# Patient Record
Sex: MALE | Race: WHITE | NOT HISPANIC OR LATINO | Employment: FULL TIME | URBAN - NONMETROPOLITAN AREA
[De-identification: names, ages, dates, MRNs, and addresses within clinical notes are randomized per-mention and may not be internally consistent; named-entity substitution may affect disease eponyms.]

---

## 2017-02-07 ENCOUNTER — OFFICE VISIT (OUTPATIENT)
Dept: RETAIL CLINIC | Facility: CLINIC | Age: 52
End: 2017-02-07

## 2017-02-07 VITALS
HEIGHT: 65 IN | SYSTOLIC BLOOD PRESSURE: 130 MMHG | RESPIRATION RATE: 16 BRPM | WEIGHT: 265.8 LBS | HEART RATE: 78 BPM | DIASTOLIC BLOOD PRESSURE: 82 MMHG | BODY MASS INDEX: 44.28 KG/M2 | TEMPERATURE: 98.4 F | OXYGEN SATURATION: 97 %

## 2017-02-07 DIAGNOSIS — R68.89 FLU-LIKE SYMPTOMS: Primary | ICD-10-CM

## 2017-02-07 LAB
EXPIRATION DATE: NORMAL
FLUAV AG NPH QL: NEGATIVE
FLUBV AG NPH QL: NEGATIVE
INTERNAL CONTROL: NORMAL
Lab: NORMAL

## 2017-02-07 PROCEDURE — 99202 OFFICE O/P NEW SF 15 MIN: CPT | Performed by: NURSE PRACTITIONER

## 2017-02-07 PROCEDURE — 87804 INFLUENZA ASSAY W/OPTIC: CPT | Performed by: NURSE PRACTITIONER

## 2017-02-07 RX ORDER — OMEPRAZOLE 20 MG/1
1 CAPSULE, DELAYED RELEASE ORAL DAILY
COMMUNITY
Start: 2016-02-19

## 2017-02-07 RX ORDER — ROSUVASTATIN CALCIUM 10 MG/1
1 TABLET, COATED ORAL DAILY
COMMUNITY
End: 2018-11-17

## 2017-02-07 RX ORDER — LEVOTHYROXINE SODIUM 0.05 MG/1
1 TABLET ORAL DAILY
COMMUNITY

## 2017-02-07 RX ORDER — GABAPENTIN 100 MG/1
100 CAPSULE ORAL 2 TIMES DAILY
COMMUNITY
End: 2021-03-15

## 2017-02-07 RX ORDER — METOPROLOL SUCCINATE 100 MG/1
1 TABLET, EXTENDED RELEASE ORAL 2 TIMES DAILY
COMMUNITY

## 2017-02-07 RX ORDER — OSELTAMIVIR PHOSPHATE 75 MG/1
75 CAPSULE ORAL 2 TIMES DAILY
Qty: 10 CAPSULE | Refills: 0 | Status: SHIPPED | OUTPATIENT
Start: 2017-02-07 | End: 2017-02-12

## 2017-02-07 NOTE — PROGRESS NOTES
Subjective   Shankar Velasco is a 51 y.o. male.     History of Present Illness   Patient presents today with complaints of fever, chills, and body aches.  Patient states symptoms began yesterday and are still present today.  He has been fatigued.  He also has some nasal congestion.  No cough or SOA.  No sore throat.  He states there have been a lot of people out sick at work, so he is unsure if he has been exposed to the flu.    The following portions of the patient's history were reviewed and updated as appropriate: allergies, current medications, past family history, past medical history, past social history, past surgical history and problem list.    Review of Systems   Constitutional: Positive for chills, fatigue and fever.   HENT: Positive for congestion and postnasal drip. Negative for rhinorrhea, sinus pressure and sore throat.    Eyes: Negative.    Respiratory: Negative.    Musculoskeletal:        Body aches     Neurological: Positive for weakness and headaches.       Objective   Physical Exam   Constitutional: He is oriented to person, place, and time. He appears well-developed and well-nourished. He appears ill.   HENT:   Right Ear: Ear canal normal. Tympanic membrane is bulging.   Left Ear: Ear canal normal. Tympanic membrane is bulging.   Nose: Nose normal.   Mouth/Throat: Uvula is midline, oropharynx is clear and moist and mucous membranes are normal. Tonsils are 0 on the right. Tonsils are 0 on the left.   Cardiovascular: Regular rhythm and normal heart sounds.    Pulmonary/Chest: Effort normal and breath sounds normal.   Lymphadenopathy:     He has no cervical adenopathy.   Neurological: He is alert and oriented to person, place, and time.   Skin: Skin is warm and dry. There is pallor.   Vitals reviewed.      Assessment/Plan   Shankar was seen today for nasal congestion, fever and generalized body aches.    Diagnoses and all orders for this visit:    Flu-like symptoms  -     POC Influenza A / B  -      oseltamivir (TAMIFLU) 75 MG capsule; Take 1 capsule by mouth 2 (Two) Times a Day for 5 days.      Will treat patient based on symptoms.  If symptoms are not improving in next 48-72 hours, follow up with PCP.

## 2017-03-03 RX ORDER — LEVOTHYROXINE SODIUM 0.05 MG/1
50 TABLET ORAL DAILY
Qty: 30 TABLET | Refills: 3 | Status: SHIPPED | OUTPATIENT
Start: 2017-03-03 | End: 2017-04-19 | Stop reason: SDUPTHER

## 2017-03-03 RX ORDER — OMEPRAZOLE 20 MG/1
20 CAPSULE, DELAYED RELEASE ORAL DAILY
Qty: 90 CAPSULE | Refills: 3 | Status: SHIPPED | OUTPATIENT
Start: 2017-03-03 | End: 2017-04-19 | Stop reason: SDUPTHER

## 2017-03-03 RX ORDER — ROSUVASTATIN CALCIUM 40 MG/1
40 TABLET, COATED ORAL DAILY
Qty: 30 TABLET | Refills: 3 | Status: SHIPPED | OUTPATIENT
Start: 2017-03-03 | End: 2017-04-19

## 2017-03-20 RX ORDER — PRAVASTATIN SODIUM 40 MG
TABLET ORAL
Qty: 30 TABLET | Refills: 0 | Status: SHIPPED | OUTPATIENT
Start: 2017-03-20 | End: 2017-04-19

## 2017-03-24 ENCOUNTER — HOSPITAL ENCOUNTER (OUTPATIENT)
Age: 52
Setting detail: OBSERVATION
Discharge: HOME | End: 2017-03-26
Attending: EMERGENCY MEDICINE | Admitting: HOSPITALIST
Payer: COMMERCIAL

## 2017-03-24 ENCOUNTER — APPOINTMENT (OUTPATIENT)
Dept: CT IMAGING | Age: 52
End: 2017-03-24
Payer: COMMERCIAL

## 2017-03-24 ENCOUNTER — APPOINTMENT (OUTPATIENT)
Dept: GENERAL RADIOLOGY | Age: 52
End: 2017-03-24
Payer: COMMERCIAL

## 2017-03-24 DIAGNOSIS — G45.9 TRANSIENT CEREBRAL ISCHEMIA, UNSPECIFIED TYPE: Primary | ICD-10-CM

## 2017-03-24 LAB
ALBUMIN SERPL-MCNC: 4.4 G/DL (ref 3.5–5.2)
ALP BLD-CCNC: 55 U/L (ref 40–130)
ALT SERPL-CCNC: 26 U/L (ref 5–41)
ANION GAP SERPL CALCULATED.3IONS-SCNC: 12 MMOL/L (ref 7–19)
AST SERPL-CCNC: 19 U/L (ref 5–40)
BACTERIA: NEGATIVE /HPF
BASOPHILS ABSOLUTE: 0.1 K/UL (ref 0–0.2)
BASOPHILS RELATIVE PERCENT: 0.6 % (ref 0–1)
BILIRUB SERPL-MCNC: 0.3 MG/DL (ref 0.2–1.2)
BILIRUBIN URINE: NEGATIVE
BLOOD, URINE: ABNORMAL
BUN BLDV-MCNC: 13 MG/DL (ref 6–20)
CALCIUM SERPL-MCNC: 9 MG/DL (ref 8.6–10)
CHLORIDE BLD-SCNC: 104 MMOL/L (ref 98–111)
CLARITY: CLEAR
CO2: 27 MMOL/L (ref 22–29)
COLOR: YELLOW
CREAT SERPL-MCNC: 0.7 MG/DL (ref 0.5–1.2)
EOSINOPHILS ABSOLUTE: 0.1 K/UL (ref 0–0.6)
EOSINOPHILS RELATIVE PERCENT: 0.7 % (ref 0–5)
EPITHELIAL CELLS, UA: 0 /HPF (ref 0–5)
GFR NON-AFRICAN AMERICAN: >60
GLOBULIN: 3 G/DL
GLUCOSE BLD-MCNC: 105 MG/DL (ref 74–109)
GLUCOSE URINE: NEGATIVE MG/DL
HCT VFR BLD CALC: 41.7 % (ref 42–52)
HEMOGLOBIN: 14.3 G/DL (ref 14–18)
HYALINE CASTS: 0 /HPF (ref 0–8)
INR BLD: 1.05 (ref 0.88–1.18)
KETONES, URINE: NEGATIVE MG/DL
LACTIC ACID: 1.1 MG/DL (ref 0.5–1.9)
LEUKOCYTE ESTERASE, URINE: NEGATIVE
LYMPHOCYTES ABSOLUTE: 2.9 K/UL (ref 1.1–4.5)
LYMPHOCYTES RELATIVE PERCENT: 26.3 % (ref 20–40)
MCH RBC QN AUTO: 30.5 PG (ref 27–31)
MCHC RBC AUTO-ENTMCNC: 34.3 G/DL (ref 33–37)
MCV RBC AUTO: 88.9 FL (ref 80–94)
MONOCYTES ABSOLUTE: 0.8 K/UL (ref 0–0.9)
MONOCYTES RELATIVE PERCENT: 7 % (ref 0–10)
NEUTROPHILS ABSOLUTE: 7.1 K/UL (ref 1.5–7.5)
NEUTROPHILS RELATIVE PERCENT: 65.4 % (ref 50–65)
NITRITE, URINE: NEGATIVE
PDW BLD-RTO: 12.9 % (ref 11.5–14.5)
PERFORMED ON: NORMAL
PH UA: 5.5
PLATELET # BLD: 247 K/UL (ref 130–400)
PMV BLD AUTO: 10.9 FL (ref 7.4–10.4)
POC TROPONIN I: 0 NG/ML (ref 0–0.08)
POTASSIUM SERPL-SCNC: 3.7 MMOL/L (ref 3.5–5)
PROTEIN UA: NEGATIVE MG/DL
PROTHROMBIN TIME: 13.7 SEC (ref 12–14.6)
RBC # BLD: 4.69 M/UL (ref 4.7–6.1)
RBC UA: 1 /HPF (ref 0–4)
SODIUM BLD-SCNC: 143 MMOL/L (ref 136–145)
SPECIFIC GRAVITY UA: 1.02
TOTAL PROTEIN: 7.4 G/DL (ref 6.6–8.7)
UROBILINOGEN, URINE: 0.2 E.U./DL
WBC # BLD: 10.8 K/UL (ref 4.8–10.8)
WBC UA: 1 /HPF (ref 0–5)

## 2017-03-24 PROCEDURE — G0378 HOSPITAL OBSERVATION PER HR: HCPCS

## 2017-03-24 PROCEDURE — 6370000000 HC RX 637 (ALT 250 FOR IP): Performed by: HOSPITALIST

## 2017-03-24 PROCEDURE — 85610 PROTHROMBIN TIME: CPT

## 2017-03-24 PROCEDURE — 83605 ASSAY OF LACTIC ACID: CPT

## 2017-03-24 PROCEDURE — 70450 CT HEAD/BRAIN W/O DYE: CPT

## 2017-03-24 PROCEDURE — 99284 EMERGENCY DEPT VISIT MOD MDM: CPT | Performed by: EMERGENCY MEDICINE

## 2017-03-24 PROCEDURE — 36415 COLL VENOUS BLD VENIPUNCTURE: CPT

## 2017-03-24 PROCEDURE — 6370000000 HC RX 637 (ALT 250 FOR IP): Performed by: EMERGENCY MEDICINE

## 2017-03-24 PROCEDURE — 85025 COMPLETE CBC W/AUTO DIFF WBC: CPT

## 2017-03-24 PROCEDURE — 93005 ELECTROCARDIOGRAM TRACING: CPT

## 2017-03-24 PROCEDURE — 84484 ASSAY OF TROPONIN QUANT: CPT

## 2017-03-24 PROCEDURE — 80053 COMPREHEN METABOLIC PANEL: CPT

## 2017-03-24 PROCEDURE — 71010 XR CHEST PORTABLE: CPT

## 2017-03-24 PROCEDURE — 81001 URINALYSIS AUTO W/SCOPE: CPT

## 2017-03-24 PROCEDURE — 99285 EMERGENCY DEPT VISIT HI MDM: CPT

## 2017-03-24 RX ORDER — GABAPENTIN 100 MG/1
100 CAPSULE ORAL 3 TIMES DAILY
Status: DISCONTINUED | OUTPATIENT
Start: 2017-03-24 | End: 2017-03-26 | Stop reason: HOSPADM

## 2017-03-24 RX ORDER — ASPIRIN 81 MG/1
324 TABLET, CHEWABLE ORAL ONCE
Status: COMPLETED | OUTPATIENT
Start: 2017-03-24 | End: 2017-03-24

## 2017-03-24 RX ORDER — LEVOTHYROXINE SODIUM 0.05 MG/1
50 TABLET ORAL DAILY
Status: DISCONTINUED | OUTPATIENT
Start: 2017-03-25 | End: 2017-03-26 | Stop reason: HOSPADM

## 2017-03-24 RX ORDER — PRAVASTATIN SODIUM 20 MG
80 TABLET ORAL NIGHTLY
Status: DISCONTINUED | OUTPATIENT
Start: 2017-03-24 | End: 2017-03-26 | Stop reason: HOSPADM

## 2017-03-24 RX ORDER — GABAPENTIN 100 MG/1
100 CAPSULE ORAL 3 TIMES DAILY
COMMUNITY
End: 2017-04-19 | Stop reason: SDUPTHER

## 2017-03-24 RX ORDER — PANTOPRAZOLE SODIUM 40 MG/1
40 TABLET, DELAYED RELEASE ORAL
Status: DISCONTINUED | OUTPATIENT
Start: 2017-03-25 | End: 2017-03-26 | Stop reason: HOSPADM

## 2017-03-24 RX ADMIN — ASPIRIN 81 MG CHEWABLE TABLET 324 MG: 81 TABLET CHEWABLE at 21:22

## 2017-03-24 RX ADMIN — METOPROLOL TARTRATE 25 MG: 25 TABLET ORAL at 23:54

## 2017-03-24 RX ADMIN — PRAVASTATIN SODIUM 80 MG: 20 TABLET ORAL at 23:54

## 2017-03-24 RX ADMIN — GABAPENTIN 100 MG: 100 CAPSULE ORAL at 23:54

## 2017-03-25 PROBLEM — K21.9 GERD (GASTROESOPHAGEAL REFLUX DISEASE): Status: ACTIVE | Noted: 2017-03-25

## 2017-03-25 PROBLEM — E03.9 HYPOTHYROIDISM: Status: ACTIVE | Noted: 2017-03-25

## 2017-03-25 PROBLEM — G47.30 UNSPECIFIED SLEEP APNEA: Status: ACTIVE | Noted: 2017-03-25

## 2017-03-25 PROBLEM — F31.9 BIPOLAR DISORDER (HCC): Status: ACTIVE | Noted: 2017-03-25

## 2017-03-25 PROBLEM — E78.5 HYPERLIPIDEMIA: Status: ACTIVE | Noted: 2017-03-25

## 2017-03-25 PROBLEM — F45.8 HYPERVENTILATION SYNDROME: Status: ACTIVE | Noted: 2017-03-25

## 2017-03-25 PROBLEM — E66.01 MORBID OBESITY (HCC): Status: ACTIVE | Noted: 2017-03-25

## 2017-03-25 PROBLEM — I10 HYPERTENSION: Status: ACTIVE | Noted: 2017-03-25

## 2017-03-25 PROBLEM — G45.9 TIA (TRANSIENT ISCHEMIC ATTACK): Status: ACTIVE | Noted: 2017-03-25

## 2017-03-25 LAB
ANION GAP SERPL CALCULATED.3IONS-SCNC: 14 MMOL/L (ref 7–19)
BUN BLDV-MCNC: 15 MG/DL (ref 6–20)
CALCIUM SERPL-MCNC: 8.7 MG/DL (ref 8.6–10)
CHLORIDE BLD-SCNC: 106 MMOL/L (ref 98–111)
CO2: 25 MMOL/L (ref 22–29)
CREAT SERPL-MCNC: 0.7 MG/DL (ref 0.5–1.2)
GFR NON-AFRICAN AMERICAN: >60
GLUCOSE BLD-MCNC: 126 MG/DL (ref 74–109)
HCT VFR BLD CALC: 40.2 % (ref 42–52)
HEMOGLOBIN: 13.5 G/DL (ref 14–18)
LV EF: 58 %
LVEF MODALITY: NORMAL
MAGNESIUM: 2.5 MG/DL (ref 1.6–2.6)
MCH RBC QN AUTO: 30.3 PG (ref 27–31)
MCHC RBC AUTO-ENTMCNC: 33.6 G/DL (ref 33–37)
MCV RBC AUTO: 90.3 FL (ref 80–94)
PDW BLD-RTO: 13.1 % (ref 11.5–14.5)
PHOSPHORUS: 4.2 MG/DL (ref 2.5–4.5)
PLATELET # BLD: 230 K/UL (ref 130–400)
PMV BLD AUTO: 11.1 FL (ref 7.4–10.4)
POTASSIUM SERPL-SCNC: 3.4 MMOL/L (ref 3.5–5)
RBC # BLD: 4.45 M/UL (ref 4.7–6.1)
SODIUM BLD-SCNC: 145 MMOL/L (ref 136–145)
TSH SERPL DL<=0.05 MIU/L-ACNC: 2.63 UIU/ML (ref 0.27–4.2)
VITAMIN B-12: 787 PG/ML (ref 211–946)
WBC # BLD: 9.4 K/UL (ref 4.8–10.8)

## 2017-03-25 PROCEDURE — 92610 EVALUATE SWALLOWING FUNCTION: CPT

## 2017-03-25 PROCEDURE — 84443 ASSAY THYROID STIM HORMONE: CPT

## 2017-03-25 PROCEDURE — 95819 EEG AWAKE AND ASLEEP: CPT

## 2017-03-25 PROCEDURE — 2580000003 HC RX 258: Performed by: HOSPITALIST

## 2017-03-25 PROCEDURE — 99220 PR INITIAL OBSERVATION CARE/DAY 70 MINUTES: CPT | Performed by: HOSPITALIST

## 2017-03-25 PROCEDURE — 93306 TTE W/DOPPLER COMPLETE: CPT

## 2017-03-25 PROCEDURE — 82607 VITAMIN B-12: CPT

## 2017-03-25 PROCEDURE — 6370000000 HC RX 637 (ALT 250 FOR IP): Performed by: HOSPITALIST

## 2017-03-25 PROCEDURE — 36415 COLL VENOUS BLD VENIPUNCTURE: CPT

## 2017-03-25 PROCEDURE — 6360000002 HC RX W HCPCS: Performed by: HOSPITALIST

## 2017-03-25 PROCEDURE — 99245 OFF/OP CONSLTJ NEW/EST HI 55: CPT | Performed by: PSYCHIATRY & NEUROLOGY

## 2017-03-25 PROCEDURE — 83735 ASSAY OF MAGNESIUM: CPT

## 2017-03-25 PROCEDURE — 85027 COMPLETE CBC AUTOMATED: CPT

## 2017-03-25 PROCEDURE — 96372 THER/PROPH/DIAG INJ SC/IM: CPT

## 2017-03-25 PROCEDURE — 95816 EEG AWAKE AND DROWSY: CPT | Performed by: PSYCHIATRY & NEUROLOGY

## 2017-03-25 PROCEDURE — 93880 EXTRACRANIAL BILAT STUDY: CPT

## 2017-03-25 PROCEDURE — 80048 BASIC METABOLIC PNL TOTAL CA: CPT

## 2017-03-25 PROCEDURE — 99999 PR OFFICE/OUTPT VISIT,PROCEDURE ONLY: CPT | Performed by: HOSPITALIST

## 2017-03-25 PROCEDURE — G0378 HOSPITAL OBSERVATION PER HR: HCPCS

## 2017-03-25 PROCEDURE — 84100 ASSAY OF PHOSPHORUS: CPT

## 2017-03-25 RX ORDER — POTASSIUM CHLORIDE 20 MEQ/1
40 TABLET, EXTENDED RELEASE ORAL ONCE
Status: COMPLETED | OUTPATIENT
Start: 2017-03-25 | End: 2017-03-25

## 2017-03-25 RX ORDER — ONDANSETRON 2 MG/ML
4 INJECTION INTRAMUSCULAR; INTRAVENOUS EVERY 6 HOURS PRN
Status: DISCONTINUED | OUTPATIENT
Start: 2017-03-25 | End: 2017-03-26 | Stop reason: HOSPADM

## 2017-03-25 RX ORDER — SODIUM CHLORIDE 0.9 % (FLUSH) 0.9 %
10 SYRINGE (ML) INJECTION EVERY 12 HOURS SCHEDULED
Status: DISCONTINUED | OUTPATIENT
Start: 2017-03-25 | End: 2017-03-26 | Stop reason: HOSPADM

## 2017-03-25 RX ORDER — SODIUM CHLORIDE 0.9 % (FLUSH) 0.9 %
10 SYRINGE (ML) INJECTION PRN
Status: DISCONTINUED | OUTPATIENT
Start: 2017-03-25 | End: 2017-03-26 | Stop reason: HOSPADM

## 2017-03-25 RX ORDER — POTASSIUM CHLORIDE 20 MEQ/1
40 TABLET, EXTENDED RELEASE ORAL EVERY 4 HOURS
Status: COMPLETED | OUTPATIENT
Start: 2017-03-25 | End: 2017-03-25

## 2017-03-25 RX ORDER — ACETAMINOPHEN 325 MG/1
650 TABLET ORAL EVERY 4 HOURS PRN
Status: DISCONTINUED | OUTPATIENT
Start: 2017-03-25 | End: 2017-03-26 | Stop reason: HOSPADM

## 2017-03-25 RX ORDER — ASPIRIN 325 MG
325 TABLET ORAL DAILY
Status: DISCONTINUED | OUTPATIENT
Start: 2017-03-25 | End: 2017-03-26 | Stop reason: HOSPADM

## 2017-03-25 RX ADMIN — POTASSIUM CHLORIDE 40 MEQ: 20 TABLET, EXTENDED RELEASE ORAL at 13:45

## 2017-03-25 RX ADMIN — ENOXAPARIN SODIUM 40 MG: 40 INJECTION SUBCUTANEOUS at 07:52

## 2017-03-25 RX ADMIN — Medication 10 ML: at 20:35

## 2017-03-25 RX ADMIN — ASPIRIN 325 MG ORAL TABLET 325 MG: 325 PILL ORAL at 07:51

## 2017-03-25 RX ADMIN — ACETAMINOPHEN 650 MG: 325 TABLET, FILM COATED ORAL at 20:35

## 2017-03-25 RX ADMIN — GABAPENTIN 100 MG: 100 CAPSULE ORAL at 07:49

## 2017-03-25 RX ADMIN — LEVOTHYROXINE SODIUM 50 MCG: 50 TABLET ORAL at 05:43

## 2017-03-25 RX ADMIN — METOPROLOL TARTRATE 25 MG: 25 TABLET ORAL at 07:50

## 2017-03-25 RX ADMIN — ACETAMINOPHEN 650 MG: 325 TABLET, FILM COATED ORAL at 05:42

## 2017-03-25 RX ADMIN — PANTOPRAZOLE SODIUM 40 MG: 40 TABLET, DELAYED RELEASE ORAL at 05:43

## 2017-03-25 RX ADMIN — GABAPENTIN 100 MG: 100 CAPSULE ORAL at 20:35

## 2017-03-25 RX ADMIN — POTASSIUM CHLORIDE 40 MEQ: 20 TABLET, EXTENDED RELEASE ORAL at 17:19

## 2017-03-25 RX ADMIN — METOPROLOL TARTRATE 25 MG: 25 TABLET ORAL at 20:35

## 2017-03-25 RX ADMIN — GABAPENTIN 100 MG: 100 CAPSULE ORAL at 13:45

## 2017-03-25 RX ADMIN — Medication 10 ML: at 07:56

## 2017-03-25 RX ADMIN — PRAVASTATIN SODIUM 80 MG: 20 TABLET ORAL at 20:35

## 2017-03-25 RX ADMIN — POTASSIUM CHLORIDE 40 MEQ: 20 TABLET, EXTENDED RELEASE ORAL at 07:50

## 2017-03-25 ASSESSMENT — ENCOUNTER SYMPTOMS
BLURRED VISION: 0
SHORTNESS OF BREATH: 0
VOMITING: 0
SPUTUM PRODUCTION: 0
ABDOMINAL PAIN: 0
NAUSEA: 0
DOUBLE VISION: 0
WHEEZING: 0
DIARRHEA: 0
SORE THROAT: 0
HEMOPTYSIS: 0
CHEST TIGHTNESS: 0
COUGH: 0
EYES NEGATIVE: 1
BACK PAIN: 1
RHINORRHEA: 0

## 2017-03-25 ASSESSMENT — PAIN SCALES - GENERAL
PAINLEVEL_OUTOF10: 10
PAINLEVEL_OUTOF10: 0
PAINLEVEL_OUTOF10: 0
PAINLEVEL_OUTOF10: 9

## 2017-03-26 VITALS
BODY MASS INDEX: 39.25 KG/M2 | HEART RATE: 63 BPM | RESPIRATION RATE: 17 BRPM | DIASTOLIC BLOOD PRESSURE: 85 MMHG | HEIGHT: 69 IN | OXYGEN SATURATION: 98 % | TEMPERATURE: 97.8 F | SYSTOLIC BLOOD PRESSURE: 143 MMHG | WEIGHT: 265 LBS

## 2017-03-26 LAB
ANION GAP SERPL CALCULATED.3IONS-SCNC: 14 MMOL/L (ref 7–19)
BASOPHILS ABSOLUTE: 0.1 K/UL (ref 0–0.2)
BASOPHILS RELATIVE PERCENT: 0.6 % (ref 0–1)
BUN BLDV-MCNC: 13 MG/DL (ref 6–20)
CALCIUM SERPL-MCNC: 8.7 MG/DL (ref 8.6–10)
CHLORIDE BLD-SCNC: 102 MMOL/L (ref 98–111)
CHOLESTEROL, TOTAL: 113 MG/DL (ref 160–199)
CO2: 24 MMOL/L (ref 22–29)
CREAT SERPL-MCNC: 0.6 MG/DL (ref 0.5–1.2)
EOSINOPHILS ABSOLUTE: 0.2 K/UL (ref 0–0.6)
EOSINOPHILS RELATIVE PERCENT: 1.9 % (ref 0–5)
GFR NON-AFRICAN AMERICAN: >60
GLUCOSE BLD-MCNC: 129 MG/DL (ref 74–109)
HBA1C MFR BLD: 5.3 %
HCT VFR BLD CALC: 42.2 % (ref 42–52)
HDLC SERPL-MCNC: 23 MG/DL (ref 55–121)
HEMOGLOBIN: 13.4 G/DL (ref 14–18)
LDL CHOLESTEROL CALCULATED: 20 MG/DL
LYMPHOCYTES ABSOLUTE: 2.9 K/UL (ref 1.1–4.5)
LYMPHOCYTES RELATIVE PERCENT: 34.7 % (ref 20–40)
MCH RBC QN AUTO: 29.5 PG (ref 27–31)
MCHC RBC AUTO-ENTMCNC: 31.8 G/DL (ref 33–37)
MCV RBC AUTO: 93 FL (ref 80–94)
MONOCYTES ABSOLUTE: 0.5 K/UL (ref 0–0.9)
MONOCYTES RELATIVE PERCENT: 5.7 % (ref 0–10)
NEUTROPHILS ABSOLUTE: 4.7 K/UL (ref 1.5–7.5)
NEUTROPHILS RELATIVE PERCENT: 56.4 % (ref 50–65)
PDW BLD-RTO: 13.4 % (ref 11.5–14.5)
PLATELET # BLD: 225 K/UL (ref 130–400)
PMV BLD AUTO: 11.4 FL (ref 7.4–10.4)
POTASSIUM SERPL-SCNC: 3.8 MMOL/L (ref 3.5–5)
RBC # BLD: 4.54 M/UL (ref 4.7–6.1)
SODIUM BLD-SCNC: 140 MMOL/L (ref 136–145)
TRIGL SERPL-MCNC: 348 MG/DL (ref 150–199)
WBC # BLD: 8.3 K/UL (ref 4.8–10.8)

## 2017-03-26 PROCEDURE — 99217 PR OBSERVATION CARE DISCHARGE MANAGEMENT: CPT | Performed by: HOSPITALIST

## 2017-03-26 PROCEDURE — 94762 N-INVAS EAR/PLS OXIMTRY CONT: CPT

## 2017-03-26 PROCEDURE — G8978 MOBILITY CURRENT STATUS: HCPCS

## 2017-03-26 PROCEDURE — 6370000000 HC RX 637 (ALT 250 FOR IP): Performed by: HOSPITALIST

## 2017-03-26 PROCEDURE — G8979 MOBILITY GOAL STATUS: HCPCS

## 2017-03-26 PROCEDURE — G0378 HOSPITAL OBSERVATION PER HR: HCPCS

## 2017-03-26 PROCEDURE — 6360000002 HC RX W HCPCS: Performed by: HOSPITALIST

## 2017-03-26 PROCEDURE — 2580000003 HC RX 258: Performed by: HOSPITALIST

## 2017-03-26 PROCEDURE — 97161 PT EVAL LOW COMPLEX 20 MIN: CPT

## 2017-03-26 PROCEDURE — G8980 MOBILITY D/C STATUS: HCPCS

## 2017-03-26 PROCEDURE — 99213 OFFICE O/P EST LOW 20 MIN: CPT | Performed by: PSYCHIATRY & NEUROLOGY

## 2017-03-26 PROCEDURE — 96372 THER/PROPH/DIAG INJ SC/IM: CPT

## 2017-03-26 PROCEDURE — 36415 COLL VENOUS BLD VENIPUNCTURE: CPT

## 2017-03-26 PROCEDURE — 80061 LIPID PANEL: CPT

## 2017-03-26 PROCEDURE — 80048 BASIC METABOLIC PNL TOTAL CA: CPT

## 2017-03-26 PROCEDURE — 83036 HEMOGLOBIN GLYCOSYLATED A1C: CPT

## 2017-03-26 PROCEDURE — 85025 COMPLETE CBC W/AUTO DIFF WBC: CPT

## 2017-03-26 RX ORDER — ASPIRIN 325 MG
325 TABLET ORAL DAILY
Qty: 30 TABLET | Refills: 3 | COMMUNITY
Start: 2017-03-26 | End: 2017-04-19 | Stop reason: SDUPTHER

## 2017-03-26 RX ADMIN — ENOXAPARIN SODIUM 40 MG: 40 INJECTION SUBCUTANEOUS at 09:05

## 2017-03-26 RX ADMIN — GABAPENTIN 100 MG: 100 CAPSULE ORAL at 09:05

## 2017-03-26 RX ADMIN — GABAPENTIN 100 MG: 100 CAPSULE ORAL at 13:47

## 2017-03-26 RX ADMIN — LEVOTHYROXINE SODIUM 50 MCG: 50 TABLET ORAL at 05:48

## 2017-03-26 RX ADMIN — PANTOPRAZOLE SODIUM 40 MG: 40 TABLET, DELAYED RELEASE ORAL at 05:48

## 2017-03-26 RX ADMIN — ASPIRIN 325 MG ORAL TABLET 325 MG: 325 PILL ORAL at 09:05

## 2017-03-26 RX ADMIN — METOPROLOL TARTRATE 25 MG: 25 TABLET ORAL at 09:05

## 2017-03-26 RX ADMIN — Medication 10 ML: at 09:05

## 2017-03-28 LAB
EKG P AXIS: 44 DEGREES
EKG P-R INTERVAL: 150 MS
EKG Q-T INTERVAL: 398 MS
EKG QRS DURATION: 90 MS
EKG QTC CALCULATION (BAZETT): 422 MS
EKG T AXIS: 15 DEGREES

## 2017-03-31 ENCOUNTER — APPOINTMENT (OUTPATIENT)
Dept: GENERAL RADIOLOGY | Age: 52
End: 2017-03-31
Payer: COMMERCIAL

## 2017-03-31 ENCOUNTER — HOSPITAL ENCOUNTER (EMERGENCY)
Age: 52
Discharge: HOME | End: 2017-03-31
Attending: EMERGENCY MEDICINE
Payer: COMMERCIAL

## 2017-03-31 ENCOUNTER — TELEPHONE (OUTPATIENT)
Dept: URGENT CARE | Age: 52
End: 2017-03-31

## 2017-03-31 VITALS
HEART RATE: 74 BPM | BODY MASS INDEX: 39.25 KG/M2 | WEIGHT: 265 LBS | DIASTOLIC BLOOD PRESSURE: 109 MMHG | RESPIRATION RATE: 20 BRPM | HEIGHT: 69 IN | OXYGEN SATURATION: 96 % | TEMPERATURE: 97.9 F | SYSTOLIC BLOOD PRESSURE: 184 MMHG

## 2017-03-31 DIAGNOSIS — I10 ESSENTIAL HYPERTENSION: Primary | ICD-10-CM

## 2017-03-31 LAB
ALBUMIN SERPL-MCNC: 4.5 G/DL (ref 3.5–5.2)
ALP BLD-CCNC: 55 U/L (ref 40–130)
ALT SERPL-CCNC: 23 U/L (ref 5–41)
ANION GAP SERPL CALCULATED.3IONS-SCNC: 12 MMOL/L (ref 7–19)
AST SERPL-CCNC: 17 U/L (ref 5–40)
BASOPHILS ABSOLUTE: 0 K/UL (ref 0–0.2)
BASOPHILS RELATIVE PERCENT: 0.4 % (ref 0–1)
BILIRUB SERPL-MCNC: 0.4 MG/DL (ref 0.2–1.2)
BUN BLDV-MCNC: 16 MG/DL (ref 6–20)
CALCIUM SERPL-MCNC: 8.9 MG/DL (ref 8.6–10)
CHLORIDE BLD-SCNC: 102 MMOL/L (ref 98–111)
CO2: 26 MMOL/L (ref 22–29)
CREAT SERPL-MCNC: 0.7 MG/DL (ref 0.5–1.2)
EOSINOPHILS ABSOLUTE: 0.1 K/UL (ref 0–0.6)
EOSINOPHILS RELATIVE PERCENT: 1 % (ref 0–5)
GFR NON-AFRICAN AMERICAN: >60
GLOBULIN: 2.7 G/DL
GLUCOSE BLD-MCNC: 118 MG/DL (ref 74–109)
HCT VFR BLD CALC: 41.4 % (ref 42–52)
HEMOGLOBIN: 14.3 G/DL (ref 14–18)
LYMPHOCYTES ABSOLUTE: 2.4 K/UL (ref 1.1–4.5)
LYMPHOCYTES RELATIVE PERCENT: 25.2 % (ref 20–40)
MCH RBC QN AUTO: 31 PG (ref 27–31)
MCHC RBC AUTO-ENTMCNC: 34.5 G/DL (ref 33–37)
MCV RBC AUTO: 89.6 FL (ref 80–94)
MONOCYTES ABSOLUTE: 0.6 K/UL (ref 0–0.9)
MONOCYTES RELATIVE PERCENT: 6 % (ref 0–10)
NEUTROPHILS ABSOLUTE: 6.4 K/UL (ref 1.5–7.5)
NEUTROPHILS RELATIVE PERCENT: 67.4 % (ref 50–65)
PDW BLD-RTO: 12.8 % (ref 11.5–14.5)
PLATELET # BLD: 226 K/UL (ref 130–400)
PMV BLD AUTO: 11.7 FL (ref 7.4–10.4)
POTASSIUM SERPL-SCNC: 4.2 MMOL/L (ref 3.5–5)
RBC # BLD: 4.62 M/UL (ref 4.7–6.1)
SODIUM BLD-SCNC: 140 MMOL/L (ref 136–145)
TOTAL PROTEIN: 7.2 G/DL (ref 6.6–8.7)
WBC # BLD: 9.6 K/UL (ref 4.8–10.8)

## 2017-03-31 PROCEDURE — 99283 EMERGENCY DEPT VISIT LOW MDM: CPT

## 2017-03-31 PROCEDURE — 80053 COMPREHEN METABOLIC PANEL: CPT

## 2017-03-31 PROCEDURE — 36415 COLL VENOUS BLD VENIPUNCTURE: CPT

## 2017-03-31 PROCEDURE — 85025 COMPLETE CBC W/AUTO DIFF WBC: CPT

## 2017-03-31 PROCEDURE — 71010 XR CHEST PORTABLE: CPT

## 2017-03-31 PROCEDURE — 99283 EMERGENCY DEPT VISIT LOW MDM: CPT | Performed by: EMERGENCY MEDICINE

## 2017-03-31 RX ORDER — CLONIDINE HYDROCHLORIDE 0.1 MG/1
0.1 TABLET ORAL ONCE
Status: DISCONTINUED | OUTPATIENT
Start: 2017-03-31 | End: 2017-03-31 | Stop reason: HOSPADM

## 2017-03-31 RX ORDER — CLONIDINE HYDROCHLORIDE 0.1 MG/1
0.1 TABLET ORAL 3 TIMES DAILY
Qty: 20 TABLET | Refills: 0 | Status: SHIPPED | OUTPATIENT
Start: 2017-03-31 | End: 2017-04-19

## 2017-03-31 ASSESSMENT — ENCOUNTER SYMPTOMS
EYE REDNESS: 0
EYE ITCHING: 0
EYES NEGATIVE: 1
COUGH: 0
RESPIRATORY NEGATIVE: 1
FACIAL SWELLING: 0
ANAL BLEEDING: 0
ALLERGIC/IMMUNOLOGIC NEGATIVE: 1
APNEA: 0
CHOKING: 0

## 2017-04-19 ENCOUNTER — OFFICE VISIT (OUTPATIENT)
Dept: PRIMARY CARE CLINIC | Age: 52
End: 2017-04-19
Payer: COMMERCIAL

## 2017-04-19 VITALS
TEMPERATURE: 98 F | SYSTOLIC BLOOD PRESSURE: 164 MMHG | WEIGHT: 264 LBS | DIASTOLIC BLOOD PRESSURE: 90 MMHG | HEART RATE: 88 BPM | BODY MASS INDEX: 39.1 KG/M2 | HEIGHT: 69 IN | RESPIRATION RATE: 20 BRPM | OXYGEN SATURATION: 98 %

## 2017-04-19 DIAGNOSIS — I10 ESSENTIAL HYPERTENSION: ICD-10-CM

## 2017-04-19 DIAGNOSIS — G47.30 UNSPECIFIED SLEEP APNEA: ICD-10-CM

## 2017-04-19 DIAGNOSIS — E66.01 MORBID OBESITY DUE TO EXCESS CALORIES (HCC): ICD-10-CM

## 2017-04-19 DIAGNOSIS — N52.01 ERECTILE DYSFUNCTION DUE TO ARTERIAL INSUFFICIENCY: ICD-10-CM

## 2017-04-19 DIAGNOSIS — E34.9 TESTOSTERONE DEFICIENCY: ICD-10-CM

## 2017-04-19 DIAGNOSIS — Z00.00 ROUTINE GENERAL MEDICAL EXAMINATION AT A HEALTH CARE FACILITY: Primary | ICD-10-CM

## 2017-04-19 DIAGNOSIS — R68.82 DECREASED LIBIDO: ICD-10-CM

## 2017-04-19 DIAGNOSIS — E03.1 CONGENITAL HYPOTHYROIDISM WITHOUT GOITER: ICD-10-CM

## 2017-04-19 DIAGNOSIS — G45.8 OTHER SPECIFIED TRANSIENT CEREBRAL ISCHEMIAS: ICD-10-CM

## 2017-04-19 PROBLEM — E78.5 HYPERLIPIDEMIA: Status: RESOLVED | Noted: 2017-03-25 | Resolved: 2017-04-19

## 2017-04-19 PROBLEM — F45.8 HYPERVENTILATION SYNDROME: Status: RESOLVED | Noted: 2017-03-25 | Resolved: 2017-04-19

## 2017-04-19 PROBLEM — E03.9 HYPOTHYROIDISM: Status: RESOLVED | Noted: 2017-03-25 | Resolved: 2017-04-19

## 2017-04-19 PROCEDURE — 99396 PREV VISIT EST AGE 40-64: CPT | Performed by: FAMILY MEDICINE

## 2017-04-19 PROCEDURE — 99214 OFFICE O/P EST MOD 30 MIN: CPT | Performed by: FAMILY MEDICINE

## 2017-04-19 RX ORDER — GABAPENTIN 100 MG/1
100 CAPSULE ORAL 3 TIMES DAILY
Qty: 270 CAPSULE | Refills: 1 | Status: SHIPPED | OUTPATIENT
Start: 2017-04-19 | End: 2018-06-01 | Stop reason: SDUPTHER

## 2017-04-19 RX ORDER — LISINOPRIL AND HYDROCHLOROTHIAZIDE 20; 12.5 MG/1; MG/1
1 TABLET ORAL DAILY
Qty: 30 TABLET | Refills: 3 | Status: SHIPPED | OUTPATIENT
Start: 2017-04-19 | End: 2017-05-17

## 2017-04-19 RX ORDER — OMEPRAZOLE 20 MG/1
20 CAPSULE, DELAYED RELEASE ORAL DAILY
Qty: 90 CAPSULE | Refills: 3 | Status: SHIPPED | OUTPATIENT
Start: 2017-04-19 | End: 2018-06-01 | Stop reason: SDUPTHER

## 2017-04-19 RX ORDER — LEVOTHYROXINE SODIUM 0.05 MG/1
50 TABLET ORAL DAILY
Qty: 30 TABLET | Refills: 3 | Status: SHIPPED | OUTPATIENT
Start: 2017-04-19 | End: 2017-11-06 | Stop reason: SDUPTHER

## 2017-04-19 RX ORDER — ASPIRIN 325 MG
325 TABLET ORAL DAILY
Qty: 30 TABLET | Refills: 3 | Status: SHIPPED | OUTPATIENT
Start: 2017-04-19 | End: 2018-06-01 | Stop reason: SDUPTHER

## 2017-04-19 RX ORDER — PRAVASTATIN SODIUM 40 MG
TABLET ORAL
Qty: 30 TABLET | Refills: 3 | Status: CANCELLED | OUTPATIENT
Start: 2017-04-19

## 2017-04-19 RX ORDER — ATORVASTATIN CALCIUM 40 MG/1
40 TABLET, FILM COATED ORAL DAILY
Qty: 30 TABLET | Refills: 3 | Status: SHIPPED | OUTPATIENT
Start: 2017-04-19 | End: 2017-09-07 | Stop reason: SDUPTHER

## 2017-04-19 ASSESSMENT — ENCOUNTER SYMPTOMS
CHEST TIGHTNESS: 0
EYE PAIN: 0
RHINORRHEA: 0
VOMITING: 0
SORE THROAT: 0
ABDOMINAL PAIN: 0
TROUBLE SWALLOWING: 0
PHOTOPHOBIA: 0
CONSTIPATION: 0
DIARRHEA: 0
NAUSEA: 0
COUGH: 0
COLOR CHANGE: 0
SHORTNESS OF BREATH: 0
WHEEZING: 0

## 2017-04-27 ENCOUNTER — TELEPHONE (OUTPATIENT)
Dept: PRIMARY CARE CLINIC | Age: 52
End: 2017-04-27

## 2017-04-28 ENCOUNTER — OFFICE VISIT (OUTPATIENT)
Dept: PRIMARY CARE CLINIC | Age: 52
End: 2017-04-28
Payer: COMMERCIAL

## 2017-04-28 VITALS
WEIGHT: 272 LBS | TEMPERATURE: 98.1 F | OXYGEN SATURATION: 95 % | RESPIRATION RATE: 18 BRPM | SYSTOLIC BLOOD PRESSURE: 152 MMHG | DIASTOLIC BLOOD PRESSURE: 96 MMHG | BODY MASS INDEX: 40.29 KG/M2 | HEART RATE: 70 BPM | HEIGHT: 69 IN

## 2017-04-28 DIAGNOSIS — E66.01 MORBID OBESITY DUE TO EXCESS CALORIES (HCC): ICD-10-CM

## 2017-04-28 DIAGNOSIS — G45.8 OTHER SPECIFIED TRANSIENT CEREBRAL ISCHEMIAS: ICD-10-CM

## 2017-04-28 DIAGNOSIS — I10 ESSENTIAL HYPERTENSION: Primary | ICD-10-CM

## 2017-04-28 PROCEDURE — 99213 OFFICE O/P EST LOW 20 MIN: CPT | Performed by: NURSE PRACTITIONER

## 2017-04-28 RX ORDER — AMLODIPINE BESYLATE 2.5 MG/1
2.5 TABLET ORAL DAILY
Qty: 30 TABLET | Refills: 3 | Status: SHIPPED | OUTPATIENT
Start: 2017-04-28 | End: 2017-05-17

## 2017-04-28 ASSESSMENT — ENCOUNTER SYMPTOMS
GASTROINTESTINAL NEGATIVE: 1
RESPIRATORY NEGATIVE: 1
EYES NEGATIVE: 1
ALLERGIC/IMMUNOLOGIC NEGATIVE: 1

## 2017-05-17 ENCOUNTER — OFFICE VISIT (OUTPATIENT)
Dept: PRIMARY CARE CLINIC | Age: 52
End: 2017-05-17
Payer: COMMERCIAL

## 2017-05-17 VITALS
SYSTOLIC BLOOD PRESSURE: 128 MMHG | TEMPERATURE: 98 F | RESPIRATION RATE: 18 BRPM | DIASTOLIC BLOOD PRESSURE: 66 MMHG | HEART RATE: 93 BPM | WEIGHT: 272.6 LBS | OXYGEN SATURATION: 96 % | BODY MASS INDEX: 40.38 KG/M2 | HEIGHT: 69 IN

## 2017-05-17 DIAGNOSIS — E66.01 MORBID OBESITY DUE TO EXCESS CALORIES (HCC): ICD-10-CM

## 2017-05-17 DIAGNOSIS — I10 ESSENTIAL HYPERTENSION: Primary | ICD-10-CM

## 2017-05-17 DIAGNOSIS — Z91.89 SEDENTARY LIFESTYLE: ICD-10-CM

## 2017-05-17 DIAGNOSIS — K40.90 INGUINAL HERNIA OF LEFT SIDE WITHOUT OBSTRUCTION OR GANGRENE: ICD-10-CM

## 2017-05-17 PROCEDURE — 99214 OFFICE O/P EST MOD 30 MIN: CPT | Performed by: FAMILY MEDICINE

## 2017-05-17 RX ORDER — AMLODIPINE BESYLATE 2.5 MG/1
2.5 TABLET ORAL DAILY
Qty: 90 TABLET | Refills: 0 | Status: SHIPPED | OUTPATIENT
Start: 2017-05-17 | End: 2017-09-11 | Stop reason: DRUGHIGH

## 2017-05-17 RX ORDER — LISINOPRIL AND HYDROCHLOROTHIAZIDE 25; 20 MG/1; MG/1
1 TABLET ORAL DAILY
Qty: 90 TABLET | Refills: 1 | Status: SHIPPED | OUTPATIENT
Start: 2017-05-17 | End: 2017-11-06 | Stop reason: SDUPTHER

## 2017-05-17 ASSESSMENT — ENCOUNTER SYMPTOMS
PHOTOPHOBIA: 0
COLOR CHANGE: 0
VOMITING: 0
CONSTIPATION: 0
RHINORRHEA: 0
EYE PAIN: 0
SHORTNESS OF BREATH: 0
CHEST TIGHTNESS: 0
NAUSEA: 0
ABDOMINAL PAIN: 0
WHEEZING: 0
TROUBLE SWALLOWING: 0
DIARRHEA: 0
SORE THROAT: 0
COUGH: 0

## 2017-09-07 RX ORDER — ATORVASTATIN CALCIUM 40 MG/1
TABLET, FILM COATED ORAL
Qty: 30 TABLET | Refills: 0 | Status: SHIPPED | OUTPATIENT
Start: 2017-09-07 | End: 2018-06-01 | Stop reason: SDUPTHER

## 2017-09-11 DIAGNOSIS — I10 ESSENTIAL HYPERTENSION: ICD-10-CM

## 2017-09-11 RX ORDER — AMLODIPINE BESYLATE 5 MG/1
5 TABLET ORAL DAILY
Qty: 30 TABLET | Refills: 3 | Status: SHIPPED | OUTPATIENT
Start: 2017-09-11 | End: 2017-11-06 | Stop reason: SDUPTHER

## 2017-09-11 RX ORDER — AMLODIPINE BESYLATE 2.5 MG/1
2.5 TABLET ORAL DAILY
Qty: 90 TABLET | Refills: 1 | Status: CANCELLED | OUTPATIENT
Start: 2017-09-11

## 2017-11-06 ENCOUNTER — OFFICE VISIT (OUTPATIENT)
Dept: PRIMARY CARE CLINIC | Age: 52
End: 2017-11-06
Payer: COMMERCIAL

## 2017-11-06 VITALS
SYSTOLIC BLOOD PRESSURE: 140 MMHG | DIASTOLIC BLOOD PRESSURE: 80 MMHG | TEMPERATURE: 98 F | RESPIRATION RATE: 20 BRPM | BODY MASS INDEX: 40.17 KG/M2 | WEIGHT: 272 LBS

## 2017-11-06 DIAGNOSIS — G89.29 CHRONIC PAIN OF LEFT ANKLE: ICD-10-CM

## 2017-11-06 DIAGNOSIS — R20.8 BURNING SENSATION OF MOUTH: Primary | ICD-10-CM

## 2017-11-06 DIAGNOSIS — L73.9 FOLLICULITIS: ICD-10-CM

## 2017-11-06 DIAGNOSIS — B07.9 VIRAL WARTS, UNSPECIFIED TYPE: ICD-10-CM

## 2017-11-06 DIAGNOSIS — B37.0 THRUSH: ICD-10-CM

## 2017-11-06 DIAGNOSIS — M25.572 CHRONIC PAIN OF LEFT ANKLE: ICD-10-CM

## 2017-11-06 PROCEDURE — G8417 CALC BMI ABV UP PARAM F/U: HCPCS | Performed by: FAMILY MEDICINE

## 2017-11-06 PROCEDURE — 99214 OFFICE O/P EST MOD 30 MIN: CPT | Performed by: FAMILY MEDICINE

## 2017-11-06 PROCEDURE — G8484 FLU IMMUNIZE NO ADMIN: HCPCS | Performed by: FAMILY MEDICINE

## 2017-11-06 PROCEDURE — G8427 DOCREV CUR MEDS BY ELIG CLIN: HCPCS | Performed by: FAMILY MEDICINE

## 2017-11-06 PROCEDURE — 1036F TOBACCO NON-USER: CPT | Performed by: FAMILY MEDICINE

## 2017-11-06 PROCEDURE — 3017F COLORECTAL CA SCREEN DOC REV: CPT | Performed by: FAMILY MEDICINE

## 2017-11-06 RX ORDER — LEVOTHYROXINE SODIUM 0.05 MG/1
50 TABLET ORAL DAILY
Qty: 90 TABLET | Refills: 3 | Status: SHIPPED | OUTPATIENT
Start: 2017-11-06 | End: 2018-06-01 | Stop reason: SDUPTHER

## 2017-11-06 RX ORDER — AMLODIPINE BESYLATE 10 MG/1
10 TABLET ORAL DAILY
Qty: 90 TABLET | Refills: 3 | Status: SHIPPED | OUTPATIENT
Start: 2017-11-06 | End: 2018-06-01 | Stop reason: SDUPTHER

## 2017-11-06 RX ORDER — LISINOPRIL AND HYDROCHLOROTHIAZIDE 25; 20 MG/1; MG/1
1 TABLET ORAL DAILY
Qty: 90 TABLET | Refills: 1 | Status: SHIPPED | OUTPATIENT
Start: 2017-11-06 | End: 2018-06-01 | Stop reason: SDUPTHER

## 2017-11-06 ASSESSMENT — ENCOUNTER SYMPTOMS
SHORTNESS OF BREATH: 0
NAUSEA: 0
DIARRHEA: 0
COUGH: 0
CONSTIPATION: 0
ABDOMINAL PAIN: 0
WHEEZING: 0
CHEST TIGHTNESS: 0
VOMITING: 0

## 2017-11-06 NOTE — PATIENT INSTRUCTIONS
Please call if you do not hear about your referal to orthopedic surgery for second opinion within 10 days. Use thrush for 1 week. Follow up as needed or in 7 days if not improving.

## 2017-11-06 NOTE — PROGRESS NOTES
Jesu Cerda is a 47 y.o. male who presents today for   Chief Complaint   Patient presents with    Gastroesophageal Reflux     taking medication but it isnt working well       HPI  Patient is here for Burning sensation in throat mouth lips. Denies any swelling. Has increase his lisinopril hydrochlorothiazide after having high blood pressure at home. Denies any dysphagia. This is a constant pain that is not related to meals or bedtime he states. Does have history of uvulectomy. No paresthesias to the skin. Complaining of rashes well on back as well as multiple warts on the hands. Has seen Dr. Reynaldo Ricci of podiatry. He would like to have a second opinion as he still having some ankle pain. He wanted to do conservative therapy at that time. Has history of subchondral cyst and possibly an osteochondroma noted on MRI    No change in PMH, family, social, or surgical history unless mentioned above. Review of Systems   Constitutional: Negative for chills and fever. Respiratory: Negative for cough, chest tightness, shortness of breath and wheezing. Cardiovascular: Negative for chest pain, palpitations and leg swelling. Gastrointestinal: Negative for abdominal pain, constipation, diarrhea, nausea and vomiting. Genitourinary: Negative for difficulty urinating, dysuria and frequency. Musculoskeletal: Positive for arthralgias. Skin: Positive for rash.        Past Medical History:   Diagnosis Date    Bipolar disorder ([facility] [address]     Colon polyps     Diverticular disease     GERD (gastroesophageal reflux disease)     Hiatal hernia     Hyperlipidemia     Hypertension     Hypothyroidism     Neuropathy (HCC)     Unspecified sleep apnea        Current Outpatient Prescriptions   Medication Sig Dispense Refill    levothyroxine (LEVOTHROID) 50 MCG tablet Take 1 tablet by mouth daily 90 tablet 3    lisinopril-hydrochlorothiazide (PRINZIDE;ZESTORETIC) 20-25 MG per tablet Take 1 tablet by mouth daily 90 tablet 1    nystatin (MYCOSTATIN) 873807 UNIT/ML suspension Take 5 mLs by mouth 4 times daily 473 mL 0    amLODIPine (NORVASC) 10 MG tablet Take 1 tablet by mouth daily 90 tablet 3    metoprolol tartrate (LOPRESSOR) 25 MG tablet Take 1 tablet by mouth 2 times daily 60 tablet 3    omeprazole (PRILOSEC) 20 MG delayed release capsule Take 1 capsule by mouth Daily 90 capsule 3    gabapentin (NEURONTIN) 100 MG capsule Take 1 capsule by mouth 3 times daily 270 capsule 1    aspirin 325 MG tablet Take 1 tablet by mouth daily 30 tablet 3    atorvastatin (LIPITOR) 40 MG tablet TAKE 1 TABLET BY MOUTH ONCE DAILY 30 tablet 0     No current facility-administered medications for this visit.         No Known Allergies    Past Surgical History:   Procedure Laterality Date    COLONOSCOPY  2009    Dr Savanna Cao: hyperplastic polyp, resolving diverticulitis    COLONOSCOPY  2012    minimal diverticulosis left side o/w normal postsurgical colon    COLONOSCOPY  2005    diverticulitis    COLONOSCOPY N/A 2016    Dr Hallman Cos bleeding internal hemorrhoids, diverticular disease-5 yr recall    SUBTOTAL COLECTOMY      recurrant diverticulitis    UPPER GASTROINTESTINAL ENDOSCOPY  2005    Dr Savanna Cao: duodenal ulcer, gastritis    UPPP UVULOPALATOPHARYGOPLASTY         Social History   Substance Use Topics    Smoking status: Never Smoker    Smokeless tobacco: Never Used    Alcohol use No       Family History   Problem Relation Age of Onset    Diabetes Mother     Kidney Disease Mother     Dementia Mother       at 68 - Lewy Body Dementia    Coronary Art Dis Father       at [de-identified]   [employer] Colon Cancer Neg Hx     Colon Polyps Neg Hx     Esophageal Cancer Neg Hx     Liver Cancer Neg Hx     Liver Disease Neg Hx     Rectal Cancer Neg Hx     Stomach Cancer Neg Hx        BP (!) 140/80   Temp 98 °F (36.7 °C) (Temporal)   Resp 20   Wt 272 lb (123.4 kg)   BMI 40.17 kg/m²     Physical Exam Constitutional: He is oriented to person, place, and time. He appears well-developed and well-nourished. Non-toxic appearance. No distress. HENT:   Head: Normocephalic and atraumatic. Mouth/Throat: Oral lesions (small opacities noted on Mucomyst mucosa bilaterally that do have white tinge) present. No oropharyngeal exudate, posterior oropharyngeal edema, posterior oropharyngeal erythema or tonsillar abscesses. Cardiovascular: Normal rate, regular rhythm, normal heart sounds and intact distal pulses. Exam reveals no gallop and no friction rub. No murmur heard. Pulmonary/Chest: Effort normal and breath sounds normal. No respiratory distress. He has no wheezes. He has no rales. He exhibits no tenderness. Abdominal: Soft. Bowel sounds are normal. He exhibits no distension and no mass. There is no tenderness. There is no rebound and no guarding. Musculoskeletal:        Right lower leg: He exhibits no edema. Left lower leg: He exhibits no edema. Neurological: He is alert and oriented to person, place, and time. Coordination and gait normal.   Skin: Skin is warm and dry. Lesion and rash noted. Rash is macular and nodular (Multiple warts on left and right hand). He is not diaphoretic. No cyanosis. Nails show no clubbing. Nursing note and vitals reviewed. Assessment:    ICD-10-CM ICD-9-CM    1. Burning sensation of mouth R20.8 528.9    2. Thrush B37.0 112.0    3. Viral warts, unspecified type B07.9 078.10    4. Folliculitis W43.7 138.9    5. Chronic pain of left ankle M25.572 719.47 External Referral To Orthopedic Surgery    G89.29 338.29        Plan:   It appears that he may have thrush on close examination and I will treat with nystatin at this time. If not improving, we will increase his coverage for reflux as he is high risk. This does not help, I will send him to ear nose and throat. It does not appear to be any nerve impingement.   It is possible it could be secondary from his uvulectomy but I would imagine would only cover the birth of the mouth versus the mucous membranes and tongue. I'll refer him to dermatology for his multiple skin issues. We will also increase his amlodipine to 10 mg daily. Refer to orthopedic surgery for ongoing ankle pain and abnormal MRI of ankle previously. Orders Placed This Encounter   Procedures    External Referral To Orthopedic Surgery     Referral Priority:   Routine     Referral Type:   Consult for Advice and Opinion     Referral Reason:   Specialty Services Required     Requested Specialty:   Orthopedic Surgery     Number of Visits Requested:   1     Orders Placed This Encounter   Medications    levothyroxine (LEVOTHROID) 50 MCG tablet     Sig: Take 1 tablet by mouth daily     Dispense:  90 tablet     Refill:  3    lisinopril-hydrochlorothiazide (PRINZIDE;ZESTORETIC) 20-25 MG per tablet     Sig: Take 1 tablet by mouth daily     Dispense:  90 tablet     Refill:  1    nystatin (MYCOSTATIN) 965604 UNIT/ML suspension     Sig: Take 5 mLs by mouth 4 times daily     Dispense:  473 mL     Refill:  0    amLODIPine (NORVASC) 10 MG tablet     Sig: Take 1 tablet by mouth daily     Dispense:  90 tablet     Refill:  3     Medications Discontinued During This Encounter   Medication Reason    levothyroxine (LEVOTHROID) 50 MCG tablet Reorder    lisinopril-hydrochlorothiazide (PRINZIDE;ZESTORETIC) 20-25 MG per tablet Reorder    amLODIPine (NORVASC) 5 MG tablet Reorder     Patient Instructions   Please call if you do not hear about your referal to orthopedic surgery for second opinion within 10 days. Use thrush for 1 week. Follow up as needed or in 7 days if not improving. Patient given educational handouts and has had all questions answered. Patient voices understanding and agrees to plans along with risks and benefits of plan. Patient is instructed to continue prior meds, diet, and exercise plans unless instructed otherwise.  Patient agrees

## 2018-06-01 ENCOUNTER — OFFICE VISIT (OUTPATIENT)
Dept: PRIMARY CARE CLINIC | Age: 53
End: 2018-06-01
Payer: COMMERCIAL

## 2018-06-01 VITALS
WEIGHT: 271 LBS | RESPIRATION RATE: 20 BRPM | BODY MASS INDEX: 40.14 KG/M2 | HEART RATE: 88 BPM | SYSTOLIC BLOOD PRESSURE: 132 MMHG | TEMPERATURE: 98 F | OXYGEN SATURATION: 93 % | DIASTOLIC BLOOD PRESSURE: 82 MMHG | HEIGHT: 69 IN

## 2018-06-01 DIAGNOSIS — G62.89 OTHER POLYNEUROPATHY: ICD-10-CM

## 2018-06-01 DIAGNOSIS — I10 ESSENTIAL HYPERTENSION: Primary | ICD-10-CM

## 2018-06-01 DIAGNOSIS — K21.9 GASTROESOPHAGEAL REFLUX DISEASE, ESOPHAGITIS PRESENCE NOT SPECIFIED: ICD-10-CM

## 2018-06-01 DIAGNOSIS — I87.2 VENOUS INSUFFICIENCY: ICD-10-CM

## 2018-06-01 DIAGNOSIS — B07.8 OTHER VIRAL WARTS: ICD-10-CM

## 2018-06-01 PROCEDURE — G8417 CALC BMI ABV UP PARAM F/U: HCPCS | Performed by: FAMILY MEDICINE

## 2018-06-01 PROCEDURE — 99214 OFFICE O/P EST MOD 30 MIN: CPT | Performed by: FAMILY MEDICINE

## 2018-06-01 PROCEDURE — 1036F TOBACCO NON-USER: CPT | Performed by: FAMILY MEDICINE

## 2018-06-01 PROCEDURE — G8427 DOCREV CUR MEDS BY ELIG CLIN: HCPCS | Performed by: FAMILY MEDICINE

## 2018-06-01 PROCEDURE — 3017F COLORECTAL CA SCREEN DOC REV: CPT | Performed by: FAMILY MEDICINE

## 2018-06-01 RX ORDER — LEVOTHYROXINE SODIUM 0.05 MG/1
50 TABLET ORAL DAILY
Qty: 90 TABLET | Refills: 3 | Status: SHIPPED | OUTPATIENT
Start: 2018-06-01 | End: 2019-06-14 | Stop reason: SDUPTHER

## 2018-06-01 RX ORDER — LISINOPRIL AND HYDROCHLOROTHIAZIDE 25; 20 MG/1; MG/1
1 TABLET ORAL DAILY
Qty: 90 TABLET | Refills: 1 | Status: SHIPPED | OUTPATIENT
Start: 2018-06-01 | End: 2018-12-22 | Stop reason: SDUPTHER

## 2018-06-01 RX ORDER — FUROSEMIDE 20 MG/1
20 TABLET ORAL DAILY
Qty: 30 TABLET | Refills: 3 | Status: SHIPPED | OUTPATIENT
Start: 2018-06-01 | End: 2018-10-08 | Stop reason: SDUPTHER

## 2018-06-01 RX ORDER — OMEPRAZOLE 20 MG/1
20 CAPSULE, DELAYED RELEASE ORAL DAILY
Qty: 90 CAPSULE | Refills: 3 | Status: SHIPPED | OUTPATIENT
Start: 2018-06-01 | End: 2019-06-14 | Stop reason: SDUPTHER

## 2018-06-01 RX ORDER — ASPIRIN 325 MG
325 TABLET ORAL DAILY
Qty: 30 TABLET | Refills: 3 | Status: SHIPPED | OUTPATIENT
Start: 2018-06-01 | End: 2018-10-08 | Stop reason: SDUPTHER

## 2018-06-01 RX ORDER — ATORVASTATIN CALCIUM 40 MG/1
40 TABLET, FILM COATED ORAL DAILY
Qty: 90 TABLET | Refills: 3 | Status: SHIPPED | OUTPATIENT
Start: 2018-06-01 | End: 2019-06-14 | Stop reason: SDUPTHER

## 2018-06-01 RX ORDER — GABAPENTIN 100 MG/1
200 CAPSULE ORAL 3 TIMES DAILY
Qty: 540 CAPSULE | Refills: 1 | Status: SHIPPED | OUTPATIENT
Start: 2018-06-01 | End: 2019-06-14 | Stop reason: SDUPTHER

## 2018-06-01 RX ORDER — AMLODIPINE BESYLATE 10 MG/1
10 TABLET ORAL DAILY
Qty: 90 TABLET | Refills: 3 | Status: SHIPPED | OUTPATIENT
Start: 2018-06-01 | End: 2019-06-14 | Stop reason: SDUPTHER

## 2018-06-01 ASSESSMENT — ENCOUNTER SYMPTOMS
DIARRHEA: 0
VOMITING: 0
ABDOMINAL PAIN: 0
NAUSEA: 0
SHORTNESS OF BREATH: 0
WHEEZING: 0
COUGH: 0
CHEST TIGHTNESS: 0
CONSTIPATION: 0

## 2018-06-01 ASSESSMENT — PATIENT HEALTH QUESTIONNAIRE - PHQ9
SUM OF ALL RESPONSES TO PHQ9 QUESTIONS 1 & 2: 0
2. FEELING DOWN, DEPRESSED OR HOPELESS: 0
1. LITTLE INTEREST OR PLEASURE IN DOING THINGS: 0
SUM OF ALL RESPONSES TO PHQ QUESTIONS 1-9: 0

## 2018-06-27 ENCOUNTER — OFFICE VISIT (OUTPATIENT)
Dept: GASTROENTEROLOGY | Age: 53
End: 2018-06-27
Payer: COMMERCIAL

## 2018-06-27 VITALS
OXYGEN SATURATION: 97 % | HEIGHT: 69 IN | HEART RATE: 82 BPM | WEIGHT: 280 LBS | DIASTOLIC BLOOD PRESSURE: 78 MMHG | SYSTOLIC BLOOD PRESSURE: 130 MMHG | BODY MASS INDEX: 41.47 KG/M2

## 2018-06-27 DIAGNOSIS — Z87.19 HISTORY OF DUODENAL ULCER: ICD-10-CM

## 2018-06-27 DIAGNOSIS — R11.10 DRY HEAVES: Primary | ICD-10-CM

## 2018-06-27 DIAGNOSIS — K22.89 ESOPHAGEAL PAIN: ICD-10-CM

## 2018-06-27 DIAGNOSIS — R13.19 ESOPHAGEAL DYSPHAGIA: ICD-10-CM

## 2018-06-27 DIAGNOSIS — K21.9 CHRONIC GERD: ICD-10-CM

## 2018-06-27 PROCEDURE — G8427 DOCREV CUR MEDS BY ELIG CLIN: HCPCS | Performed by: NURSE PRACTITIONER

## 2018-06-27 PROCEDURE — G8417 CALC BMI ABV UP PARAM F/U: HCPCS | Performed by: NURSE PRACTITIONER

## 2018-06-27 PROCEDURE — 3017F COLORECTAL CA SCREEN DOC REV: CPT | Performed by: NURSE PRACTITIONER

## 2018-06-27 PROCEDURE — 99214 OFFICE O/P EST MOD 30 MIN: CPT | Performed by: NURSE PRACTITIONER

## 2018-06-27 PROCEDURE — 1036F TOBACCO NON-USER: CPT | Performed by: NURSE PRACTITIONER

## 2018-06-27 ASSESSMENT — ENCOUNTER SYMPTOMS
CHEST TIGHTNESS: 0
BLOOD IN STOOL: 0
DIARRHEA: 0
VOMITING: 0
NAUSEA: 0
ABDOMINAL DISTENTION: 0
ABDOMINAL PAIN: 0
RECTAL PAIN: 0
SORE THROAT: 0
COUGH: 0
CONSTIPATION: 0
SHORTNESS OF BREATH: 0
VOICE CHANGE: 0
BACK PAIN: 0

## 2018-08-20 ENCOUNTER — ANESTHESIA EVENT (OUTPATIENT)
Dept: OPERATING ROOM | Age: 53
End: 2018-08-20

## 2018-08-24 ENCOUNTER — ANESTHESIA (OUTPATIENT)
Dept: OPERATING ROOM | Age: 53
End: 2018-08-24

## 2018-08-24 ENCOUNTER — HOSPITAL ENCOUNTER (OUTPATIENT)
Age: 53
Setting detail: SPECIMEN
Discharge: HOME | End: 2018-08-24
Payer: COMMERCIAL

## 2018-08-24 ENCOUNTER — HOSPITAL ENCOUNTER (OUTPATIENT)
Age: 53
Setting detail: OUTPATIENT SURGERY
Discharge: HOME | End: 2018-08-24
Attending: INTERNAL MEDICINE | Admitting: INTERNAL MEDICINE
Payer: COMMERCIAL

## 2018-08-24 VITALS — OXYGEN SATURATION: 93 % | SYSTOLIC BLOOD PRESSURE: 84 MMHG | DIASTOLIC BLOOD PRESSURE: 52 MMHG

## 2018-08-24 VITALS
OXYGEN SATURATION: 96 % | BODY MASS INDEX: 39.99 KG/M2 | HEART RATE: 77 BPM | SYSTOLIC BLOOD PRESSURE: 107 MMHG | HEIGHT: 69 IN | WEIGHT: 270 LBS | DIASTOLIC BLOOD PRESSURE: 59 MMHG | RESPIRATION RATE: 18 BRPM | TEMPERATURE: 97.7 F

## 2018-08-24 PROCEDURE — 88312 SPECIAL STAINS GROUP 1: CPT

## 2018-08-24 PROCEDURE — 43239 EGD BIOPSY SINGLE/MULTIPLE: CPT | Performed by: INTERNAL MEDICINE

## 2018-08-24 PROCEDURE — 88305 TISSUE EXAM BY PATHOLOGIST: CPT

## 2018-08-24 PROCEDURE — 43239 EGD BIOPSY SINGLE/MULTIPLE: CPT

## 2018-08-24 PROCEDURE — G8918 PT W/O PREOP ORDER IV AB PRO: HCPCS

## 2018-08-24 PROCEDURE — G8907 PT DOC NO EVENTS ON DISCHARG: HCPCS

## 2018-08-24 RX ORDER — PROPOFOL 10 MG/ML
INJECTION, EMULSION INTRAVENOUS PRN
Status: DISCONTINUED | OUTPATIENT
Start: 2018-08-24 | End: 2018-08-24 | Stop reason: SDUPTHER

## 2018-08-24 RX ORDER — LIDOCAINE HYDROCHLORIDE 10 MG/ML
1 INJECTION, SOLUTION EPIDURAL; INFILTRATION; INTRACAUDAL; PERINEURAL
Status: DISCONTINUED | OUTPATIENT
Start: 2018-08-24 | End: 2018-08-24 | Stop reason: HOSPADM

## 2018-08-24 RX ORDER — SODIUM CHLORIDE, SODIUM LACTATE, POTASSIUM CHLORIDE, CALCIUM CHLORIDE 600; 310; 30; 20 MG/100ML; MG/100ML; MG/100ML; MG/100ML
INJECTION, SOLUTION INTRAVENOUS CONTINUOUS
Status: DISCONTINUED | OUTPATIENT
Start: 2018-08-24 | End: 2018-08-24 | Stop reason: HOSPADM

## 2018-08-24 RX ADMIN — SODIUM CHLORIDE, SODIUM LACTATE, POTASSIUM CHLORIDE, CALCIUM CHLORIDE: 600; 310; 30; 20 INJECTION, SOLUTION INTRAVENOUS at 13:02

## 2018-08-24 RX ADMIN — PROPOFOL 200 MG: 10 INJECTION, EMULSION INTRAVENOUS at 13:20

## 2018-08-24 NOTE — ANESTHESIA PRE PROCEDURE
(122.9 kg)   11/06/17 272 lb (123.4 kg)     There is no height or weight on file to calculate BMI.    CBC:   Lab Results   Component Value Date    WBC 9.6 03/31/2017    RBC 4.62 03/31/2017    HGB 14.3 03/31/2017    HCT 41.4 03/31/2017    MCV 89.6 03/31/2017    RDW 12.8 03/31/2017     03/31/2017       CMP:   Lab Results   Component Value Date     03/31/2017    K 4.2 03/31/2017     03/31/2017    CO2 26 03/31/2017    BUN 16 03/31/2017    CREATININE 0.7 03/31/2017    LABGLOM >60 03/31/2017    GLUCOSE 118 03/31/2017    PROT 7.2 03/31/2017    CALCIUM 8.9 03/31/2017    BILITOT 0.4 03/31/2017    ALKPHOS 55 03/31/2017    AST 17 03/31/2017    ALT 23 03/31/2017       POC Tests: No results for input(s): POCGLU, POCNA, POCK, POCCL, POCBUN, POCHEMO, POCHCT in the last 72 hours. Coags:   Lab Results   Component Value Date    PROTIME 13.7 03/24/2017    INR 1.05 03/24/2017       HCG (If Applicable): No results found for: PREGTESTUR, PREGSERUM, HCG, HCGQUANT     ABGs: No results found for: PHART, PO2ART, YKF2NHJ, BWQ7MAC, BEART, B3MTLRTJ     Type & Screen (If Applicable):  No results found for: LABABO, LABRH    Anesthesia Evaluation    Airway: Mallampati: I  TM distance: >3 FB   Neck ROM: full  Mouth opening: > = 3 FB Dental:          Pulmonary:normal exam    (+) sleep apnea: on CPAP,                             Cardiovascular:    (+) hypertension: no interval change,                   Neuro/Psych:   (+) TIA, psychiatric history: stable with treatment            GI/Hepatic/Renal:   (+) hiatal hernia, GERD: poorly controlled, morbid obesity          Endo/Other:    (+) hypothyroidism::., .                 Abdominal:           Vascular: negative vascular ROS. Anesthesia Plan      general     ASA 3       Induction: intravenous. Anesthetic plan and risks discussed with patient and spouse.                       TRIP Fleming - CRNA   8/24/2018

## 2018-08-24 NOTE — H&P
Patient Name: Vicky Wang  : 1965  MRN: [id number]  DATE: 18    Allergies: No Known Allergies     ENDOSCOPY  History and Physical    Procedure:    [] Diagnostic Colonoscopy       [] Screening Colonoscopy  [x] EGD      [] ERCP      [] EUS       [] Other    [x] Previous office notes/History and Physical reviewed from the patients chart. Please see EMR for further details of HPI. I have examined the patient's status immediately prior to the procedure and:      Indications/HPI:    []Abdominal Pain   []Cancer- GI/Lung     []Fhx of colon CA/polyps  []History of Polyps  []Barretts            []Melena  []Abnormal Imaging              []Dysphagia              []Persistent Pneumonia   []Anemia                            []Food Impaction        []History of Polyps  [] GI Bleed             []Pulmonary nodule/Mass   []Change in bowel habits [x]Heartburn/Reflux  []Rectal Bleed (BRBPR)  []Chest Pain - Non Cardiac []Heme (+) Stool []Ulcers  []Constipation  []Hemoptysis  []Varices  []Diarrhea  []Hypoxemia    []Nausea/Vomiting   []Screening   []Crohns/Colitis  [x]Other: history of duodenal/peptic ulcers      Anesthesia:   [x] MAC [] Moderate Sedation   [] General   [] None     ROS: 12 pt Review of Symptoms was negative unless mentioned above    Medications:   Prior to Admission medications    Medication Sig Start Date End Date Taking? Authorizing Provider   gabapentin (NEURONTIN) 100 MG capsule Take 2 capsules by mouth 3 times daily for 90 days. . 18  Arsalan Rodríguez MD   furosemide (LASIX) 20 MG tablet Take 1 tablet by mouth daily 18   Arsalan Rodríguez MD   metoprolol tartrate (LOPRESSOR) 25 MG tablet Take 1 tablet by mouth 2 times daily 18   Arsalan Rodríguez MD   levothyroxine (LEVOTHROID) 50 MCG tablet Take 1 tablet by mouth daily 18   Arsalan Rodríguez MD   lisinopril-hydrochlorothiazide [facility]) 20-25 MG per tablet Take 1 tablet by mouth daily 18   Arsalan Rodríguez MD   amLODIPine appropriate for planned sedation and procedure. Plan:  Proceed with planned sedation and procedure as above.     Kevin Aguila MD

## 2018-09-12 ENCOUNTER — OFFICE VISIT (OUTPATIENT)
Dept: PRIMARY CARE CLINIC | Age: 53
End: 2018-09-12
Payer: COMMERCIAL

## 2018-09-12 VITALS
RESPIRATION RATE: 18 BRPM | HEART RATE: 88 BPM | DIASTOLIC BLOOD PRESSURE: 68 MMHG | OXYGEN SATURATION: 98 % | BODY MASS INDEX: 39.99 KG/M2 | HEIGHT: 69 IN | TEMPERATURE: 98 F | WEIGHT: 270 LBS | SYSTOLIC BLOOD PRESSURE: 110 MMHG

## 2018-09-12 DIAGNOSIS — Z23 NEED FOR INFLUENZA VACCINATION: ICD-10-CM

## 2018-09-12 DIAGNOSIS — K14.6 TONGUE BURNING SENSATION: ICD-10-CM

## 2018-09-12 DIAGNOSIS — R05.3 CHRONIC COUGH: ICD-10-CM

## 2018-09-12 DIAGNOSIS — Z00.00 ROUTINE GENERAL MEDICAL EXAMINATION AT A HEALTH CARE FACILITY: Primary | ICD-10-CM

## 2018-09-12 DIAGNOSIS — E66.01 MORBID OBESITY (HCC): ICD-10-CM

## 2018-09-12 PROCEDURE — G8427 DOCREV CUR MEDS BY ELIG CLIN: HCPCS | Performed by: FAMILY MEDICINE

## 2018-09-12 PROCEDURE — 90686 IIV4 VACC NO PRSV 0.5 ML IM: CPT | Performed by: FAMILY MEDICINE

## 2018-09-12 PROCEDURE — 1036F TOBACCO NON-USER: CPT | Performed by: FAMILY MEDICINE

## 2018-09-12 PROCEDURE — 3017F COLORECTAL CA SCREEN DOC REV: CPT | Performed by: FAMILY MEDICINE

## 2018-09-12 PROCEDURE — 99213 OFFICE O/P EST LOW 20 MIN: CPT | Performed by: FAMILY MEDICINE

## 2018-09-12 PROCEDURE — G8417 CALC BMI ABV UP PARAM F/U: HCPCS | Performed by: FAMILY MEDICINE

## 2018-09-12 PROCEDURE — 99396 PREV VISIT EST AGE 40-64: CPT | Performed by: FAMILY MEDICINE

## 2018-09-12 PROCEDURE — 90471 IMMUNIZATION ADMIN: CPT | Performed by: FAMILY MEDICINE

## 2018-09-12 RX ORDER — RANITIDINE 150 MG/1
150 TABLET ORAL 2 TIMES DAILY
Qty: 60 TABLET | Refills: 3 | Status: SHIPPED | OUTPATIENT
Start: 2018-09-12 | End: 2019-02-26 | Stop reason: SDUPTHER

## 2018-09-12 NOTE — PROGRESS NOTES
Comprehensive Metabolic Panel      CBC      Lipid Panel      TSH 3RD GENERATION       Plan:   Plan #1: Annual exam  I have reviewed and assessed the patient's current health status, risk factors, and progress for available preventative care. Patient received influenza. Suggested to patient that he needs to increase his exercise as well to help with central obesity. Plan #2: Acute and chronic conditions    Chronic cough is likely secondary to combination of postnasal drip as well as reflux disease. We will start ranitidine for this visit is uncontrolled. He is also started vitamin B supplement for burning sensation at the tongue but it may be secondary to GERD. Orders Placed This Encounter   Procedures    INFLUENZA, QUADV, 3 YRS AND OLDER, IM, PF, PREFILL SYR OR SDV, 0.5ML (FLUZONE QUADV, PF)    Comprehensive Metabolic Panel     Every 11 months     Standing Status:   Standing     Number of Occurrences:   15     Standing Expiration Date:   8/25/2029    CBC     Standing Status:   Standing     Number of Occurrences:   15     Standing Expiration Date:   8/25/2029    Lipid Panel     Standing Status:   Standing     Number of Occurrences:   15     Standing Expiration Date:   8/25/2029     Order Specific Question:   Is Patient Fasting?/# of Hours     Answer:   yes/8 hrs    TSH 3RD GENERATION     Standing Status:   Standing     Number of Occurrences:   15     Standing Expiration Date:   8/25/2029     Orders Placed This Encounter   Medications    ranitidine (ZANTAC) 150 MG tablet     Sig: Take 1 tablet by mouth 2 times daily     Dispense:  60 tablet     Refill:  3     There are no discontinued medications. There are no Patient Instructions on file for this visit. Patient given educational handouts and has had all questions answered. Patient voices understanding and agrees to plans along with risks and benefits of plan.  Patient is instructed to continue prior meds, diet, and exercise plans unless instructed otherwise. Patient agrees to follow up as instructed and sooner if needed. Patient agrees to go to ER if condition becomes emergent. Notes may be completed with dictation device and spelling errors may occur. No Follow-up on file.

## 2018-09-21 ASSESSMENT — ENCOUNTER SYMPTOMS
COUGH: 0
SORE THROAT: 0
NAUSEA: 0
EYE ITCHING: 0
SHORTNESS OF BREATH: 0
RHINORRHEA: 0
COLOR CHANGE: 0
ABDOMINAL PAIN: 0

## 2018-10-08 RX ORDER — ASPIRIN 325 MG
TABLET, DELAYED RELEASE (ENTERIC COATED) ORAL
Qty: 30 TABLET | Refills: 0 | Status: SHIPPED | OUTPATIENT
Start: 2018-10-08 | End: 2018-11-17 | Stop reason: SDUPTHER

## 2018-10-08 RX ORDER — FUROSEMIDE 20 MG/1
TABLET ORAL
Qty: 30 TABLET | Refills: 0 | Status: SHIPPED | OUTPATIENT
Start: 2018-10-08 | End: 2018-11-17 | Stop reason: SDUPTHER

## 2019-01-29 ENCOUNTER — OFFICE VISIT (OUTPATIENT)
Dept: PRIMARY CARE CLINIC | Age: 54
End: 2019-01-29
Payer: COMMERCIAL

## 2019-01-29 VITALS
RESPIRATION RATE: 18 BRPM | BODY MASS INDEX: 40.14 KG/M2 | WEIGHT: 271 LBS | HEIGHT: 69 IN | TEMPERATURE: 98 F | HEART RATE: 102 BPM | OXYGEN SATURATION: 96 % | SYSTOLIC BLOOD PRESSURE: 110 MMHG | DIASTOLIC BLOOD PRESSURE: 80 MMHG

## 2019-01-29 DIAGNOSIS — B07.9 VIRAL WARTS DUE TO HPV: ICD-10-CM

## 2019-01-29 DIAGNOSIS — F41.9 ANXIETY: ICD-10-CM

## 2019-01-29 DIAGNOSIS — R25.2 MUSCLE CRAMP: Primary | ICD-10-CM

## 2019-01-29 DIAGNOSIS — M79.89 LEG SWELLING: ICD-10-CM

## 2019-01-29 PROCEDURE — 1036F TOBACCO NON-USER: CPT | Performed by: FAMILY MEDICINE

## 2019-01-29 PROCEDURE — 99214 OFFICE O/P EST MOD 30 MIN: CPT | Performed by: FAMILY MEDICINE

## 2019-01-29 PROCEDURE — G8482 FLU IMMUNIZE ORDER/ADMIN: HCPCS | Performed by: FAMILY MEDICINE

## 2019-01-29 PROCEDURE — G8417 CALC BMI ABV UP PARAM F/U: HCPCS | Performed by: FAMILY MEDICINE

## 2019-01-29 PROCEDURE — G8427 DOCREV CUR MEDS BY ELIG CLIN: HCPCS | Performed by: FAMILY MEDICINE

## 2019-01-29 PROCEDURE — 3017F COLORECTAL CA SCREEN DOC REV: CPT | Performed by: FAMILY MEDICINE

## 2019-01-29 RX ORDER — BUSPIRONE HYDROCHLORIDE 7.5 MG/1
TABLET ORAL
Qty: 180 TABLET | Refills: 1 | Status: SHIPPED | OUTPATIENT
Start: 2019-01-29 | End: 2020-02-19

## 2019-01-29 RX ORDER — IMIQUIMOD 12.5 MG/.25G
CREAM TOPICAL
Qty: 24 EACH | Refills: 1 | Status: SHIPPED | OUTPATIENT
Start: 2019-01-29 | End: 2019-02-05

## 2019-01-29 RX ORDER — POTASSIUM CHLORIDE 750 MG/1
10 TABLET, EXTENDED RELEASE ORAL DAILY
Qty: 30 TABLET | Refills: 0 | Status: SHIPPED | OUTPATIENT
Start: 2019-01-29 | End: 2019-03-05 | Stop reason: SDUPTHER

## 2019-05-07 RX ORDER — POTASSIUM CHLORIDE 750 MG/1
TABLET, EXTENDED RELEASE ORAL
Qty: 30 TABLET | Refills: 0 | Status: SHIPPED | OUTPATIENT
Start: 2019-05-07 | End: 2020-08-24

## 2019-06-14 DIAGNOSIS — G62.89 OTHER POLYNEUROPATHY: ICD-10-CM

## 2019-06-14 RX ORDER — OMEPRAZOLE 20 MG/1
20 CAPSULE, DELAYED RELEASE ORAL DAILY
Qty: 30 CAPSULE | Refills: 0 | Status: SHIPPED | OUTPATIENT
Start: 2019-06-14 | End: 2019-07-15 | Stop reason: SDUPTHER

## 2019-06-14 RX ORDER — POTASSIUM CHLORIDE 750 MG/1
TABLET, EXTENDED RELEASE ORAL
Qty: 30 TABLET | Refills: 0 | Status: SHIPPED | OUTPATIENT
Start: 2019-06-14 | End: 2020-08-24

## 2019-06-14 RX ORDER — FUROSEMIDE 20 MG/1
TABLET ORAL
Qty: 30 TABLET | Refills: 0 | Status: SHIPPED | OUTPATIENT
Start: 2019-06-14 | End: 2019-07-15 | Stop reason: SDUPTHER

## 2019-06-14 RX ORDER — RANITIDINE 300 MG/1
TABLET ORAL
Qty: 60 TABLET | Refills: 0 | Status: SHIPPED | OUTPATIENT
Start: 2019-06-14 | End: 2019-07-15 | Stop reason: SDUPTHER

## 2019-06-14 RX ORDER — ATORVASTATIN CALCIUM 40 MG/1
TABLET, FILM COATED ORAL
Qty: 30 TABLET | Refills: 0 | Status: SHIPPED | OUTPATIENT
Start: 2019-06-14 | End: 2019-07-15 | Stop reason: SDUPTHER

## 2019-06-14 RX ORDER — AMLODIPINE BESYLATE 10 MG/1
TABLET ORAL
Qty: 30 TABLET | Refills: 0 | Status: SHIPPED | OUTPATIENT
Start: 2019-06-14 | End: 2019-07-15 | Stop reason: SDUPTHER

## 2019-06-14 RX ORDER — LEVOTHYROXINE SODIUM 0.05 MG/1
TABLET ORAL
Qty: 30 TABLET | Refills: 0 | Status: SHIPPED | OUTPATIENT
Start: 2019-06-14 | End: 2019-09-16 | Stop reason: SDUPTHER

## 2019-06-14 RX ORDER — GABAPENTIN 100 MG/1
CAPSULE ORAL
Qty: 180 CAPSULE | Refills: 0 | OUTPATIENT
Start: 2019-06-14 | End: 2019-08-16 | Stop reason: SDUPTHER

## 2019-06-14 NOTE — TELEPHONE ENCOUNTER
Requested Prescriptions     Signed Prescriptions Disp Refills    furosemide (LASIX) 20 MG tablet 30 tablet 0     Sig: TAKE 1 TABLET BY MOUTH ONCE DAILY. Authorizing Provider: Josefa Lemon     Ordering User: Marcus Rojas atorvastatin (LIPITOR) 40 MG tablet 30 tablet 0     Sig: TAKE 1 TABLET BY MOUTH ONCE DAILY. Authorizing Provider: Josefa Lemon     Ordering User: Marcus Rojas levothyroxine (SYNTHROID) 50 MCG tablet 30 tablet 0     Sig: TAKE 1 TABLET BY MOUTH ONCE DAILY. Authorizing Provider: Josefa Lemon     Ordering User: Marcus Rojas omeprazole (PRILOSEC) 20 MG delayed release capsule 30 capsule 0     Sig: TAKE 1 CAPSULE BY MOUTH DAILY     Authorizing Provider: Josefa Lemon     Ordering User: Dariela DICK    amLODIPine (NORVASC) 10 MG tablet 30 tablet 0     Sig: TAKE 1 TABLET BY MOUTH ONCE DAILY. Authorizing Provider: Josefa Ellison User: Marcus Rojas gabapentin (NEURONTIN) 100 MG capsule 180 capsule 0     Sig: TAKE (2) CAPSULES THREE TIMES DAILY. Authorizing Provider: Josefa Lemon     Ordering User: Marcus Bob potassium chloride (KLOR-CON M) 10 MEQ extended release tablet 30 tablet 0     Sig: TAKE 1 TABLET BY MOUTH ONCE DAILY. Authorizing Provider: Josefa Lemon     Ordering User: Wilkes Bob metoprolol tartrate (LOPRESSOR) 25 MG tablet 60 tablet 0     Sig: TAKE 1 TABLET BY MOUTH TWICE DAILY.      Authorizing Provider: Josefa Lemon     Ordering User: Wilkes Bob ranitidine (ZANTAC) 300 MG tablet 60 tablet 0     Sig: TAKE 1 TABLET BY MOUTH TWICE DAILY     Authorizing Provider: Josefa Lemon     Ordering User: Eleanor Don

## 2019-07-01 ENCOUNTER — OFFICE VISIT (OUTPATIENT)
Dept: PRIMARY CARE CLINIC | Age: 54
End: 2019-07-01
Payer: COMMERCIAL

## 2019-07-01 VITALS
RESPIRATION RATE: 20 BRPM | DIASTOLIC BLOOD PRESSURE: 78 MMHG | BODY MASS INDEX: 42.65 KG/M2 | OXYGEN SATURATION: 98 % | WEIGHT: 288 LBS | SYSTOLIC BLOOD PRESSURE: 150 MMHG | HEIGHT: 69 IN | TEMPERATURE: 97.5 F | HEART RATE: 85 BPM

## 2019-07-01 DIAGNOSIS — E66.01 MORBID OBESITY (HCC): ICD-10-CM

## 2019-07-01 DIAGNOSIS — Z11.59 ENCOUNTER FOR HEPATITIS C SCREENING TEST FOR LOW RISK PATIENT: ICD-10-CM

## 2019-07-01 DIAGNOSIS — I10 ESSENTIAL HYPERTENSION: Primary | ICD-10-CM

## 2019-07-01 DIAGNOSIS — E29.1 HYPOGONADISM IN MALE: ICD-10-CM

## 2019-07-01 DIAGNOSIS — N52.1 ERECTILE DYSFUNCTION DUE TO DISEASES CLASSIFIED ELSEWHERE: ICD-10-CM

## 2019-07-01 DIAGNOSIS — K14.6 TONGUE BURNING SENSATION: ICD-10-CM

## 2019-07-01 DIAGNOSIS — F41.9 ANXIETY: ICD-10-CM

## 2019-07-01 DIAGNOSIS — E03.9 ACQUIRED HYPOTHYROIDISM: ICD-10-CM

## 2019-07-01 DIAGNOSIS — B07.9 VIRAL WARTS DUE TO HPV: ICD-10-CM

## 2019-07-01 DIAGNOSIS — I10 ESSENTIAL HYPERTENSION: ICD-10-CM

## 2019-07-01 LAB
ALBUMIN SERPL-MCNC: 4.1 G/DL (ref 3.5–5.2)
ALP BLD-CCNC: 56 U/L (ref 40–130)
ALT SERPL-CCNC: 17 U/L (ref 5–41)
ANION GAP SERPL CALCULATED.3IONS-SCNC: 16 MMOL/L (ref 7–19)
AST SERPL-CCNC: 13 U/L (ref 5–40)
BILIRUB SERPL-MCNC: 0.3 MG/DL (ref 0.2–1.2)
BILIRUBIN URINE: NEGATIVE
BLOOD, URINE: NEGATIVE
BUN BLDV-MCNC: 14 MG/DL (ref 6–20)
CALCIUM SERPL-MCNC: 8.8 MG/DL (ref 8.6–10)
CHLORIDE BLD-SCNC: 104 MMOL/L (ref 98–111)
CHOLESTEROL, TOTAL: 129 MG/DL (ref 160–199)
CLARITY: CLEAR
CO2: 22 MMOL/L (ref 22–29)
COLOR: YELLOW
CREAT SERPL-MCNC: 0.7 MG/DL (ref 0.5–1.2)
GFR NON-AFRICAN AMERICAN: >60
GLUCOSE BLD-MCNC: 116 MG/DL (ref 74–109)
GLUCOSE URINE: NEGATIVE MG/DL
HCT VFR BLD CALC: 41.9 % (ref 42–52)
HDLC SERPL-MCNC: 27 MG/DL (ref 55–121)
HEMOGLOBIN: 13.7 G/DL (ref 14–18)
HEPATITIS C ANTIBODY INTERPRETATION: NORMAL
KETONES, URINE: NEGATIVE MG/DL
LDL CHOLESTEROL CALCULATED: 58 MG/DL
LEUKOCYTE ESTERASE, URINE: NEGATIVE
MCH RBC QN AUTO: 30 PG (ref 27–31)
MCHC RBC AUTO-ENTMCNC: 32.7 G/DL (ref 33–37)
MCV RBC AUTO: 91.7 FL (ref 80–94)
NITRITE, URINE: NEGATIVE
PDW BLD-RTO: 12.8 % (ref 11.5–14.5)
PH UA: 6 (ref 5–8)
PLATELET # BLD: 238 K/UL (ref 130–400)
PMV BLD AUTO: 11.5 FL (ref 9.4–12.4)
POTASSIUM SERPL-SCNC: 3.6 MMOL/L (ref 3.5–5)
PROTEIN UA: NEGATIVE MG/DL
RBC # BLD: 4.57 M/UL (ref 4.7–6.1)
SODIUM BLD-SCNC: 142 MMOL/L (ref 136–145)
SPECIFIC GRAVITY UA: 1.01 (ref 1–1.03)
TESTOSTERONE TOTAL: 155.2 NG/DL (ref 193–740)
TOTAL PROTEIN: 7.1 G/DL (ref 6.6–8.7)
TRIGL SERPL-MCNC: 221 MG/DL (ref 0–149)
UROBILINOGEN, URINE: 0.2 E.U./DL
WBC # BLD: 8.7 K/UL (ref 4.8–10.8)

## 2019-07-01 PROCEDURE — 1036F TOBACCO NON-USER: CPT | Performed by: FAMILY MEDICINE

## 2019-07-01 PROCEDURE — 3017F COLORECTAL CA SCREEN DOC REV: CPT | Performed by: FAMILY MEDICINE

## 2019-07-01 PROCEDURE — G8417 CALC BMI ABV UP PARAM F/U: HCPCS | Performed by: FAMILY MEDICINE

## 2019-07-01 PROCEDURE — G8427 DOCREV CUR MEDS BY ELIG CLIN: HCPCS | Performed by: FAMILY MEDICINE

## 2019-07-01 PROCEDURE — 99214 OFFICE O/P EST MOD 30 MIN: CPT | Performed by: FAMILY MEDICINE

## 2019-07-01 RX ORDER — SILDENAFIL 100 MG/1
100 TABLET, FILM COATED ORAL PRN
Qty: 8 TABLET | Refills: 5 | Status: SHIPPED | OUTPATIENT
Start: 2019-07-01 | End: 2021-06-14

## 2019-07-01 ASSESSMENT — ENCOUNTER SYMPTOMS
CHEST TIGHTNESS: 0
ABDOMINAL PAIN: 0
SHORTNESS OF BREATH: 0
VOMITING: 0
NAUSEA: 0
WHEEZING: 0
DIARRHEA: 0
CONSTIPATION: 0
COUGH: 0

## 2019-07-01 NOTE — PROGRESS NOTES
Marga Rutledge is a 53 y.o. male who presents today for   Chief Complaint   Patient presents with    Anxiety       HPI  Patient is here for f/u anxiety. Doing well overall. 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 He is working from home due to work having concern of contamination. Concerned about ED as well. He is frustrated, wife 10 yrs younger. Pt notes he had low T in the past.  Anxiety controlled today w/ buspar    No change in PMH, family, social, or surgical history unless mentioned above. Review of Systems   Constitutional: Negative for chills and fever. Respiratory: Negative for cough, chest tightness, shortness of breath and wheezing. Cardiovascular: Negative for chest pain, palpitations and leg swelling. Gastrointestinal: Negative for abdominal pain, constipation, diarrhea, nausea and vomiting. Genitourinary: Negative for difficulty urinating, dysuria and frequency. Past Medical History:   Diagnosis Date    [facility] disorder ([facility] [address]     Colon polyps     Diverticular disease     GERD (gastroesophageal reflux disease)     Hiatal hernia     Hyperlipidemia     Hypertension     Hypothyroidism     Neuropathy     Unspecified sleep apnea        Current Outpatient Medications   Medication Sig Dispense Refill    metoprolol tartrate (LOPRESSOR) 25 MG tablet Take 2 tablets by mouth 2 times daily 180 tablet 3    sildenafil (VIAGRA) 100 MG tablet Take 1 tablet by mouth as needed for Erectile Dysfunction Generic sildenafil 100 mg or strongest dose 8 tablet 5    furosemide (LASIX) 20 MG tablet TAKE 1 TABLET BY MOUTH ONCE DAILY. 30 tablet 0    atorvastatin (LIPITOR) 40 MG tablet TAKE 1 TABLET BY MOUTH ONCE DAILY. 30 tablet 0    levothyroxine (SYNTHROID) 50 MCG tablet TAKE 1 TABLET BY MOUTH ONCE DAILY.  30 tablet 0    omeprazole (PRILOSEC) 20 MG delayed release capsule TAKE 1 CAPSULE BY MOUTH DAILY 30 capsule 0    amLODIPine (NORVASC) 10 MG tablet TAKE 1 TABLET BY MOUTH ONCE DAILY. 30 tablet 0    gabapentin (NEURONTIN) 100 MG capsule TAKE (2) CAPSULES THREE TIMES DAILY. 180 capsule 0    potassium chloride (KLOR-CON M) 10 MEQ extended release tablet TAKE 1 TABLET BY MOUTH ONCE DAILY. 30 tablet 0    ranitidine (ZANTAC) 300 MG tablet TAKE 1 TABLET BY MOUTH TWICE DAILY 60 tablet 0    potassium chloride (KLOR-CON M) 10 MEQ extended release tablet TAKE 1 TABLET BY MOUTH ONCE DAILY. 30 tablet 0    potassium chloride (KLOR-CON M) 10 MEQ extended release tablet TAKE 1 TABLET BY MOUTH ONCE DAILY. 30 tablet 1    ranitidine (ZANTAC) 150 MG tablet TAKE 1 TABLET BY MOUTH TWICE DAILY 60 tablet 2    magnesium oxide (MAG-OX) 140 MG CAPS Take 140 mg by mouth daily 90 capsule 1    busPIRone (BUSPAR) 7.5 MG tablet Take 3 times per day as necessary. After 7 days if needed may take 2 tablets 3 times per day as necessary 180 tablet 1    lisinopril-hydrochlorothiazide (PRINZIDE;ZESTORETIC) 20-25 MG per tablet TAKE 1 TABLET BY MOUTH ONCE DAILY. 30 tablet 5    aspirin 325 MG EC tablet TAKE 1 TABLET BY MOUTH ONCE DAILY. 30 tablet 5     No current facility-administered medications for this visit.         No Known Allergies    Past Surgical History:   Procedure Laterality Date    COLONOSCOPY  01/06/2009    Dr Anne Garcias: hyperplastic polyp, resolving diverticulitis    COLONOSCOPY  07/2012    minimal diverticulosis left side o/w normal postsurgical colon    COLONOSCOPY  12/2005    diverticulitis    COLONOSCOPY N/A 11/8/2016    Dr Shannan Selby bleeding internal hemorrhoids, diverticular disease-5 yr recall    EGD      TX EGD TRANSORAL BIOPSY SINGLE/MULTIPLE N/A 8/24/2018    Dr JONATHAN Bentley-Gastritis/gastropathy, esophagitis    SUBTOTAL COLECTOMY  2010    recurrant diverticulitis    UPPER GASTROINTESTINAL ENDOSCOPY  12/22/2005    Dr Anne Garcias: duodenal ulcer, gastritis    UPPP UVULOPALATOPHARYGOPLASTY         Social tartrate (LOPRESSOR) 25 MG tablet     metoprolol tartrate (LOPRESSOR) 25 MG tablet     metoprolol tartrate (LOPRESSOR) 25 MG tablet     metoprolol tartrate (LOPRESSOR) 25 MG tablet REORDER     Patient Instructions   Get your labs checked in [address]. Please arrive 15 minutes early to next follow up appointment in 6 months or schedule an appointment sooner if needed. Patient given educational handouts and has had all questions answered. Patient voices understanding and agrees to plans along with risks and benefits of plan. Patient isinstructed to continue prior meds, diet, and exercise plans unless instructed otherwise. Patient agrees to follow up as instructed and sooner if needed. Patient agrees to go to ER if condition becomes emergent. Notesmay be completed with dictation device and spelling errors may occur.       Return in about 6 months (around 1/1/2020) for annual.

## 2019-07-02 LAB — TSH, 3RD GENERATION: 1.69 MU/L (ref 0.3–4)

## 2019-07-08 ENCOUNTER — TELEPHONE (OUTPATIENT)
Dept: PRIMARY CARE CLINIC | Age: 54
End: 2019-07-08

## 2019-07-08 NOTE — TELEPHONE ENCOUNTER
----- Message from Alessandra Pineda MD sent at 7/5/2019  1:20 PM CDT -----  Testosterone levels are slightly low for your age. The lab compares it to a normal 80-year-old male testosterone and therefore it is always low compared to a younger male. You may feel some benefit if we start testosterone based on history of a mild mini stroke, there is some increased risk of stroke potentially. If we do treatment, we do not want the levels to get very high. If you do not feel better on treatment; and we should stop the treatment because it would not be providing any benefit but only some risk.     Please let us know if you are interested in starting treatment for which we would do a once monthly injection to see if this helps    Eventually her stated let me know, I'll look into it whenever I get back

## 2019-07-16 RX ORDER — FUROSEMIDE 20 MG/1
TABLET ORAL
Qty: 30 TABLET | Refills: 0 | Status: SHIPPED | OUTPATIENT
Start: 2019-07-16 | End: 2019-08-16 | Stop reason: SDUPTHER

## 2019-07-16 RX ORDER — ASPIRIN 325 MG
TABLET, DELAYED RELEASE (ENTERIC COATED) ORAL
Qty: 30 TABLET | Refills: 0 | Status: SHIPPED | OUTPATIENT
Start: 2019-07-16 | End: 2019-08-16 | Stop reason: SDUPTHER

## 2019-07-16 RX ORDER — RANITIDINE 300 MG/1
TABLET ORAL
Qty: 60 TABLET | Refills: 0 | Status: SHIPPED | OUTPATIENT
Start: 2019-07-16 | End: 2019-10-19 | Stop reason: SDUPTHER

## 2019-07-16 RX ORDER — OMEPRAZOLE 20 MG/1
20 CAPSULE, DELAYED RELEASE ORAL DAILY
Qty: 30 CAPSULE | Refills: 0 | Status: SHIPPED | OUTPATIENT
Start: 2019-07-16 | End: 2019-08-16 | Stop reason: SDUPTHER

## 2019-07-16 RX ORDER — AMLODIPINE BESYLATE 10 MG/1
TABLET ORAL
Qty: 30 TABLET | Refills: 0 | Status: SHIPPED | OUTPATIENT
Start: 2019-07-16 | End: 2019-08-16 | Stop reason: SDUPTHER

## 2019-07-16 RX ORDER — ATORVASTATIN CALCIUM 40 MG/1
TABLET, FILM COATED ORAL
Qty: 30 TABLET | Refills: 0 | Status: SHIPPED | OUTPATIENT
Start: 2019-07-16 | End: 2019-08-16 | Stop reason: SDUPTHER

## 2019-08-16 DIAGNOSIS — G62.89 OTHER POLYNEUROPATHY: ICD-10-CM

## 2019-08-19 RX ORDER — GABAPENTIN 100 MG/1
CAPSULE ORAL
Qty: 180 CAPSULE | Refills: 0 | OUTPATIENT
Start: 2019-08-19 | End: 2019-11-15 | Stop reason: SDUPTHER

## 2019-08-19 RX ORDER — AMLODIPINE BESYLATE 10 MG/1
TABLET ORAL
Qty: 30 TABLET | Refills: 0 | Status: SHIPPED | OUTPATIENT
Start: 2019-08-19 | End: 2019-09-23 | Stop reason: SDUPTHER

## 2019-08-19 RX ORDER — OMEPRAZOLE 20 MG/1
20 CAPSULE, DELAYED RELEASE ORAL DAILY
Qty: 30 CAPSULE | Refills: 0 | Status: SHIPPED | OUTPATIENT
Start: 2019-08-19 | End: 2021-06-03

## 2019-08-19 RX ORDER — ASPIRIN 325 MG
TABLET, DELAYED RELEASE (ENTERIC COATED) ORAL
Qty: 30 TABLET | Refills: 0 | Status: SHIPPED | OUTPATIENT
Start: 2019-08-19 | End: 2019-09-23 | Stop reason: SDUPTHER

## 2019-08-19 RX ORDER — ATORVASTATIN CALCIUM 40 MG/1
TABLET, FILM COATED ORAL
Qty: 30 TABLET | Refills: 0 | Status: SHIPPED | OUTPATIENT
Start: 2019-08-19 | End: 2019-09-23 | Stop reason: SDUPTHER

## 2019-08-19 RX ORDER — FUROSEMIDE 20 MG/1
TABLET ORAL
Qty: 30 TABLET | Refills: 0 | Status: SHIPPED | OUTPATIENT
Start: 2019-08-19 | End: 2019-09-23 | Stop reason: SDUPTHER

## 2019-11-15 DIAGNOSIS — G62.89 OTHER POLYNEUROPATHY: ICD-10-CM

## 2019-11-19 RX ORDER — GABAPENTIN 100 MG/1
CAPSULE ORAL
Qty: 180 CAPSULE | Refills: 0 | Status: SHIPPED | OUTPATIENT
Start: 2019-11-19 | End: 2020-01-27 | Stop reason: SDUPTHER

## 2019-11-24 ENCOUNTER — APPOINTMENT (OUTPATIENT)
Dept: GENERAL RADIOLOGY | Age: 54
End: 2019-11-24
Payer: COMMERCIAL

## 2019-11-24 ENCOUNTER — HOSPITAL ENCOUNTER (EMERGENCY)
Age: 54
Discharge: HOME | End: 2019-11-24
Attending: EMERGENCY MEDICINE
Payer: COMMERCIAL

## 2019-11-24 VITALS
HEIGHT: 68 IN | TEMPERATURE: 98.3 F | HEART RATE: 90 BPM | RESPIRATION RATE: 18 BRPM | BODY MASS INDEX: 41.68 KG/M2 | WEIGHT: 275 LBS | SYSTOLIC BLOOD PRESSURE: 135 MMHG | OXYGEN SATURATION: 93 % | DIASTOLIC BLOOD PRESSURE: 73 MMHG

## 2019-11-24 DIAGNOSIS — J18.9 PNEUMONIA OF RIGHT MIDDLE LOBE DUE TO INFECTIOUS ORGANISM: Primary | ICD-10-CM

## 2019-11-24 LAB
ANION GAP SERPL CALCULATED.3IONS-SCNC: 12 MMOL/L (ref 7–19)
APTT: 30.9 SEC (ref 26–36.2)
BASE EXCESS ARTERIAL: 1.9 MMOL/L (ref -2–2)
BASOPHILS ABSOLUTE: 0.1 K/UL (ref 0–0.2)
BASOPHILS RELATIVE PERCENT: 0.7 % (ref 0–1)
BUN BLDV-MCNC: 18 MG/DL (ref 6–20)
CALCIUM SERPL-MCNC: 9.1 MG/DL (ref 8.6–10)
CARBOXYHEMOGLOBIN ARTERIAL: 2.1 % (ref 0–5)
CHLORIDE BLD-SCNC: 105 MMOL/L (ref 98–111)
CO2: 24 MMOL/L (ref 22–29)
CREAT SERPL-MCNC: 0.8 MG/DL (ref 0.5–1.2)
EOSINOPHILS ABSOLUTE: 0.2 K/UL (ref 0–0.6)
EOSINOPHILS RELATIVE PERCENT: 1.5 % (ref 0–5)
GFR NON-AFRICAN AMERICAN: >60
GLUCOSE BLD-MCNC: 157 MG/DL (ref 74–109)
HCO3 ARTERIAL: 26.6 MMOL/L (ref 22–26)
HCT VFR BLD CALC: 42.6 % (ref 42–52)
HEMOGLOBIN, ART, EXTENDED: 14.1 G/DL (ref 14–18)
HEMOGLOBIN: 14.1 G/DL (ref 14–18)
IMMATURE GRANULOCYTES #: 0.1 K/UL
INR BLD: 1.08 (ref 0.88–1.18)
LYMPHOCYTES ABSOLUTE: 2.6 K/UL (ref 1.1–4.5)
LYMPHOCYTES RELATIVE PERCENT: 22.8 % (ref 20–40)
MCH RBC QN AUTO: 30.7 PG (ref 27–31)
MCHC RBC AUTO-ENTMCNC: 33.1 G/DL (ref 33–37)
MCV RBC AUTO: 92.6 FL (ref 80–94)
METHEMOGLOBIN ARTERIAL: 1.2 %
MONOCYTES ABSOLUTE: 0.9 K/UL (ref 0–0.9)
MONOCYTES RELATIVE PERCENT: 7.6 % (ref 0–10)
NEUTROPHILS ABSOLUTE: 7.6 K/UL (ref 1.5–7.5)
NEUTROPHILS RELATIVE PERCENT: 67 % (ref 50–65)
O2 CONTENT ARTERIAL: 18.5 ML/DL
O2 SAT, ARTERIAL: 93.1 %
O2 THERAPY: ABNORMAL
PCO2 ARTERIAL: 41 MMHG (ref 35–45)
PDW BLD-RTO: 13.1 % (ref 11.5–14.5)
PH ARTERIAL: 7.42 (ref 7.35–7.45)
PLATELET # BLD: 261 K/UL (ref 130–400)
PMV BLD AUTO: 11.4 FL (ref 9.4–12.4)
PO2 ARTERIAL: 67 MMHG (ref 80–100)
POTASSIUM REFLEX MAGNESIUM: 4.1 MMOL/L (ref 3.5–5)
POTASSIUM, WHOLE BLOOD: 3.7
PRO-BNP: 8 PG/ML (ref 0–900)
PROTHROMBIN TIME: 13.4 SEC (ref 12–14.6)
RAPID INFLUENZA  B AGN: NEGATIVE
RAPID INFLUENZA A AGN: NEGATIVE
RBC # BLD: 4.6 M/UL (ref 4.7–6.1)
SODIUM BLD-SCNC: 141 MMOL/L (ref 136–145)
T4 TOTAL: 6.6 UG/DL (ref 4.5–11.7)
TROPONIN: <0.01 NG/ML (ref 0–0.03)
TSH SERPL DL<=0.05 MIU/L-ACNC: 1.85 UIU/ML (ref 0.27–4.2)
WBC # BLD: 11.3 K/UL (ref 4.8–10.8)

## 2019-11-24 PROCEDURE — 71046 X-RAY EXAM CHEST 2 VIEWS: CPT

## 2019-11-24 PROCEDURE — 84443 ASSAY THYROID STIM HORMONE: CPT

## 2019-11-24 PROCEDURE — 36415 COLL VENOUS BLD VENIPUNCTURE: CPT

## 2019-11-24 PROCEDURE — 83880 ASSAY OF NATRIURETIC PEPTIDE: CPT

## 2019-11-24 PROCEDURE — 96374 THER/PROPH/DIAG INJ IV PUSH: CPT

## 2019-11-24 PROCEDURE — 36600 WITHDRAWAL OF ARTERIAL BLOOD: CPT

## 2019-11-24 PROCEDURE — 99285 EMERGENCY DEPT VISIT HI MDM: CPT

## 2019-11-24 PROCEDURE — 80048 BASIC METABOLIC PNL TOTAL CA: CPT

## 2019-11-24 PROCEDURE — 6370000000 HC RX 637 (ALT 250 FOR IP): Performed by: EMERGENCY MEDICINE

## 2019-11-24 PROCEDURE — 94640 AIRWAY INHALATION TREATMENT: CPT

## 2019-11-24 PROCEDURE — 85730 THROMBOPLASTIN TIME PARTIAL: CPT

## 2019-11-24 PROCEDURE — 84436 ASSAY OF TOTAL THYROXINE: CPT

## 2019-11-24 PROCEDURE — 84484 ASSAY OF TROPONIN QUANT: CPT

## 2019-11-24 PROCEDURE — 6360000002 HC RX W HCPCS: Performed by: EMERGENCY MEDICINE

## 2019-11-24 PROCEDURE — 82803 BLOOD GASES ANY COMBINATION: CPT

## 2019-11-24 PROCEDURE — 85610 PROTHROMBIN TIME: CPT

## 2019-11-24 PROCEDURE — 84132 ASSAY OF SERUM POTASSIUM: CPT

## 2019-11-24 PROCEDURE — 87804 INFLUENZA ASSAY W/OPTIC: CPT

## 2019-11-24 PROCEDURE — 93005 ELECTROCARDIOGRAM TRACING: CPT | Performed by: EMERGENCY MEDICINE

## 2019-11-24 PROCEDURE — 85025 COMPLETE CBC W/AUTO DIFF WBC: CPT

## 2019-11-24 RX ORDER — CEFDINIR 300 MG/1
300 CAPSULE ORAL ONCE
Status: COMPLETED | OUTPATIENT
Start: 2019-11-24 | End: 2019-11-24

## 2019-11-24 RX ORDER — ASPIRIN 81 MG/1
162 TABLET, CHEWABLE ORAL ONCE
Status: COMPLETED | OUTPATIENT
Start: 2019-11-24 | End: 2019-11-24

## 2019-11-24 RX ORDER — METHYLPREDNISOLONE SODIUM SUCCINATE 125 MG/2ML
60 INJECTION, POWDER, LYOPHILIZED, FOR SOLUTION INTRAMUSCULAR; INTRAVENOUS ONCE
Status: COMPLETED | OUTPATIENT
Start: 2019-11-24 | End: 2019-11-24

## 2019-11-24 RX ORDER — ASPIRIN 81 MG/1
81 TABLET, CHEWABLE ORAL DAILY
COMMUNITY
End: 2021-06-14

## 2019-11-24 RX ORDER — AZITHROMYCIN 250 MG/1
TABLET, FILM COATED ORAL
Qty: 1 PACKET | Refills: 0 | Status: SHIPPED | OUTPATIENT
Start: 2019-11-24 | End: 2019-11-28

## 2019-11-24 RX ORDER — CEFDINIR 300 MG/1
300 CAPSULE ORAL 2 TIMES DAILY
Qty: 14 CAPSULE | Refills: 0 | Status: SHIPPED | OUTPATIENT
Start: 2019-11-24 | End: 2019-12-01

## 2019-11-24 RX ADMIN — IPRATROPIUM BROMIDE 0.5 MG: 0.5 SOLUTION RESPIRATORY (INHALATION) at 16:05

## 2019-11-24 RX ADMIN — ALBUTEROL SULFATE 7.5 MG: 2.5 SOLUTION RESPIRATORY (INHALATION) at 16:05

## 2019-11-24 RX ADMIN — ASPIRIN 81 MG 162 MG: 81 TABLET ORAL at 15:56

## 2019-11-24 RX ADMIN — CEFDINIR 300 MG: 300 CAPSULE ORAL at 17:11

## 2019-11-24 RX ADMIN — METHYLPREDNISOLONE SODIUM SUCCINATE 60 MG: 125 INJECTION, POWDER, FOR SOLUTION INTRAMUSCULAR; INTRAVENOUS at 15:57

## 2019-11-24 ASSESSMENT — ENCOUNTER SYMPTOMS
DIARRHEA: 0
CONSTIPATION: 0
COUGH: 1
SORE THROAT: 0
WHEEZING: 1
BLOOD IN STOOL: 0
SHORTNESS OF BREATH: 1
CHEST TIGHTNESS: 1
ABDOMINAL PAIN: 0
TROUBLE SWALLOWING: 0
BACK PAIN: 0
VOMITING: 0
ABDOMINAL DISTENTION: 0
RHINORRHEA: 0
NAUSEA: 0

## 2019-11-26 LAB
EKG P AXIS: 38 DEGREES
EKG P-R INTERVAL: 154 MS
EKG Q-T INTERVAL: 370 MS
EKG QRS DURATION: 94 MS
EKG QTC CALCULATION (BAZETT): 413 MS
EKG T AXIS: 40 DEGREES

## 2019-11-26 PROCEDURE — 93010 ELECTROCARDIOGRAM REPORT: CPT | Performed by: INTERNAL MEDICINE

## 2019-12-02 ENCOUNTER — HOSPITAL ENCOUNTER (OUTPATIENT)
Dept: GENERAL RADIOLOGY | Age: 54
Discharge: HOME | End: 2019-12-02
Payer: COMMERCIAL

## 2019-12-02 ENCOUNTER — TELEPHONE (OUTPATIENT)
Dept: PRIMARY CARE CLINIC | Age: 54
End: 2019-12-02

## 2019-12-02 ENCOUNTER — OFFICE VISIT (OUTPATIENT)
Dept: PRIMARY CARE CLINIC | Age: 54
End: 2019-12-02
Payer: COMMERCIAL

## 2019-12-02 VITALS
OXYGEN SATURATION: 98 % | HEART RATE: 78 BPM | BODY MASS INDEX: 43.83 KG/M2 | DIASTOLIC BLOOD PRESSURE: 72 MMHG | WEIGHT: 288.25 LBS | SYSTOLIC BLOOD PRESSURE: 120 MMHG | RESPIRATION RATE: 20 BRPM

## 2019-12-02 DIAGNOSIS — R06.2 WHEEZING: ICD-10-CM

## 2019-12-02 DIAGNOSIS — Z09 HOSPITAL DISCHARGE FOLLOW-UP: ICD-10-CM

## 2019-12-02 DIAGNOSIS — R06.02 SHORTNESS OF BREATH: ICD-10-CM

## 2019-12-02 DIAGNOSIS — Z09 HOSPITAL DISCHARGE FOLLOW-UP: Primary | ICD-10-CM

## 2019-12-02 DIAGNOSIS — Z87.01 HISTORY OF PNEUMONIA: ICD-10-CM

## 2019-12-02 LAB
ALBUMIN SERPL-MCNC: 4.2 G/DL (ref 3.5–5.2)
ALP BLD-CCNC: 61 U/L (ref 40–130)
ALT SERPL-CCNC: 19 U/L (ref 5–41)
ANION GAP SERPL CALCULATED.3IONS-SCNC: 17 MMOL/L (ref 7–19)
AST SERPL-CCNC: 15 U/L (ref 5–40)
BASOPHILS ABSOLUTE: 0.1 K/UL (ref 0–0.2)
BASOPHILS RELATIVE PERCENT: 1.2 % (ref 0–1)
BILIRUB SERPL-MCNC: 0.6 MG/DL (ref 0.2–1.2)
BUN BLDV-MCNC: 16 MG/DL (ref 6–20)
CALCIUM SERPL-MCNC: 9.1 MG/DL (ref 8.6–10)
CHLORIDE BLD-SCNC: 103 MMOL/L (ref 98–111)
CO2: 22 MMOL/L (ref 22–29)
CREAT SERPL-MCNC: 0.8 MG/DL (ref 0.5–1.2)
EOSINOPHILS ABSOLUTE: 0.3 K/UL (ref 0–0.6)
EOSINOPHILS RELATIVE PERCENT: 2.9 % (ref 0–5)
GFR NON-AFRICAN AMERICAN: >60
GLUCOSE BLD-MCNC: 129 MG/DL (ref 74–109)
HCT VFR BLD CALC: 41.9 % (ref 42–52)
HEMOGLOBIN: 13.7 G/DL (ref 14–18)
IMMATURE GRANULOCYTES #: 0.1 K/UL
LYMPHOCYTES ABSOLUTE: 2.3 K/UL (ref 1.1–4.5)
LYMPHOCYTES RELATIVE PERCENT: 22.3 % (ref 20–40)
MCH RBC QN AUTO: 30.3 PG (ref 27–31)
MCHC RBC AUTO-ENTMCNC: 32.7 G/DL (ref 33–37)
MCV RBC AUTO: 92.7 FL (ref 80–94)
MONOCYTES ABSOLUTE: 0.8 K/UL (ref 0–0.9)
MONOCYTES RELATIVE PERCENT: 7.7 % (ref 0–10)
NEUTROPHILS ABSOLUTE: 6.8 K/UL (ref 1.5–7.5)
NEUTROPHILS RELATIVE PERCENT: 65.3 % (ref 50–65)
PDW BLD-RTO: 13 % (ref 11.5–14.5)
PLATELET # BLD: 267 K/UL (ref 130–400)
PMV BLD AUTO: 11.1 FL (ref 9.4–12.4)
POTASSIUM SERPL-SCNC: 3.7 MMOL/L (ref 3.5–5)
PRO-BNP: 28 PG/ML (ref 0–900)
RBC # BLD: 4.52 M/UL (ref 4.7–6.1)
SODIUM BLD-SCNC: 142 MMOL/L (ref 136–145)
TOTAL PROTEIN: 7.5 G/DL (ref 6.6–8.7)
WBC # BLD: 10.4 K/UL (ref 4.8–10.8)

## 2019-12-02 PROCEDURE — 96372 THER/PROPH/DIAG INJ SC/IM: CPT | Performed by: NURSE PRACTITIONER

## 2019-12-02 PROCEDURE — 1111F DSCHRG MED/CURRENT MED MERGE: CPT | Performed by: NURSE PRACTITIONER

## 2019-12-02 PROCEDURE — 99214 OFFICE O/P EST MOD 30 MIN: CPT | Performed by: NURSE PRACTITIONER

## 2019-12-02 PROCEDURE — 71046 X-RAY EXAM CHEST 2 VIEWS: CPT

## 2019-12-02 RX ORDER — ALBUTEROL SULFATE 90 UG/1
2 AEROSOL, METERED RESPIRATORY (INHALATION) EVERY 6 HOURS PRN
Qty: 1 INHALER | Refills: 3 | Status: SHIPPED | OUTPATIENT
Start: 2019-12-02 | End: 2021-04-05 | Stop reason: ALTCHOICE

## 2019-12-02 RX ORDER — METHYLPREDNISOLONE ACETATE 40 MG/ML
40 INJECTION, SUSPENSION INTRA-ARTICULAR; INTRALESIONAL; INTRAMUSCULAR; SOFT TISSUE ONCE
Status: COMPLETED | OUTPATIENT
Start: 2019-12-02 | End: 2019-12-02

## 2019-12-02 RX ORDER — METHYLPREDNISOLONE 4 MG/1
TABLET ORAL
Qty: 1 KIT | Refills: 0 | Status: SHIPPED | OUTPATIENT
Start: 2019-12-02 | End: 2019-12-08

## 2019-12-02 RX ADMIN — METHYLPREDNISOLONE ACETATE 40 MG: 40 INJECTION, SUSPENSION INTRA-ARTICULAR; INTRALESIONAL; INTRAMUSCULAR; SOFT TISSUE at 10:12

## 2019-12-02 ASSESSMENT — ENCOUNTER SYMPTOMS
NAUSEA: 0
COUGH: 1
WHEEZING: 1
BLOOD IN STOOL: 0
ALLERGIC/IMMUNOLOGIC NEGATIVE: 1
DIARRHEA: 0
VOMITING: 0
SHORTNESS OF BREATH: 1
SINUS PRESSURE: 0
GASTROINTESTINAL NEGATIVE: 1
EYE DISCHARGE: 0
TROUBLE SWALLOWING: 0
EYES NEGATIVE: 1
SORE THROAT: 0

## 2019-12-03 ENCOUNTER — TELEPHONE (OUTPATIENT)
Dept: PRIMARY CARE CLINIC | Age: 54
End: 2019-12-03

## 2019-12-30 RX ORDER — LISINOPRIL AND HYDROCHLOROTHIAZIDE 25; 20 MG/1; MG/1
TABLET ORAL
Qty: 30 TABLET | Refills: 0 | Status: SHIPPED | OUTPATIENT
Start: 2019-12-30 | End: 2020-02-19

## 2019-12-30 RX ORDER — MULTIVITAMIN/IRON/FOLIC ACID 18MG-0.4MG
TABLET ORAL
Qty: 30 TABLET | Refills: 0 | Status: SHIPPED | OUTPATIENT
Start: 2019-12-30 | End: 2020-02-19

## 2020-01-27 ENCOUNTER — OFFICE VISIT (OUTPATIENT)
Dept: PRIMARY CARE CLINIC | Age: 55
End: 2020-01-27
Payer: COMMERCIAL

## 2020-01-27 VITALS
SYSTOLIC BLOOD PRESSURE: 120 MMHG | OXYGEN SATURATION: 97 % | HEART RATE: 85 BPM | BODY MASS INDEX: 43.69 KG/M2 | DIASTOLIC BLOOD PRESSURE: 82 MMHG | TEMPERATURE: 95.3 F | WEIGHT: 295 LBS | HEIGHT: 69 IN

## 2020-01-27 PROCEDURE — 99214 OFFICE O/P EST MOD 30 MIN: CPT | Performed by: FAMILY MEDICINE

## 2020-01-27 RX ORDER — GABAPENTIN 100 MG/1
CAPSULE ORAL
Qty: 180 CAPSULE | Refills: 3 | Status: SHIPPED | OUTPATIENT
Start: 2020-01-27 | End: 2020-09-14

## 2020-01-27 ASSESSMENT — ENCOUNTER SYMPTOMS
DIARRHEA: 0
CONSTIPATION: 0
SHORTNESS OF BREATH: 0
VOMITING: 0
COUGH: 0
ABDOMINAL PAIN: 0
NAUSEA: 0
CHEST TIGHTNESS: 0
WHEEZING: 0

## 2020-01-27 NOTE — PROGRESS NOTES
Steven Mederos is a 53 y.o. male who presents today for   Chief Complaint   Patient presents with    Sleep Apnea       HPI  Patient is here for f/u sleep apnea. Pt reports problems with BiPAP and inquires about another sleep study or better-fitting mask. He notes Hx of PNA ~ 1.5 months ago that has resolved. He reports continuous weight gain but notes he sits frequently at his job. He inquires about weigh gain as medication side effect or other ways to help with weight loss. He denies chronic depression but notes his weight gain is discouraging. No change in PMH, family, social, or surgical history unless mentioned above. Review of Systems   Constitutional: Negative for chills and fever. Respiratory: Negative for cough, chest tightness, shortness of breath and wheezing. Cardiovascular: Negative for chest pain, palpitations and leg swelling. Gastrointestinal: Negative for abdominal pain, constipation, diarrhea, nausea and vomiting. Genitourinary: Negative for difficulty urinating, dysuria and frequency. Psychiatric/Behavioral: Positive for dysphoric mood. Past Medical History:   Diagnosis Date    Bipolar disorder ([facility] [address]     Colon polyps     Diverticular disease     GERD (gastroesophageal reflux disease)     Hiatal hernia     Hyperlipidemia     Hypertension     Hypothyroidism     Neuropathy     Unspecified sleep apnea        Current Outpatient Medications   Medication Sig Dispense Refill    gabapentin (NEURONTIN) 100 MG capsule TAKE (2) CAPSULES THREE TIMES DAILY. 180 capsule 3    Magnesium Oxide 250 MG TABS TAKE 1 TABLET BY MOUTH ONCE DAILY. 30 tablet 0    lisinopril-hydrochlorothiazide (PRINZIDE;ZESTORETIC) 20-25 MG per tablet TAKE 1 TABLET BY MOUTH ONCE DAILY.  30 tablet 0    albuterol sulfate HFA (VENTOLIN HFA) 108 (90 Base) MCG/ACT inhaler Inhale 2 puffs into the lungs every 6 hours as needed for Wheezing 1 Inhaler 3    aspirin 81 MG chewable tablet Take 81 mg by mouth daily  albuterol (PROVENTIL) (5 MG/ML) 0.5% nebulizer solution Take 0.5 mLs by nebulization every 6 hours as needed for Wheezing 120 each 0    ranitidine (ZANTAC) 300 MG tablet TAKE 1 TABLET BY MOUTH TWICE DAILY 60 tablet 2    levothyroxine (SYNTHROID) 50 MCG tablet TAKE 1 TABLET BY MOUTH ONCE DAILY. 30 tablet 2    amLODIPine (NORVASC) 10 MG tablet TAKE 1 TABLET BY MOUTH ONCE DAILY. 30 tablet 3    aspirin 325 MG EC tablet TAKE 1 TABLET BY MOUTH ONCE DAILY. 30 tablet 3    atorvastatin (LIPITOR) 40 MG tablet TAKE 1 TABLET BY MOUTH ONCE DAILY. 30 tablet 3    furosemide (LASIX) 20 MG tablet TAKE 1 TABLET BY MOUTH ONCE DAILY. 30 tablet 3    omeprazole (PRILOSEC) 20 MG delayed release capsule TAKE 1 CAPSULE BY MOUTH DAILY 30 capsule 0    metoprolol tartrate (LOPRESSOR) 25 MG tablet Take 2 tablets by mouth 2 times daily 180 tablet 3    sildenafil (VIAGRA) 100 MG tablet Take 1 tablet by mouth as needed for Erectile Dysfunction Generic sildenafil 100 mg or strongest dose 8 tablet 5    potassium chloride (KLOR-CON M) 10 MEQ extended release tablet TAKE 1 TABLET BY MOUTH ONCE DAILY. 30 tablet 0    potassium chloride (KLOR-CON M) 10 MEQ extended release tablet TAKE 1 TABLET BY MOUTH ONCE DAILY. 30 tablet 0    potassium chloride (KLOR-CON M) 10 MEQ extended release tablet TAKE 1 TABLET BY MOUTH ONCE DAILY. 30 tablet 1    magnesium oxide (MAG-OX) 140 MG CAPS Take 140 mg by mouth daily 90 capsule 1    busPIRone (BUSPAR) 7.5 MG tablet Take 3 times per day as necessary. After 7 days if needed may take 2 tablets 3 times per day as necessary 180 tablet 1     No current facility-administered medications for this visit.         No Known Allergies    Past Surgical History:   Procedure Laterality Date    COLONOSCOPY  01/06/2009    Dr Wick Face: hyperplastic polyp, resolving diverticulitis    COLONOSCOPY  07/2012    minimal diverticulosis left side o/w normal postsurgical colon    COLONOSCOPY  12/2005    diverticulitis    COLONOSCOPY N/A 2016    Dr Ruffin Read bleeding internal hemorrhoids, diverticular disease-5 yr recall    EGD      PA EGD TRANSORAL BIOPSY SINGLE/MULTIPLE N/A 2018    Dr Deidra Bentley-Gastritis/gastropathy, esophagitis    SUBTOTAL COLECTOMY      recurrant diverticulitis    UPPER GASTROINTESTINAL ENDOSCOPY  2005    Dr Paz Reading: duodenal ulcer, gastritis    UPPP UVULOPALATOPHARYGOPLASTY         Social History     Tobacco Use    Smoking status: Never Smoker    Smokeless tobacco: Never Used   Substance Use Topics    Alcohol use: No    Drug use: No       Family History   Problem Relation Age of Onset    Diabetes Mother     Kidney Disease Mother     Dementia Mother          at 68 - Lewy Body Dementia    Coronary Art Dis Father          at [de-identified]   Otelia Cristian Colon Cancer Neg Hx     Colon Polyps Neg Hx     Esophageal Cancer Neg Hx     Liver Cancer Neg Hx     Liver Disease Neg Hx     Rectal Cancer Neg Hx     Stomach Cancer Neg Hx        /82   Pulse 85   Temp 95.3 °F (35.2 °C)   Ht 5' 9\" (1.753 m)   Wt 295 lb (133.8 kg)   SpO2 97%   BMI 43.56 kg/m²     Physical Exam  Vitals signs and nursing note reviewed. Constitutional:       General: He is not in acute distress. Appearance: He is well-developed. He is not toxic-appearing or diaphoretic. HENT:      Head: Normocephalic and atraumatic. Cardiovascular:      Rate and Rhythm: Normal rate and regular rhythm. Heart sounds: Normal heart sounds. No murmur. No friction rub. No gallop. Pulmonary:      Effort: Pulmonary effort is normal. No respiratory distress. Breath sounds: Normal breath sounds. No wheezing or rales. Chest:      Chest wall: No tenderness. Abdominal:      General: Bowel sounds are normal. There is no distension. Palpations: Abdomen is soft. There is no mass. Tenderness: There is no abdominal tenderness. There is no guarding or rebound.       Comments: Central obesity   Musculoskeletal: Right lower leg: No edema. Left lower leg: No edema. Skin:     General: Skin is warm and dry. Nails: There is no clubbing. Neurological:      Mental Status: He is alert and oriented to person, place, and time. Coordination: Coordination normal.      Gait: Gait normal.         Assessment:    ICD-10-CM    1. Bipolar disorder, in full remission, most recent episode mixed ([facility] [address] F31.78    2. HAWK (obstructive sleep apnea) G47.33 Home Sleep Study   3. Essential hypertension I10    4. Class 3 severe obesity due to excess calories with serious comorbidity and body mass index (BMI) of 40.0 to 44.9 in adult ([facility]) E66.01     Z68.41    5. Other polyneuropathy G62.89 gabapentin (NEURONTIN) 100 MG capsule       Plan:   Suggested sleep study for uncontrolled sleep apnea, as BiPAP giving him issues. Has had weight gain since Dx of sleep apnea. Suspect he continues to have sleep apnea. Other conditions controlled. Dietary and exercise changes suggested today for weight gain. Continue gabapentin for neuropathy. Orders Placed This Encounter   Procedures    Home Sleep Study     Pt prefers not the service in [facility] Status:   Future     Standing Expiration Date:   2/27/2020     Order Specific Question:   Location For Sleep Study     Answer:   [place]     Order Specific Question:   Select Sleep Lab Location     Answer:   Non-[facility]     Comments:   h/o HAWK w/ bipap but haiving issues w/ it     Orders Placed This Encounter   Medications    gabapentin (NEURONTIN) 100 MG capsule     Sig: TAKE (2) CAPSULES THREE TIMES DAILY. Dispense:  180 capsule     Refill:  3     Medications Discontinued During This Encounter   Medication Reason    gabapentin (NEURONTIN) 100 MG capsule REORDER     Patient Instructions   Begin a walking program consisting of 20 minutes of brisk walking 3 days a week for 2 weeks. Then increase by 1 day a week every 2 weeks until 5 days a week is reached.  Then increase to 30 minutes, 5 days a week. This will help reduce your risk of heart attack and stroke. This can also help control your blood pressure, blood sugar, chronic arthritis and pain, and cholesterol. Please arrive 15 minutes early to next follow up appointment in 4 months or schedule an appointment sooner if needed. Get your labs checked in [address] of [address] building. Get fasting lab work done (no food or drink besides water after midnight and medicines as prescribed) before the next follow up please. Patient given educational handouts and has had all questions answered. Patient voices understanding and agrees to plans along with risks and benefits of plan. Patient isinstructed to continue prior meds, diet, and exercise plans unless instructed otherwise. Patient agrees to follow up as instructed and sooner if needed. Patient agrees to go to ER if condition becomes emergent. Notesmay be completed with dictation device and spelling errors may occur. Jaycob Foster am scribing for and in the presence of Dr. Ivette Rolon. 1/27/2020   I, Dr. Ritchie Gallo, the medical provider for the encounter with patient on 1/27/2020 at 1:56 PM have reviewed my scribe's documentation in earnest and take sole ownership of the intellectual property represented in documentation by my medical scribe and myself within this document. Some errors may occur in proofreading.       Return in about 4 months (around 5/27/2020) for annual.

## 2020-01-27 NOTE — PATIENT INSTRUCTIONS
Begin a walking program consisting of 20 minutes of brisk walking 3 days a week for 2 weeks. Then increase by 1 day a week every 2 weeks until 5 days a week is reached. Then increase to 30 minutes, 5 days a week. This will help reduce your risk of heart attack and stroke. This can also help control your blood pressure, blood sugar, chronic arthritis and pain, and cholesterol. Please arrive 15 minutes early to next follow up appointment in 4 months or schedule an appointment sooner if needed. Get your labs checked in [address] of [address] building. Get fasting lab work done (no food or drink besides water after midnight and medicines as prescribed) before the next follow up please.

## 2020-02-19 RX ORDER — LISINOPRIL AND HYDROCHLOROTHIAZIDE 25; 20 MG/1; MG/1
TABLET ORAL
Qty: 30 TABLET | Refills: 4 | Status: SHIPPED | OUTPATIENT
Start: 2020-02-19 | End: 2020-08-07

## 2020-02-19 RX ORDER — LEVOTHYROXINE SODIUM 0.05 MG/1
TABLET ORAL
Qty: 30 TABLET | Refills: 4 | Status: SHIPPED | OUTPATIENT
Start: 2020-02-19 | End: 2020-08-07

## 2020-02-19 RX ORDER — FUROSEMIDE 20 MG/1
TABLET ORAL
Qty: 30 TABLET | Refills: 4 | Status: SHIPPED | OUTPATIENT
Start: 2020-02-19 | End: 2020-08-07

## 2020-02-19 RX ORDER — MULTIVITAMIN/IRON/FOLIC ACID 18MG-0.4MG
TABLET ORAL
Qty: 30 TABLET | Refills: 4 | Status: SHIPPED | OUTPATIENT
Start: 2020-02-19 | End: 2020-08-24

## 2020-02-19 NOTE — TELEPHONE ENCOUNTER
Azar Anant called to request a refill on his medication. Last office visit : 1/27/2020   Next office visit : 6/1/2020     Requested Prescriptions     Pending Prescriptions Disp Refills    furosemide (LASIX) 20 MG tablet [Pharmacy Med Name: FUROSEMIDE 20MG TABLET] 30 tablet 0     Sig: TAKE 1 TABLET BY MOUTH ONCE DAILY.  lisinopril-hydroCHLOROthiazide (PRINZIDE;ZESTORETIC) 20-25 MG per tablet [Pharmacy Med Name: LISINOP/HCTZ 20/25TAB SD] 30 tablet 0     Sig: TAKE 1 TABLET BY MOUTH ONCE DAILY.  levothyroxine (SYNTHROID) 50 MCG tablet [Pharmacy Med Name: LEVOTHYROXINE 50 MCG TABLET] 30 tablet 0     Sig: TAKE 1 TABLET BY MOUTH ONCE DAILY.  Magnesium Oxide 250 MG TABS [Pharmacy Med Name: MAGNESIUM OXIDE 250 MG TABLET] 30 tablet 0     Sig: TAKE 1 TABLET BY MOUTH ONCE DAILY.             Cm Aver, [place]

## 2020-03-09 ENCOUNTER — TELEPHONE (OUTPATIENT)
Dept: PRIMARY CARE CLINIC | Age: 55
End: 2020-03-09

## 2020-05-27 ENCOUNTER — HOSPITAL ENCOUNTER (OUTPATIENT)
Dept: SLEEP CENTER | Age: 55
Discharge: HOME | End: 2020-05-29
Payer: MEDICAID

## 2020-05-27 PROCEDURE — 95810 POLYSOM 6/> YRS 4/> PARAM: CPT | Performed by: PSYCHIATRY & NEUROLOGY

## 2020-05-27 PROCEDURE — 95810 POLYSOM 6/> YRS 4/> PARAM: CPT

## 2020-05-28 NOTE — PROGRESS NOTES
[place] [address]  [place], [address]  Phone [phone] Fax [phone]     Sleep Study Technician Review    Patient Name:  Marita Olsen  :   1965  Referring Provider: Mary Oakley MD    Brief History:  Marita Olsen is a 63 y.o. Patient is here for f/u sleep apnea. Pt reports problems with BiPAP and inquires about another sleep study or better-fitting mask. He notes Hx of PNA ~ 1.5 months ago that has resolved. He reports continuous weight gain but notes he sits frequently at his job. He inquires about weigh gain as medication side effect or other ways to help with weight loss. He denies chronic depression but notes his weight gain is discouraging. Height:69 inches  Weight: 295 lbs   BMI: 43.56   Neck Circ: 18  MALLAMPATI:4  ESS: 14     Type of Study: PSG  Time Stage Position Snore Hypopnea Obs Apnea Elmer Apnea PAP O2   2200 awake supine no no no no  RA   2300 2 supine yes yes yes no  RA   2400 2 supine yes yes yes no  RA   0100 2 supine yes yes yes no  RA   0200 1 supine yes yes no no  RA   0241 Pt wanted to leave                                   Summary:  Gilmer had multiple events, he was claustrophobic. He was able to keep wires on up until early a.m. He said that he does not think he can wear cpap. DME: [facility]     The study was reviewed briefly with Marita Olsen. He will be notified of the formal results and recommendations after the study is scored and interpreted. The report will be sent to his referring provider.     Technician: Tameka Barragan RRT, RPSGT

## 2020-06-01 ENCOUNTER — VIRTUAL VISIT (OUTPATIENT)
Dept: PRIMARY CARE CLINIC | Age: 55
End: 2020-06-01
Payer: MEDICAID

## 2020-06-01 PROBLEM — R11.10 DRY HEAVES: Status: RESOLVED | Noted: 2018-06-27 | Resolved: 2020-06-01

## 2020-06-01 PROCEDURE — 99396 PREV VISIT EST AGE 40-64: CPT | Performed by: FAMILY MEDICINE

## 2020-06-01 ASSESSMENT — ENCOUNTER SYMPTOMS
CHEST TIGHTNESS: 0
VOMITING: 0
WHEEZING: 0
SHORTNESS OF BREATH: 0
CONSTIPATION: 0
ABDOMINAL PAIN: 0
COUGH: 1
DIARRHEA: 0
NAUSEA: 0

## 2020-06-01 NOTE — PROGRESS NOTES
Hepatitis A vaccine  Aged Out    Hepatitis B vaccine  Aged Out    Hib vaccine  Aged Out    Meningococcal (ACWY) vaccine  Aged Out    Pneumococcal 0-64 years Vaccine  Aged Out       PHYSICAL EXAMINATION:  [ INSTRUCTIONS:  \"[x]\" Indicates a positive item  \"[]\" Indicates a negative item  -- DELETE ALL ITEMS NOT EXAMINED]  Vital Signs: (As obtained by patient/caregiver or practitioner observation)    Blood pressure-  Heart rate-    Respiratory rate-    Temperature-  Pulse oximetry-     Constitutional: [x] Appears well-developed and well-nourished [] No apparent distress      [] Abnormal-   Mental status  [x] Alert and awake  [x] Oriented to person/place/time [x]Able to follow commands      Eyes:  EOM    [x]  Normal  [] Abnormal-  Sclera  [x]  Normal  [] Abnormal -         Discharge []  None visible  [] Abnormal -    HENT:   [x] Normocephalic, atraumatic. [] Abnormal   [x] Mouth/Throat: Mucous membranes are moist.     External Ears [x] Normal  [] Abnormal-     Neck: [x] No visualized mass     Pulmonary/Chest: [x] Respiratory effort normal.  [x] No visualized signs of difficulty breathing or respiratory distress        [] Abnormal-      Musculoskeletal:   [x] Normal gait with no signs of ataxia         [x] Normal range of motion of neck        [] Abnormal-       Neurological:        [x] No Facial Asymmetry (Cranial nerve 7 motor function) (limited exam to video visit)          [x] No gaze palsy        [] Abnormal-         Skin:        [x] No significant exanthematous lesions or discoloration noted on facial skin         [] Abnormal-            Psychiatric:       [x] Normal Affect [x] No Hallucinations        [] Abnormal-     Other pertinent observable physical exam findings-     ASSESSMENT/PLAN:    ICD-10-CM    1. Routine general medical examination at a health care facility Z00.00    2. Panic attack as reaction to stress F41.0     F43.0    3. Obstructive sleep apnea G47.33    4.  Bipolar disorder, in full remission,

## 2020-06-01 NOTE — PATIENT INSTRUCTIONS
Get your labs checked in [address]. Get fasting lab work done (no food or drink besides water after midnight and medicines as prescribed) before the next follow up please.

## 2020-06-17 ENCOUNTER — HOSPITAL ENCOUNTER (OUTPATIENT)
Dept: SLEEP CENTER | Age: 55
Discharge: HOME | End: 2020-06-19
Payer: MEDICAID

## 2020-06-18 ENCOUNTER — TELEPHONE (OUTPATIENT)
Dept: PRIMARY CARE CLINIC | Age: 55
End: 2020-06-18

## 2020-06-18 NOTE — TELEPHONE ENCOUNTER
Patient wife called stating patient is unable to complete sleep study due to getting very anxious when they hooking him up, this is the second attempt. She wanted to know if you could give him something to calm him so he can complete the study?

## 2020-06-19 RX ORDER — LORAZEPAM 0.5 MG/1
TABLET ORAL
Qty: 1 TABLET | Refills: 0 | OUTPATIENT
Start: 2020-06-19 | End: 2020-08-19

## 2020-06-19 NOTE — TELEPHONE ENCOUNTER
Patient is aware and medication has been sent into pharmacy.  Spoke with Allan Miner the pharmacist.     Requested Prescriptions     Signed Prescriptions Disp Refills    LORazepam (ATIVAN) 0.5 MG tablet 1 tablet 0     Sig: Take tablet 15-30 minutes prior to sleep study     Authorizing Provider: Ricardo Youssef     Ordering User: Silvia Brown MA

## 2020-08-07 RX ORDER — LEVOTHYROXINE SODIUM 0.05 MG/1
TABLET ORAL
Qty: 30 TABLET | Refills: 0 | Status: SHIPPED | OUTPATIENT
Start: 2020-08-07 | End: 2020-09-14

## 2020-08-07 RX ORDER — LISINOPRIL AND HYDROCHLOROTHIAZIDE 25; 20 MG/1; MG/1
TABLET ORAL
Qty: 30 TABLET | Refills: 0 | Status: SHIPPED | OUTPATIENT
Start: 2020-08-07 | End: 2020-09-14

## 2020-08-07 RX ORDER — FUROSEMIDE 20 MG/1
TABLET ORAL
Qty: 30 TABLET | Refills: 0 | Status: SHIPPED | OUTPATIENT
Start: 2020-08-07 | End: 2020-09-15

## 2020-09-15 RX ORDER — AMLODIPINE BESYLATE 10 MG/1
TABLET ORAL
Qty: 90 TABLET | Refills: 1 | Status: SHIPPED | OUTPATIENT
Start: 2020-09-15 | End: 2021-03-21

## 2020-09-15 RX ORDER — LEVOTHYROXINE SODIUM 0.05 MG/1
TABLET ORAL
Qty: 90 TABLET | Refills: 1 | Status: SHIPPED | OUTPATIENT
Start: 2020-09-15 | End: 2021-03-21

## 2020-09-15 RX ORDER — ASPIRIN 325 MG
TABLET, DELAYED RELEASE (ENTERIC COATED) ORAL
Qty: 30 TABLET | Refills: 0 | OUTPATIENT
Start: 2020-09-15

## 2020-09-15 RX ORDER — FUROSEMIDE 20 MG/1
TABLET ORAL
Qty: 30 TABLET | Refills: 1 | Status: SHIPPED | OUTPATIENT
Start: 2020-09-15 | End: 2020-11-02

## 2020-09-15 RX ORDER — ATORVASTATIN CALCIUM 40 MG/1
TABLET, FILM COATED ORAL
Qty: 90 TABLET | Refills: 1 | Status: SHIPPED | OUTPATIENT
Start: 2020-09-15 | End: 2021-03-21

## 2020-09-15 RX ORDER — GABAPENTIN 100 MG/1
CAPSULE ORAL
Qty: 180 CAPSULE | Refills: 2 | OUTPATIENT
Start: 2020-09-15 | End: 2020-12-01

## 2020-09-15 RX ORDER — LISINOPRIL AND HYDROCHLOROTHIAZIDE 25; 20 MG/1; MG/1
TABLET ORAL
Qty: 90 TABLET | Refills: 1 | Status: SHIPPED | OUTPATIENT
Start: 2020-09-15 | End: 2021-03-21

## 2020-09-15 NOTE — TELEPHONE ENCOUNTER
Darylene Cooks called to request a refill on his medication. Last office visit : 6/1/2020   Next office visit : 12/1/2020     Last Farheen Bryan: 09/14/2020  Medication Contract: [id number]  Last Fill: 07/07/2020    Last UDS: No results found for: LABAMPH, LABBARB, LABBENZ, BUPRENUR, COCAIMETSCRU, GABAPENTIN, MDMA, METAMPU, OPIATESCREENURINE, OXTCOSU, PHENCYCLIDINESCREENURINE, PROPOXYPHENE, THCSCREENUR, TRICYUR                Requested Prescriptions     Signed Prescriptions Disp Refills    gabapentin (NEURONTIN) 100 MG capsule 180 capsule 2     Sig: TAKE (2) CAPSULES THREE TIMES DAILY. Authorizing Provider: Meaghan Merchant     Ordering User: Ann Marie Dock furosemide (LASIX) 20 MG tablet 30 tablet 1     Sig: TAKE 1 TABLET BY MOUTH ONCE DAILY. Authorizing Provider: Meaghan Merchant     Ordering User: Sofi Arguelles    atorvastatin (LIPITOR) 40 MG tablet 90 tablet 1     Sig: TAKE 1 TABLET BY MOUTH ONCE DAILY. Authorizing Provider: Meaghan Merchant     Ordering User: Ann Marie Dock lisinopril-hydroCHLOROthiazide (PRINZIDE;ZESTORETIC) 20-25 MG per tablet 90 tablet 1     Sig: TAKE 1 TABLET BY MOUTH ONCE DAILY. Authorizing Provider: Meaghan Merchant     Ordering User: Ann Marie Dock levothyroxine (SYNTHROID) 50 MCG tablet 90 tablet 1     Sig: TAKE 1 TABLET BY MOUTH ONCE DAILY. Authorizing Provider: Meaghan Merchant     Ordering User: Sofi Arguelles    amLODIPine (NORVASC) 10 MG tablet 90 tablet 1     Sig: TAKE 1 TABLET BY MOUTH ONCE DAILY. Authorizing Provider: Meaghan Merchant     Ordering User: Sofi Arguelles     Refused Prescriptions Disp Refills    aspirin 325 MG EC tablet [Pharmacy Med Name: ASPIRIN 325MG EC**TAB SD] 30 tablet 0     Sig: TAKE 1 TABLET BY MOUTH ONCE DAILY. Refused By: Sofi Arguelles     Reason for Refusal: Refill not appropriate         Please approve or refuse this medication.    Mia Johnston MA

## 2020-10-29 ENCOUNTER — OFFICE VISIT (OUTPATIENT)
Dept: NEUROLOGY | Age: 55
End: 2020-10-29
Payer: MEDICAID

## 2020-10-29 VITALS
DIASTOLIC BLOOD PRESSURE: 89 MMHG | SYSTOLIC BLOOD PRESSURE: 145 MMHG | HEIGHT: 68 IN | BODY MASS INDEX: 45.47 KG/M2 | WEIGHT: 300 LBS | OXYGEN SATURATION: 97 % | HEART RATE: 87 BPM

## 2020-10-29 PROCEDURE — 99203 OFFICE O/P NEW LOW 30 MIN: CPT | Performed by: PSYCHIATRY & NEUROLOGY

## 2020-10-29 NOTE — PROGRESS NOTES
[id number] [facility]  [address] [place] [address] Drive, [address] [place] [address],[address], [place] [address]  Phone [phone]  [phone]     Joe Ellison PATIENT VISIT        Patient: Kei Nazario  :  1965  Age:  63 y.o. MRN:  [id number]  Account #:  [de-identified]  Today:  10/29/20    Referring Provider:  Susanna Hamm M.D. Consulting Provider: Wicho John M.D.    [address] [place] [address] Avenue:  Chief Complaint   Patient presents with    Follow-up    Sleep Apnea       History Source: History obtained from the patient and chart. PCP: Deniz Ruiz MD    HISTORY OF PRESENT ILLNESS:  Kei Nazario is a 63y.o. year old man with a history of severe obstructive sleep apnea and treatment noncompliance who was referred for opinion. He was diagnosed with HAWK years ago and prescribed BiPAP. He did not tolerate that and did not use it. He had UPPP. He has had worsening symptoms of snoring, poor sleep, and daytime drowsiness. The PSG performed 2020 showed severe HAWK with an AHI of 129 and low saturation of 79%. He did not tolerate the titration portion of the study and left after only 20 minutes of trying. He returned for a titration polysomnogram appt but again left after just a short period. He was referred to the clinic for opinion. He has no problems initiating sleep. He wakens frequently with nocturia. He has no sweating at night or morning headaches. He never feels rested and is very drowsy throughout the day. He naps and falls asleep unintentionally.       PAST MEDICAL HITORY:    Past Medical History:   Diagnosis Date    Bipolar disorder ([facility] [address]     Colon polyps     Diverticular disease     GERD (gastroesophageal reflux disease)     Hiatal hernia     Hyperlipidemia     Hypertension     Hypothyroidism     Neuropathy     Unspecified sleep apnea        Past Surgical History:   Procedure Laterality Date    COLONOSCOPY  2009    Dr [place]: hyperplastic polyp, resolving diverticulitis    COLONOSCOPY  2012 minimal diverticulosis left side o/w normal postsurgical colon    COLONOSCOPY  12/2005    diverticulitis    COLONOSCOPY N/A 11/8/2016    Dr Mia Sanchez bleeding internal hemorrhoids, diverticular disease-5 yr recall    EGD      [place] EGD TRANSORAL BIOPSY SINGLE/MULTIPLE N/A 8/24/2018    Dr JONATHAN Bentley-Gastritis/gastropathy, esophagitis    SUBTOTAL COLECTOMY  2010    recurrant diverticulitis    UPPER GASTROINTESTINAL ENDOSCOPY  12/22/2005    Dr Willard Dub: duodenal ulcer, gastritis    UPPP UVULOPALATOPHARYGOPLASTY         Recent Hospitalizations  · None    Significant Injuries  · None    Habits  Morales Swanson reports that he has never smoked. He has never used smokeless tobacco. He reports that he does not drink alcohol or use drugs. Current Outpatient Medications   Medication Sig Dispense Refill    gabapentin (NEURONTIN) 100 MG capsule TAKE (2) CAPSULES THREE TIMES DAILY. 180 capsule 2    furosemide (LASIX) 20 MG tablet TAKE 1 TABLET BY MOUTH ONCE DAILY. 30 tablet 1    atorvastatin (LIPITOR) 40 MG tablet TAKE 1 TABLET BY MOUTH ONCE DAILY. 90 tablet 1    lisinopril-hydroCHLOROthiazide (PRINZIDE;ZESTORETIC) 20-25 MG per tablet TAKE 1 TABLET BY MOUTH ONCE DAILY. 90 tablet 1    levothyroxine (SYNTHROID) 50 MCG tablet TAKE 1 TABLET BY MOUTH ONCE DAILY. 90 tablet 1    amLODIPine (NORVASC) 10 MG tablet TAKE 1 TABLET BY MOUTH ONCE DAILY.  90 tablet 1    metoprolol tartrate (LOPRESSOR) 25 MG tablet TAKE 2 TABLETS BY MOUTH TWICE DAILY 120 tablet 5    busPIRone (BUSPAR) 7.5 MG tablet TAKE 1 TABLET BY MOUTH UP TO 3 TIMES DAILY AS NEEDED - AFTER 1 WEEK, MAY INCREASE TO 2 TABLETS 3 TIMES DAILY IF NEEDED 180 tablet 3    albuterol sulfate HFA (VENTOLIN HFA) 108 (90 Base) MCG/ACT inhaler Inhale 2 puffs into the lungs every 6 hours as needed for Wheezing 1 Inhaler 3    aspirin 81 MG chewable tablet Take 81 mg by mouth daily      albuterol (PROVENTIL) (5 MG/ML) 0.5% nebulizer solution Take 0.5 mLs by nebulization every 6 hours as needed for Wheezing 120 each 0    omeprazole (PRILOSEC) 20 MG delayed release capsule TAKE 1 CAPSULE BY MOUTH DAILY 30 capsule 0    sildenafil (VIAGRA) 100 MG tablet Take 1 tablet by mouth as needed for Erectile Dysfunction Generic sildenafil 100 mg or strongest dose 8 tablet 5     No current facility-administered medications for this visit. Allergies as of 10/29/2020    (No Known Allergies)       FAMILY HISTORY:   Family History   Problem Relation Age of Onset    Diabetes Mother     Kidney Disease Mother     Dementia Mother          at 68 - Lewy Body Dementia    Coronary Art Dis Father          at [de-identified]    Colon Cancer Neg Hx     Colon Polyps Neg Hx     Esophageal Cancer Neg Hx     Liver Cancer Neg Hx     Liver Disease Neg Hx     Rectal Cancer Neg Hx     Stomach Cancer Neg Hx        SOCIAL HISTORY:   Anish Winslow is , lives in [place], [place], and works at [email or web address]. REVIEW OF SYSTEMS:  Review of Systems   Constitutional: Positive for fatigue. Negative for chills and fever. HENT: Negative for hearing loss and tinnitus. Eyes: Negative for photophobia and visual disturbance. Respiratory: Negative for cough and shortness of breath. Cardiovascular: Negative for chest pain and palpitations. Gastrointestinal: Negative for nausea and vomiting. Endocrine: Negative for polydipsia and polyuria. Genitourinary: Negative for frequency and urgency. Musculoskeletal: Positive for arthralgias. Negative for back pain. Skin: Negative for rash and wound. Allergic/Immunologic: Negative for environmental allergies and food allergies. Neurological: Negative for dizziness, tremors, seizures, syncope, facial asymmetry, speech difficulty, weakness, light-headedness, numbness and headaches. Hematological: Negative for adenopathy. Does not bruise/bleed easily. Psychiatric/Behavioral: Negative for dysphoric mood. The patient is not nervous/anxious.     Sleep: Positive vibration in all extremities. Coordination:  Rapid alternating movements are normal in both upper and lower extremities. Finger to nose testing is unimpaired bilaterally. Gait:  Normal station and gait. ADDITIONAL REVIEW:      IMPRESSION:    ICD-10-CM    1. Obstructive sleep apnea  G47.33    2. Nocturnal hypoxemia  G47.34       I discussed the diagnosis of obstructive sleep apnea with Boris Orona including thepathophysiology (namely the mechanism of breathing and obstruction of upper airway, interruptions of sleep, hypoxemia, hypercapnia, and results of repetitive sympathetic activation), risks, evaluation, and treatment options. I discussed the risks of driving when drowsy and advised that Boris Roch not drive when drowsy and avoid sedatingmedications and respiratory suppressants. Although there are other treatment options - surgery, oral devices, Inspire device,   His apnea is so severe and his size makes those options inappropriate and unavailable. The only option for treatment available to him is PAP use. He used BiPAP previously. I am unsure his insurance with cover that without documented failure of CPAP which is why the titration polysomnogram was scheduled. He believes that he would be able to use BiPAP but gets very claustrophobic with the polysomnogram.    PLAN:  1. Refer for auto BiPAP set up. Will see if insurance will cover  2.  FU per protocol    Mariah Washington M.D.

## 2020-10-29 NOTE — LETTER
[place] [address]  [address]  Phone [phone] Fax [phone]     Patient Name: Jacinda Lyles 10/29/2020  : 1965  Age: 63 y.o.   Patient Address: [address] [place] [address]       Patient Phone: [phone] (home)     REFERRAL  Referred to: DME provider of patient's choice  Jacinda Lyles is referred for the following:    DME Equipment HPCPS Code Setting   Auto Adjusting BiPAP device with flex or comparable pressure relief per comfort  Min EPAP: 8cm to   Max IPAP: 20cm,   Pressure Support Range:  4cm to 6cm   Heated Humidifier  Patient Choice       Replinishible PAP Supplies, 1 year supply  Item HPCPS Code Frequency   Mask of choice  or  1 per 3 months   Nasal Mask cushion/pillows  or  2 per 30 days   Full Face Mask Interface  1 per 30 days   Headgear  1 per 6 months   Tubing, length of choice  or  1 per 3 months   Water Chamber  1 per 6 months   Chinstrap  1 per 6 months   Disposable Filters  2 per 30 days   Reusable Filters  1 per 6 months     Diagnoses:  Obstructive sleep apnea (G47.33)  Length of Need: Lifetime, 99    Ordering Provider: Rosanna Almazan M.D.  Emaline Beverage: [id number]         Signature:       Date: 10/29/2020      Electronically Signed by Rosanna Almazan M.D.

## 2020-11-02 RX ORDER — FUROSEMIDE 20 MG/1
TABLET ORAL
Qty: 30 TABLET | Refills: 5 | Status: SHIPPED | OUTPATIENT
Start: 2020-11-02 | End: 2021-08-27

## 2020-11-03 ASSESSMENT — ENCOUNTER SYMPTOMS
VOMITING: 0
SHORTNESS OF BREATH: 0
NAUSEA: 0
PHOTOPHOBIA: 0
BACK PAIN: 0
COUGH: 0

## 2020-11-12 ENCOUNTER — TELEPHONE (OUTPATIENT)
Dept: NEUROLOGY | Age: 55
End: 2020-11-12

## 2020-11-12 NOTE — TELEPHONE ENCOUNTER
Tabby Duran requests that someone return their call. The best time to reach him is Anytime. The pt is calling back about the cpap machine. Thank you.

## 2020-11-13 NOTE — TELEPHONE ENCOUNTER
Pt states he has yet to have a call from [facility] regarding his CPAP order.

## 2020-11-13 NOTE — TELEPHONE ENCOUNTER
Contacted Caitlin with [address] regarding this patient. Shereen Greenfield will be contacting patient regarding his DME.

## 2020-12-01 ENCOUNTER — VIRTUAL VISIT (OUTPATIENT)
Dept: PRIMARY CARE CLINIC | Age: 55
End: 2020-12-01
Payer: MEDICAID

## 2020-12-01 PROBLEM — J45.40 MODERATE PERSISTENT ASTHMA WITHOUT COMPLICATION: Status: ACTIVE | Noted: 2020-12-01

## 2020-12-01 PROCEDURE — 99214 OFFICE O/P EST MOD 30 MIN: CPT | Performed by: FAMILY MEDICINE

## 2020-12-01 RX ORDER — FLUTICASONE PROPIONATE 44 UG/1
2 AEROSOL, METERED RESPIRATORY (INHALATION) 2 TIMES DAILY
Qty: 1 INHALER | Refills: 3 | Status: SHIPPED | OUTPATIENT
Start: 2020-12-01 | End: 2021-04-05 | Stop reason: ALTCHOICE

## 2020-12-01 RX ORDER — PHENTERMINE HYDROCHLORIDE 15 MG/1
15 CAPSULE ORAL EVERY MORNING
Qty: 30 CAPSULE | Refills: 0 | Status: SHIPPED | OUTPATIENT
Start: 2020-12-01 | End: 2021-01-30

## 2020-12-01 RX ORDER — GABAPENTIN 400 MG/1
400 CAPSULE ORAL 3 TIMES DAILY
Qty: 90 CAPSULE | Refills: 3 | Status: SHIPPED | OUTPATIENT
Start: 2020-12-01 | End: 2021-07-12 | Stop reason: ALTCHOICE

## 2020-12-01 ASSESSMENT — ENCOUNTER SYMPTOMS
ABDOMINAL PAIN: 0
VOMITING: 0
SHORTNESS OF BREATH: 0
CONSTIPATION: 0
DIARRHEA: 0
CHEST TIGHTNESS: 0
COUGH: 0
NAUSEA: 0
WHEEZING: 0

## 2020-12-01 NOTE — PROGRESS NOTES
2020    TELEHEALTH EVALUATION -- Audio/Visual (During FOAQB-99 public health emergency)    HPI:    Cary Encarnacion (:  1965) has requested an audio/video evaluation for the following concern(s):    Patient presents today via video visit for f/u neuropathy. He is on gabapentin but low dose. Notes he is having issues w/ burning. Notes his dad is on namenda for neuropathy. Have some pain in the foot that is ongoing. He was referred to podiatrist.  Describes pain in feet separate from burning as bony instability. He notes he is having more pain on his L knee and L hip pain. Notes that the ankle and knee on L side. Notes he feels jittery after using inhaler, it helps. Wondering if his belly is coming into interference as well. Review of Systems   Constitutional: Negative for chills and fever. Respiratory: Negative for cough, chest tightness, shortness of breath and wheezing. Cardiovascular: Negative for chest pain, palpitations and leg swelling. Gastrointestinal: Negative for abdominal pain, constipation, diarrhea, nausea and vomiting. Genitourinary: Negative for difficulty urinating, dysuria and frequency. Neurological: Positive for numbness (with burning). Negative for weakness. Prior to Visit Medications    Medication Sig Taking? Authorizing Provider   fluticasone (FLOVENT HFA) 44 MCG/ACT inhaler Inhale 2 puffs into the lungs 2 times daily Yes Ruma Shay MD   gabapentin (NEURONTIN) 400 MG capsule Take 1 capsule by mouth 3 times daily for 30 days. Yes Ruma Shay MD   phentermine 15 MG capsule Take 1 capsule by mouth every morning for 30 days. Yes Ruma Shay MD   furosemide (LASIX) 20 MG tablet TAKE 1 TABLET BY MOUTH ONCE DAILY. Ruma Shay MD   atorvastatin (LIPITOR) 40 MG tablet TAKE 1 TABLET BY MOUTH ONCE DAILY. Ruma Shay MD   lisinopril-hydroCHLOROthiazide (PRINZIDE;ZESTORETIC) 20-25 MG per tablet TAKE 1 TABLET BY MOUTH ONCE DAILY.   Ruma Shay MD levothyroxine (SYNTHROID) 50 MCG tablet TAKE 1 TABLET BY MOUTH ONCE DAILY. Edwin Sims MD   amLODIPine (NORVASC) 10 MG tablet TAKE 1 TABLET BY MOUTH ONCE DAILY.   Edwin Sims MD   metoprolol tartrate (LOPRESSOR) 25 MG tablet TAKE 2 TABLETS BY MOUTH TWICE DAILY  Edwin Sims MD   busPIRone (BUSPAR) 7.5 MG tablet TAKE 1 TABLET BY MOUTH UP TO 3 TIMES DAILY AS NEEDED - AFTER 1 WEEK, MAY INCREASE TO 2 TABLETS 3 TIMES DAILY IF NEEDED  Edwin Sims MD   albuterol sulfate HFA (VENTOLIN HFA) 108 (90 Base) MCG/ACT inhaler Inhale 2 puffs into the lungs every 6 hours as needed for Wheezing  TRIP Carlos - NP   aspirin 81 MG chewable tablet Take 81 mg by mouth daily  Historical Provider, MD   albuterol (PROVENTIL) (5 MG/ML) 0.5% nebulizer solution Take 0.5 mLs by nebulization every 6 hours as needed for Wheezing  Thania Saldaña MD   omeprazole (PRILOSEC) 20 MG delayed release capsule TAKE 1 CAPSULE BY MOUTH DAILY  Edwin Sims MD   sildenafil (VIAGRA) 100 MG tablet Take 1 tablet by mouth as needed for Erectile Dysfunction Generic sildenafil 100 mg or strongest dose  Edwin Sims MD       Social History     Tobacco Use    Smoking status: Never Smoker    Smokeless tobacco: Never Used   Substance Use Topics    Alcohol use: No    Drug use: No        No Known Allergies,   Past Medical History:   Diagnosis Date    B[facility] ([facility] [address]     Colon polyps     Diverticular disease     GERD (gastroesophageal reflux disease)     Hiatal hernia     Hyperlipidemia     Hypertension     Hypothyroidism     Neuropathy     Unspecified sleep apnea    ,   Past Surgical History:   Procedure Laterality Date    COLONOSCOPY  01/06/2009    Dr Good Staff: hyperplastic polyp, resolving diverticulitis    COLONOSCOPY  07/2012    minimal diverticulosis left side o/w normal postsurgical colon    COLONOSCOPY  12/2005    diverticulitis    COLONOSCOPY N/A 11/8/2016 Dr Lowell Redmond bleeding internal hemorrhoids, diverticular disease-5 yr recall    EGD      [place] EGD TRANSORAL BIOPSY SINGLE/MULTIPLE N/A 2018    Dr Antonio Bentley-Gastritis/gastropathy, esophagitis    SUBTOTAL COLECTOMY  2010    recurrant diverticulitis    UPPER GASTROINTESTINAL ENDOSCOPY  2005    Dr Kinjal Corbett: duodenal ulcer, gastritis    UPPP UVULOPALATOPHARYGOPLASTY     ,   Social History     Tobacco Use    Smoking status: Never Smoker    Smokeless tobacco: Never Used   Substance Use Topics    Alcohol use: No    Drug use: No   ,   Family History   Problem Relation Age of Onset    Diabetes Mother     Kidney Disease Mother     Dementia Mother          at 68 - Lewy Body Dementia    Coronary Art Dis Father          at [de-identified]   [facility] Colon Cancer Neg Hx     Colon Polyps Neg Hx     Esophageal Cancer Neg Hx     Liver Cancer Neg Hx     Liver Disease Neg Hx     Rectal Cancer Neg Hx     Stomach Cancer Neg Hx    ,   Immunization History   Administered Date(s) Administered    Influenza Virus Vaccine 2017    Influenza, Maria T Mills, IM, (6 mo and older Fluzone, Flulaval, Fluarix and 3 yrs and older Afluria) 2016    Influenza, Quadv, IM, PF (6 mo and older Fluzone, Flulaval, Fluarix, and 3 yrs and older Afluria) 2018    Tdap (Boostrix, Adacel) 2013   ,   Health Maintenance   Topic Date Due    Pneumococcal 0-64 years Vaccine (1 of 1 - PPSV23) 1970    HIV screen  1979    Shingles Vaccine (1 of 2) 2014    Diabetes screen  2020    Lipid screen  2020    Flu vaccine (1) 2020    TSH testing  2020    Potassium monitoring  2020    Creatinine monitoring  2020    Colon cancer screen colonoscopy  2021    DTaP/Tdap/Td vaccine (2 - Td) 2023    Hepatitis C screen  Completed    Hepatitis A vaccine  Aged Out    Hepatitis B vaccine  Aged Out    Hib vaccine  Aged Out    Meningococcal (ACWY) vaccine  Aged Out 7. Chronic pain in left foot  M79.672 External Referral To Orthopedic Surgery    G89.29    8. Acquired hypothyroidism  E03.9 TSH without Reflex   9. Class 3 severe obesity due to excess calories with serious comorbidity and body mass index (BMI) of 45.0 to 49.9 in adult ([facility])  E66.01 phentermine 15 MG capsule    Z68.42        Recommend increase gabapentin. Suggest combined B2/ICS inhaler w/ low dose ICS in it for SE. patient to be referred to orthopedic surgeon for ongoing foot pain. Orders Placed This Encounter   Medications    fluticasone (FLOVENT HFA) 44 MCG/ACT inhaler     Sig: Inhale 2 puffs into the lungs 2 times daily     Dispense:  1 Inhaler     Refill:  3    gabapentin (NEURONTIN) 400 MG capsule     Sig: Take 1 capsule by mouth 3 times daily for 30 days. Dispense:  90 capsule     Refill:  3    phentermine 15 MG capsule     Sig: Take 1 capsule by mouth every morning for 30 days. Dispense:  30 capsule     Refill:  0       Orders Placed This Encounter   Procedures    Hemoglobin A1C     Standing Status:   Future     Standing Expiration Date:   12/1/2021    TSH without Reflex     Standing Status:   Standing     Number of Occurrences:   10     Standing Expiration Date:   11/29/2030    External Referral To Orthopedic Surgery     Referral Priority:   Routine     Referral Type:   Eval and Treat     Referral Reason:   Specialty Services Required     Requested Specialty:   Orthopedic Surgery     Number of Visits Requested:   1       Return in about 1 month (around 1/1/2021) for VV - f/u labs, phentermine, flovent. Caroline Liao is a 63 y.o. male being evaluated by a Virtual Visit (video visit) encounter to address concerns as mentioned above. A caregiver was present when appropriate. Due to this being a TeleHealth encounter (During RKVBR-31 public health emergency), evaluation of the following organ systems was limited: Vitals/Constitutional/EENT/Resp/CV/GI//MS/Neuro/Skin/Heme-Lymph-Imm. Pursuant to the emergency declaration under the [address] [place] [address] and the [employer] Act, this Virtual Visit was conducted with patient's (and/or legal guardian's) consent, to reduce the patient's risk of exposure to COVID-19 and provide necessary medical care. The patient (and/or legal guardian) has also been advised to contact this office for worsening conditions or problems, and seek emergency medical treatment and/or call 911 if deemed necessary. Services were provided through a video synchronous discussion virtually to substitute for in-person clinic visit. Patient and provider were located at their individual homes or provider at secure site for business. It is possible that material may be posted from a notepad that is used for a Dragon dictation device for dictating notes outside the EMR and I am the original author of all of this content. --Horacio Chery MD on 12/13/2020 at 12:22 PM    An electronic signature was used to authenticate this note.

## 2021-01-05 ENCOUNTER — VIRTUAL VISIT (OUTPATIENT)
Dept: PRIMARY CARE CLINIC | Age: 56
End: 2021-01-05
Payer: MEDICAID

## 2021-01-05 DIAGNOSIS — G62.89 OTHER POLYNEUROPATHY: ICD-10-CM

## 2021-01-05 DIAGNOSIS — H81.13 BENIGN PAROXYSMAL POSITIONAL VERTIGO DUE TO BILATERAL VESTIBULAR DISORDER: Primary | ICD-10-CM

## 2021-01-05 DIAGNOSIS — J45.40 MODERATE PERSISTENT ASTHMA WITHOUT COMPLICATION: ICD-10-CM

## 2021-01-05 PROCEDURE — 99214 OFFICE O/P EST MOD 30 MIN: CPT | Performed by: FAMILY MEDICINE

## 2021-01-05 RX ORDER — MECLIZINE HCL 12.5 MG/1
12.5 TABLET ORAL 3 TIMES DAILY PRN
Qty: 30 TABLET | Refills: 0 | Status: SHIPPED | OUTPATIENT
Start: 2021-01-05 | End: 2021-01-15

## 2021-01-05 RX ORDER — MEMANTINE HYDROCHLORIDE 10 MG/1
10 TABLET ORAL 2 TIMES DAILY
Qty: 60 TABLET | Refills: 5 | Status: ON HOLD
Start: 2021-01-05 | End: 2021-05-01 | Stop reason: HOSPADM

## 2021-01-05 NOTE — PATIENT INSTRUCTIONS
Look up \"cantalith repositioning maneuver\" and \"Epley\" maneuver on youtube and do those exercises twice a day. They will make you fell dizzy at first possibly. Make sure you are in a safe environment or on your bed to prevent a fall. Use meclizine as needed for dizziness. If it helps, you can take it up to 3 times a day as needed. If it helps, continue to use it. It can make you drowsy so be careful. Cut down gabapentin to twice daily for 4 days and 1 cap nightly for 4 nights and then stop. Start namenda twice daily     Start inhaler twice daily, call if it is not covered by insurance. Patient Education        Benign Paroxysmal Positional Vertigo (BPPV): Care Instructions  Your Care Instructions     Benign paroxysmal positional vertigo, also called BPPV, is an inner ear problem. It causes a spinning or whirling sensation when you move your head. This sensation is called vertigo. The vertigo usually lasts for less than a minute. People often have vertigo spells for a few days or weeks. Then the vertigo goes away. But it may come back again. The vertigo may be mild, or it may be bad enough to cause unsteadiness, nausea, and vomiting. When you move, your inner ear sends messages to the brain. This helps you keep your balance. Vertigo can happen when debris builds up in the inner ear. The buildup can cause the inner ear to send the wrong message to the brain. Your doctor may move you in different positions to help your vertigo get better faster. This is called the Epley maneuver. Your doctor may also prescribe medicines or exercises to help with your symptoms. Follow-up care is a key part of your treatment and safety. Be sure to make and go to all appointments, and call your doctor if you are having problems. It's also a good idea to know your test results and keep a list of the medicines you take. How can you care for yourself at home? · If your doctor suggests that you do Lawson-Daroff exercises:  ? Sit on the edge of a bed or sofa. Quickly lie down on the side that causes the worst vertigo. Lie on your side with your ear down. ? Stay in this position for at least 30 seconds or until the vertigo goes away. ? Sit up. If this causes vertigo, wait for it to stop. ? Repeat the procedure on the other side. ? Repeat this 10 times. Do these exercises 2 times a day until the vertigo is gone. When should you call for help? Call 911 anytime you think you may need emergency care. For example, call if:    · You have symptoms of a stroke. These may include:  ? Sudden numbness, tingling, weakness, or loss of movement in your face, arm, or leg, especially on only one side of your body. ? Sudden vision changes. ? Sudden trouble speaking. ? Sudden confusion or trouble understanding simple statements. ? Sudden problems with walking or balance. ? A sudden, severe headache that is different from past headaches. Call your doctor now or seek immediate medical care if:    · You have new or worse nausea and vomiting.     · You have new symptoms such as hearing loss or roaring in your ears. Watch closely for changes in your health, and be sure to contact your doctor if:    · You are not getting better as expected.     · Your vertigo gets worse. Where can you learn more? Go to [email or web address]://[email or web address].[employer]. org and sign in to your [employer] account. Enter  in the [facility] box to learn more about \"Benign Paroxysmal Positional Vertigo (BPPV): Care Instructions. \"     If you do not have an account, please click on the \"Sign Up Now\" link. Current as of: April 15, 2020               Content Version: 12.6  © 3550-3463 [employer].

## 2021-01-05 NOTE — PROGRESS NOTES
2021    TELEHEALTH EVALUATION -- Audio/Visual (During RODNP-81 public health emergency)    HPI:    Michele Cevallos (:  1965) has requested an audio/video evaluation for the following concern(s):    Patient presents today via video visit for concern of ongoing issues with balance. Notes that he has difficulty sometimes when he returns it quickly. He denies any loss of consciousness with this. Patient otherwise has been doing well in regards to neuropathy. Dealing with some ongoing issues though as far as breathing and being able to get inhaler covered. Flovent was not covered previously for his asthma. Notes otherwise that he would like to possibly try Namenda for neuropathy if  It is covered as he has a fam member on it. Review of Systems   Constitutional: Negative for chills and fever. Respiratory: Positive for shortness of breath. Negative for cough, chest tightness and wheezing. Cardiovascular: Negative for chest pain, palpitations and leg swelling. Gastrointestinal: Negative for abdominal pain, constipation, diarrhea, nausea and vomiting. Genitourinary: Negative for difficulty urinating, dysuria and frequency. Neurological: Positive for dizziness and numbness (with burning). Negative for syncope, weakness and light-headedness. Prior to Visit Medications    Medication Sig Taking? Authorizing Provider   memantine (NAMENDA) 10 MG tablet Take 1 tablet by mouth 2 times daily Yes Jada Lizarraga MD   meclizine (ANTIVERT) 12.5 MG tablet Take 1 tablet by mouth 3 times daily as needed for Dizziness Yes Jada Lizarraga MD   beclomethasone (QVAR) 80 MCG/ACT inhaler Inhale 1 puff into the lungs 2 times daily Yes Jada Lizarraga MD   fluticasone (FLOVENT HFA) 44 MCG/ACT inhaler Inhale 2 puffs into the lungs 2 times daily  Jada Lizarraga MD   gabapentin (NEURONTIN) 400 MG capsule Take 1 capsule by mouth 3 times daily for 30 days.   Jada Lizarraga MD furosemide (LASIX) 20 MG tablet TAKE 1 TABLET BY MOUTH ONCE DAILY. Pam Dandy, MD   atorvastatin (LIPITOR) 40 MG tablet TAKE 1 TABLET BY MOUTH ONCE DAILY. Pam Dandy, MD   lisinopril-hydroCHLOROthiazide (PRINZIDE;ZESTORETIC) 20-25 MG per tablet TAKE 1 TABLET BY MOUTH ONCE DAILY. Pam Dandy, MD   levothyroxine (SYNTHROID) 50 MCG tablet TAKE 1 TABLET BY MOUTH ONCE DAILY. Pam Dandy, MD   amLODIPine (NORVASC) 10 MG tablet TAKE 1 TABLET BY MOUTH ONCE DAILY.   Pam Dandy, MD   metoprolol tartrate (LOPRESSOR) 25 MG tablet TAKE 2 TABLETS BY MOUTH TWICE DAILY  Pam Dandy, MD   busPIRone (BUSPAR) 7.5 MG tablet TAKE 1 TABLET BY MOUTH UP TO 3 TIMES DAILY AS NEEDED - AFTER 1 WEEK, MAY INCREASE TO 2 TABLETS 3 TIMES DAILY IF NEEDED  Pam Dandy, MD   albuterol sulfate HFA (VENTOLIN HFA) 108 (90 Base) MCG/ACT inhaler Inhale 2 puffs into the lungs every 6 hours as needed for Wheezing  TRIP Frazier NP   aspirin 81 MG chewable tablet Take 81 mg by mouth daily  Historical Provider, MD   albuterol (PROVENTIL) (5 MG/ML) 0.5% nebulizer solution Take 0.5 mLs by nebulization every 6 hours as needed for Wheezing  Sarah Estrada MD   omeprazole (PRILOSEC) 20 MG delayed release capsule TAKE 1 CAPSULE BY MOUTH DAILY  Pam Dandy, MD   sildenafil (VIAGRA) 100 MG tablet Take 1 tablet by mouth as needed for Erectile Dysfunction Generic sildenafil 100 mg or strongest dose  Pam Dandy, MD       Social History     Tobacco Use    Smoking status: Never Smoker    Smokeless tobacco: Never Used   Substance Use Topics    Alcohol use: No    Drug use: No        No Known Allergies,   Past Medical History:   Diagnosis Date    Bipolar disorder ([facility] [address]     Colon polyps     Diverticular disease     GERD (gastroesophageal reflux disease)     Hiatal hernia     Hyperlipidemia     Hypertension     Hypothyroidism     Neuropathy     Unspecified sleep apnea    ,   Past Surgical History: Procedure Laterality Date    COLONOSCOPY  2009    Dr Gisela Raymond: hyperplastic polyp, resolving diverticulitis    COLONOSCOPY  2012    minimal diverticulosis left side o/w normal postsurgical colon    COLONOSCOPY  2005    diverticulitis    COLONOSCOPY N/A 2016    Dr Antoni Ma bleeding internal hemorrhoids, diverticular disease-5 yr recall    EGD      DE EGD TRANSORAL BIOPSY SINGLE/MULTIPLE N/A 2018    Dr JONATHAN Bentley-Gastritis/gastropathy, esophagitis    SUBTOTAL COLECTOMY      recurrant diverticulitis    UPPER GASTROINTESTINAL ENDOSCOPY  2005    Dr Gisela Raymond: duodenal ulcer, gastritis    UPPP UVULOPALATOPHARYGOPLASTY     ,   Social History     Tobacco Use    Smoking status: Never Smoker    Smokeless tobacco: Never Used   Substance Use Topics    Alcohol use: No    Drug use: No   ,   Family History   Problem Relation Age of Onset    Diabetes Mother     Kidney Disease Mother     Dementia Mother          at 68 - Lewy Body Dementia    Coronary Art Dis Father          at [de-identified]   Anny Culp Colon Cancer Neg Hx     Colon Polyps Neg Hx     Esophageal Cancer Neg Hx     Liver Cancer Neg Hx     Liver Disease Neg Hx     Rectal Cancer Neg Hx     Stomach Cancer Neg Hx    ,   Immunization History   Administered Date(s) Administered    Influenza Virus Vaccine 2017    Influenza, [place]cl[place] Fleeting, IM, (6 mo and older Fluzone, Flulaval, Fluarix and 3 yrs and older Afluria) 2016    Influenza, Quadv, IM, PF (6 mo and older Fluzone, Flulaval, Fluarix, and 3 yrs and older Afluria) 2018    Tdap (Boostrix, Adacel) 2013   ,   Health Maintenance   Topic Date Due    Pneumococcal 0-64 years Vaccine (1 of 1 - PPSV23) 1970    HIV screen  1979    Shingles Vaccine (1 of 2) 2014    Diabetes screen  2020    Lipid screen  2020    Flu vaccine (1) 2020    TSH testing  2020    Potassium monitoring  2020  Creatinine monitoring  12/02/2020    Colon cancer screen colonoscopy  11/08/2021    DTaP/Tdap/Td vaccine (2 - Td) 04/19/2023    Hepatitis C screen  Completed    Hepatitis A vaccine  Aged Out    Hepatitis B vaccine  Aged Out    Hib vaccine  Aged Out    Meningococcal (ACWY) vaccine  Aged Out       PHYSICAL EXAMINATION:  [ INSTRUCTIONS:  \"[x]\" Indicates a positive item  \"[]\" Indicates a negative item  -- DELETE ALL ITEMS NOT EXAMINED]  Vital Signs: (As obtained by patient/caregiver or practitioner observation)    Blood pressure-  Heart rate-    Respiratory rate-    Temperature-  Pulse oximetry-     Constitutional: [x] Appears well-developed and well-nourished [] No apparent distress      [] Abnormal-   Mental status  [x] Alert and awake  [x] Oriented to person/place/time [x]Able to follow commands      Eyes:  EOM    [x]  Normal  [] Abnormal-  Sclera  [x]  Normal  [] Abnormal -         Discharge []  None visible  [] Abnormal -    HENT:   [x] Normocephalic, atraumatic. [] Abnormal   [x] Mouth/Throat: Mucous membranes are moist.     External Ears [x] Normal  [] Abnormal-     Neck: [x] No visualized mass     Pulmonary/Chest: [x] Respiratory effort normal.  [x] No visualized signs of difficulty breathing or respiratory distress        [] Abnormal-      Musculoskeletal:   [x] Normal gait with no signs of ataxia         [x] Normal range of motion of neck        [] Abnormal-       Neurological:        [x] No Facial Asymmetry (Cranial nerve 7 motor function) (limited exam to video visit)          [x] No gaze palsy        [] Abnormal-         Skin:        [x] No significant exanthematous lesions or discoloration noted on facial skin         [] Abnormal-            Psychiatric:       [x] Normal Affect [x] No Hallucinations        [] Abnormal-     Other pertinent observable physical exam findings-     ASSESSMENT/PLAN:    ICD-10-CM    1.  Benign paroxysmal positional vertigo due to bilateral vestibular disorder  H81.13 Services were provided through a video synchronous discussion virtually to substitute for in-person clinic visit. Patient and provider were located at their individual homes or provider at secure site for business. It is possible that material may be posted from a notepad that is used for a Dragon dictation device for dictating notes outside the EMR and I am the original author of all of this content. --Baron Myah MD on 1/15/2021 at 7:44 AM    An electronic signature was used to authenticate this note.

## 2021-01-07 ENCOUNTER — TELEPHONE (OUTPATIENT)
Dept: PRIMARY CARE CLINIC | Age: 56
End: 2021-01-07

## 2021-01-07 NOTE — TELEPHONE ENCOUNTER
Called patient as requested per [facility] regarding after visit haja instructions.  Patient informed,\"I have to get off of here I have a call on home but so you know my inhaler isn't covered by insurance  Information sent to patients home address  PA initiated for inhaler

## 2021-01-08 ENCOUNTER — TELEPHONE (OUTPATIENT)
Dept: PRIMARY CARE CLINIC | Age: 56
End: 2021-01-08

## 2021-01-12 ENCOUNTER — TELEPHONE (OUTPATIENT)
Dept: PRIMARY CARE CLINIC | Age: 56
End: 2021-01-12

## 2021-01-12 NOTE — TELEPHONE ENCOUNTER
Pt called office back after speaking with wife. Needs to make an appt due to urinary/prostate concerns. Scheduled pt with TRIP Dave for 1/21. Pt VU.

## 2021-01-12 NOTE — TELEPHONE ENCOUNTER
Called pt's wife in regards to concerns she had about pt. Received a message back from the provider and just needed to relay the message. No answer. Left voicemail to call office back.
15:00

## 2021-01-12 NOTE — TELEPHONE ENCOUNTER
Called patients wife,she had talked to Kirstie and told her that she would have Jordan Ruiz call back at [facility]

## 2021-01-15 ASSESSMENT — ENCOUNTER SYMPTOMS
ABDOMINAL PAIN: 0
COUGH: 0
VOMITING: 0
DIARRHEA: 0
WHEEZING: 0
CHEST TIGHTNESS: 0
CONSTIPATION: 0
NAUSEA: 0
SHORTNESS OF BREATH: 1

## 2021-01-27 ENCOUNTER — OFFICE VISIT (OUTPATIENT)
Dept: PRIMARY CARE CLINIC | Age: 56
End: 2021-01-27
Payer: MEDICAID

## 2021-01-27 VITALS
TEMPERATURE: 97.1 F | HEART RATE: 84 BPM | DIASTOLIC BLOOD PRESSURE: 78 MMHG | BODY MASS INDEX: 44.43 KG/M2 | WEIGHT: 300 LBS | HEIGHT: 69 IN | SYSTOLIC BLOOD PRESSURE: 126 MMHG | RESPIRATION RATE: 18 BRPM | OXYGEN SATURATION: 98 %

## 2021-01-27 DIAGNOSIS — R33.9 URINARY RETENTION: ICD-10-CM

## 2021-01-27 DIAGNOSIS — E03.9 ACQUIRED HYPOTHYROIDISM: ICD-10-CM

## 2021-01-27 DIAGNOSIS — R42 DIZZINESS: ICD-10-CM

## 2021-01-27 DIAGNOSIS — R33.9 URINARY RETENTION: Primary | ICD-10-CM

## 2021-01-27 DIAGNOSIS — R73.9 HYPERGLYCEMIA: ICD-10-CM

## 2021-01-27 LAB
ALBUMIN SERPL-MCNC: 4.3 G/DL (ref 3.5–5.2)
ALP BLD-CCNC: 57 U/L (ref 40–130)
ALT SERPL-CCNC: 22 U/L (ref 5–41)
ANION GAP SERPL CALCULATED.3IONS-SCNC: 12 MMOL/L (ref 7–19)
AST SERPL-CCNC: 15 U/L (ref 5–40)
BASOPHILS ABSOLUTE: 0.1 K/UL (ref 0–0.2)
BASOPHILS RELATIVE PERCENT: 0.9 % (ref 0–1)
BILIRUB SERPL-MCNC: 0.4 MG/DL (ref 0.2–1.2)
BUN BLDV-MCNC: 14 MG/DL (ref 6–20)
CALCIUM SERPL-MCNC: 8.8 MG/DL (ref 8.6–10)
CHLORIDE BLD-SCNC: 104 MMOL/L (ref 98–111)
CO2: 25 MMOL/L (ref 22–29)
CREAT SERPL-MCNC: 0.6 MG/DL (ref 0.5–1.2)
EOSINOPHILS ABSOLUTE: 0.1 K/UL (ref 0–0.6)
EOSINOPHILS RELATIVE PERCENT: 0.9 % (ref 0–5)
GFR AFRICAN AMERICAN: >59
GFR NON-AFRICAN AMERICAN: >60
GLUCOSE BLD-MCNC: 83 MG/DL (ref 74–109)
HBA1C MFR BLD: 5.9 % (ref 4–6)
HCT VFR BLD CALC: 43.1 % (ref 42–52)
HEMOGLOBIN: 14.3 G/DL (ref 14–18)
IMMATURE GRANULOCYTES #: 0.1 K/UL
LYMPHOCYTES ABSOLUTE: 3 K/UL (ref 1.1–4.5)
LYMPHOCYTES RELATIVE PERCENT: 28.8 % (ref 20–40)
MCH RBC QN AUTO: 30.4 PG (ref 27–31)
MCHC RBC AUTO-ENTMCNC: 33.2 G/DL (ref 33–37)
MCV RBC AUTO: 91.7 FL (ref 80–94)
MONOCYTES ABSOLUTE: 0.8 K/UL (ref 0–0.9)
MONOCYTES RELATIVE PERCENT: 7.2 % (ref 0–10)
NEUTROPHILS ABSOLUTE: 6.5 K/UL (ref 1.5–7.5)
NEUTROPHILS RELATIVE PERCENT: 61.7 % (ref 50–65)
PDW BLD-RTO: 12.8 % (ref 11.5–14.5)
PLATELET # BLD: 241 K/UL (ref 130–400)
PMV BLD AUTO: 11.5 FL (ref 9.4–12.4)
POTASSIUM SERPL-SCNC: 3.8 MMOL/L (ref 3.5–5)
PROSTATE SPECIFIC ANTIGEN: 0.64 NG/ML (ref 0–4)
RBC # BLD: 4.7 M/UL (ref 4.7–6.1)
SODIUM BLD-SCNC: 141 MMOL/L (ref 136–145)
TOTAL PROTEIN: 7.4 G/DL (ref 6.6–8.7)
TSH REFLEX FT4: 2.28 UIU/ML (ref 0.35–5.5)
WBC # BLD: 10.5 K/UL (ref 4.8–10.8)

## 2021-01-27 PROCEDURE — 99214 OFFICE O/P EST MOD 30 MIN: CPT | Performed by: NURSE PRACTITIONER

## 2021-01-27 RX ORDER — MECLIZINE HYDROCHLORIDE 25 MG/1
TABLET ORAL
COMMUNITY
Start: 2021-01-05 | End: 2021-01-30

## 2021-01-27 SDOH — ECONOMIC STABILITY: TRANSPORTATION INSECURITY
IN THE PAST 12 MONTHS, HAS THE LACK OF TRANSPORTATION KEPT YOU FROM MEDICAL APPOINTMENTS OR FROM GETTING MEDICATIONS?: NO

## 2021-01-27 SDOH — ECONOMIC STABILITY: INCOME INSECURITY: HOW HARD IS IT FOR YOU TO PAY FOR THE VERY BASICS LIKE FOOD, HOUSING, MEDICAL CARE, AND HEATING?: NOT HARD AT ALL

## 2021-01-27 ASSESSMENT — ENCOUNTER SYMPTOMS
SHORTNESS OF BREATH: 0
BACK PAIN: 0
PHOTOPHOBIA: 0
RHINORRHEA: 0
VOICE CHANGE: 0
COLOR CHANGE: 0
VOMITING: 0
COUGH: 0
NAUSEA: 0

## 2021-01-27 NOTE — PROGRESS NOTES
[address] [facility]  [address] [address] [place] [id number] [address] [address]  Dept: [phone]  Dept Fax: [phone]  Loc: [phone]    Davied Barthel is a 63 y.o. male who presents today for his medical conditions/complaints as noted below. Davied Barthel is c/o of Urinary Retention (feels like he has difficulty urinating; he has difficulty starting to urinate), Thyroid Problem (would like to see about getting his thyroid levels checked also), and Dizziness (has noticed more difficulty walking a straight line sometimes; he will sometimes have to grab the wall to get his balance)        HPI:     HPI   Chief Complaint   Patient presents with    Urinary Retention     feels like he has difficulty urinating; he has difficulty starting to urinate    Thyroid Problem     would like to see about getting his thyroid levels checked also    Dizziness     has noticed more difficulty walking a straight line sometimes; he will sometimes have to grab the wall to get his balance     Patient presents today for evaluation of urinary retention. He reports he is having difficulty starting to void and keeping a stream. He denies burning or irritation. He states his symptoms have been present for 1 month; he feels like it maybe has improved some. There was small amount of blood in urine today. Patient complains of wanting his thyroid checked. He is taking synthroid 50 mcg. Reports he has had normal thyroid ultrasound but feels like it is off. He complains of dizziness. He was seen on 1/5/21 by Dr Jose Cordova and this was addressed. He was given meclizine. He states that this has not helped his dizziness. He denies aural pressure. He notices with head movement his dizziness increases. Patient was scheduled on 1/21 to discuss these problems with TRIP Cook and no showed his appointment.     Past Medical History:   Diagnosis Date    Bipolar disorder ([facility] [address]     Colon polyps  Diverticular disease     GERD (gastroesophageal reflux disease)     Hiatal hernia     Hyperlipidemia     Hypertension     Hypothyroidism     Neuropathy     Unspecified sleep apnea       Past Surgical History:   Procedure Laterality Date    COLONOSCOPY  2009    Dr Jenna Jordan: hyperplastic polyp, resolving diverticulitis    COLONOSCOPY  2012    minimal diverticulosis left side o/w normal postsurgical colon    COLONOSCOPY  2005    diverticulitis    COLONOSCOPY N/A 2016    Dr Lin Rendon bleeding internal hemorrhoids, diverticular disease-5 yr recall    EGD      AL EGD TRANSORAL BIOPSY SINGLE/MULTIPLE N/A 2018    Dr JONATHAN Bentley-Gastritis/gastropathy, esophagitis    SUBTOTAL COLECTOMY  2010    recurrant diverticulitis    UPPER GASTROINTESTINAL ENDOSCOPY  2005    Dr Jenna Jordan: duodenal ulcer, gastritis    UPPP UVULOPALATOPHARYGOPLASTY         Vitals 2021 10/29/2020 2020 2019 2019    SYSTOLIC 161 895 316 - 633 233   DIASTOLIC 78 89 82 - 72 73   Site - - - - Right Upper Arm -   Position - - - - Sitting -   Cuff Size - - - - Large Adult -   Pulse 84 87 85 - 78 90   Temp 97.1 - 95.3 - - 98.3   Resp 18 - - - 20 18   SpO2 98 97 97 - 98 93   Weight 300 lb 300 lb 295 lb 288 lb 4 oz 299 lb -   Height 5' 9\" 5' 8\" 5' 9\" - - -   Body mass index 44.3 kg/m2 45.61 kg/m2 43.56 kg/m2 - - -   Some recent data might be hidden       Family History   Problem Relation Age of Onset    Diabetes Mother     Kidney Disease Mother     Dementia Mother          at 68 - Lewy Body Dementia    Coronary Art Dis Father          at [de-identified]   Georgianna Olszewski Colon Cancer Neg Hx     Colon Polyps Neg Hx     Esophageal Cancer Neg Hx     Liver Cancer Neg Hx     Liver Disease Neg Hx     Rectal Cancer Neg Hx     Stomach Cancer Neg Hx        Social History     Tobacco Use    Smoking status: Never Smoker    Smokeless tobacco: Never Used   Substance Use Topics    Alcohol use:  No Current Outpatient Medications   Medication Sig Dispense Refill    memantine (NAMENDA) 10 MG tablet Take 1 tablet by mouth 2 times daily 60 tablet 5    furosemide (LASIX) 20 MG tablet TAKE 1 TABLET BY MOUTH ONCE DAILY. 30 tablet 5    atorvastatin (LIPITOR) 40 MG tablet TAKE 1 TABLET BY MOUTH ONCE DAILY. 90 tablet 1    lisinopril-hydroCHLOROthiazide (PRINZIDE;ZESTORETIC) 20-25 MG per tablet TAKE 1 TABLET BY MOUTH ONCE DAILY. 90 tablet 1    levothyroxine (SYNTHROID) 50 MCG tablet TAKE 1 TABLET BY MOUTH ONCE DAILY. 90 tablet 1    amLODIPine (NORVASC) 10 MG tablet TAKE 1 TABLET BY MOUTH ONCE DAILY. 90 tablet 1    metoprolol tartrate (LOPRESSOR) 25 MG tablet TAKE 2 TABLETS BY MOUTH TWICE DAILY 120 tablet 5    busPIRone (BUSPAR) 7.5 MG tablet TAKE 1 TABLET BY MOUTH UP TO 3 TIMES DAILY AS NEEDED - AFTER 1 WEEK, MAY INCREASE TO 2 TABLETS 3 TIMES DAILY IF NEEDED (Patient taking differently: TAKE 1 TABLET BY MOUTH 3 TIMES DAILY AS NEEDED) 180 tablet 3    albuterol sulfate HFA (VENTOLIN HFA) 108 (90 Base) MCG/ACT inhaler Inhale 2 puffs into the lungs every 6 hours as needed for Wheezing 1 Inhaler 3    albuterol (PROVENTIL) (5 MG/ML) 0.5% nebulizer solution Take 0.5 mLs by nebulization every 6 hours as needed for Wheezing 120 each 0    omeprazole (PRILOSEC) 20 MG delayed release capsule TAKE 1 CAPSULE BY MOUTH DAILY 30 capsule 0    sildenafil (VIAGRA) 100 MG tablet Take 1 tablet by mouth as needed for Erectile Dysfunction Generic sildenafil 100 mg or strongest dose 8 tablet 5    meclizine (ANTIVERT) 25 MG tablet       beclomethasone (QVAR) 80 MCG/ACT inhaler Inhale 1 puff into the lungs 2 times daily (Patient not taking: Reported on 1/27/2021) 1 Inhaler 3    fluticasone (FLOVENT HFA) 44 MCG/ACT inhaler Inhale 2 puffs into the lungs 2 times daily (Patient not taking: Reported on 1/27/2021) 1 Inhaler 3    gabapentin (NEURONTIN) 400 MG capsule Take 1 capsule by mouth 3 times daily for 30 days.  90 capsule 3  aspirin 81 MG chewable tablet Take 81 mg by mouth daily       No current facility-administered medications for this visit. No Known Allergies    Health Maintenance   Topic Date Due    Pneumococcal 0-64 years Vaccine (1 of 1 - PPSV23) 02/18/1970    HIV screen  02/18/1979    Shingles Vaccine (1 of 2) 02/18/2014    Diabetes screen  03/26/2020    Lipid screen  07/01/2020    Flu vaccine (1) 09/01/2020    TSH testing  11/24/2020    Potassium monitoring  12/02/2020    Creatinine monitoring  12/02/2020    Colon cancer screen colonoscopy  11/08/2021    DTaP/Tdap/Td vaccine (2 - Td) 04/19/2023    Hepatitis C screen  Completed    Hepatitis A vaccine  Aged Out    Hepatitis B vaccine  Aged Out    Hib vaccine  Aged Out    Meningococcal (ACWY) vaccine  Aged Out       Subjective:      Review of Systems   Constitutional: Negative for chills and fever. HENT: Negative for ear pain, hearing loss, rhinorrhea and voice change. Eyes: Negative for photophobia and visual disturbance. Respiratory: Negative for cough and shortness of breath. Cardiovascular: Negative for chest pain and palpitations. Gastrointestinal: Negative for nausea and vomiting. Endocrine: Negative. Negative for cold intolerance and heat intolerance. Genitourinary: Positive for difficulty urinating and frequency. Negative for flank pain. Musculoskeletal: Negative for back pain and neck pain. Skin: Negative for color change and rash. Allergic/Immunologic: Negative for environmental allergies and food allergies. Neurological: Positive for dizziness. Negative for speech difficulty and headaches. Hematological: Does not bruise/bleed easily. Psychiatric/Behavioral: Negative for sleep disturbance and suicidal ideas. Objective:      Physical Exam  Vitals signs and nursing note reviewed. Constitutional:       Appearance: He is well-developed. HENT:      Head: Atraumatic.       Right Ear: External ear normal. Left Ear: External ear normal.      Nose: Nose normal.   Eyes:      Conjunctiva/sclera: Conjunctivae normal.      Pupils: Pupils are equal, round, and reactive to light. Neck:      Musculoskeletal: Normal range of motion and neck supple. Cardiovascular:      Rate and Rhythm: Normal rate and regular rhythm. Heart sounds: Normal heart sounds, S1 normal and S2 normal.   Pulmonary:      Effort: Pulmonary effort is normal.      Breath sounds: Normal breath sounds. Abdominal:      General: Bowel sounds are normal.      Palpations: Abdomen is soft. Musculoskeletal: Normal range of motion. Skin:     General: Skin is warm and dry. Neurological:      Mental Status: He is alert and oriented to person, place, and time. Psychiatric:         Behavior: Behavior normal.       /78   Pulse 84   Temp 97.1 °F (36.2 °C) (Temporal)   Resp 18   Ht 5' 9\" (1.753 m)   Wt 300 lb (136.1 kg)   SpO2 98%   BMI 44.30 kg/m²     Assessment:       Diagnosis Orders   1. Urinary retention  CBC Auto Differential    Comprehensive Metabolic Panel    PSA, Diagnostic    TSH WITH REFLEX TO FT4   2. Acquired hypothyroidism  CBC Auto Differential    Comprehensive Metabolic Panel    PSA, Diagnostic    TSH WITH REFLEX TO FT4   3. Dizziness  CBC Auto Differential    Comprehensive Metabolic Panel    PSA, Diagnostic    TSH WITH REFLEX TO FT4       Plan:     More than 50% of the time was spent counseling and coordinating care for a total time of 30 min face to face. If labs are negative would suggest referral to ENT/audiology for vertigo. Physical exam was normal.   Urinary symptoms are improving and no UTI but will check PSA today. If it worsens again would consider treating with medication.    Will recheck TSH per his request. Patient given educational materials -see patient instructions. Discussed use, benefit, and side effects of prescribed medications. All patient questions answered. Pt voiced understanding. Reviewed health maintenance. Instructed to continue currentmedications, diet and exercise. Patient agreed with treatment plan. Follow up as directed. MEDICATIONS:  No orders of the defined types were placed in this encounter. ORDERS:  Orders Placed This Encounter   Procedures    CBC Auto Differential    Comprehensive Metabolic Panel    PSA, Diagnostic    TSH WITH REFLEX TO FT4       Follow-up:  No follow-ups on file. PATIENT INSTRUCTIONS:  Patient Instructions   We are committed to providing you with the best care possible. In order to help us achieve these goals please remember to bring all medications, herbal products, and over the counter supplements with you to each visit. If your provider has ordered testing for you, please be sure to follow up with our office if you have not received results within 7 days after the testing took place. *If you receive a survey after visiting one of our offices, please take time to share your experience concerning your physician office visit. These surveys are confidential and no health information about you is shared. We are eager to improve for you and we are counting on your feedback to help make that happen. Electronically signed by TRIP Irene on 1/27/2021 at 4:25 PM    EMR Dragon/transcription disclaimer:  Much of thisencounter note is electronic transcription/translation of spoken language to printed texts. The electronic translation of spoken language may be erroneous, or at times, nonsensical words or phrases may be inadvertentlytranscribed.   Although I have reviewed the note for such errors, some may still exist.

## 2021-02-01 ENCOUNTER — TELEPHONE (OUTPATIENT)
Dept: NEUROLOGY | Age: 56
End: 2021-02-01

## 2021-02-01 NOTE — TELEPHONE ENCOUNTER
Called pt twice, pt no answer and no voicemail. to let them know we had to reschedule appt that was on 2/4/21 with Dr. Allan Coulter, he has had a death in the family.

## 2021-03-03 ENCOUNTER — HOSPITAL ENCOUNTER (INPATIENT)
Age: 56
LOS: 2 days | Discharge: HOME | DRG: 948 | End: 2021-03-05
Attending: PEDIATRICS | Admitting: FAMILY MEDICINE
Payer: MEDICAID

## 2021-03-03 ENCOUNTER — APPOINTMENT (OUTPATIENT)
Dept: CT IMAGING | Age: 56
DRG: 948 | End: 2021-03-03
Payer: MEDICAID

## 2021-03-03 ENCOUNTER — APPOINTMENT (OUTPATIENT)
Dept: GENERAL RADIOLOGY | Age: 56
DRG: 948 | End: 2021-03-03
Payer: MEDICAID

## 2021-03-03 ENCOUNTER — TELEPHONE (OUTPATIENT)
Dept: PRIMARY CARE CLINIC | Age: 56
End: 2021-03-03

## 2021-03-03 DIAGNOSIS — R00.2 PALPITATIONS: ICD-10-CM

## 2021-03-03 DIAGNOSIS — R42 DIZZINESS: ICD-10-CM

## 2021-03-03 DIAGNOSIS — R53.1 GENERALIZED WEAKNESS: Primary | ICD-10-CM

## 2021-03-03 DIAGNOSIS — R47.01 EXPRESSIVE APHASIA: ICD-10-CM

## 2021-03-03 PROBLEM — R29.90 STROKE-LIKE SYMPTOMS: Status: ACTIVE | Noted: 2021-03-03

## 2021-03-03 LAB
ALBUMIN SERPL-MCNC: 4.3 G/DL (ref 3.5–5.2)
ALP BLD-CCNC: 59 U/L (ref 40–130)
ALT SERPL-CCNC: 22 U/L (ref 5–41)
ANION GAP SERPL CALCULATED.3IONS-SCNC: 11 MMOL/L (ref 7–19)
AST SERPL-CCNC: 20 U/L (ref 5–40)
BASOPHILS ABSOLUTE: 0.1 K/UL (ref 0–0.2)
BASOPHILS RELATIVE PERCENT: 0.7 % (ref 0–1)
BILIRUB SERPL-MCNC: 0.9 MG/DL (ref 0.2–1.2)
BILIRUBIN URINE: NEGATIVE
BLOOD, URINE: NEGATIVE
BUN BLDV-MCNC: 21 MG/DL (ref 6–20)
CALCIUM SERPL-MCNC: 9.3 MG/DL (ref 8.6–10)
CHLORIDE BLD-SCNC: 103 MMOL/L (ref 98–111)
CHP ED QC CHECK: YES
CLARITY: CLEAR
CO2: 27 MMOL/L (ref 22–29)
COLOR: YELLOW
CREAT SERPL-MCNC: 0.7 MG/DL (ref 0.5–1.2)
EKG P AXIS: 52 DEGREES
EKG P-R INTERVAL: 150 MS
EKG Q-T INTERVAL: 400 MS
EKG QRS DURATION: 100 MS
EKG QTC CALCULATION (BAZETT): 415 MS
EKG T AXIS: 65 DEGREES
EOSINOPHILS ABSOLUTE: 0.2 K/UL (ref 0–0.6)
EOSINOPHILS RELATIVE PERCENT: 1.9 % (ref 0–5)
GFR AFRICAN AMERICAN: >59
GFR NON-AFRICAN AMERICAN: >60
GLUCOSE BLD-MCNC: 106 MG/DL
GLUCOSE BLD-MCNC: 106 MG/DL (ref 70–99)
GLUCOSE BLD-MCNC: 97 MG/DL (ref 74–109)
GLUCOSE URINE: NEGATIVE MG/DL
HCT VFR BLD CALC: 42.4 % (ref 42–52)
HEMOGLOBIN: 14.3 G/DL (ref 14–18)
IMMATURE GRANULOCYTES #: 0 K/UL
KETONES, URINE: NEGATIVE MG/DL
LEUKOCYTE ESTERASE, URINE: NEGATIVE
LYMPHOCYTES ABSOLUTE: 2.6 K/UL (ref 1.1–4.5)
LYMPHOCYTES RELATIVE PERCENT: 27.2 % (ref 20–40)
MAGNESIUM: 2.3 MG/DL (ref 1.6–2.6)
MCH RBC QN AUTO: 30.8 PG (ref 27–31)
MCHC RBC AUTO-ENTMCNC: 33.7 G/DL (ref 33–37)
MCV RBC AUTO: 91.4 FL (ref 80–94)
MONOCYTES ABSOLUTE: 0.6 K/UL (ref 0–0.9)
MONOCYTES RELATIVE PERCENT: 6.4 % (ref 0–10)
NEUTROPHILS ABSOLUTE: 6.1 K/UL (ref 1.5–7.5)
NEUTROPHILS RELATIVE PERCENT: 63.4 % (ref 50–65)
NITRITE, URINE: NEGATIVE
PDW BLD-RTO: 13 % (ref 11.5–14.5)
PERFORMED ON: ABNORMAL
PH UA: 5.5 (ref 5–8)
PLATELET # BLD: 274 K/UL (ref 130–400)
PMV BLD AUTO: 11.3 FL (ref 9.4–12.4)
POTASSIUM REFLEX MAGNESIUM: 3.7 MMOL/L (ref 3.5–5)
PRO-BNP: 35 PG/ML (ref 0–900)
PROTEIN UA: NEGATIVE MG/DL
RBC # BLD: 4.64 M/UL (ref 4.7–6.1)
SARS-COV-2, NAAT: NOT DETECTED
SODIUM BLD-SCNC: 141 MMOL/L (ref 136–145)
SPECIFIC GRAVITY UA: 1.02 (ref 1–1.03)
TOTAL PROTEIN: 7.6 G/DL (ref 6.6–8.7)
TROPONIN: <0.01 NG/ML (ref 0–0.03)
TROPONIN: <0.01 NG/ML (ref 0–0.03)
TSH SERPL DL<=0.05 MIU/L-ACNC: 1.69 UIU/ML (ref 0.27–4.2)
UROBILINOGEN, URINE: 1 E.U./DL
WBC # BLD: 9.6 K/UL (ref 4.8–10.8)

## 2021-03-03 PROCEDURE — 81003 URINALYSIS AUTO W/O SCOPE: CPT

## 2021-03-03 PROCEDURE — 85025 COMPLETE CBC W/AUTO DIFF WBC: CPT

## 2021-03-03 PROCEDURE — 83880 ASSAY OF NATRIURETIC PEPTIDE: CPT

## 2021-03-03 PROCEDURE — 83735 ASSAY OF MAGNESIUM: CPT

## 2021-03-03 PROCEDURE — 80053 COMPREHEN METABOLIC PANEL: CPT

## 2021-03-03 PROCEDURE — 93005 ELECTROCARDIOGRAM TRACING: CPT | Performed by: PEDIATRICS

## 2021-03-03 PROCEDURE — 1210000000 HC MED SURG R&B

## 2021-03-03 PROCEDURE — 99285 EMERGENCY DEPT VISIT HI MDM: CPT

## 2021-03-03 PROCEDURE — 84443 ASSAY THYROID STIM HORMONE: CPT

## 2021-03-03 PROCEDURE — 82947 ASSAY GLUCOSE BLOOD QUANT: CPT

## 2021-03-03 PROCEDURE — 84484 ASSAY OF TROPONIN QUANT: CPT

## 2021-03-03 PROCEDURE — 70450 CT HEAD/BRAIN W/O DYE: CPT

## 2021-03-03 PROCEDURE — 87635 SARS-COV-2 COVID-19 AMP PRB: CPT

## 2021-03-03 PROCEDURE — 36415 COLL VENOUS BLD VENIPUNCTURE: CPT

## 2021-03-03 PROCEDURE — 71045 X-RAY EXAM CHEST 1 VIEW: CPT

## 2021-03-03 RX ORDER — ACETAMINOPHEN 325 MG/1
650 TABLET ORAL EVERY 4 HOURS PRN
Status: DISCONTINUED | OUTPATIENT
Start: 2021-03-03 | End: 2021-03-05 | Stop reason: HOSPADM

## 2021-03-03 RX ORDER — PROMETHAZINE HYDROCHLORIDE 25 MG/1
12.5 TABLET ORAL EVERY 6 HOURS PRN
Status: DISCONTINUED | OUTPATIENT
Start: 2021-03-03 | End: 2021-03-05 | Stop reason: HOSPADM

## 2021-03-03 RX ORDER — PANTOPRAZOLE SODIUM 40 MG/1
40 TABLET, DELAYED RELEASE ORAL
Status: DISCONTINUED | OUTPATIENT
Start: 2021-03-04 | End: 2021-03-05 | Stop reason: HOSPADM

## 2021-03-03 RX ORDER — BUDESONIDE 0.25 MG/2ML
0.25 INHALANT ORAL 2 TIMES DAILY
Refills: 3 | Status: DISCONTINUED | OUTPATIENT
Start: 2021-03-03 | End: 2021-03-04

## 2021-03-03 RX ORDER — LEVOTHYROXINE SODIUM 0.05 MG/1
50 TABLET ORAL DAILY
Status: DISCONTINUED | OUTPATIENT
Start: 2021-03-04 | End: 2021-03-05 | Stop reason: HOSPADM

## 2021-03-03 RX ORDER — ASPIRIN 300 MG/1
300 SUPPOSITORY RECTAL DAILY
Status: DISCONTINUED | OUTPATIENT
Start: 2021-03-03 | End: 2021-03-05 | Stop reason: HOSPADM

## 2021-03-03 RX ORDER — FLUTICASONE PROPIONATE 44 UG/1
2 AEROSOL, METERED RESPIRATORY (INHALATION) 2 TIMES DAILY
Status: DISCONTINUED | OUTPATIENT
Start: 2021-03-03 | End: 2021-03-04

## 2021-03-03 RX ORDER — ONDANSETRON 2 MG/ML
4 INJECTION INTRAMUSCULAR; INTRAVENOUS EVERY 6 HOURS PRN
Status: DISCONTINUED | OUTPATIENT
Start: 2021-03-03 | End: 2021-03-05 | Stop reason: HOSPADM

## 2021-03-03 RX ORDER — ACETAMINOPHEN 650 MG/1
650 SUPPOSITORY RECTAL EVERY 4 HOURS PRN
Status: DISCONTINUED | OUTPATIENT
Start: 2021-03-03 | End: 2021-03-05 | Stop reason: HOSPADM

## 2021-03-03 RX ORDER — SODIUM CHLORIDE 0.9 % (FLUSH) 0.9 %
10 SYRINGE (ML) INJECTION PRN
Status: DISCONTINUED | OUTPATIENT
Start: 2021-03-03 | End: 2021-03-05 | Stop reason: HOSPADM

## 2021-03-03 RX ORDER — LABETALOL HYDROCHLORIDE 5 MG/ML
10 INJECTION, SOLUTION INTRAVENOUS EVERY 10 MIN PRN
Status: DISCONTINUED | OUTPATIENT
Start: 2021-03-03 | End: 2021-03-05 | Stop reason: HOSPADM

## 2021-03-03 RX ORDER — SODIUM CHLORIDE 0.9 % (FLUSH) 0.9 %
10 SYRINGE (ML) INJECTION EVERY 12 HOURS SCHEDULED
Status: DISCONTINUED | OUTPATIENT
Start: 2021-03-03 | End: 2021-03-05 | Stop reason: HOSPADM

## 2021-03-03 RX ORDER — ATORVASTATIN CALCIUM 40 MG/1
40 TABLET, FILM COATED ORAL NIGHTLY
Status: DISCONTINUED | OUTPATIENT
Start: 2021-03-03 | End: 2021-03-05 | Stop reason: HOSPADM

## 2021-03-03 RX ORDER — ASPIRIN 81 MG/1
81 TABLET ORAL DAILY
Status: DISCONTINUED | OUTPATIENT
Start: 2021-03-03 | End: 2021-03-05 | Stop reason: HOSPADM

## 2021-03-03 RX ORDER — BUSPIRONE HYDROCHLORIDE 5 MG/1
7.5 TABLET ORAL 3 TIMES DAILY PRN
Status: DISCONTINUED | OUTPATIENT
Start: 2021-03-03 | End: 2021-03-05 | Stop reason: HOSPADM

## 2021-03-03 ASSESSMENT — ENCOUNTER SYMPTOMS
SORE THROAT: 0
NAUSEA: 0
EYE REDNESS: 0
RHINORRHEA: 0
WHEEZING: 0
SHORTNESS OF BREATH: 0
ABDOMINAL PAIN: 0
COUGH: 0
EYE DISCHARGE: 0
VOMITING: 0
BACK PAIN: 0
COLOR CHANGE: 0
TROUBLE SWALLOWING: 0

## 2021-03-03 NOTE — ED NOTES
Bed: 11  Expected date:   Expected time:   Means of arrival:   Comments:  Open at [address], RN  03/03/21 [id number]

## 2021-03-03 NOTE — H&P
HISTORY AND PHYSICAL             Date: 3/3/2021        Patient Name: Maksim Moreira     YOB: 1965      Age:  61 y.o.     Chief Complaint     Chief Complaint   Patient presents with    Palpitations        History Obtained From   patient, electronic medical record, emergency room provider    History of Present Illness Patient is a 58-year-old obese  male with a known past medical history of hypertension, hyperlipidemia, hypothyroidism, obstructive sleep apnea, bipolar disorder presenting to the emergency room with concern due to feelings of numbness presenting in the oral region, bilateral elbow, bilateral knees as well as lower extremities. Patient states the symptoms have been present for approximately 3 to 4 months time, has progressively gotten worse appreciate also some weakness feelings as though his feet are light when standing. Does have a history of Bell's palsy left-sided, most concerned presented as patient's nurse was an RN last night appreciated some patient with broken speech, still patient appreciate some slurred words evidenced by ER encounter. Patient also states when he walks noticed some drifting to the left or the right. Patient denies any recent trauma to the head, no recent car accidents. Patient has been diagnosed with benign positional vertigo by primary care provider and dosed with meclizine which patient states has not been working. Work-up in the emergency thus far reveals electrolytes to be within normal limits, unremarkable EKG. CT head was obtained which revealed no acute intracranial findings, x-ray within normal limits. Case has been discussed with neurological attending plans for admission for strokelike symptoms, anticipate MRI. Patient at current time anxious, would like answers as to that he feels as though the numbness has been affecting lifestyle. At current time denies any headache, changes in vision, chest pain, abdominal pain, nausea vomiting, fevers or chills.       Patient to be admitted to medical floor, COVID-19 screen pending    Past Medical History     Past Medical History:   Diagnosis Date    Bipolar disorder ([facility] [address]     Colon polyps     Diverticular disease     GERD (gastroesophageal reflux disease)     Hiatal hernia     Hyperlipidemia     Hypertension  Hypothyroidism     Neuropathy     Unspecified sleep apnea         Past Surgical History     Past Surgical History:   Procedure Laterality Date    COLONOSCOPY  01/06/2009    Dr Cano Carry: hyperplastic polyp, resolving diverticulitis    COLONOSCOPY  07/2012    minimal diverticulosis left side o/w normal postsurgical colon    COLONOSCOPY  12/2005    diverticulitis    COLONOSCOPY N/A 11/8/2016    Dr Timoteo Barksdale bleeding internal hemorrhoids, diverticular disease-5 yr recall    EGD      [place] EGD TRANSORAL BIOPSY SINGLE/MULTIPLE N/A 8/24/2018    Dr JONATHAN Bentley-Gastritis/gastropathy, esophagitis    SUBTOTAL COLECTOMY  2010    recurrant diverticulitis    UPPER GASTROINTESTINAL ENDOSCOPY  12/22/2005    Dr Cano Carry: duodenal ulcer, gastritis    UPPP UVULOPALATOPHARYGOPLASTY          Medications Prior to Admission     Prior to Admission medications    Medication Sig Start Date End Date Taking? Authorizing Provider   phentermine 15 MG capsule TAKE 1 CAPSULE BY MOUTH ONCE DAILY IN THE MORNING. 2/1/21 3/3/21 Yes Jeffy Ford MD   meclizine (ANTIVERT) 25 MG tablet TAKE 1/2 TABLET BY MOUTH THREE TIMES DAILY AS NEEDED FOR DIZZINESS. 1/30/21  Yes Jeffy Ford MD   memantine [facility]) 10 MG tablet Take 1 tablet by mouth 2 times daily 1/5/21  Yes Jeffy Ford MD   beclomethasone (QVAR) 80 MCG/ACT inhaler Inhale 1 puff into the lungs 2 times daily 1/5/21  Yes Jeffy Ford MD   fluticasone [facility]) 44 MCG/ACT inhaler Inhale 2 puffs into the lungs 2 times daily 12/1/20  Yes Jeffy Ford MD   furosemide (LASIX) 20 MG tablet TAKE 1 TABLET BY MOUTH ONCE DAILY. 11/2/20  Yes Jeffy Ford MD   atorvastatin (LIPITOR) 40 MG tablet TAKE 1 TABLET BY MOUTH ONCE DAILY. 9/15/20  Yes Jeffy Ford MD   lisinopril-hydroCHLOROthiazide (PRINZIDE;ZESTORETIC) 20-25 MG per tablet TAKE 1 TABLET BY MOUTH ONCE DAILY.  9/15/20  Yes Jeffy Ford MD Mental Status: He is alert and oriented to person, place, and time. Mental status is at baseline.       Comments: Sensation intact bilaterally   Psychiatric:         Mood and Affect: Mood normal.         Labs      Recent Results (from the past 24 hour(s))   EKG 12 Lead    Collection Time: 03/03/21 11:57 AM   Result Value Ref Range    P-R Interval 150 ms    QRS Duration 100 ms    Q-T Interval 400 ms    QTc Calculation (Bazett) 415 ms    P Axis 52 degrees    T Axis 65 degrees   Urinalysis Reflex to Culture    Collection Time: 03/03/21  1:20 PM    Specimen: Urine, clean catch   Result Value Ref Range    Color, UA YELLOW Straw/Yellow    Clarity, UA Clear Clear    Glucose, Ur Negative Negative mg/dL    Bilirubin Urine Negative Negative    Ketones, Urine Negative Negative mg/dL    Specific Gravity, UA 1.024 1.005 - 1.030    Blood, Urine Negative Negative    pH, UA 5.5 5.0 - 8.0    Protein, UA Negative Negative mg/dL    Urobilinogen, Urine 1.0 <2.0 E.U./dL    Nitrite, Urine Negative Negative    Leukocyte Esterase, Urine Negative Negative   CBC Auto Differential    Collection Time: 03/03/21  1:22 PM   Result Value Ref Range    WBC 9.6 4.8 - 10.8 K/uL    RBC 4.64 (L) 4.70 - 6.10 M/uL    Hemoglobin 14.3 14.0 - 18.0 g/dL    Hematocrit 42.4 42.0 - 52.0 %    MCV 91.4 80.0 - 94.0 fL    MCH 30.8 27.0 - 31.0 pg    MCHC 33.7 33.0 - 37.0 g/dL    RDW 13.0 11.5 - 14.5 %    Platelets 304 424 - 927 K/uL    MPV 11.3 9.4 - 12.4 fL    Neutrophils % 63.4 50.0 - 65.0 %    Lymphocytes % 27.2 20.0 - 40.0 %    Monocytes % 6.4 0.0 - 10.0 %    Eosinophils % 1.9 0.0 - 5.0 %    Basophils % 0.7 0.0 - 1.0 %    Neutrophils Absolute 6.1 1.5 - 7.5 K/uL    Immature Granulocytes # 0.0 K/uL    Lymphocytes Absolute 2.6 1.1 - 4.5 K/uL    Monocytes Absolute 0.60 0.00 - 0.90 K/uL    Eosinophils Absolute 0.20 0.00 - 0.60 K/uL    Basophils Absolute 0.10 0.00 - 0.20 K/uL   Comprehensive Metabolic Panel w/ Reflex to MG    Collection Time: 03/03/21  1:22 PM Result Value Ref Range    Sodium 141 136 - 145 mmol/L    Potassium reflex Magnesium 3.7 3.5 - 5.0 mmol/L    Chloride 103 98 - 111 mmol/L    CO2 27 22 - 29 mmol/L    Anion Gap 11 7 - 19 mmol/L    Glucose 97 74 - 109 mg/dL    BUN 21 (H) 6 - 20 mg/dL    CREATININE 0.7 0.5 - 1.2 mg/dL    GFR Non-African American >60 >60    GFR African American >59 >59    Calcium 9.3 8.6 - 10.0 mg/dL    Total Protein 7.6 6.6 - 8.7 g/dL    Albumin 4.3 3.5 - 5.2 g/dL    Total Bilirubin 0.9 0.2 - 1.2 mg/dL    Alkaline Phosphatase 59 40 - 130 U/L    ALT 22 5 - 41 U/L    AST 20 5 - 40 U/L   Troponin    Collection Time: 03/03/21  1:22 PM   Result Value Ref Range    Troponin <0.01 0.00 - 0.03 ng/mL   Brain Natriuretic Peptide    Collection Time: 03/03/21  1:22 PM   Result Value Ref Range    Pro-BNP 35 0 - 900 pg/mL   Magnesium    Collection Time: 03/03/21  1:22 PM   Result Value Ref Range    Magnesium 2.3 1.6 - 2.6 mg/dL   POCT Glucose    Collection Time: 03/03/21  2:10 PM   Result Value Ref Range    POC Glucose 106 (H) 70 - 99 mg/dl    Performed on [employer]    POCT Glucose    Collection Time: 03/03/21  2:12 PM   Result Value Ref Range    Glucose 106 mg/dL    QC OK?  yes    Troponin    Collection Time: 03/03/21  5:42 PM   Result Value Ref Range    Troponin <0.01 0.00 - 0.03 ng/mL        Imaging/Diagnostics Last 24 Hours   Ct Head Wo Contrast    Result Date: 3/3/2021 Exam: CT HEAD WO CONTRAST - 3/3/2021 12:36 PM Indication: Expressive aphasia Comparison: 3/24/2017 DLP: 675 mGy cm. In order to have a CT radiation dose as low as reasonably achievable, Automated Exposure Control was utilized for adjustment of the mA and/or KV according to patient size. Findings: No evidence of intracranial hemorrhage. No loss of gray-white differentiation to suggest acute infarct. No midline shift or mass effect. Lateral ventricles are nondilated. Basilar cisterns are patent. No acute orbital finding. Mastoid air cells are clear. Trace mucosal thickening in the maxillary sinuses. Paranasal sinuses are otherwise clear. No acute osseous finding. Impression: No acute intracranial findings. Signed by Dr Radhika Newsome on 3/3/2021 1:52 PM    Xr Chest Portable    Result Date: 3/3/2021  Exam: XR CHEST PORTABLE - 3/3/2021 12:45 PM Indication: Cough Comparison: 12/2/2019 Findings: Cardiac silhouette is normal size. No pleural effusion, pneumothorax, or consolidation. No acute osseous finding. Impression: No acute findings.  Signed by Dr Radhika Newsome on 3/3/2021 1:57 PM      Assessment      Hospital Problems           Last Modified POA    * (Principal) Stroke-like symptoms 3/3/2021 Yes    Hypothyroidism 3/3/2021 Yes    Chronic GERD 3/3/2021 Yes    Morbid obesity ([address] 3/3/2021 Yes    Obstructive sleep apnea 3/3/2021 Yes    Mixed hyperlipidemia 3/3/2021 Yes    Essential hypertension 3/3/2021 Yes          Plan     Stroke Like Symptoms   -Head CT unremarkable  -Urinalysis negative for nitrite/leukocyte esterase  -Neurology has been consulted appreciate further recommendations  -Plans for bilateral carotid ultrasound  -Plans for transthoracic echocardiogram  -Lovenox anticoagulation, statin therapy  -PT/OT/SLP appreciate recommendation  -Hold meclizine/Namenda/gabapentin  -Allow for permissive hypertension, labetalol as needed    Hx of [place] Palsy left sided -noted Hypertension -condition, will hold home antihypertensive regimen currently allow for permissive hypertension, as needed modalities have been placed, routine vitals      Hyperlipidemiachronic condition, will obtain lipid panel in a.m., continue with  statin therapy    Hypothyroidism -chronic condition, obtain updated thyroid studies, continue with Synthroid dosing      Anxiety -chronic condition continue with home BuSpar      GERD -chronic condition, continue with PPI    History of obstructive sleep apneachronic condition, continue with albuterol as needed, CPAP nightly      Consultations Ordered:  IP CONSULT TO NEUROLOGY      EMR Dragon/Transcription disclaimer:   Much of this encounter note is an electronic transcription/translation of spoken language to printed text.  The electronic translation of spoken language may permit erroneous, or at times, nonsensical words or phrases to be inadvertently transcribed; although attempts have made to review the note for such errors, some may still exist.      Electronically signed by   Lupe Sunshine   Internal Medicine Hospitalist  On 3/3/2021  At 6:21 PM

## 2021-03-03 NOTE — ED PROVIDER NOTES
[facility]  eMERGENCY dEPARTMENT eNCOUnter      Pt Name: Terence Knox  MRN: [id number]  [place] 1965  Date of evaluation: 3/3/2021  Provider: Shira Kingston MD    [address] [place] [address]       Chief Complaint   Patient presents with    Palpitations         HISTORY OF PRESENT ILLNESS   (Location/Symptom, Timing/Onset,Context/Setting, Quality, Duration, Modifying Factors, Severity)  Note limiting factors. HENT: Negative for congestion and rhinorrhea. Eyes: Negative for discharge. Respiratory: Negative for cough and shortness of breath. Cardiovascular: Positive for palpitations and leg swelling (A long time). Negative for chest pain. Gastrointestinal: Negative for abdominal pain, nausea and vomiting. Genitourinary: Negative for difficulty urinating and dysuria. Musculoskeletal: Negative for back pain and neck pain. Skin: Negative for color change and pallor. Neurological: Positive for dizziness, facial asymmetry (Chronic left-sided Bell's palsy), speech difficulty, weakness, light-headedness and numbness. Negative for syncope. Psychiatric/Behavioral: Negative for agitation and confusion. All other systems reviewed and are negative.            PAST MEDICALHISTORY     Past Medical History:   Diagnosis Date    B[facility] disorder ([facility] [address]     Colon polyps     Diverticular disease     GERD (gastroesophageal reflux disease)     Hiatal hernia     Hyperlipidemia     Hypertension     Hypothyroidism     Neuropathy     Unspecified sleep apnea          SURGICAL HISTORY       Past Surgical History:   Procedure Laterality Date    COLONOSCOPY  01/06/2009    Dr Karlos Acosta: hyperplastic polyp, resolving diverticulitis    COLONOSCOPY  07/2012    minimal diverticulosis left side o/w normal postsurgical colon    COLONOSCOPY  12/2005    diverticulitis    COLONOSCOPY N/A 11/8/2016    Dr Cindy Fernandez bleeding internal hemorrhoids, diverticular disease-5 yr recall    EGD      [place] EGD TRANSORAL BIOPSY SINGLE/MULTIPLE N/A 8/24/2018    Dr JONATHAN Bentley-Gastritis/gastropathy, esophagitis    SUBTOTAL COLECTOMY  2010    recurrant diverticulitis    UPPER GASTROINTESTINAL ENDOSCOPY  12/22/2005    Dr Karlos Acosta: duodenal ulcer, gastritis    UPPP UVULOPALATOPHARYGOPLASTY           CURRENT MEDICATIONS     Current Discharge Medication List      CONTINUE these medications which have NOT CHANGED    Details omeprazole (PRILOSEC) 20 MG delayed release capsule TAKE 1 CAPSULE BY MOUTH DAILY  Qty: 30 capsule, Refills: 0    Comments: $      sildenafil (VIAGRA) 100 MG tablet Take 1 tablet by mouth as needed for Erectile Dysfunction Generic sildenafil 100 mg or strongest dose  Qty: 8 tablet, Refills: 5             ALLERGIES     Patient has no known allergies.     FAMILY HISTORY       Family History   Problem Relation Age of Onset    Diabetes Mother     Kidney Disease Mother     Dementia Mother          at 68 - Lewy Body Dementia    Coronary Art Dis Father          at [de-identified]    Colon Cancer Neg Hx     Colon Polyps Neg Hx     Esophageal Cancer Neg Hx     Liver Cancer Neg Hx     Liver Disease Neg Hx     Rectal Cancer Neg Hx     Stomach Cancer Neg Hx           SOCIAL HISTORY       Social History     Socioeconomic History    Marital status:      Spouse name: None    Number of children: None    Years of education: None    Highest education level: None   Occupational History    None   Social Needs    Financial resource strain: Not hard at all   [place] insecurity     Worry: Never true     Inability: Never true    Transportation needs     Medical: No     Non-medical: No   Tobacco Use    Smoking status: Never Smoker    Smokeless tobacco: Never Used   Substance and Sexual Activity    Alcohol use: No    Drug use: No    Sexual activity: Yes   Lifestyle    Physical activity     Days per week: None     Minutes per session: None    Stress: None   Relationships    Social connections     Talks on phone: None     Gets together: None     Attends [employer] service: None     Active member of club or organization: None     Attends meetings of clubs or organizations: None     Relationship status: None    Intimate partner violence     Fear of current or ex partner: None     Emotionally abused: None     Physically abused: None     Forced sexual activity: None   Other Topics Concern    None Social History Narrative    None       SCREENINGS    [place] Coma Scale  Eye Opening: Spontaneous  Best Verbal Response: Oriented  Best Motor Response: Obeys commands  [place] Coma Scale Score: 15        PHYSICAL EXAM    (up to 7 for level 4, 8 or more for level 5)     ED Triage Vitals [03/03/21 1155]   BP Temp Temp src Pulse Resp SpO2 Height Weight   122/70 98 °F (36.7 °C) -- 76 19 94 % 5' 9\" (1.753 m) 300 lb (136.1 kg)       Physical Exam  Vitals signs and nursing note reviewed. Constitutional:       General: He is not in acute distress. Appearance: He is obese. HENT:      Head: Normocephalic and atraumatic. Right Ear: External ear normal.      Left Ear: External ear normal.      Nose: Nose normal.      Mouth/Throat:      Mouth: Mucous membranes are moist.      Pharynx: Oropharynx is clear. No oropharyngeal exudate. Eyes:      General: No scleral icterus. Conjunctiva/sclera: Conjunctivae normal.      Pupils: Pupils are equal, round, and reactive to light. Neck:      Musculoskeletal: Neck supple. No neck rigidity. Cardiovascular:      Rate and Rhythm: Normal rate and regular rhythm. Pulses: Normal pulses. Heart sounds: Murmur (2/6  murmur left axilla) present. Comments: Multiple extrasystoles seen on monitor while in the room. Pulmonary:      Effort: Pulmonary effort is normal.      Breath sounds: Normal breath sounds. Abdominal:      General: Bowel sounds are normal.      Palpations: Abdomen is soft. Tenderness: There is no abdominal tenderness. There is no guarding. Musculoskeletal:         General: No tenderness or deformity. Right lower leg: Edema present. Left lower leg: Edema present. Skin:     General: Skin is warm and dry. Capillary Refill: Capillary refill takes less than 2 seconds. Coloration: Skin is not jaundiced. Neurological:      General: No focal deficit present. All other components within normal limits   POCT GLUCOSE - Abnormal; Notable for the following components:    POC Glucose 106 (*)     All other components within normal limits   POCT GLUCOSE - Normal   COVID-19, RAPID    Narrative:     ORDER WAS CANCELLED 03/03/2021 18:36, Rapid swab sent down. TROPONIN   BRAIN NATRIURETIC PEPTIDE   URINE RT REFLEX TO CULTURE   TROPONIN   MAGNESIUM    Narrative:     . TSH WITHOUT REFLEX    Narrative:     . CBC   COMPREHENSIVE METABOLIC PANEL   LIPID PANEL       All other labs were within normal range or not returned as of this dictation. EMERGENCY DEPARTMENT COURSE and DIFFERENTIAL DIAGNOSIS/MDM:   Vitals:    Vitals:    03/03/21 1403 03/03/21 2054 03/03/21 2249 03/04/21 0157   BP: 139/76 (!) 153/88 (!) 151/90 118/75   Pulse:  88 71 80   Resp:  24 19 19   Temp:  98.5 °F (36.9 °C) 98.3 °F (36.8 °C) 97.2 °F (36.2 °C)   TempSrc:  Temporal Temporal Temporal   SpO2: 92% 95% 95% 95%   Weight:  290 lb 8 oz (131.8 kg)     Height:           MDM  58-year-old male presents with recent history of palpitations and expressive aphasia/slurring of speech. Patient has multiple other symptoms that have been ongoing for the last few months. Patient has been evaluated by neurology and diagnosed with benign positional vertigo. Lab, EKG, and radiology results reviewed. Discussed with Dr. Jada Aragon, neurologist, who recommends further evaluation. Discussed with Dr. Hailey Sevilla, hospitalist, who will admit patient for further evaluation and treatment. Patient and wife, Natalia Landaverde, verbalize understanding and agreement with plan of care. CONSULTS:  IP CONSULT TO NEUROLOGY    PROCEDURES:  Unless otherwise noted below, none     Procedures    FINAL IMPRESSION      1. Generalized weakness    2. Palpitations    3. Expressive aphasia    4. Dizziness          DISPOSITION/PLAN   DISPOSITION Admitted      PATIENT REFERRED TO:  No follow-up provider specified.     DISCHARGE MEDICATIONS: Current Discharge Medication List             (Please note that portions of this note were completed with a voice recognition program.  Efforts were made to edit thedictations but occasionally words are mis-transcribed.)    Britt Smith MD (electronically signed)  Attending Emergency Physician          Britt Smith MD  03/04/21 [id number]

## 2021-03-03 NOTE — ED TRIAGE NOTES
Pt presents with list from spouse of s/s  \"ongoing dizziness, numbness around mouth for one month plus, arrhythmia/irregular beart per spouse, ongoing swelling of feet and ankles, intermittent sob\"  Pt denies any new concerns or problems at this time.

## 2021-03-04 ENCOUNTER — APPOINTMENT (OUTPATIENT)
Dept: MRI IMAGING | Age: 56
DRG: 948 | End: 2021-03-04
Payer: MEDICAID

## 2021-03-04 LAB
ALBUMIN SERPL-MCNC: 4.1 G/DL (ref 3.5–5.2)
ALP BLD-CCNC: 56 U/L (ref 40–130)
ALT SERPL-CCNC: 23 U/L (ref 5–41)
ANION GAP SERPL CALCULATED.3IONS-SCNC: 10 MMOL/L (ref 7–19)
AST SERPL-CCNC: 17 U/L (ref 5–40)
BILIRUB SERPL-MCNC: 0.6 MG/DL (ref 0.2–1.2)
BUN BLDV-MCNC: 21 MG/DL (ref 6–20)
C-REACTIVE PROTEIN: 0.46 MG/DL (ref 0–0.5)
CALCIUM SERPL-MCNC: 8.8 MG/DL (ref 8.6–10)
CHLORIDE BLD-SCNC: 104 MMOL/L (ref 98–111)
CHOLESTEROL, TOTAL: 106 MG/DL (ref 160–199)
CO2: 25 MMOL/L (ref 22–29)
CREAT SERPL-MCNC: 0.7 MG/DL (ref 0.5–1.2)
GFR AFRICAN AMERICAN: >59
GFR NON-AFRICAN AMERICAN: >60
GLUCOSE BLD-MCNC: 96 MG/DL (ref 74–109)
HCT VFR BLD CALC: 40.3 % (ref 42–52)
HDLC SERPL-MCNC: 27 MG/DL (ref 55–121)
HEMOGLOBIN: 13.4 G/DL (ref 14–18)
LDL CHOLESTEROL CALCULATED: 39 MG/DL
LV EF: 58 %
LVEF MODALITY: NORMAL
MAGNESIUM: 2.4 MG/DL (ref 1.6–2.6)
MCH RBC QN AUTO: 30.7 PG (ref 27–31)
MCHC RBC AUTO-ENTMCNC: 33.3 G/DL (ref 33–37)
MCV RBC AUTO: 92.4 FL (ref 80–94)
PDW BLD-RTO: 13 % (ref 11.5–14.5)
PLATELET # BLD: 252 K/UL (ref 130–400)
PMV BLD AUTO: 11.5 FL (ref 9.4–12.4)
POTASSIUM SERPL-SCNC: 3.3 MMOL/L (ref 3.5–5)
RBC # BLD: 4.36 M/UL (ref 4.7–6.1)
RHEUMATOID FACTOR: <10 IU/ML
SEDIMENTATION RATE, ERYTHROCYTE: 14 MM/HR (ref 0–15)
SODIUM BLD-SCNC: 139 MMOL/L (ref 136–145)
TOTAL CK: 104 U/L (ref 39–308)
TOTAL PROTEIN: 7.1 G/DL (ref 6.6–8.7)
TRIGL SERPL-MCNC: 199 MG/DL (ref 0–149)
TSH REFLEX FT4: 1.37 UIU/ML (ref 0.35–5.5)
VITAMIN B-12: 868 PG/ML (ref 211–946)
WBC # BLD: 10.3 K/UL (ref 4.8–10.8)

## 2021-03-04 PROCEDURE — A9577 INJ MULTIHANCE: HCPCS | Performed by: FAMILY MEDICINE

## 2021-03-04 PROCEDURE — 86617 LYME DISEASE ANTIBODY: CPT

## 2021-03-04 PROCEDURE — 83735 ASSAY OF MAGNESIUM: CPT

## 2021-03-04 PROCEDURE — 70553 MRI BRAIN STEM W/O & W/DYE: CPT

## 2021-03-04 PROCEDURE — 2580000003 HC RX 258: Performed by: FAMILY MEDICINE

## 2021-03-04 PROCEDURE — 85652 RBC SED RATE AUTOMATED: CPT

## 2021-03-04 PROCEDURE — 86431 RHEUMATOID FACTOR QUANT: CPT

## 2021-03-04 PROCEDURE — 80061 LIPID PANEL: CPT

## 2021-03-04 PROCEDURE — 97530 THERAPEUTIC ACTIVITIES: CPT

## 2021-03-04 PROCEDURE — 95816 EEG AWAKE AND DROWSY: CPT

## 2021-03-04 PROCEDURE — 70544 MR ANGIOGRAPHY HEAD W/O DYE: CPT

## 2021-03-04 PROCEDURE — 86038 ANTINUCLEAR ANTIBODIES: CPT

## 2021-03-04 PROCEDURE — 86140 C-REACTIVE PROTEIN: CPT

## 2021-03-04 PROCEDURE — 82607 VITAMIN B-12: CPT

## 2021-03-04 PROCEDURE — 6360000004 HC RX CONTRAST MEDICATION: Performed by: FAMILY MEDICINE

## 2021-03-04 PROCEDURE — 99223 1ST HOSP IP/OBS HIGH 75: CPT | Performed by: PSYCHIATRY & NEUROLOGY

## 2021-03-04 PROCEDURE — 82550 ASSAY OF CK (CPK): CPT

## 2021-03-04 PROCEDURE — 84443 ASSAY THYROID STIM HORMONE: CPT

## 2021-03-04 PROCEDURE — 95816 EEG AWAKE AND DROWSY: CPT | Performed by: PSYCHIATRY & NEUROLOGY

## 2021-03-04 PROCEDURE — 80053 COMPREHEN METABOLIC PANEL: CPT

## 2021-03-04 PROCEDURE — 6370000000 HC RX 637 (ALT 250 FOR IP): Performed by: FAMILY MEDICINE

## 2021-03-04 PROCEDURE — 97165 OT EVAL LOW COMPLEX 30 MIN: CPT

## 2021-03-04 PROCEDURE — 36415 COLL VENOUS BLD VENIPUNCTURE: CPT

## 2021-03-04 PROCEDURE — 6360000002 HC RX W HCPCS: Performed by: FAMILY MEDICINE

## 2021-03-04 PROCEDURE — 85027 COMPLETE CBC AUTOMATED: CPT

## 2021-03-04 PROCEDURE — 93880 EXTRACRANIAL BILAT STUDY: CPT

## 2021-03-04 PROCEDURE — 1210000000 HC MED SURG R&B

## 2021-03-04 PROCEDURE — 92610 EVALUATE SWALLOWING FUNCTION: CPT

## 2021-03-04 PROCEDURE — C8929 TTE W OR WO FOL WCON,DOPPLER: HCPCS

## 2021-03-04 PROCEDURE — 92523 SPEECH SOUND LANG COMPREHEN: CPT

## 2021-03-04 RX ORDER — DIAZEPAM 5 MG/1
5 TABLET ORAL
Status: COMPLETED | OUTPATIENT
Start: 2021-03-04 | End: 2021-03-04

## 2021-03-04 RX ORDER — POTASSIUM CHLORIDE 20 MEQ/1
40 TABLET, EXTENDED RELEASE ORAL PRN
Status: DISCONTINUED | OUTPATIENT
Start: 2021-03-04 | End: 2021-03-05 | Stop reason: HOSPADM

## 2021-03-04 RX ORDER — POTASSIUM CHLORIDE 7.45 MG/ML
10 INJECTION INTRAVENOUS PRN
Status: DISCONTINUED | OUTPATIENT
Start: 2021-03-04 | End: 2021-03-05 | Stop reason: HOSPADM

## 2021-03-04 RX ADMIN — LEVOTHYROXINE SODIUM 50 MCG: 0.05 TABLET ORAL at 09:50

## 2021-03-04 RX ADMIN — PANTOPRAZOLE SODIUM 40 MG: 40 TABLET, DELAYED RELEASE ORAL at 09:51

## 2021-03-04 RX ADMIN — GADOBENATE DIMEGLUMINE 20 ML: 529 INJECTION, SOLUTION INTRAVENOUS at 13:20

## 2021-03-04 RX ADMIN — SODIUM CHLORIDE, PRESERVATIVE FREE 10 ML: 5 INJECTION INTRAVENOUS at 09:51

## 2021-03-04 RX ADMIN — ATORVASTATIN CALCIUM 40 MG: 40 TABLET, FILM COATED ORAL at 21:25

## 2021-03-04 RX ADMIN — PERFLUTREN 2.2 MG: 6.52 INJECTION, SUSPENSION INTRAVENOUS at 08:45

## 2021-03-04 RX ADMIN — ASPIRIN 81 MG: 81 TABLET, COATED ORAL at 09:50

## 2021-03-04 RX ADMIN — ENOXAPARIN SODIUM 40 MG: 40 INJECTION SUBCUTANEOUS at 21:25

## 2021-03-04 RX ADMIN — SODIUM CHLORIDE, PRESERVATIVE FREE 10 ML: 5 INJECTION INTRAVENOUS at 21:25

## 2021-03-04 RX ADMIN — DIAZEPAM 5 MG: 5 TABLET ORAL at 11:57

## 2021-03-04 NOTE — PROGRESS NOTES
Speech Language Pathology  Facility/Department: [facility] SURG SERVICES  Initial Speech/Language/Cognitive/Swallow Assessment    NAME: Leisa Alex  : 1965   MRN: [id number]  ADMISSION DATE: 3/3/2021  ADMITTING DIAGNOSIS: has S/P partial resection of colon; Hiatal hernia; Hypothyroidism; TIA (transient ischemic attack); Chronic GERD; Bipolar disorder ([facility] [address] Morbid obesity ([facility] [address] Obstructive sleep apnea; Mixed hyperlipidemia; Essential hypertension; Testosterone deficiency; Decreased libido; Erectile dysfunction due to arterial insufficiency; Sedentary lifestyle; Esophageal pain; Esophageal dysphagia; History of duodenal ulcer; Dyspepsia; 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    Date of Eval: 3/4/2021   Evaluating Therapist: BRIE Solomon    Assessment:  Completed assessment. SLP ranked functional intelligibility of speech for unfamiliar listeners at 100% in conversation. Patient did exhibit decreased sustained attention with frequently fast, tangential speech noted. Also evaluated patient's swallowing function. Patient exhibits mildly decreased and inconsistently sluggish laryngeal elevation for swallow airway protection. Even so, no outward S/S penetration/aspiration was observed with any ice chip trial, puree consistency trial, regular solid consistency trial, or thin H2O trial presented during assessment this date. At this time, recommend continuation regular solid consistency and thin liquids. Self-feed. Will continue to follow. Thank you for this consult. Subjective:  General  Chart Reviewed: Yes  Patient assessed for rehabilitation services?: Yes  Family / Caregiver Present: No     Objective:  Oral Motor:   Labial ROM: Adequate during labial retraction trials and labial protrusion trials. Labial Strength: Adequate during labial compression trials. Labial Coordination: Adequate movements were noted. Lingual ROM: Adequate during lingual extension trials with full point achieved; decreased during lingual elevation trials without use of accessory jaw movement; adequate movements noted bilaterally. Lingual Coordination: Mildly slowed movements were noted. Auditory Comprehension  Comprehension:  (Patient demonstrated ability to answer yes/no questions of increased complexity and to follow complex 1 and simple 2 step commands at independent level and with adequate processing speed.)    Expression  Primary Mode of Expression:  (Confrontation naming of items in room was considered to be [facility]. Patient did exhibit decreased sustained attention with frequently fast, tangential speech noted in structured responsive speech and during natural conversation.)    Motor Speech:  (Patient exhibited very mildly slowed, decreased lingual movements during verbalizations. SLP still ranked functional intelligibility of speech for unfamiliar listeners at 100% in verbalizations with background noise present.)    Overall Orientation Status:  (Patient demonstrated ability to verbalize name, birthday, age, address, phone number, city, state, hospital, month, [place]AN[place], date, year, and situation at independent level.)    Attention:  (Patient demonstrated decreased sustained attention during assessment. No adverse behaviors were noted with provision of redirections.)    Memory:  (Patient demonstrated appropriate immediate memory with sequences of unrelated numbers/words set up to 5 items without repetitions provided. Patient demonstrated appropriate short-term/working memory with 10 minute delay+distractions present during assessment.)    Problem Solving:  (Patient verbalized appropriate simple solutions to situations that could occur during activities of daily living at independent level.  Patient verbalized PCP and pharmacy independently.)    Additional Assessments: Assessed patient's swallowing function. Patient exhibited adequate oral prep and transit of ice chip trials, presented by SLP, with timing of oral transit primarily measuring 1-2 seconds in length. Patient exhibited adequate oral prep and transit of puree consistency trials and regular solid consistency trials, presented independently, with timing of oral transit primarily measuring 1-2 seconds in length. No puree consistency residue was noted post swallows. Min regular solid consistency residue was observed post swallows; residue cleared from the mouth with additional dry swallows. Oral transit of thin H2O trials, presented independently via cup, primarily measured 1-2 seconds in length. Laryngeal elevation during swallow initiation was considered to be mildly decreased and inconsistently sluggish for swallow airway protection. Even so, no outward S/S penetration/aspiration was observed with any ice chip trial, puree consistency trial, regular solid consistency trial, or thin H2O trial presented during evaluation this date. At this time, recommend continuation regular solid consistency and thin liquids. Self-feed. Will continue to follow to monitor swallow and [id number] functioning.     Electronically signed by BRIE Lozano on 3/4/2021 at 1:14 PM

## 2021-03-04 NOTE — CONSULTS
[facility] Neurology Consult      Patient:   Anders Jaimes  MR#:    [id number]  Account Number:                   [id number]      Room:    [facility]-   YOB: 1965  Date of Progress Note: 3/4/2021  Time of Note                           11:25 AM  Attending Physician:  Elsa Pacheco MD  Consulting Physician:  Sean Haywood DO       CHIEF COMPLAINT:  Speech difficulty    HISTORY OF PRESENT ILLNESS:   This is a 61 y.o. male who was admitted with speech difficulty and numbness. Symptoms have been present for the last few months but seemed to worsen from a speech standpoint yesterday which prompted him to go to the ED. Numbness is facial, but also noted some numbness in the arms and legs as well. Denies worsening back or neck pain. Also noted some gait difficulty and balance difficulty. But denies focal weakness. Does have a prior history of [place] palsy as well, but denies new facial drooping. No clear history of stroke is noted. Underlying bipolar history noted as well. Some possible vertigo noted as well, and recently diagnosed with BPPV. REVIEW OF SYSTEMS:  Constitutional  No fever or chills. HENT   No Scalp tenderness. No tinnitus or significant hearing loss. No nose bleeding, no sore throat. Eyes - No sudden vision change or eye pain  Respiratory  No significant shortness of breath or cough  Cardiovascular  No chest pain. No palpitations or significant leg swelling  Gastrointestinal  No abdominal swelling or pain. Genitourinary  No difficulty urinating, dysuria  Musculoskeletal  No back pain or myalgia. Skin  No color change or rash  Neurologic  No seizures. Hematologic  No easy bruising or spontaneous bleeding. Psychiatric  Anxiety.      PAST MEDICAL HISTORY:      Diagnosis Date    Bipolar disorder ([facility] [address]     Colon polyps     Diverticular disease     GERD (gastroesophageal reflux disease)     Hiatal hernia     Hyperlipidemia     Hypertension Pupillary exam as below, see CN exam in the neurologic exam  ENT-    No scars, masses, or lesions over external nose or ears  Oropharynx without erythema, palate midline  Cardiovascular -   No clubbing, cyanosis, or edema   Pulmonary-   Good expansion, normal effort without use of accessory muscles  Musculoskeletal    No significant wasting of muscles noted  Gait as below, see gait exam in the neurologic exam  Muscle strength, tone, stability as below see the motor exam in the neurologic exam.   No bony deformities  Skin    Warm, dry, and intact to inspection and palpation. No rash, erythema, or pallor  Psychiatric    Mood, affect, and behavior appear normal    Memory as below see mental status examination in the neurologic exam      NEUROLOGICAL EXAM    Mental status   [x] Awake, alert, oriented   [x] Affect attention and concentration appear appropriate  [x] Recent and remote memory appears unremarkable  [] Speech normal without dysarthria or aphasia, comprehension and repetition intact.    COMMENTS: mild speech difficulty   Cranial Nerves [x] No VF deficit to confrontation  [x] PERRLA, EOMI, no nystagmus, conjugate eye movements, no ptosis  [] Face symmetric  [x] Facial sensation intact  [x] Tongue midline no atrophy or fasciculations present  [x] Palate midline, hearing to finger rub normal  [x] Shoulder shrug and SCM testing normal  COMMENTS: Mild left facial symmetry, chronic   Motor   [] 5/5 strength x 4 extremities  [x] Normal bulk and tone  [x] No tremor present  [x] No rigidity or bradykinesia noted  COMMENTS: 4+/5 globally   Sensory  [] Sensation intact to light touch, pin prick, vibration, and proprioception BLE  [] Sensation intact to light touch, pin prick, vibration, and proprioception BUE  COMMENTS: Decreased LT, PP, Vib BLE    Coordination [x] FTN normal bilaterally   [] HTS normal bilaterally  [] CORY normal.   COMMENTS:   Reflexes  [x] Symmetric and non-pathological [x] Toes downgoing bilaterally  [x] No clonus present  COMMENTS:   Gait                  [] Normal steady gait    [] Ataxic    [] Spastic     [] Magnetic     [] Shuffling  [x] Not assessed  COMMENTS:       LABS/IMAGING:    As below and per HPI    Ct Head Wo Contrast    Result Date: 3/3/2021  Exam: CT HEAD WO CONTRAST - 3/3/2021 12:36 PM Indication: Expressive aphasia Comparison: 3/24/2017 DLP: 675 mGy cm. In order to have a CT radiation dose as low as reasonably achievable, Automated Exposure Control was utilized for adjustment of the mA and/or KV according to patient size. Findings: No evidence of intracranial hemorrhage. No loss of gray-white differentiation to suggest acute infarct. No midline shift or mass effect. Lateral ventricles are nondilated. Basilar cisterns are patent. No acute orbital finding. Mastoid air cells are clear. Trace mucosal thickening in the maxillary sinuses. Paranasal sinuses are otherwise clear. No acute osseous finding. Impression: No acute intracranial findings. Signed by Dr Nela Mcintyre on 3/3/2021 1:52 PM    Xr Chest Portable    Result Date: 3/3/2021  Exam: XR CHEST PORTABLE - 3/3/2021 12:45 PM Indication: Cough Comparison: 12/2/2019 Findings: Cardiac silhouette is normal size. No pleural effusion, pneumothorax, or consolidation. No acute osseous finding. Impression: No acute findings.  Signed by Dr Nela Mcintyre on 3/3/2021 1:57 PM    Vascular Carotid Duplex Bilateral    Result Date: 3/4/2021 ! +------------+-------+-------+--------+-------+------------+---------------+ ! Mid ICA     !68     !20.8   !0       !0.69   !            !               ! +------------+-------+-------+--------+-------+------------+---------------+ ! Dist ICA    !126    !39.3   !60      !0.69   !            !               ! +------------+-------+-------+--------+-------+------------+---------------+ ! Prox ECA    !141    !4.71   ! 60      !0.97   !            !               ! +------------+-------+-------+--------+-------+------------+---------------+ ! Vertebral   !73.9   !14.7   !60      !0.8    ! !               ! +------------+-------+-------+--------+-------+------------+---------------+   - There is antegrade vertebral flow noted on the right side. - Additional Measurements:ICAPSV/CCAPSV 0.74. ICAEDV/CCAEDV 2.35. Carotid Left Measurements +------------+-------+-------+--------+-------+------------+---------------+ ! Location    ! PSV    ! EDV    ! Angle   ! RI     !%Stenosis   ! Tortuosity     ! +------------+-------+-------+--------+-------+------------+---------------+ ! Prox CCA    ! 157    !21.6   !60      !0.86   !            !               ! +------------+-------+-------+--------+-------+------------+---------------+ ! Mid CCA     !163    !19.6   !60      !0.88   !            !               ! +------------+-------+-------+--------+-------+------------+---------------+ ! Prox ICA    ! 144    ! 25.5   !60      !0.82   !            !               ! +------------+-------+-------+--------+-------+------------+---------------+ ! Mid ICA     ! 122    !26.5   !60      !0.78   !            !               ! +------------+-------+-------+--------+-------+------------+---------------+ ! Dist ICA    !108    !27     !0       !0.75   !            !               ! +------------+-------+-------+--------+-------+------------+---------------+ ! Prox ECA    !116    !10.8   !60      !0.91   !            !               ! +------------+-------+-------+--------+-------+------------+---------------+ ! Vertebral   !73.9   !17     !60      !0.77   !            !               ! +------------+-------+-------+--------+-------+------------+---------------+   - There is antegrade vertebral flow noted on the left side. - Additional Measurements:ICAPSV/CCAPSV 0.92. ICAEDV/CCAEDV 1.25. Recent Labs     03/03/21  1322 03/04/21  0352   WBC 9.6 10.3   HGB 14.3 13.4*    252     Recent Labs     03/03/21  1322 03/03/21  1412 03/04/21  0352     --  139   K 3.7  --  3.3*     --  104   CO2 27  --  25   BUN 21*  --  21*   CREATININE 0.7  --  0.7   GLUCOSE 97 106 96     Recent Labs     03/03/21  1322 03/04/21  0352   AST 20 17   ALT 22 23   BILITOT 0.9 0.6   ALKPHOS 59 56     Recent Labs     03/04/21  0352   CHOL 106*   HDL 27*       ASSESSMENT:  61 y.o. admitted with speech difficulty, widespread numbness and tingling including face and extremities, unsteadiness, vertigo, generalized weakness. Plan further workup to try and clarify the source of his symptoms, differential broad, will need to exclude stroke. PLAN:  1. MRI brain / MRA head  2.  CD, ECHO, Tele, EEG  3. Additional labs  4. May need NCS/EMG but can likely be done as outpatient, do not feel strongly concerning GBS/CIDP/myopathy  5. May consider spinal imaging as well pending above, and can likely be done as outpatient if needed. 6.  PT, OT, ST  7. DVT proph  8. ASA, risk factor maximization. Please feel free to call with any questions. [phone] (cell phone).     Rafal Johnson DO  Board Certified Neurology

## 2021-03-04 NOTE — PROGRESS NOTES
Standing Balance: Independent  Functional Mobility  Assist Level:  Independent  Toilet Transfers  Toilet Transfer: Independent  ADL  Feeding: Independent  Grooming: Independent  UE Bathing: Independent  LE Bathing: Independent  UE Dressing: Independent  LE Dressing: Independent  Toileting: Independent        Bed mobility  Rolling to Left: Independent  Rolling to Right: Independent  Supine to Sit: Independent  Sit to Supine: Independent  Transfers  Stand Step Transfers: Independent     Cognition  Overall Cognitive Status: WNL                 LUE PROM (degrees)  LUE PROM: WNL  LUE AROM (degrees)  LUE AROM : WNL  RUE PROM (degrees)  RUE PROM: WNL  RUE AROM (degrees)  RUE AROM : WNL                   Tx initiated:  Balance activities, therapeutic activity recommendations (10 mins)   Plan   Plan  Plan Comment: No further visits planned    G-Code     OutComes Score                                                  AM-PAC Score             Goals  Short term goals  Short term goal 1: Independent with therapeutic activity recommendations(met)       Therapy Time   Individual Concurrent Group Co-treatment   Time In           Time Out           Minutes                   Hemant Knig, OT

## 2021-03-04 NOTE — PROGRESS NOTES
Naye Redd arrived to room # [phone]. Presented with: Numbness  Mental Status: Patient is oriented, alert and coherent. Vitals:    03/04/21 0157   BP: 118/75   Pulse: 80   Resp: 19   Temp: 97.2 °F (36.2 °C)   SpO2: 95%     Patient safety contract and falls prevention contract reviewed with patient Yes. Oriented Patient to room. Call light within reach. Yes.   Needs, issues or concerns expressed at this time: no.      Electronically signed by Lisseth London RN on 3/4/2021 at 2:03 AM

## 2021-03-04 NOTE — PROGRESS NOTES
Physical Therapy    Facility/Department: [facility] SURG SERVICES  Initial Assessment    NAME: Kiesha Adan  : 1965  MRN: [id number]    Date of Service: 3/4/2021    Discharge Recommendations:  Home with assist PRN   PT Equipment Recommendations  Equipment Needed: No    Assessment   Assessment: NO NEED FOR SKILLED PT IDENTIFIED  Prognosis: Good  Decision Making: Low Complexity  PT Education: General Safety  REQUIRES PT FOLLOW UP: No  Activity Tolerance  Activity Tolerance: Patient Tolerated treatment well       Patient Diagnosis(es): The primary encounter diagnosis was Generalized weakness. Diagnoses of Palpitations, Expressive aphasia, and Dizziness were also pertinent to this visit. has a past medical history of Bipolar disorder ([facility] [address] Colon polyps, Diverticular disease, GERD (gastroesophageal reflux disease), Hiatal hernia, Hyperlipidemia, Hypertension, Hypothyroidism, Neuropathy, and Unspecified sleep apnea. has a past surgical history that includes Colonoscopy (2009); Upper gastrointestinal endoscopy (2005); Colonoscopy (2012); Colonoscopy (2005); Colonoscopy (N/A, 2016); UPPP; subtotal colectomy (); EGD; and pr egd transoral biopsy single/multiple (N/A, 2018). Restrictions  Restrictions/Precautions  Restrictions/Precautions: Fall Risk  Vision/Hearing        Subjective  General  Patient assessed for rehabilitation services?: Yes  Diagnosis: STROKE LIKE SYMPTOMS  Follows Commands: Within Functional Limits  Subjective  Subjective: pt STATES HE HAS HAD EPISODES OR DIZZINESS WHERE THE ROOM SEEMED TO SPIN AND WAS REALTED TO POSITIONAL CHANGES BUT NOW IT IS MORE LIKE AN \"UNSTEADINESS\" WHERE HE \"WALKS LIKE A DRUNK MAN\" BUT THAT TODAY HE HAS NOT EXPERIENCED THIS AND WAS ABLE TO SHOWER IND AND BE UP IN ROM AD SARITA.   Pain Screening  Patient Currently in Pain: Denies  Vital Signs  Patient Currently in Pain: Denies       Orientation  Orientation  Overall Orientation Status: Within

## 2021-03-04 NOTE — PROGRESS NOTES
4 Eyes Skin Assessment    Sourav Khoury is being assessed upon: Admission    I agree that I, Moe Ling, along with Harley Augustin, RN have performed a thorough Head to Toe Skin Assessment on the patient. ALL assessment sites listed below have been assessed. Areas assessed by both nurses:     [x]   Head, Face, and Ears   [x]   Shoulders, Back, and Chest  [x]   Arms, Elbows, and Hands   [x]   Coccyx, Sacrum, and Ischium  [x]   Legs, Feet, and Heels    Does the Patient have Skin Breakdown?  No    Madi Prevention initiated: NA  Wound Care Orders initiated: NA    WOC nurse consulted for Pressure Injury (Stage 3,4, Unstageable, DTI, NWPT, and Complex wounds) and New or Established Ostomies: NA        Primary Nurse eSignature: Moe Ling RN on 3/4/2021 at 2:02 AM      Co-Signer eSignature: Electronically signed by Harley Augustin RN on 3/4/21 at 2:41 AM CST

## 2021-03-04 NOTE — PROGRESS NOTES
Progress Note  Date:3/4/2021       Room:0519/519-01  Patient Noe Rivas     YOB: 1965     Age:56 y.o. Subjective    Subjective   Patient seen and examined sitting up in bedside chair no acute distress however states that his symptoms of numbness, tingling weakness still present. Studies unremarkable thus far. Patient currently denying any chest pain, shortness of breath, nausea vomiting, fevers or chills. Review of Systems   ROS: 14 point review of systems is negative except as specifically addressed above. Objective         Vitals Last 24 Hours:  TEMPERATURE:  Temp  Av °F (36.7 °C)  Min: 97.2 °F (36.2 °C)  Max: 98.5 °F (36.9 °C)  RESPIRATIONS RANGE: Resp  Av.4  Min: 16  Max: 24  PULSE OXIMETRY RANGE: SpO2  Av.4 %  Min: 92 %  Max: 96 %  PULSE RANGE: Pulse  Av.3  Min: 71  Max: 88  BLOOD PRESSURE RANGE: Systolic (22SMY), TJX:614 , Min:118 , ELKE:976   ; Diastolic (35MXX), PQN:99, Min:62, Max:90    I/O (24Hr): Intake/Output Summary (Last 24 hours) at 3/4/2021 1116  Last data filed at 3/4/2021 1025  Gross per 24 hour   Intake 210 ml   Output    Net 210 ml     Physical Exam  Vitals signs and nursing note reviewed. Constitutional:       General: He is not in acute distress. Sitting comfortably in bedside chair     Appearance: He is obese. He is not diaphoretic. HENT:      Head: Normocephalic and atraumatic. Nose: Nose normal.   Eyes:      Conjunctiva/sclera: Conjunctivae normal.   Cardiovascular:      Rate and Rhythm: Normal rate and regular rhythm. Pulses: Normal pulses. Pulmonary:      Breath sounds: No wheezing or rales. Abdominal:      General: Bowel sounds are normal.      Tenderness: There is no guarding or rebound. Musculoskeletal:      Right lower leg: No edema. Left lower leg: No edema. Patient still appreciates weakness in bilateral lower extremities  Skin:     General: Skin is warm.    Neurological: Mental Status: He is alert and oriented to person, place, and time. Mental status is at baseline. Comments: Sensation intact bilaterally   Psychiatric:         Mood and Affect: Mood normal.   Labs/Imaging/Diagnostics    Labs:  CBC:  Recent Labs     03/03/21  1322 03/04/21  0352   WBC 9.6 10.3   RBC 4.64* 4.36*   HGB 14.3 13.4*   HCT 42.4 40.3*   MCV 91.4 92.4   RDW 13.0 13.0    252     CHEMISTRIES:  Recent Labs     03/03/21  1322 03/03/21  1412 03/04/21  0352     --  139   K 3.7  --  3.3*     --  104   CO2 27  --  25   BUN 21*  --  21*   CREATININE 0.7  --  0.7   GLUCOSE 97 106 96   MG 2.3  --  2.4     PT/INR:No results for input(s): PROTIME, INR in the last 72 hours. APTT:No results for input(s): APTT in the last 72 hours. LIVER PROFILE:  Recent Labs     03/03/21  1322 03/04/21  0352   AST 20 17   ALT 22 23   BILITOT 0.9 0.6   ALKPHOS 59 56       Imaging Last 24 Hours:  Ct Head Wo Contrast    Result Date: 3/3/2021  Exam: CT HEAD WO CONTRAST - 3/3/2021 12:36 PM Indication: Expressive aphasia Comparison: 3/24/2017 DLP: 675 mGy cm. In order to have a CT radiation dose as low as reasonably achievable, Automated Exposure Control was utilized for adjustment of the mA and/or KV according to patient size. Findings: No evidence of intracranial hemorrhage. No loss of gray-white differentiation to suggest acute infarct. No midline shift or mass effect. Lateral ventricles are nondilated. Basilar cisterns are patent. No acute orbital finding. Mastoid air cells are clear. Trace mucosal thickening in the maxillary sinuses. Paranasal sinuses are otherwise clear. No acute osseous finding. Impression: No acute intracranial findings.  Signed by Dr Sandy Mcenal on 3/3/2021 1:52 PM    Xr Chest Portable    Result Date: 3/3/2021 Vascular Carotid Procedure  Demographics   Patient Name    Su Mueller Age                  61   Patient Number  [id number]        Gender               Male   Visit Number    [id number]     Interpreting         Harriette Gitelman MD                                Physician   Date of Birth   1965    Referring Physician  Neal Edwards   Accession       [id number]    [address], [place], [address]  Number  Procedure Type of Study:   Cerebral:Carotid, VL CAROTID BILATERAL. Indications for Study:Stroke like symptoms. Risk Factors   - The patient's risk factor(s) include: dyslipidemia and arterial     hypertension. Technical Quality:Limited visualization due to thick neck. Impression   There is smooth plaque visualized in the bilateral internal carotid  artery(ies). There is no stenosis in the right internal carotid artery. There is no stenosis of the left internal carotid artery. There is normal antegrade flow in the bilateral vertebral artery(ies). Signature   ----------------------------------------------------------------  Electronically signed by Harriette Gitelman MD(Interpreting  physician) on 03/04/2021 10:57 AM  ----------------------------------------------------------------  Blood Pressure:Right arm 144/74 mmHg. Velocities are measured in cm/s ; Diameters are measured in mm Carotid Right Measurements +------------+-------+-------+--------+-------+------------+---------------+ ! Location    ! PSV    ! EDV    ! Angle   ! RI     !%Stenosis   ! Tortuosity     ! +------------+-------+-------+--------+-------+------------+---------------+ ! Prox CCA    !170    !16.7   !60      !0.9    ! !               ! +------------+-------+-------+--------+-------+------------+---------------+ ! Mid CCA     !120    !19.6   !60      !0.84   !            !               ! +------------+-------+-------+--------+-------+------------+---------------+ ! Prox ICA    !105    !17.3   ! 60      !0.84   !            ! ! +------------+-------+-------+--------+-------+------------+---------------+ ! Mid ICA     !68     !20.8   !0       !0.69   !            !               ! +------------+-------+-------+--------+-------+------------+---------------+ ! Dist ICA    !126    !39.3   !60      !0.69   !            !               ! +------------+-------+-------+--------+-------+------------+---------------+ ! Prox ECA    !141    !4.71   ! 60      !0.97   !            !               ! +------------+-------+-------+--------+-------+------------+---------------+ ! Vertebral   !73.9   !14.7   !60      !0.8    ! !               ! +------------+-------+-------+--------+-------+------------+---------------+   - There is antegrade vertebral flow noted on the right side. - Additional Measurements:ICAPSV/CCAPSV 0.74. ICAEDV/CCAEDV 2.35. Carotid Left Measurements +------------+-------+-------+--------+-------+------------+---------------+ ! Location    ! PSV    ! EDV    ! Angle   ! RI     !%Stenosis   ! Tortuosity     ! +------------+-------+-------+--------+-------+------------+---------------+ ! Prox CCA    ! 157    !21.6   !60      !0.86   !            !               ! +------------+-------+-------+--------+-------+------------+---------------+ ! Mid CCA     !163    !19.6   !60      !0.88   !            !               ! +------------+-------+-------+--------+-------+------------+---------------+ ! Prox ICA    ! 144    ! 25.5   !60      !0.82   !            !               ! +------------+-------+-------+--------+-------+------------+---------------+ ! Mid ICA     ! 122    !26.5   !60      !0.78   !            !               ! +------------+-------+-------+--------+-------+------------+---------------+ ! Dist ICA    !108    !27     !0       !0.75   !            !               ! +------------+-------+-------+--------+-------+------------+---------------+ ! Prox ECA    !116    !10.8   !60      !0.91   !            !               ! +------------+-------+-------+--------+-------+------------+---------------+ ! Vertebral   !73.9   !17     !60      !0.77   !            !               ! +------------+-------+-------+--------+-------+------------+---------------+   - There is antegrade vertebral flow noted on the left side. - Additional Measurements:ICAPSV/CCAPSV 0.92. ICAEDV/CCAEDV 1.25.     Assessment//Plan           Hospital Problems           Last Modified POA    * (Principal) Stroke-like symptoms 3/3/2021 Yes    Hypothyroidism 3/3/2021 Yes    Chronic GERD 3/3/2021 Yes    Morbid obesity ([address] 3/3/2021 Yes    Obstructive sleep apnea 3/3/2021 Yes    Mixed hyperlipidemia 3/3/2021 Yes    Essential hypertension 3/3/2021 Yes        Stroke Like Symptoms   -Head CT, MRI/MRA grossly unremarkable from chronic ischemic changes noted in white matter , patient denies history of migraine  -Urinalysis negative for nitrite/leukocyte esterase  -Neurology has been consulted appreciate further recommendations  -Bilateral carotid ultrasounds unremarkable  -Transthoracic echocardiogram completed  -Lovenox anticoagulation, statin therapy  -PT/OT/SLP appreciate recommendation  -Hold meclizine/Namenda/gabapentin  -Allow for permissive hypertension, labetalol as needed  -Inflammatory markers unremarkable, SARA Borrelia burgdorferi antibodies in process     Hx of [place] Palsy left sided -noted     Hypertension -condition, will hold home antihypertensive regimen currently allow for permissive hypertension, as needed modalities have been placed, routine vitals        Hyperlipidemiachronic condition, will obtain lipid panel in a.m., continue with  statin therapy     Hypothyroidism -chronic condition, obtain updated thyroid studies, continue with Synthroid dosing        Anxiety -chronic condition continue with home BuSpar        GERD -chronic condition, continue with PPI    History of obstructive sleep apneachronic condition, continue with albuterol as needed, CPAP nightly           EMR Dragon/Transcription disclaimer:   Much of this encounter note is an electronic transcription/translation of spoken language to printed text.  The electronic translation of spoken language may permit erroneous, or at times, nonsensical words or phrases to be inadvertently transcribed; although attempts have made to review the note for such errors, some may still exist.      Electronically signed by   Autumn Victoria   Internal Medicine Hospitalist  On 3/4/2021  At 11:17 AM

## 2021-03-05 ENCOUNTER — APPOINTMENT (OUTPATIENT)
Dept: MRI IMAGING | Age: 56
DRG: 948 | End: 2021-03-05
Payer: MEDICAID

## 2021-03-05 VITALS
BODY MASS INDEX: 43.03 KG/M2 | SYSTOLIC BLOOD PRESSURE: 139 MMHG | HEIGHT: 69 IN | TEMPERATURE: 97.6 F | HEART RATE: 85 BPM | WEIGHT: 290.5 LBS | DIASTOLIC BLOOD PRESSURE: 91 MMHG | OXYGEN SATURATION: 95 % | RESPIRATION RATE: 17 BRPM

## 2021-03-05 LAB
ANION GAP SERPL CALCULATED.3IONS-SCNC: 12 MMOL/L (ref 7–19)
BUN BLDV-MCNC: 14 MG/DL (ref 6–20)
CALCIUM SERPL-MCNC: 8.5 MG/DL (ref 8.6–10)
CHLORIDE BLD-SCNC: 102 MMOL/L (ref 98–111)
CO2: 25 MMOL/L (ref 22–29)
CREAT SERPL-MCNC: 0.6 MG/DL (ref 0.5–1.2)
GFR AFRICAN AMERICAN: >59
GFR NON-AFRICAN AMERICAN: >60
GLUCOSE BLD-MCNC: 99 MG/DL (ref 74–109)
MAGNESIUM: 2.3 MG/DL (ref 1.6–2.6)
POTASSIUM REFLEX MAGNESIUM: 3.5 MMOL/L (ref 3.5–5)
SODIUM BLD-SCNC: 139 MMOL/L (ref 136–145)

## 2021-03-05 PROCEDURE — A9577 INJ MULTIHANCE: HCPCS | Performed by: PSYCHIATRY & NEUROLOGY

## 2021-03-05 PROCEDURE — 83735 ASSAY OF MAGNESIUM: CPT

## 2021-03-05 PROCEDURE — 72156 MRI NECK SPINE W/O & W/DYE: CPT

## 2021-03-05 PROCEDURE — 6360000004 HC RX CONTRAST MEDICATION: Performed by: PSYCHIATRY & NEUROLOGY

## 2021-03-05 PROCEDURE — 6360000002 HC RX W HCPCS: Performed by: FAMILY MEDICINE

## 2021-03-05 PROCEDURE — 72157 MRI CHEST SPINE W/O & W/DYE: CPT

## 2021-03-05 PROCEDURE — 6370000000 HC RX 637 (ALT 250 FOR IP): Performed by: FAMILY MEDICINE

## 2021-03-05 PROCEDURE — 6370000000 HC RX 637 (ALT 250 FOR IP): Performed by: PSYCHIATRY & NEUROLOGY

## 2021-03-05 PROCEDURE — 2580000003 HC RX 258: Performed by: FAMILY MEDICINE

## 2021-03-05 PROCEDURE — 80048 BASIC METABOLIC PNL TOTAL CA: CPT

## 2021-03-05 PROCEDURE — 36415 COLL VENOUS BLD VENIPUNCTURE: CPT

## 2021-03-05 PROCEDURE — 99233 SBSQ HOSP IP/OBS HIGH 50: CPT | Performed by: PSYCHIATRY & NEUROLOGY

## 2021-03-05 RX ORDER — LORAZEPAM 2 MG/ML
1 INJECTION INTRAMUSCULAR ONCE
Status: COMPLETED | OUTPATIENT
Start: 2021-03-05 | End: 2021-03-05

## 2021-03-05 RX ORDER — DIAZEPAM 5 MG/1
5 TABLET ORAL
Status: COMPLETED | OUTPATIENT
Start: 2021-03-05 | End: 2021-03-05

## 2021-03-05 RX ADMIN — PANTOPRAZOLE SODIUM 40 MG: 40 TABLET, DELAYED RELEASE ORAL at 05:50

## 2021-03-05 RX ADMIN — LORAZEPAM 1 MG: 2 INJECTION, SOLUTION INTRAMUSCULAR; INTRAVENOUS at 14:25

## 2021-03-05 RX ADMIN — ASPIRIN 81 MG: 81 TABLET, COATED ORAL at 08:33

## 2021-03-05 RX ADMIN — GADOBENATE DIMEGLUMINE 20 ML: 529 INJECTION, SOLUTION INTRAVENOUS at 15:51

## 2021-03-05 RX ADMIN — DIAZEPAM 5 MG: 5 TABLET ORAL at 12:25

## 2021-03-05 RX ADMIN — SODIUM CHLORIDE, PRESERVATIVE FREE 10 ML: 5 INJECTION INTRAVENOUS at 08:33

## 2021-03-05 RX ADMIN — LEVOTHYROXINE SODIUM 50 MCG: 0.05 TABLET ORAL at 05:49

## 2021-03-05 NOTE — PROGRESS NOTES
[address] [facility] Neurology Progress Note      Patient:   Lesli Aguillon  MR#:    [id number]   Room:    [address] [place] [address]   YOB: 1965  Date of Progress Note: 3/5/2021  Time of Note                           9:33 AM  Consulting Physician:  Irina Vasquez DO  Attending Physician:  Lupe Sunshine MD      INTERVAL HISTORY:  No acute events, no new focal complaints this am, still noting some numbness, tingling. Speech improved. REVIEW OF SYSTEMS:  Constitutional: No fevers No chills  Neck:No stiffness  Respiratory: No shortness of breath  Cardiovascular: No chest pain No palpitations  Gastrointestinal: No abdominal pain    Genitourinary: No Dysuria  Neurological: No headache, no confusion    PHYSICAL EXAM:    Constitutional    /86   Pulse 79   Temp 96.9 °F (36.1 °C) (Temporal)   Resp 17   Ht 5' 9\" (1.753 m)   Wt 290 lb 8 oz (131.8 kg)   SpO2 94%   BMI 42.90 kg/m²   General appearance: No acute distress   EYES -   Conjunctiva normal  Pupillary exam as below, see CN exam in the neurologic exam  ENT-    No scars, masses, or lesions over external nose or ears  Hearing normal bilaterally to finger rub  Neck-   No neck masses noted  Thyroid normal   No jugular vein distension  Cardiovascular -   No clubbing, cyanosis, or edema   Pulmonary-   Good expansion, normal effort without use of accessory muscles  Musculoskeletal    No significant wasting of muscles noted  Gait as below, see gait exam in the neurologic exam  Muscle strength, tone, stability as below see the motor exam in the neurologic exam.   No bony deformities  Skin    Warm, dry, and intact to inspection and palpation.     No rash, erythema, or pallor  Psychiatric    Mood, affect, and behavior appear normal    Memory as below see mental status examination in the neurologic exam      NEUROLOGICAL EXAM    Mental status   [x] Awake, alert, oriented   [x] Affect attention and concentration appear appropriate  [x] Recent and remote memory appears unremarkable  [x] Speech normal without dysarthria or aphasia, comprehension and repetition intact. COMMENTS:   Cranial Nerves [x] No VF deficit to confrontation  [x] PERRLA, EOMI, no nystagmus, conjugate eye movements, no ptosis  [x] Face symmetric  [x] Facial sensation intact  [x] Tongue midline no atrophy or fasciculations present  [x] Palate midline, hearing to finger rub normal  [x] Shoulder shrug and SCM testing normal  COMMENTS:   Motor   [x] 5/5 strength x 4 extremities  [x] Normal bulk and tone  [x] No tremor present  [x] No rigidity or bradykinesia noted  COMMENTS:   Sensory  [x] Sensation intact to light touch, pin prick, vibration, and proprioception BLE  [] Sensation intact to light touch, pin prick, vibration, and proprioception BUE  COMMENTS:   Coordination [x] FTN normal bilaterally   [] HTS normal bilaterally  [] CORY normal.   COMMENTS:   Reflexes  [x] Symmetric and non-pathological  [x] Toes downgoing bilaterally  [x] No clonus present  COMMENTS:   Gait                  [] Normal steady gait    [] Ataxic    [] Spastic     [] Magnetic     [] Shuffling  [x] Not assessed  COMMENTS:       LABS/IMAGING:    Ct Head Wo Contrast    Result Date: 3/3/2021  Exam: CT HEAD WO CONTRAST - 3/3/2021 12:36 PM Indication: Expressive aphasia Comparison: 3/24/2017 DLP: 675 mGy cm. In order to have a CT radiation dose as low as reasonably achievable, Automated Exposure Control was utilized for adjustment of the mA and/or KV according to patient size. Findings: No evidence of intracranial hemorrhage. No loss of gray-white differentiation to suggest acute infarct. No midline shift or mass effect. Lateral ventricles are nondilated. Basilar cisterns are patent. No acute orbital finding. Mastoid air cells are clear. Trace mucosal thickening in the maxillary sinuses. Paranasal sinuses are otherwise clear. No acute osseous finding. Impression: No acute intracranial findings.  Signed by Dr Radhiak Newsome on 3/3/2021 1:52 PM    Mra Head Wo Contrast    Result Date: 3/4/2021  Exam: MRA HEAD WO CONTRAST - 3/4/2021 11:17 AM Indication: Dizziness, numbness Comparison: None available. Findings: RIGHT intracranial internal carotid artery, RIGHT MCA, and RIGHT CRISTINA are widely patent. LEFT intracranial internal carotid artery, LEFT MCA, and LEFT CRISTINA are widely patent. Distal vertebral arteries are unremarkable. Basilar artery and tip appear unremarkable. Both PCAs are widely patent. Impression: No major vascular occlusion, flow-limiting stenosis, or aneurysm. Signed by Dr Mariela Encarnacion on 3/4/2021 2:32 PM    Xr Chest Portable    Result Date: 3/3/2021  Exam: XR CHEST PORTABLE - 3/3/2021 12:45 PM Indication: Cough Comparison: 12/2/2019 Findings: Cardiac silhouette is normal size. No pleural effusion, pneumothorax, or consolidation. No acute osseous finding. Impression: No acute findings. Signed by Dr Mariela Encarnacion on 3/3/2021 1:57 PM    Vascular Carotid Duplex Bilateral    Result Date: 3/4/2021  Vascular Carotid Procedure  Demographics   Patient Name    Khadra Mota Age                  61   Patient Number  [id number]        Gender               Male   Visit Number    [id number]     Interpreting         Zachary Lombardo MD                                Physician   Date of Birth   1965    Referring Physician  Kirk Hernandez   Accession       [id number]    [address], [place], [address]  Number  Procedure Type of Study:   Cerebral:Carotid, VL CAROTID BILATERAL. Indications for Study:Stroke like symptoms. Risk Factors   - The patient's risk factor(s) include: dyslipidemia and arterial     hypertension. Technical Quality:Limited visualization due to thick neck. Impression   There is smooth plaque visualized in the bilateral internal carotid  artery(ies). There is no stenosis in the right internal carotid artery. There is no stenosis of the left internal carotid artery.   There is normal antegrade flow in the bilateral vertebral artery(ies). Signature   ----------------------------------------------------------------  Electronically signed by Montserrat Moreno MD(Interpreting  physician) on 03/04/2021 10:57 AM  ----------------------------------------------------------------  Blood Pressure:Right arm 144/74 mmHg. Velocities are measured in cm/s ; Diameters are measured in mm Carotid Right Measurements +------------+-------+-------+--------+-------+------------+---------------+ ! Location    ! PSV    ! EDV    ! Angle   ! RI     !%Stenosis   ! Tortuosity     ! +------------+-------+-------+--------+-------+------------+---------------+ ! Prox CCA    !170    !16.7   !60      !0.9    ! !               ! +------------+-------+-------+--------+-------+------------+---------------+ ! Mid CCA     !120    !19.6   !60      !0.84   !            !               ! +------------+-------+-------+--------+-------+------------+---------------+ ! Prox ICA    !105    !17.3   ! 60      !0.84   !            !               ! +------------+-------+-------+--------+-------+------------+---------------+ ! Mid ICA     !68     !20.8   !0       !0.69   !            !               ! +------------+-------+-------+--------+-------+------------+---------------+ ! Dist ICA    !126    !39.3   !60      !0.69   !            !               ! +------------+-------+-------+--------+-------+------------+---------------+ ! Prox ECA    !141    !4.71   ! 60      !0.97   !            !               ! +------------+-------+-------+--------+-------+------------+---------------+ ! Vertebral   !73.9   !14.7   !60      !0.8    ! !               ! +------------+-------+-------+--------+-------+------------+---------------+   - There is antegrade vertebral flow noted on the right side. - Additional Measurements:ICAPSV/CCAPSV 0.74. ICAEDV/CCAEDV 2.35. Carotid Left Measurements +------------+-------+-------+--------+-------+------------+---------------+ ! Location    ! PSV ! EDV    !Angle   ! RI     !%Stenosis   ! Tortuosity     ! +------------+-------+-------+--------+-------+------------+---------------+ ! Prox CCA    ! 157    !21.6   !60      !0.86   !            !               ! +------------+-------+-------+--------+-------+------------+---------------+ ! Mid CCA     !163    !19.6   !60      !0.88   !            !               ! +------------+-------+-------+--------+-------+------------+---------------+ ! Prox ICA    ! 144    ! 25.5   !60      !0.82   !            !               ! +------------+-------+-------+--------+-------+------------+---------------+ ! Mid ICA     ! 122    !26.5   !60      !0.78   !            !               ! +------------+-------+-------+--------+-------+------------+---------------+ ! Dist ICA    !108    !27     !0       !0.75   !            !               ! +------------+-------+-------+--------+-------+------------+---------------+ ! Prox ECA    !116    !10.8   !60      !0.91   !            !               ! +------------+-------+-------+--------+-------+------------+---------------+ ! Vertebral   !73.9   !17     !60      !0.77   !            !               ! +------------+-------+-------+--------+-------+------------+---------------+   - There is antegrade vertebral flow noted on the left side. - Additional Measurements:ICAPSV/CCAPSV 0.92. ICAEDV/CCAEDV 1.25. Mri Brain W Wo Contrast    Result Date: 3/4/2021  Exam: MRI BRAIN W WO CONTRAST - 3/4/2021 11:18 AM Indication: Strokelike symptoms, dizziness Comparison: CT head 3/3/2021 Findings: No abnormal diffusion restriction. No increased susceptibility to suggest hemorrhage. Major physiologic flow voids are maintained. A few subcortical T2 FLAIR hyperintense white matter lesions are likely early microvascular ischemic white matter change or sequela of migraine headaches. No other abnormality of brain parenchyma signal intensity. No midline shift or mass effect. The lateral ventricles are nondilated.  Sella

## 2021-03-05 NOTE — CARE COORDINATION
Suesuad Vargas    Date / Time of Evaluation: 3/5/2021 9:56 AM  Assessment Completed by: Kasandra Sinclair    Patient Admission Status: Inpatient [101]      Current PCP:  Melissa Ware MD    Initial Assessment Completed at bedside with:  patient, family    Emergency Contacts:  Extended Emergency Contact Information  Primary Emergency Contact: Natasha Maravilla  Address: [address] [place] [address] Phone: [phone]  Mobile Phone: [phone]  Relation: Spouse    Advance Directives: Code Status:  Full Code    Financial:  Payor: 2000 [address] Stephan / Plan: 2000 [facility] / Product Type: *No Product type* /     Pre-Cert required for SNF:  No    Pharmacy:   [address] [address]  Phone: [phone] Fax: [phone]      Potential assistance purchasing medications? No    ADLS:  Support System:       Current Home Environment:  Home w spouse  Steps:  Yes    Plans to RETURN to current housing: Yes  Barriers to RETURNING to current housing:  No    Currently ACTIVE with Home Health CARE:  No  [address] [place] [address]:  N/A    DME Provider:  N/A    Has a pulse oximetry unit at home: No    Had [facility] prior to admission:  No  Oxygen Company:  N/A  Informed of need to bring portable home O2 tank to hospital on day of DISCHARGE:  No  Name of person committed to bringing portable tank at discharge: Active with HD/PD prior to admission: No  Nephrologist:  N/A  HD Center:  N/A    Transition Plan:       Transportation PLAN for Discharge:  Home with spouse    Factors facilitating achievement of predicted outcomes: education from medical team     Barriers to discharge:  n/a      Additional CM/SW Notes: SW met with Pt/spouse in the room. Pt stated besides his speech and a little bit unsteady when he walks, he is close to being back to baseline.  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 Pt is ready to get back to work and home when medically cleared. Pt denied any home health needs or dme at this time. SW asked to be contacted if plans/needs change. Jenny Christiansen and/or his family were provided with choice of provider.         [facility] Management Department  Ph:    Fax: [phone]

## 2021-03-05 NOTE — PROCEDURES
ADULT INPATIENT ELECTROENCEPHALOGRAM REPORT    Patient:   Jose Luis Sandoval  MR#:    [id number]  Room #:    INPATIENT  YOB: 1965  Date of Evaluation:  3/4/2021  Primary Physician:     Jair Araujo MD   Referring Physician:   Aarti Alva DO      CLINICAL INFORMATION:     This patient is a 61 y.o. male with a history of speech difficulty. MEDICATIONS:     See MAR. RECORDING CONDITIONS:     This EEG was performed utilizing standard International 10-20 System of electrode placement, with additional channels monitored for eye movement. One channel electrocardiogram was monitored. Data was obtained, stored, and interpreted according to ACNS guidelines (J Clin Neurophysiol 2006;23(2):) utilizing referential montage recording, with reformatting to longitudinal, transverse bipolar, and referential montages as necessary for interpretation, along with the digital/automated EEG analysis. Patient tolerated entire procedure well. Photic stimulation and hyperventilation were utilized as activation procedures unless otherwise specified below. E.E.G. DESCRIPTION:     The resting predominant posterior background frequency is a 9-10 Hz 30-40 uV rhythm. No overt focal, lateralizing, or paroxysmal abnormalities were noted through the recording. Drowsiness and sleep were not demonstrated. Hyperventilation was not performed. Photic stimulation was performed and had little change on the recording. No ECG abnormalities were noted. Muscle, motion, and eye movement artifacts were noted. EEG INTERPRETATION:    Normal EEG for age in the awake state. CLINICAL CORRELATION:     The absence of epileptiform abnormalities does not preclude a clinical diagnosis of seizures.        Aarti Alva DO  Board Certified Neurologist    Date reported: 3/5/2021  Date signed: 3/5/2021

## 2021-03-05 NOTE — DISCHARGE SUMMARY
Discharge Summary  Date:3/5/2021        Patient Nasrin Kramer     YOB: 1965     Age:56 y.o. Admit Date:3/3/2021   Admission Condition:fair   Discharged Condition:fair  Discharge Date: 03/05/21     Discharge Diagnoses   Principal Problem:    Stroke-like symptoms  Active Problems:    Hypothyroidism    Chronic GERD    Morbid obesity ([address]    Obstructive sleep apnea    Mixed hyperlipidemia    Essential hypertension  Resolved Problems:    * No resolved hospital problems. [facility] Stay   Narrative of Hospital Course: Patient admitted [age]-year-old  male past medical history of hypertension, hyperlipidemia, hypothyroidism, obstructive sleep apnea, bipolar disorder presenting to the emergency room with concern due to feelings of numbness presenting in the oral region, bilateral elbow, bilateral knees as well as lower extremities. Symptoms have been present for approximately 3 to 4 months, patient was scheduled to see neurology outpatient however due to pandemic appointments have been canceled. Patient has a remote history of left-sided Bell's palsy, patient patient has slurred speech, denies any trauma. Work-up revealed unremarkable electrolyte abnormalities, EKG stable, CT head no intracranial findings. Patient was admitted for neurologic work-up, EEG was completed which was normal, MRI brain revealed no acute intracranial findings, few small subcortical white matter lesions, MRA head no mass no major vascular occlusions or aneurysm seen. Patient irritative as the numbness has been an ongoing issue, in his words states he would like a diagnosis at current time there is none. Patient underwent MRI thoracic spine which was unremarkable there was mild to moderate degenerative changes in the lower spine.  there were no abnormal lesions seen./c  MRI cervical spine revealed multilevel disc osteophyte complexes large predominantly left paracentral disc herniation at C6-C7 occupying the lateral left recess/asymmetrical spinal stenosis/left neuroforaminal stenosis. Asymmetrical disc bulging more towards the left at C7-T1 compression of the thecal sac and left foot     Due to findings on CT cervical spine neurology is recommending follow-up with neurosurgery in 2 weeks. Inflammatory marker labs are unremarkable, Borrelia burgdorferi antibodies in process, SARA screen in process with reflex. Patient is cleared by neurology, lumbar puncture is felt to be of low yield, plan will be for patient to follow-up in outpatient setting with neurology for NCS/EMG. Patient care. Educational modalities attached to the after visit summary. Consultants:   IP CONSULT TO NEUROLOGY  IP CONSULT TO DIETITIAN    Time Spent on Discharge:  45  minutes were spent in patient examination, evaluation, counseling as well as medication reconciliation, prescriptions for required medications, discharge plan and follow up.       Surgeries/Procedures Performed:       Treatments:   analgesia: acetaminophen, cardiac meds: Lipitor, labetalol as needed, anticoagulation: ASA and LMW heparin,therapies: PT and OT and electrolyte stabilization    Significant Diagnostic Studies:   Recent Labs:  CBC:   Lab Results   Component Value Date    WBC 10.3 03/04/2021    RBC 4.36 03/04/2021    HGB 13.4 03/04/2021    HCT 40.3 03/04/2021    MCV 92.4 03/04/2021    MCH 30.7 03/04/2021    MCHC 33.3 03/04/2021    RDW 13.0 03/04/2021     03/04/2021     BMP:    Lab Results   Component Value Date    GLUCOSE 99 03/05/2021     03/05/2021    K 3.5 03/05/2021     03/05/2021    CO2 25 03/05/2021    ANIONGAP 12 03/05/2021    BUN 14 03/05/2021    CREATININE 0.6 03/05/2021    CALCIUM 8.5 03/05/2021    LABGLOM >60 03/05/2021    GFRAA >59 03/05/2021     HFP:    Lab Results   Component Value Date    PROT 7.1 03/04/2021     CMP:    Lab Results   Component Value Date    GLUCOSE 99 03/05/2021     03/05/2021    K 3.5 03/05/2021  03/05/2021    CO2 25 03/05/2021    BUN 14 03/05/2021    CREATININE 0.6 03/05/2021    ANIONGAP 12 03/05/2021    ALKPHOS 56 03/04/2021    ALT 23 03/04/2021    AST 17 03/04/2021    BILITOT 0.6 03/04/2021    LABALBU 4.1 03/04/2021    LABGLOM >60 03/05/2021    GFRAA >59 03/05/2021    PROT 7.1 03/04/2021    CALCIUM 8.5 03/05/2021     PT/INR:    Lab Results   Component Value Date    PROTIME 13.4 11/24/2019    INR 1.08 11/24/2019     PTT:   Lab Results   Component Value Date    APTT 30.9 11/24/2019     FLP:    Lab Results   Component Value Date    CHOL 106 03/04/2021    TRIG 199 03/04/2021    HDL 27 03/04/2021     U/A:    Lab Results   Component Value Date    COLORU YELLOW 03/03/2021    SPECGRAV 1.024 03/03/2021    PHUR 5.5 03/03/2021    PROTEINU Negative 03/03/2021    GLUCOSEU Negative 03/03/2021    KETUA Negative 03/03/2021    BILIRUBINUR Negative 03/03/2021    UROBILINOGEN 1.0 03/03/2021    NITRU Negative 03/03/2021    LEUKOCYTESUR Negative 03/03/2021     TSH:    Lab Results   Component Value Date    TSH 1.690 03/03/2021       Radiology Last 7 Days:  Ct Head Wo Contrast    Result Date: 3/3/2021  Impression: No acute intracranial findings. Signed by Dr Joan Spaulding on 3/3/2021 1:52 PM    Mra Head Wo Contrast    Result Date: 3/4/2021  Impression: No major vascular occlusion, flow-limiting stenosis, or aneurysm. Signed by Dr Joan Spaulding on 3/4/2021 2:32 PM    Xr Chest Portable    Result Date: 3/3/2021  Impression: No acute findings. Signed by Dr Joan Spaulding on 3/3/2021 1:57 PM    Mri Brain W Wo Contrast    Result Date: 3/4/2021  1. No acute intracranial findings. 2.  A few small subcortical T2 FLAIR hyperintense white matter lesions likely represent early microvascular ischemic white matter change or sequela of migraine headaches. Signed by Dr Joan Spaulding on 3/4/2021 2:30 PM      Physical Exam  Vitals signs and nursing note reviewed. Constitutional:       General: He is not in acute distress.   Sitting comfortably in bedside chair     Appearance: He is obese. He is not diaphoretic. HENT:      Head: Normocephalic and atraumatic.      Nose: Nose normal.   Eyes:      Conjunctiva/sclera: Conjunctivae normal.   Cardiovascular:      Rate and Rhythm: Normal rate and regular rhythm.      Pulses: Normal pulses. Pulmonary:      Breath sounds: No wheezing or rales. Abdominal:      General: Bowel sounds are normal.      Tenderness: There is no guarding or rebound. Musculoskeletal:      Right lower leg: No edema.      Left lower leg: No edema. Patient still appreciates weakness in bilateral lower extremities  Skin:     General: Skin is warm. Neurological:      Mental Status: He is alert and oriented to person, place, and time.  Mental status is at baseline.      Comments: Sensation intact bilaterally   Psychiatric:         Mood and Affect: Mood normal.     Discharge Plan   Disposition: Home    Provider Follow-Up:   [facility]  [address] [place] [id number]  Schedule an appointment as soon as possible for a visit  1-2 weeks outpatient    Holli Ying MD  [address] [place] [address]   [facility] [address]    Schedule an appointment as soon as possible for a visit in 1 week  hospital follow-up         Patient Instructions   Diet: cardiac diet    Activity: activity as tolerated        Discharge Medications         Medication List      CHANGE how you take these medications    busPIRone 7.5 MG tablet  Commonly known as: BUSPAR  TAKE 1 TABLET BY MOUTH UP TO 3 TIMES DAILY AS NEEDED - AFTER 1 WEEK, MAY INCREASE TO 2 TABLETS 3 TIMES DAILY IF NEEDED  What changed: additional instructions        CONTINUE taking these medications    * albuterol (5 MG/ML) 0.5% nebulizer solution  Commonly known as: PROVENTIL  Take 0.5 mLs by nebulization every 6 hours as needed for Wheezing     * albuterol sulfate  (90 Base) MCG/ACT inhaler  Commonly known as: Ventolin HFA  Inhale 2 puffs into the lungs every 6 hours as needed for Wheezing     amLODIPine 10 MG tablet  Commonly known as: NORVASC  TAKE 1 TABLET BY MOUTH ONCE DAILY. aspirin 81 MG chewable tablet     atorvastatin 40 MG tablet  Commonly known as: LIPITOR  TAKE 1 TABLET BY MOUTH ONCE DAILY. beclomethasone 80 MCG/ACT inhaler  Commonly known as: Qvar  Inhale 1 puff into the lungs 2 times daily     fluticasone 44 MCG/ACT inhaler  Commonly known as: Flovent HFA  Inhale 2 puffs into the lungs 2 times daily     furosemide 20 MG tablet  Commonly known as: LASIX  TAKE 1 TABLET BY MOUTH ONCE DAILY. gabapentin 400 MG capsule  Commonly known as: Neurontin  Take 1 capsule by mouth 3 times daily for 30 days. levothyroxine 50 MCG tablet  Commonly known as: SYNTHROID  TAKE 1 TABLET BY MOUTH ONCE DAILY. lisinopril-hydroCHLOROthiazide 20-25 MG per tablet  Commonly known as: PRINZIDE;ZESTORETIC  TAKE 1 TABLET BY MOUTH ONCE DAILY. meclizine 25 MG tablet  Commonly known as: ANTIVERT  TAKE 1/2 TABLET BY MOUTH THREE TIMES DAILY AS NEEDED FOR DIZZINESS.     memantine 10 MG tablet  Commonly known as: NAMENDA  Take 1 tablet by mouth 2 times daily     metoprolol tartrate 25 MG tablet  Commonly known as: LOPRESSOR  TAKE 2 TABLETS BY MOUTH TWICE DAILY     omeprazole 20 MG delayed release capsule  Commonly known as: PRILOSEC  TAKE 1 CAPSULE BY MOUTH DAILY     sildenafil 100 MG tablet  Commonly known as: VIAGRA  Take 1 tablet by mouth as needed for Erectile Dysfunction Generic sildenafil 100 mg or strongest dose         * This list has 2 medication(s) that are the same as other medications prescribed for you. Read the directions carefully, and ask your doctor or other care provider to review them with you. STOP taking these medications    phentermine 15 MG capsule            EMR Dragon/Transcription disclaimer:   Much of this encounter note is an electronic transcription/translation of spoken language to printed text.  The electronic translation of spoken language may permit erroneous, or at times, nonsensical words or phrases to be inadvertently transcribed; although attempts have made to review the note for such errors, some may still exist.    Electronically signed by   Lisa Ware   Internal Medicine Hospitalist  On 3/5/2021  At 5:15 PM

## 2021-03-05 NOTE — PROGRESS NOTES
Nutrition Assessment     Type and Reason for Visit: Initial, Consult    Nutrition Assessment:  Pt requested diet information. Pt out of room at time of visit. Educational materials left on the Cardiac diet with RD contact information included.     Electronically signed by Sakina Platt MS, RD, LD on 3/5/21 at 2:21 PM CST    Contact: [phone]

## 2021-03-06 LAB
ANA IGG, ELISA: NORMAL
LYME (B. BURGDORFERI) AB IGG WB: NEGATIVE
LYME AB IGM BY WB:: NEGATIVE

## 2021-03-08 ENCOUNTER — OFFICE VISIT (OUTPATIENT)
Dept: PRIMARY CARE CLINIC | Age: 56
End: 2021-03-08
Payer: MEDICAID

## 2021-03-08 VITALS
OXYGEN SATURATION: 98 % | WEIGHT: 295 LBS | HEART RATE: 74 BPM | DIASTOLIC BLOOD PRESSURE: 86 MMHG | HEIGHT: 69 IN | RESPIRATION RATE: 18 BRPM | SYSTOLIC BLOOD PRESSURE: 148 MMHG | TEMPERATURE: 97.4 F | BODY MASS INDEX: 43.69 KG/M2

## 2021-03-08 DIAGNOSIS — Z51.81 ENCOUNTER FOR MEDICATION MONITORING: ICD-10-CM

## 2021-03-08 DIAGNOSIS — R42 DIZZINESS: ICD-10-CM

## 2021-03-08 DIAGNOSIS — Z09 HOSPITAL DISCHARGE FOLLOW-UP: Primary | ICD-10-CM

## 2021-03-08 DIAGNOSIS — R13.10 DYSPHAGIA, UNSPECIFIED TYPE: ICD-10-CM

## 2021-03-08 DIAGNOSIS — M50.30 BULGING OF CERVICAL INTERVERTEBRAL DISC: ICD-10-CM

## 2021-03-08 PROCEDURE — 99215 OFFICE O/P EST HI 40 MIN: CPT | Performed by: NURSE PRACTITIONER

## 2021-03-08 PROCEDURE — 80305 DRUG TEST PRSMV DIR OPT OBS: CPT | Performed by: NURSE PRACTITIONER

## 2021-03-08 PROCEDURE — 1111F DSCHRG MED/CURRENT MED MERGE: CPT | Performed by: NURSE PRACTITIONER

## 2021-03-08 ASSESSMENT — ENCOUNTER SYMPTOMS
SHORTNESS OF BREATH: 0
RHINORRHEA: 0
PHOTOPHOBIA: 0
TROUBLE SWALLOWING: 1
VOICE CHANGE: 0
COLOR CHANGE: 0
COUGH: 0
NAUSEA: 0
BACK PAIN: 0
VOMITING: 0

## 2021-03-08 ASSESSMENT — PATIENT HEALTH QUESTIONNAIRE - PHQ9
SUM OF ALL RESPONSES TO PHQ QUESTIONS 1-9: 0
1. LITTLE INTEREST OR PLEASURE IN DOING THINGS: 0
2. FEELING DOWN, DEPRESSED OR HOPELESS: 0

## 2021-03-08 NOTE — PROGRESS NOTES
Post-Discharge Transitional Care Management Services or Hospital Follow Up      Anabella Webb   YOB: 1965    Date of Office Visit:  3/8/2021  Date of Hospital Admission: 3/3/21  Date of Hospital Discharge: 3/5/21  Readmission Risk Score(high >=14%. Medium >=10%):Readmission Risk Score: 13      Care management risk score Rising risk (score 2-5) and Complex Care (Scores >=6): 5     Non face to face  following discharge, date last encounter closed (first attempt may have been earlier): *No documented post hospital discharge outreach found in the last 14 days *No documented post hospital discharge outreach found in the last 14 days    Call initiated 2 business days of discharge: *No response recorded in the last 14 days     Patient Active Problem List   Diagnosis    S/P partial resection of colon    Hiatal hernia    Hypothyroidism    TIA (transient ischemic attack)    Chronic GERD    B[address] disorder ([facility]    Morbid obesity ([facility]    Obstructive sleep apnea    Mixed hyperlipidemia    Essential hypertension    Testosterone deficiency    Decreased libido    Erectile dysfunction due to arterial insufficiency    Sedentary lifestyle    Esophageal pain    Esophageal dysphagia    History of duodenal ulcer    Dyspepsia    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    Moderate persistent asthma without complication    Stroke-like symptoms       No Known Allergies    Medications listed as ordered at the time of discharge from [facility] Medication Instructions NOHEMI:    Printed on:03/08/21 6229   Medication Information                      albuterol (PROVENTIL) (5 MG/ML) 0.5% nebulizer solution  Take 0.5 mLs by nebulization every 6 hours as needed for Wheezing             albuterol sulfate HFA (VENTOLIN HFA) 108 (90 Base) MCG/ACT inhaler  Inhale 2 puffs into the lungs every 6 hours as needed for Wheezing             amLODIPine (NORVASC) 10 MG tablet  TAKE 1 TABLET BY MOUTH ONCE DAILY. aspirin 81 MG chewable tablet  Take 81 mg by mouth daily             atorvastatin (LIPITOR) 40 MG tablet  TAKE 1 TABLET BY MOUTH ONCE DAILY. beclomethasone (QVAR) 80 MCG/ACT inhaler  Inhale 1 puff into the lungs 2 times daily             busPIRone (BUSPAR) 7.5 MG tablet  TAKE 1 TABLET BY MOUTH UP TO 3 TIMES DAILY AS NEEDED - AFTER 1 WEEK, MAY INCREASE TO 2 TABLETS 3 TIMES DAILY IF NEEDED             fluticasone (FLOVENT HFA) 44 MCG/ACT inhaler  Inhale 2 puffs into the lungs 2 times daily             furosemide (LASIX) 20 MG tablet  TAKE 1 TABLET BY MOUTH ONCE DAILY. gabapentin (NEURONTIN) 400 MG capsule  Take 1 capsule by mouth 3 times daily for 30 days. levothyroxine (SYNTHROID) 50 MCG tablet  TAKE 1 TABLET BY MOUTH ONCE DAILY. lisinopril-hydroCHLOROthiazide (PRINZIDE;ZESTORETIC) 20-25 MG per tablet  TAKE 1 TABLET BY MOUTH ONCE DAILY. meclizine (ANTIVERT) 25 MG tablet  TAKE 1/2 TABLET BY MOUTH THREE TIMES DAILY AS NEEDED FOR DIZZINESS.             memantine (NAMENDA) 10 MG tablet  Take 1 tablet by mouth 2 times daily             metoprolol tartrate (LOPRESSOR) 25 MG tablet  TAKE 2 TABLETS BY MOUTH TWICE DAILY             omeprazole (PRILOSEC) 20 MG delayed release capsule  TAKE 1 CAPSULE BY MOUTH DAILY             sildenafil (VIAGRA) 100 MG tablet  Take 1 tablet by mouth as needed for Erectile Dysfunction Generic sildenafil 100 mg or strongest dose                   Medications marked \"taking\" at this time  Outpatient Medications Marked as Taking for the 3/8/21 encounter (Office Visit) with TRIP Hancock   Medication Sig Dispense Refill    memantine (NAMENDA) 10 MG tablet Take 1 tablet by mouth 2 times daily 60 tablet 5    furosemide (LASIX) 20 MG tablet TAKE 1 TABLET BY MOUTH ONCE DAILY. 30 tablet 5    atorvastatin (LIPITOR) 40 MG tablet TAKE 1 TABLET BY MOUTH ONCE DAILY.  90 tablet 1    lisinopril-hydroCHLOROthiazide (PRINZIDE;ZESTORETIC) 20-25 MG per tablet TAKE 1 TABLET BY MOUTH ONCE DAILY. 90 tablet 1    levothyroxine (SYNTHROID) 50 MCG tablet TAKE 1 TABLET BY MOUTH ONCE DAILY. 90 tablet 1    amLODIPine (NORVASC) 10 MG tablet TAKE 1 TABLET BY MOUTH ONCE DAILY. 90 tablet 1    metoprolol tartrate (LOPRESSOR) 25 MG tablet TAKE 2 TABLETS BY MOUTH TWICE DAILY 120 tablet 5    busPIRone (BUSPAR) 7.5 MG tablet TAKE 1 TABLET BY MOUTH UP TO 3 TIMES DAILY AS NEEDED - AFTER 1 WEEK, MAY INCREASE TO 2 TABLETS 3 TIMES DAILY IF NEEDED (Patient taking differently: TAKE 1 TABLET BY MOUTH 3 TIMES DAILY AS NEEDED) 180 tablet 3    aspirin 81 MG chewable tablet Take 81 mg by mouth daily      omeprazole (PRILOSEC) 20 MG delayed release capsule TAKE 1 CAPSULE BY MOUTH DAILY 30 capsule 0    sildenafil (VIAGRA) 100 MG tablet Take 1 tablet by mouth as needed for Erectile Dysfunction Generic sildenafil 100 mg or strongest dose 8 tablet 5        Medications patient taking as of now reconciled against medications ordered at time of hospital discharge: Yes    Chief Complaint   Patient presents with    Follow-Up from [facility]     still having symptoms; seems like some come and go; having dizziness off an on       HPI    Inpatient course: Discharge summary reviewed- see chart. Interval history/Current status:     Patient presents today for hospital discharge follow-up. Patient was admitted to [facility] from 3/3/2021 through 3/5/2021. He was admitted for strokelike symptoms. He had initially presented to the ED with complaints of numbness to mouth elbows knees. He is underwent EKG CT of head EEG MRI and MRA. There were no abnormal findings. He did have a history of Bell's palsy. MRI of spine was even done which which revealed multilevel disc osteophyte complexes large predominantly left paracentral disc herniation at C6-C7 occupying the lateral left recess asymmetrical spinal stenosis and left Neuroform minimal stenosis.   Asymmetrical disc bulging more 98%   Weight: 295 lb (133.8 kg)   Height: 5' 9\" (1.753 m)     Body mass index is 43.56 kg/m². Wt Readings from Last 3 Encounters:   03/08/21 295 lb (133.8 kg)   03/03/21 290 lb 8 oz (131.8 kg)   01/27/21 300 lb (136.1 kg)     BP Readings from Last 3 Encounters:   03/08/21 (!) 148/86   03/05/21 (!) 139/91   01/27/21 126/78       Physical Exam  Vitals signs and nursing note reviewed. Constitutional:       Appearance: He is well-developed. HENT:      Head: Atraumatic. Right Ear: External ear normal.      Left Ear: External ear normal.      Nose: Nose normal.   Eyes:      Conjunctiva/sclera: Conjunctivae normal.      Pupils: Pupils are equal, round, and reactive to light. Neck:      Musculoskeletal: Normal range of motion and neck supple. Cardiovascular:      Rate and Rhythm: Normal rate and regular rhythm. Heart sounds: Normal heart sounds, S1 normal and S2 normal.   Pulmonary:      Effort: Pulmonary effort is normal.      Breath sounds: Normal breath sounds. Abdominal:      General: Bowel sounds are normal.      Palpations: Abdomen is soft. Musculoskeletal: Normal range of motion. Skin:     General: Skin is warm and dry. Neurological:      Mental Status: He is alert and oriented to person, place, and time. Psychiatric:         Behavior: Behavior normal.             Assessment/Plan:  1. Hospital discharge follow-up    - HI DISCHARGE MEDS RECONCILED W/ CURRENT OUTPATIENT MED LIST    2. Dizziness  - Meclizine as needed; possible vertigo/ENT evaluation to be considered. He prefers to hold off on this until he sees neuro. 3. Encounter for medication monitoring    - POCT Rapid Drug Screen    4. Dysphagia, unspecified type    - FL ESOPHAGRAM; Future    5.  Bulging of cervical intervertebral disc    - Amb External Referral To Neurosurgery        Medical Decision Making: high complexity

## 2021-03-10 ENCOUNTER — TELEPHONE (OUTPATIENT)
Dept: PRIMARY CARE CLINIC | Age: 56
End: 2021-03-10

## 2021-03-10 NOTE — TELEPHONE ENCOUNTER
I called pt and lmvm of his appt with Jacques Marie in Dr Demarco Schaefer office on [email or web address].  Also if he needed to call us back for anything else not to hestitate

## 2021-03-11 ENCOUNTER — HOSPITAL ENCOUNTER (OUTPATIENT)
Dept: GENERAL RADIOLOGY | Age: 56
Discharge: HOME | End: 2021-03-11
Payer: MEDICAID

## 2021-03-11 DIAGNOSIS — R13.10 DYSPHAGIA, UNSPECIFIED TYPE: ICD-10-CM

## 2021-03-11 PROCEDURE — 74220 X-RAY XM ESOPHAGUS 1CNTRST: CPT

## 2021-03-15 ENCOUNTER — OFFICE VISIT (OUTPATIENT)
Dept: FAMILY MEDICINE CLINIC | Facility: CLINIC | Age: 56
End: 2021-03-15

## 2021-03-15 VITALS
TEMPERATURE: 97.3 F | RESPIRATION RATE: 20 BRPM | HEART RATE: 73 BPM | HEIGHT: 69 IN | WEIGHT: 284 LBS | SYSTOLIC BLOOD PRESSURE: 127 MMHG | DIASTOLIC BLOOD PRESSURE: 80 MMHG | BODY MASS INDEX: 42.06 KG/M2

## 2021-03-15 DIAGNOSIS — F41.9 ANXIETY: ICD-10-CM

## 2021-03-15 DIAGNOSIS — R41.3 MEMORY CHANGES: ICD-10-CM

## 2021-03-15 DIAGNOSIS — R20.2 PARESTHESIA OF BOTH LOWER EXTREMITIES: Primary | ICD-10-CM

## 2021-03-15 DIAGNOSIS — R41.89 BRAIN FOG: ICD-10-CM

## 2021-03-15 DIAGNOSIS — G47.33 OBSTRUCTIVE SLEEP APNEA SYNDROME: ICD-10-CM

## 2021-03-15 DIAGNOSIS — E66.01 MORBIDLY OBESE (HCC): ICD-10-CM

## 2021-03-15 DIAGNOSIS — G37.9 DEMYELINATING CHANGES IN BRAIN (HCC): ICD-10-CM

## 2021-03-15 PROCEDURE — 99204 OFFICE O/P NEW MOD 45 MIN: CPT | Performed by: PEDIATRICS

## 2021-03-15 RX ORDER — GABAPENTIN 300 MG/1
300 CAPSULE ORAL 3 TIMES DAILY
Qty: 90 CAPSULE | Refills: 0 | Status: SHIPPED | OUTPATIENT
Start: 2021-03-15

## 2021-03-15 RX ORDER — BUSPIRONE HYDROCHLORIDE 10 MG/1
7.5 TABLET ORAL EVERY MORNING
COMMUNITY
End: 2021-06-15

## 2021-03-15 RX ORDER — MEMANTINE HYDROCHLORIDE 10 MG/1
10 TABLET ORAL EVERY MORNING
COMMUNITY
Start: 2021-01-05 | End: 2021-03-15

## 2021-03-15 NOTE — ASSESSMENT & PLAN NOTE
His symptoms fatigue/paresthesia/brain fog would suggest at least further w/u for MS although it would very uncommon at this age.

## 2021-03-15 NOTE — PROGRESS NOTES
"Chief Complaint  Dizziness and Numbness    Subjective    History of Present Illness      Patient presents to [facility] for   Pt c/o dizziness for several months. Pt c/o numbness to both feet that radiated up to bilateral knees. Pt now has numbness to around his mouth causing his speech not to be clear.     Dizziness  This is a recurrent problem. The current episode started more than 1 month ago. The problem occurs constantly. The problem has been gradually worsening. Associated symptoms include numbness.        Review of Systems   Neurological: Positive for dizziness and numbness.       I have reviewed and agree with the HPI and ROS information as above.  Joshua Pereira MD     Objective   Vital Signs:   /80   Pulse 73   Temp 97.3 °F (36.3 °C)   Resp 20   Ht 175.3 cm (69\")   Wt 129 kg (284 lb)   BMI 41.94 kg/m²       Physical Exam  Constitutional:       Appearance: Normal appearance. He is well-developed.   HENT:      Head: Normocephalic and atraumatic.      Right Ear: External ear normal.      Left Ear: External ear normal.      Nose: Nose normal. No nasal tenderness or congestion.      Mouth/Throat:      Lips: Pink. No lesions.      Mouth: Mucous membranes are moist. No oral lesions.      Dentition: Normal dentition.      Pharynx: Oropharynx is clear. No pharyngeal swelling, oropharyngeal exudate or posterior oropharyngeal erythema.   Eyes:      General: Lids are normal. Vision grossly intact. No scleral icterus.        Right eye: No discharge.         Left eye: No discharge.      Extraocular Movements: Extraocular movements intact.      Conjunctiva/sclera: Conjunctivae normal.      Right eye: Right conjunctiva is not injected.      Left eye: Left conjunctiva is not injected.      Pupils: Pupils are equal, round, and reactive to light.   Cardiovascular:      Rate and Rhythm: Normal rate and regular rhythm.      Heart sounds: Normal heart sounds. No murmur. No gallop.  "   Pulmonary:      Effort: Pulmonary effort is normal.      Breath sounds: Normal breath sounds and air entry. No wheezing, rhonchi or rales.   Musculoskeletal:         General: No tenderness or deformity. Normal range of motion.      Cervical back: Full passive range of motion without pain, normal range of motion and neck supple.      Right lower leg: No edema.      Left lower leg: No edema.   Skin:     General: Skin is warm and dry.      Coloration: Skin is not jaundiced.      Findings: No rash.   Neurological:      Mental Status: He is alert and oriented to person, place, and time. He is confused.      Cranial Nerves: Cranial nerves are intact.      Sensory: Sensation is intact.      Motor: Motor function is intact.      Coordination: Coordination is intact.      Gait: Gait is intact.   Psychiatric:         Attention and Perception: Attention normal.         Mood and Affect: Affect normal. Mood is anxious.         Speech: Speech is rapid and pressured.         Behavior: Behavior is cooperative.         Thought Content: Thought content normal.         Cognition and Memory: Cognition is impaired. Memory is impaired.         Judgment: Judgment normal.          Result Review  Data Reviewed:                   Assessment and Plan    Patient's Body mass index is 41.94 kg/m². BMI is above normal parameters. Recommendations include: nutrition counseling.    Problem List Items Addressed This Visit        Endocrine and Metabolic    Morbidly obese (CMS/HCC)    Current Assessment & Plan       Continue low carb lifestyle for weight loss            Mental Health    Anxiety       Neuro    Memory changes    Demyelinating changes in brain (CMS/HCC)    Current Assessment & Plan     His symptoms fatigue/paresthesia/brain fog would suggest at least further w/u for MS although it would very uncommon at this age.            Symptoms and Signs    Paresthesia of both lower extremities - Primary    Current Assessment & Plan     Has  symptoms and is worried about MS//           Relevant Medications    gabapentin (NEURONTIN) 300 MG capsule              Follow Up   No follow-ups on file.  Patient was given instructions and counseling regarding his condition or for health maintenance advice. Please see specific information pulled into the AVS if appropriate.

## 2021-03-17 ENCOUNTER — TELEPHONE (OUTPATIENT)
Dept: PRIMARY CARE CLINIC | Age: 56
End: 2021-03-17

## 2021-03-17 DIAGNOSIS — R13.10 DYSPHAGIA, UNSPECIFIED TYPE: Primary | ICD-10-CM

## 2021-03-17 NOTE — TELEPHONE ENCOUNTER
----- Message from TRIP Teixeira sent at 3/17/2021  1:00 PM CDT -----  Patient has small hiatal hernia.    If still having difficulty with swallowing I would recommend speech eval.

## 2021-03-17 NOTE — TELEPHONE ENCOUNTER
The patient has been notified of this information and all questions answered. Referral placed. Patient notified.

## 2021-03-21 RX ORDER — ATORVASTATIN CALCIUM 40 MG/1
TABLET, FILM COATED ORAL
Qty: 90 TABLET | Refills: 0 | Status: SHIPPED | OUTPATIENT
Start: 2021-03-21

## 2021-03-21 RX ORDER — LEVOTHYROXINE SODIUM 0.05 MG/1
TABLET ORAL
Qty: 90 TABLET | Refills: 0 | Status: SHIPPED | OUTPATIENT
Start: 2021-03-21 | End: 2021-08-07

## 2021-03-21 RX ORDER — LISINOPRIL AND HYDROCHLOROTHIAZIDE 25; 20 MG/1; MG/1
TABLET ORAL
Qty: 90 TABLET | Refills: 0 | Status: SHIPPED | OUTPATIENT
Start: 2021-03-21 | End: 2021-06-11

## 2021-03-21 RX ORDER — AMLODIPINE BESYLATE 10 MG/1
TABLET ORAL
Qty: 90 TABLET | Refills: 0 | Status: SHIPPED | OUTPATIENT
Start: 2021-03-21 | End: 2021-08-27

## 2021-03-21 RX ORDER — MECLIZINE HYDROCHLORIDE 25 MG/1
TABLET ORAL
Qty: 15 TABLET | Refills: 0 | Status: SHIPPED | OUTPATIENT
Start: 2021-03-21 | End: 2021-06-14

## 2021-03-23 ENCOUNTER — TELEPHONE (OUTPATIENT)
Dept: PRIMARY CARE CLINIC | Age: 56
End: 2021-03-23

## 2021-03-23 NOTE — TELEPHONE ENCOUNTER
Jeronimo Blackburn from Outpatient Rehab called to confirm whether patient still needs the Modified Berum swallow. She spoke with patient and he stated he already had that done. If you could call Jeronimo Blackburn back to let her know either way @ [phone], that would be much appreciated. Thank you.

## 2021-04-05 ENCOUNTER — OFFICE VISIT (OUTPATIENT)
Dept: PRIMARY CARE CLINIC | Age: 56
End: 2021-04-05
Payer: MEDICAID

## 2021-04-05 VITALS
HEIGHT: 69 IN | SYSTOLIC BLOOD PRESSURE: 128 MMHG | WEIGHT: 272.6 LBS | BODY MASS INDEX: 40.38 KG/M2 | DIASTOLIC BLOOD PRESSURE: 82 MMHG | HEART RATE: 66 BPM | OXYGEN SATURATION: 99 % | TEMPERATURE: 97.9 F

## 2021-04-05 DIAGNOSIS — E66.01 CLASS 3 SEVERE OBESITY DUE TO EXCESS CALORIES WITH SERIOUS COMORBIDITY AND BODY MASS INDEX (BMI) OF 45.0 TO 49.9 IN ADULT (HCC): ICD-10-CM

## 2021-04-05 DIAGNOSIS — I10 ESSENTIAL HYPERTENSION: Primary | ICD-10-CM

## 2021-04-05 DIAGNOSIS — G62.89 OTHER POLYNEUROPATHY: ICD-10-CM

## 2021-04-05 PROCEDURE — 99214 OFFICE O/P EST MOD 30 MIN: CPT | Performed by: FAMILY MEDICINE

## 2021-04-05 RX ORDER — PHENTERMINE HYDROCHLORIDE 15 MG/1
CAPSULE ORAL
Qty: 30 CAPSULE | Refills: 2 | Status: ON HOLD
Start: 2021-04-05 | End: 2021-05-01 | Stop reason: HOSPADM

## 2021-04-05 RX ORDER — FLUCONAZOLE 100 MG/1
100 TABLET ORAL
Qty: 2 TABLET | Refills: 0 | Status: SHIPPED | OUTPATIENT
Start: 2021-04-05 | End: 2021-04-09

## 2021-04-05 ASSESSMENT — ENCOUNTER SYMPTOMS
CHEST TIGHTNESS: 0
DIARRHEA: 0
VOMITING: 0
WHEEZING: 0
COUGH: 0
NAUSEA: 0
SHORTNESS OF BREATH: 0
CONSTIPATION: 0
ABDOMINAL PAIN: 0

## 2021-04-05 NOTE — PROGRESS NOTES
Mc-Gastritis/gastropathy, esophagitis    SUBTOTAL COLECTOMY  2010    recurrant diverticulitis    UPPER GASTROINTESTINAL ENDOSCOPY  2005    Dr Rogelio Goldsmith: duodenal ulcer, gastritis    UPPP UVULOPALATOPHARYGOPLASTY         Social History     Tobacco Use    Smoking status: Never Smoker    Smokeless tobacco: Never Used   Substance Use Topics    Alcohol use: No    Drug use: No       Family History   Problem Relation Age of Onset    Diabetes Mother     Kidney Disease Mother     Dementia Mother          at 68 - Lewy Body Dementia    Coronary Art Dis Father          at [de-identified]   Bethena Lobstein Colon Cancer Neg Hx     Colon Polyps Neg Hx     Esophageal Cancer Neg Hx     Liver Cancer Neg Hx     Liver Disease Neg Hx     Rectal Cancer Neg Hx     Stomach Cancer Neg Hx        /82   Pulse 66   Temp 97.9 °F (36.6 °C) (Temporal)   Ht 5' 9\" (1.753 m)   Wt 272 lb 9.6 oz (123.7 kg)   SpO2 99%   BMI 40.26 kg/m²     Physical Exam  Vitals signs and nursing note reviewed. Constitutional:       General: He is not in acute distress. Appearance: He is well-developed. He is not toxic-appearing or diaphoretic. HENT:      Head: Normocephalic and atraumatic. Cardiovascular:      Rate and Rhythm: Normal rate and regular rhythm. Heart sounds: Normal heart sounds. No murmur. No friction rub. No gallop. Pulmonary:      Effort: Pulmonary effort is normal. No respiratory distress. Breath sounds: Normal breath sounds. No wheezing or rales. Chest:      Chest wall: No tenderness. Abdominal:      General: Bowel sounds are normal. There is no distension. Palpations: Abdomen is soft. There is no mass. Tenderness: There is no abdominal tenderness. There is no guarding or rebound. Comments: Central obesity   Musculoskeletal:      Right lower leg: No edema. Left lower leg: No edema. Skin:     General: Skin is warm and dry. Nails: There is no clubbing.      Neurological:      Mental Status: He is alert and oriented to person, place, and time. Coordination: Coordination normal.      Gait: Gait normal.   Psychiatric:         Mood and Affect: Mood is anxious. Mood is not depressed. Speech: Speech normal.         Behavior: Behavior normal.         Thought Content: Thought content does not include homicidal or suicidal ideation. Judgment: Judgment normal.         Assessment:    ICD-10-CM    1. Essential hypertension  I10    2. Class 3 severe obesity due to excess calories with serious comorbidity and body mass index (BMI) of 45.0 to 49.9 in adult ([facility])  E66.01 phentermine 15 MG capsule    Z68.42    3. Other polyneuropathy  G62.89        Plan:   ontinue seeing neurology. Doing well on weigh tloss and htn. There are no signs of any abuse, misuse, or usage of the controlled substance in a way that is not directed. Suspect some spinal compression in C spine as part of his proprioception issue as reported by balance issues. Pt was given approximately 15 minutes of counseling about diet and exercise including education on what calories are, where calories come from, the need for portion control, and healthy snacks along side an active lifestyle with supplementary exercise of approx 30 minutes a week, 5 days a week of moderate to high intensity exercise for weight loss. The patient voiced increased understanding of the topics discussed. No orders of the defined types were placed in this encounter. Orders Placed This Encounter   Medications    phentermine 15 MG capsule     Sig: TAKE 1 CAPSULE BY MOUTH ONCE DAILY IN THE MORNING.      Dispense:  30 capsule     Refill:  2     Medications Discontinued During This Encounter   Medication Reason    albuterol (PROVENTIL) (5 MG/ML) 0.5% nebulizer solution Therapy completed    albuterol sulfate HFA (VENTOLIN HFA) 108 (90 Base) MCG/ACT inhaler Therapy completed    beclomethasone (QVAR) 80 MCG/ACT inhaler Therapy completed    fluticasone (FLOVENT HFA) 44 MCG/ACT inhaler Therapy completed     There are no Patient Instructions on file for this visit. Patient given educational handouts and has had all questions answered. Patient voices understanding and agrees to plans along with risks and benefits of plan. Patient isinstructed to continue prior meds, diet, and exercise plans unless instructed otherwise. Patient agrees to follow up as instructed and sooner if needed. Patient agrees to go to ER if condition becomes emergent. Notesmay be completed with dictation device and spelling errors may occur. Materials may be copied and pasted from a notepad outside of EMR, all of which, I, Dr. Jenny Mari MD, take sole intellectual ownership of and have approved adding to my note. Return in about 3 months (around 7/5/2021) for OV ([facility]), annual 2 time slots.

## 2021-04-07 ENCOUNTER — HOSPITAL ENCOUNTER (OUTPATIENT)
Dept: GENERAL RADIOLOGY | Age: 56
Discharge: HOME | End: 2021-04-07
Payer: MEDICAID

## 2021-04-07 ENCOUNTER — HOSPITAL ENCOUNTER (OUTPATIENT)
Dept: SPEECH THERAPY | Age: 56
Setting detail: THERAPIES SERIES
Discharge: HOME | End: 2021-04-07
Payer: MEDICAID

## 2021-04-07 DIAGNOSIS — R13.10 DYSPHAGIA, UNSPECIFIED TYPE: ICD-10-CM

## 2021-04-07 PROCEDURE — 92611 MOTION FLUOROSCOPY/SWALLOW: CPT

## 2021-04-07 PROCEDURE — 74230 X-RAY XM SWLNG FUNCJ C+: CPT

## 2021-04-07 PROCEDURE — 92526 ORAL FUNCTION THERAPY: CPT

## 2021-04-07 NOTE — PROGRESS NOTES
Chief complaint:   Chief Complaint   Patient presents with   • Neck Pain     Shankar has been referred here today for follow up for some occasional neck pain, his MRI from [facility] has been uploaded to the system for review. He has never had any physical therapy       Subjective     HPI: This is a 56-year-old male gentleman who was referred to us by REED Goodwin for neck pain.  He says that he has very little neck pain at this point.  He says that in the course of being worked up for MS that they did his imaging of his neck and wanted him evaluated here for a disc herniation.  He is not really complaining of any specific onset of neck pain.  He says his neck just feels tired maybe has a few spasms but does not notice any severe neck pain.  Denies any upper extremity radicular pain.  Denies any numbness tingling in his upper extremities.  Denies any bowel or bladder incontinence.  He is right-hand dominant.  Works as a .  Rates his pain on a scale 0-10 at a 1.    Review of Systems   Constitutional: Positive for activity change.   Musculoskeletal: Positive for arthralgias, back pain, myalgias and neck pain.   Neurological: Positive for numbness.   Psychiatric/Behavioral: Negative.    All other systems reviewed and are negative.       Past Medical History:   Diagnosis Date   • Acid reflux    • Arthritis    • Bell palsy    • Chronic pain disorder    • Disease of thyroid gland    • Diverticulitis of colon    • Hyperlipidemia    • Hypertension    • Low back pain    • Neck pain    • Sleep apnea    • Stomach ulcer      Past Surgical History:   Procedure Laterality Date   • ADENOIDECTOMY     • COLON RESECTION     • TONSILLECTOMY       Family History   Problem Relation Age of Onset   • Heart disease Mother    • Diabetes Mother    • Heart disease Father    • Lung disease Father      Social History     Tobacco Use   • Smoking status: Never Smoker   • Smokeless tobacco: Never Used   Vaping  "Use   • Vaping Use: Never used   Substance Use Topics   • Alcohol use: No   • Drug use: No     (Not in a hospital admission)    Allergies:  Patient has no known allergies.    Objective      Vital Signs  /80   Ht 175.3 cm (69\")   Wt 124 kg (274 lb)   BMI 40.46 kg/m²     Physical Exam  Constitutional:       Appearance: Normal appearance. He is well-developed.   HENT:      Head: Normocephalic.   Eyes:      General: Lids are normal.      Conjunctiva/sclera: Conjunctivae normal.      Pupils: Pupils are equal, round, and reactive to light.   Cardiovascular:      Rate and Rhythm: Normal rate and regular rhythm.   Pulmonary:      Effort: Pulmonary effort is normal.      Breath sounds: Normal breath sounds.   Musculoskeletal:         General: Normal range of motion.      Cervical back: Normal range of motion.   Skin:     General: Skin is warm.   Neurological:      Mental Status: He is alert and oriented to person, place, and time.      GCS: GCS eye subscore is 4. GCS verbal subscore is 5. GCS motor subscore is 6.      Cranial Nerves: No cranial nerve deficit.      Sensory: No sensory deficit.      Deep Tendon Reflexes: Reflexes are normal and symmetric. Reflexes normal.   Psychiatric:         Speech: Speech normal.         Behavior: Behavior normal.         Thought Content: Thought content normal.         Neurologic Exam     Mental Status   Oriented to person, place, and time.   Speech: speech is normal     Cranial Nerves     CN III, IV, VI   Pupils are equal, round, and reactive to light.      Results Review: MRI of the cervical spine shows patient does have some mild left-sided foraminal narrowing at C7-T1.  At see 6-7 there is left-sided disc herniation causing left-sided foraminal narrowing.  There is bilateral foraminal narrowing at C5-6.  No fracture visualized.  No malalignment.    C6-7        C7-T1      Assessment/Plan: At this point the patient is having very little neck pain.  I do not think that the " patient would benefit by going through any kind of physical therapy at this time.  We will need to see him on an as-needed basis.  He was told that if he did have any worsening neck pain or arm pain issues we be happy to see him back in the office and address this further.  Patient is a nonsmoker  The patient's Body mass index is 40.46 kg/m².. BMI is above normal parameters. Recommendations include: educational material and nutrition counseling    Diagnoses and all orders for this visit:    1. Cervical disc herniation (Primary)    2. Class 3 severe obesity due to excess calories with body mass index (BMI) of 40.0 to 44.9 in adult, unspecified whether serious comorbidity present (CMS/[facility])    3. Non-smoker          I discussed the patients findings and my recommendations with patient    Edil Goff, APRN  04/08/21  09:25 CDT

## 2021-04-07 NOTE — PROCEDURES
INSTRUMENTAL SWALLOW REPORT  MODIFIED BARIUM SWALLOW    NAME: Héctor Suggs     : 1965  MRN: [id number]     Date of Eval: 2021     Ordering Physician: Dr. Bekah Ambrose  Radiologist: Dr. Roel Mccall    Referring Diagnosis:   Dysphagia, unspecified R13.10    Past Medical History:    has a past medical history of Bipolar disorder ([address] Colon polyps, Diverticular disease, GERD (gastroesophageal reflux disease), Hiatal hernia, Hyperlipidemia, Hypertension, Hypothyroidism, Neuropathy, and Unspecified sleep apnea. Past Surgical History:    has a past surgical history that includes Colonoscopy (2009); Upper gastrointestinal endoscopy (2005); Colonoscopy (2012); Colonoscopy (2005); Colonoscopy (N/A, 2016); UPPP; subtotal colectomy (); EGD; and pr egd transoral biopsy single/multiple (N/A, 2018). Current Diet Solid Consistency:   Regular    Current Diet Liquid Consistency: Thin    Type of Study:   Initial MBS    Oral Mechanism Examination:  Left labial asymmetry (mild). He does report ongoing left labial numbness following Bell's Palsy. No lingual deviation. Natural dentition. Recent CXR/CT of Chest:    Impression   Impression:   No acute findings. Signed by Dr Roxann Swenson on 3/3/2021 1:57 PM       MRI/Brain:  Impression   1.  No acute intracranial findings. 2.  A few small subcortical T2 FLAIR hyperintense white matter lesions   likely represent early microvascular ischemic white matter change or   sequela of migraine headaches. Signed by Dr Roxann Swenson on 3/4/2021 2:30 PM       Esophagram:  Impression   A small intermittently visualized sliding type hiatal   hernia. No gastroesophageal reflux. The esophagus is normal.   Fluoroscopy time: 2.2 minutes.    Signed by Dr Pepito Frank on 3/11/2021 11:59 AM         Patient Complaints/Reason for Referral:  Héctor Suggs was referred for a MBS to assess the efficiency of his/her swallow function, assess for aspiration, and base of tongue and pyriform sinus residue. Residue did clear with independent dry swallows. Thin liquids via straw resulted in premature loss to the valleculae then pyriforms. Trace base of tongue and pyriform residue. One episode of trace silent laryngeal penetration well above the vocal folds was noted. This did eject from the airway with swallow completion. Thin liquids via cup were presented again and were tolerated well. Pudding was tolerated well. Puree resulted in minimal vallecular residue. Mechanical soft resulted in minimal vallecular residue and moderate pyriform residue. Solid was presented and moderate vallecular residue was noted. The material did overflow to the pyriforms. Minimal pyriform residue was noted. Cued dry swallows was effective in clearing some of the residue. Thin liquids via cup did clear most of the remaining solid. Oral Phase:   Mild oral phase dysphagia due to premature loss of the bolus over the tongue base. Trace base of tongue residue was noted. Pharyngeal:   Mild pharyngeal phase dysphagia due to premature loss of the bolus over the tongue base to the valleculae then pyriforms. Pharyngeal residue did mostly clear with subsequent dry swallows. Minimal laryngeal penetration well above the vocal folds was noted. No aspiration was observed this study. Decreased hyolaryngeal elevation, epiglottic inversion and anterior hyoid excursion. Osteophyte noted at C5. Upper Esophageal Screen:   No esophageal residue. No esophageal backflow was observed. Dysphagia Outcome Severity Scale:   Level 5: Mild dysphagia- Distant supervision. May need one diet consistency restricted    Penetration-Aspiration Scale (PAS):   2 - Material enters the airway, remains above the vocal folds, and is ejected from the airway    Recommended Diet:  Solid consistency: Regular  Liquid consistency:  Thin  Liquid administration via: Straw;Cup    Medication administration: (Meds whole,one at a time, followed by water)    Safe Swallow Protocol:  Compensatory Swallowing Strategies: Upright as possible for all oral intake; Check for pocketing of food on the Left; Alternate solids and liquids;Effortful swallow      Recommendations/Treatment  He is recommended to complete a home exercise program. pharyngeal exercises       Recommendations comment:   Recommend repeat MBSS if dysphagia worsens    D/C Recommendations: To be determined    Education:   Images and recommendations were reviewed with the patient following this exam.   Patient Education: Skilled education provided regarding swallowing anatomy and physiology. Pharyngeal exercises (raz and effortful) were demonstrated for the patient to use at home. Diet recommendations and swallowing precautions were reviewed.   Patient Education Response: Verbalizes understanding                     4/7/2021, 11:56 AM    Electronically Signed By:  Kenji Borrero M.S., CCC-SLP  4/7/2021,12:41 PM.

## 2021-04-08 ENCOUNTER — OFFICE VISIT (OUTPATIENT)
Dept: NEUROSURGERY | Facility: CLINIC | Age: 56
End: 2021-04-08

## 2021-04-08 VITALS
BODY MASS INDEX: 40.58 KG/M2 | WEIGHT: 274 LBS | HEIGHT: 69 IN | DIASTOLIC BLOOD PRESSURE: 80 MMHG | SYSTOLIC BLOOD PRESSURE: 120 MMHG

## 2021-04-08 DIAGNOSIS — Z78.9 NON-SMOKER: ICD-10-CM

## 2021-04-08 DIAGNOSIS — M50.20 CERVICAL DISC HERNIATION: Primary | ICD-10-CM

## 2021-04-08 DIAGNOSIS — E66.01 CLASS 3 SEVERE OBESITY DUE TO EXCESS CALORIES WITH BODY MASS INDEX (BMI) OF 40.0 TO 44.9 IN ADULT, UNSPECIFIED WHETHER SERIOUS COMORBIDITY PRESENT (HCC): ICD-10-CM

## 2021-04-08 PROBLEM — E66.813 CLASS 3 SEVERE OBESITY DUE TO EXCESS CALORIES WITH BODY MASS INDEX (BMI) OF 40.0 TO 44.9 IN ADULT: Status: ACTIVE | Noted: 2021-04-08

## 2021-04-08 PROCEDURE — 99213 OFFICE O/P EST LOW 20 MIN: CPT | Performed by: NURSE PRACTITIONER

## 2021-04-08 NOTE — PATIENT INSTRUCTIONS
"BMI for Adults  What is BMI?  Body mass index (BMI) is a number that is calculated from a person's weight and height. BMI can help estimate how much of a person's weight is composed of fat. BMI does not measure body fat directly. Rather, it is an alternative to procedures that directly measure body fat, which can be difficult and expensive.  BMI can help identify people who may be at higher risk for certain medical problems.  What are BMI measurements used for?  BMI is used as a screening tool to identify possible weight problems. It helps determine whether a person is obese, overweight, a healthy weight, or underweight.  BMI is useful for:  · Identifying a weight problem that may be related to a medical condition or may increase the risk for medical problems.  · Promoting changes, such as changes in diet and exercise, to help reach a healthy weight. BMI screening can be repeated to see if these changes are working.  How is BMI calculated?  BMI involves measuring your weight in relation to your height. Both height and weight are measured, and the BMI is calculated from those numbers. This can be done either in English ([place] or metric measurements. Note that charts and online BMI calculators are available to help you find your BMI quickly and easily without having to do these calculations yourself.  To calculate your BMI in English ([place] measurements:    1. Measure your weight in pounds (lb).  2. Multiply the number of pounds by 703.  ? For example, for a person who weighs 180 lb, multiply that number by 703, which equals 126,540.  3. Measure your height in inches. Then multiply that number by itself to get a measurement called \"inches squared.\"  ? For example, for a person who is 70 inches tall, the \"inches squared\" measurement is 70 inches x 70 inches, which equals 4,900 inches squared.  4. Divide the total from step 2 (number of lb x 703) by the total from step 3 (inches squared): 126,540 ÷ 4,900 = 25.8. This is " "your BMI.  To calculate your BMI in metric measurements:  1. Measure your weight in kilograms (kg).  2. Measure your height in meters (m). Then multiply that number by itself to get a measurement called \"meters squared.\"  ? For example, for a person who is 1.75 m tall, the \"meters squared\" measurement is 1.75 m x 1.75 m, which is equal to 3.1 meters squared.  3. Divide the number of kilograms (your weight) by the meters squared number. In this example: 70 ÷ 3.1 = 22.6. This is your BMI.  What do the results mean?  BMI charts are used to identify whether you are underweight, normal weight, overweight, or obese. The following guidelines will be used:  · Underweight: BMI less than 18.5.  · Normal weight: BMI between 18.5 and 24.9.  · Overweight: BMI between 25 and 29.9.  · Obese: BMI of 30 or above.  Keep these notes in mind:  · Weight includes both fat and muscle, so someone with a muscular build, such as an athlete, may have a BMI that is higher than 24.9. In cases like these, BMI is not an accurate measure of body fat.  · To determine if excess body fat is the cause of a BMI of 25 or higher, further assessments may need to be done by a health care provider.  · BMI is usually interpreted in the same way for men and women.  Where to find more information  For more information about BMI, including tools to quickly calculate your BMI, go to these websites:  · Centers for Disease Control and Prevention: www.cdc.gov  · American Heart Association: www.heart.org  · National Heart, Lung, and Blood [place]: www.nhlbi.nih.gov  Summary  · Body mass index (BMI) is a number that is calculated from a person's weight and height.  · BMI may help estimate how much of a person's weight is composed of fat. BMI can help identify those who may be at higher risk for certain medical problems.  · BMI can be measured using English measurements or metric measurements.  · BMI charts are used to identify whether you are underweight, normal " "weight, overweight, or obese.  This information is not intended to replace advice given to you by your health care provider. Make sure you discuss any questions you have with your health care provider.  Document Revised: 09/09/2020 Document Reviewed: 07/17/2020  [employer] Patient Education © 2021 [employer]      https://www.nhlbi.nih.gov/files/docs/public/heart/dash_brief.pdf\">   DASH Eating Plan  DASH stands for Dietary Approaches to Stop Hypertension. The DASH eating plan is a healthy eating plan that has been shown to:  · Reduce high blood pressure (hypertension).  · Reduce your risk for type 2 diabetes, heart disease, and stroke.  · Help with weight loss.  What are tips for following this plan?  Reading food labels  · Check food labels for the amount of salt (sodium) per serving. Choose foods with less than 5 percent of the Daily Value of sodium. Generally, foods with less than 300 milligrams (mg) of sodium per serving fit into this eating plan.  · To find whole grains, look for the word \"whole\" as the first word in the ingredient list.  Shopping  · Buy products labeled as \"low-sodium\" or \"no salt added.\"  · Buy fresh foods. Avoid canned foods and pre-made or frozen meals.  Cooking  · Avoid adding salt when cooking. Use salt-free seasonings or herbs instead of table salt or sea salt. Check with your health care provider or pharmacist before using salt substitutes.  · Do not licona foods. Cook foods using healthy methods such as baking, boiling, grilling, roasting, and broiling instead.  · Cook with heart-healthy oils, such as olive, canola, avocado, soybean, or sunflower oil.  Meal planning    · Eat a balanced diet that includes:  ? 4 or more servings of fruits and 4 or more servings of vegetables each day. Try to fill one-half of your plate with fruits and vegetables.  ? 6-8 servings of whole grains each day.  ? Less than 6 oz (170 g) of lean meat, poultry, or fish each day. A 3-oz (85-g) serving of meat is " about the same size as a deck of cards. One egg equals 1 oz (28 g).  ? 2-3 servings of low-fat dairy each day. One serving is 1 cup (237 mL).  ? 1 serving of nuts, seeds, or beans 5 times each week.  ? 2-3 servings of heart-healthy fats. Healthy fats called omega-3 fatty acids are found in foods such as walnuts, flaxseeds, fortified milks, and eggs. These fats are also found in cold-water fish, such as sardines, salmon, and mackerel.  · Limit how much you eat of:  ? Canned or prepackaged foods.  ? Food that is high in trans fat, such as some fried foods.  ? Food that is high in saturated fat, such as fatty meat.  ? Desserts and other sweets, sugary drinks, and other foods with added sugar.  ? Full-fat dairy products.  · Do not salt foods before eating.  · Do not eat more than 4 egg yolks a week.  · Try to eat at least 2 vegetarian meals a week.  · Eat more home-cooked food and less restaurant, buffet, and fast food.  Lifestyle  · When eating at a restaurant, ask that your food be prepared with less salt or no salt, if possible.  · If you drink alcohol:  ? Limit how much you use to:  § 0-1 drink a day for women who are not pregnant.  § 0-2 drinks a day for men.  ? Be aware of how much alcohol is in your drink. In the [place] one drink equals one 12 oz bottle of beer (355 mL), one 5 oz glass of wine (148 mL), or one 1½ oz glass of hard liquor (44 mL).  General information  · Avoid eating more than 2,300 mg of salt a day. If you have hypertension, you may need to reduce your sodium intake to 1,500 mg a day.  · Work with your health care provider to maintain a healthy body weight or to lose weight. Ask what an ideal weight is for you.  · Get at least 30 minutes of exercise that causes your heart to beat faster (aerobic exercise) most days of the week. Activities may include walking, swimming, or biking.  · Work with your health care provider or dietitian to adjust your eating plan to your individual calorie needs.  What  foods should I eat?  Fruits  All fresh, dried, or frozen fruit. Canned fruit in natural juice (without added sugar).  Vegetables  Fresh or frozen vegetables (raw, steamed, roasted, or grilled). Low-sodium or reduced-sodium tomato and vegetable juice. Low-sodium or reduced-sodium tomato sauce and tomato paste. Low-sodium or reduced-sodium canned vegetables.  Grains  Whole-grain or whole-wheat bread. Whole-grain or whole-wheat pasta. Brown rice. Oatmeal. Quinoa. Bulgur. Whole-grain and low-sodium cereals. Ebony bread. Low-fat, low-sodium crackers. Whole-wheat flour tortillas.  Meats and other proteins  Skinless chicken or turkey. Ground chicken or turkey. Pork with fat trimmed off. Fish and seafood. Egg whites. Dried beans, peas, or lentils. Unsalted nuts, nut butters, and seeds. Unsalted canned beans. Lean cuts of beef with fat trimmed off. Low-sodium, lean precooked or cured meat, such as sausages or meat loaves.  Dairy  Low-fat (1%) or fat-free (skim) milk. Reduced-fat, low-fat, or fat-free cheeses. Nonfat, low-sodium ricotta or cottage cheese. Low-fat or nonfat yogurt. Low-fat, low-sodium cheese.  Fats and oils  Soft margarine without trans fats. Vegetable oil. Reduced-fat, low-fat, or light mayonnaise and salad dressings (reduced-sodium). Canola, safflower, olive, avocado, soybean, and sunflower oils. Avocado.  Seasonings and condiments  Herbs. Spices. Seasoning mixes without salt.  Other foods  Unsalted popcorn and pretzels. Fat-free sweets.  The items listed above may not be a complete list of foods and beverages you can eat. Contact a dietitian for more information.  What foods should I avoid?  Fruits  Canned fruit in a light or heavy syrup. Fried fruit. Fruit in cream or butter sauce.  Vegetables  Creamed or fried vegetables. Vegetables in a cheese sauce. Regular canned vegetables (not low-sodium or reduced-sodium). Regular canned tomato sauce and paste (not low-sodium or reduced-sodium). Regular tomato and  vegetable juice (not low-sodium or reduced-sodium). Pickles. Olives.  Grains  Baked goods made with fat, such as croissants, muffins, or some breads. Dry pasta or rice meal packs.  Meats and other proteins  Fatty cuts of meat. Ribs. Fried meat. Howell. Bologna, salami, and other precooked or cured meats, such as sausages or meat loaves. Fat from the back of a pig (fatback). Bratwurst. Salted nuts and seeds. Canned beans with added salt. Canned or smoked fish. Whole eggs or egg yolks. Chicken or turkey with skin.  Dairy  Whole or 2% milk, cream, and half-and-half. Whole or full-fat cream cheese. Whole-fat or sweetened yogurt. Full-fat cheese. Nondairy creamers. Whipped toppings. Processed cheese and cheese spreads.  Fats and oils  Butter. Stick margarine. Lard. Shortening. Ghee. Howell fat. Tropical oils, such as coconut, palm kernel, or palm oil.  Seasonings and condiments  Onion salt, garlic salt, seasoned salt, table salt, and sea salt. Worcestershire sauce. Tartar sauce. Barbecue sauce. Teriyaki sauce. Soy sauce, including reduced-sodium. Steak sauce. Canned and packaged gravies. Fish sauce. Oyster sauce. Cocktail sauce. Store-bought horseradish. Ketchup. Mustard. Meat flavorings and tenderizers. Bouillon cubes. Hot sauces. Pre-made or packaged marinades. Pre-made or packaged taco seasonings. Relishes. Regular salad dressings.  Other foods  Salted popcorn and pretzels.  The items listed above may not be a complete list of foods and beverages you should avoid. Contact a dietitian for more information.  Where to find more information  · National Heart, Lung, and Blood [place]: www.nhlbi.nih.gov  · American Heart Association: www.heart.org  · Academy of Nutrition and Dietetics: www.eatright.org  · National Kidney Foundation: www.kidney.org  Summary  · The DASH eating plan is a healthy eating plan that has been shown to reduce high blood pressure (hypertension). It may also reduce your risk for type 2 diabetes, heart  disease, and stroke.  · When on the DASH eating plan, aim to eat more fresh fruits and vegetables, whole grains, lean proteins, low-fat dairy, and heart-healthy fats.  · With the DASH eating plan, you should limit salt (sodium) intake to 2,300 mg a day. If you have hypertension, you may need to reduce your sodium intake to 1,500 mg a day.  · Work with your health care provider or dietitian to adjust your eating plan to your individual calorie needs.  This information is not intended to replace advice given to you by your health care provider. Make sure you discuss any questions you have with your health care provider.  Document Revised: 11/20/2020 Document Reviewed: 11/20/2020  [employer] Patient Education © 2021 [employer]

## 2021-04-14 ENCOUNTER — OFFICE VISIT (OUTPATIENT)
Dept: NEUROSURGERY | Age: 56
End: 2021-04-14
Payer: MEDICAID

## 2021-04-14 VITALS
OXYGEN SATURATION: 97 % | HEIGHT: 69 IN | DIASTOLIC BLOOD PRESSURE: 81 MMHG | WEIGHT: 272 LBS | HEART RATE: 80 BPM | TEMPERATURE: 96.8 F | SYSTOLIC BLOOD PRESSURE: 137 MMHG | BODY MASS INDEX: 40.29 KG/M2

## 2021-04-14 DIAGNOSIS — R20.2 NUMBNESS AND TINGLING OF BOTH LOWER EXTREMITIES: ICD-10-CM

## 2021-04-14 DIAGNOSIS — R26.89 IMBALANCE: ICD-10-CM

## 2021-04-14 DIAGNOSIS — R20.0 NUMBNESS AND TINGLING OF BOTH LOWER EXTREMITIES: ICD-10-CM

## 2021-04-14 DIAGNOSIS — R20.0 NUMBNESS AROUND MOUTH: Primary | ICD-10-CM

## 2021-04-14 LAB
HIV-1 P24 AG: NORMAL
RAPID HIV 1&2: NORMAL
RHEUMATOID FACTOR: <10 IU/ML
VITAMIN B-12: 862 PG/ML (ref 211–946)

## 2021-04-14 PROCEDURE — 99214 OFFICE O/P EST MOD 30 MIN: CPT | Performed by: NURSE PRACTITIONER

## 2021-04-14 NOTE — PROGRESS NOTES
[facility] Neurology Office Note      Patient:   Candelario Huffman  MR#:    [id number]  Account Number:                         YOB: 1965  Date of Evaluation:  4/14/2021  Time of Note:                          1:24 PM  Primary/Referring Physician:  Marilyn Baker MD   Consulting Physician:  Anila Dunn DNP, . [address]    Chief Complaint   Patient presents with    New Patient     c/o numbness in face feet and hand, slurred speech and dizziness     Numbness    Dizziness       HISTORY OF PRESENT ILLNESS    Candelario Huffman is a 61y.o. year old male here for hospital follow up. Recently admitted with numbness and speech changes. Symptoms have been present for quite some time but he noted worsening speech changes which prompted ER evaluation. Noting imbalance as well, feels like he has most problems with right leg. Work up in the hospital was largely unremarkable. He notes numbness primarily around his mouth, seems to worsen towards the end of the day. Notes word finding difficulty or difficulty with speech when facial numbness is worse. Also noting numbness in BLE, has worsened over the past month now as well. Notes mild lower back pain but no clear radicular pain. He does note a sharp, stabbing pain in his right thigh. Notes intermittent LE weakness, worse at the end of the day. They are concerned about MS today, wanting LP. He is not diabetic. No vitamin B12 deficiency. Denies neck pain or clear radicular pain. On Namenda for atypical neuropathy. Prior history of [place] palsy, chronic left facial asymmetry. Recently diagnosed with BPPV.      Past Medical History:   Diagnosis Date    Bipolar disorder ([address]     Colon polyps     Diverticular disease     GERD (gastroesophageal reflux disease)     Hiatal hernia     Hyperlipidemia     Hypertension     Hypothyroidism     Neuropathy     Unspecified sleep apnea        Past Surgical History:   Procedure Laterality Date    COLONOSCOPY 2009    Dr Margarita Buckley: hyperplastic polyp, resolving diverticulitis    COLONOSCOPY  2012    minimal diverticulosis left side o/w normal postsurgical colon    COLONOSCOPY  2005    diverticulitis    COLONOSCOPY N/A 2016    Dr Clement Clarity bleeding internal hemorrhoids, diverticular disease-5 yr recall    EGD      [place] EGD TRANSORAL BIOPSY SINGLE/MULTIPLE N/A 2018    Dr JONATHAN Bentley-Gastritis/gastropathy, esophagitis    SUBTOTAL COLECTOMY      recurrant diverticulitis    UPPER GASTROINTESTINAL ENDOSCOPY  2005    Dr Margarita Buckley: duodenal ulcer, gastritis    UPPP UVULOPALATOPHARYGOPLASTY         Family History   Problem Relation Age of Onset    Diabetes Mother     Kidney Disease Mother     Dementia Mother          at 68 - Lewy Body Dementia    Coronary Art Dis Father          at [de-identified]   Andrea Galla Colon Cancer Neg Hx     Colon Polyps Neg Hx     Esophageal Cancer Neg Hx     Liver Cancer Neg Hx     Liver Disease Neg Hx     Rectal Cancer Neg Hx     Stomach Cancer Neg Hx        Social History     Socioeconomic History    Marital status:      Spouse name: Not on file    Number of children: Not on file    Years of education: Not on file    Highest education level: Not on file   Occupational History    Not on file   Social Needs    Financial resource strain: Not hard at all   [email or web address] insecurity     Worry: Never true     Inability: Never true    Transportation needs     Medical: No     Non-medical: No   Tobacco Use    Smoking status: Never Smoker    Smokeless tobacco: Never Used   Substance and Sexual Activity    Alcohol use: No    Drug use: No    Sexual activity: Yes   Lifestyle    Physical activity     Days per week: Not on file     Minutes per session: Not on file    Stress: Not on file   Relationships    Social connections     Talks on phone: Not on file     Gets together: Not on file     Attends Protestant service: Not on file     Active member of club or organization: Not on file     Attends meetings of clubs or organizations: Not on file     Relationship status: Not on file    Intimate partner violence     Fear of current or ex partner: Not on file     Emotionally abused: Not on file     Physically abused: Not on file     Forced sexual activity: Not on file   Other Topics Concern    Not on file   Social History Narrative    Not on file       Current Outpatient Medications   Medication Sig Dispense Refill    phentermine 15 MG capsule TAKE 1 [address] 30 capsule 2    miconazole (MICOTIN) 2 % cream Apply topically 2 times daily for 4 weeks to area 120 g 1    levothyroxine (SYNTHROID) 50 MCG tablet TAKE 1 TABLET BY MOUTH ONCE DAILY. 90 tablet 0    lisinopril-hydroCHLOROthiazide (PRINZIDE;ZESTORETIC) 20-25 MG per tablet TAKE 1 TABLET BY MOUTH ONCE DAILY. 90 tablet 0    amLODIPine (NORVASC) 10 MG tablet TAKE 1 TABLET BY MOUTH ONCE DAILY. 90 tablet 0    atorvastatin (LIPITOR) 40 MG tablet TAKE 1 TABLET BY MOUTH ONCE DAILY. 90 tablet 0    meclizine (ANTIVERT) 25 MG tablet TAKE 1/2 TABLET BY MOUTH THREE TIMES DAILY AS NEEDED FOR DIZZINESS. 15 tablet 0    memantine (NAMENDA) 10 MG tablet Take 1 tablet by mouth 2 times daily 60 tablet 5    gabapentin (NEURONTIN) 400 MG capsule Take 1 capsule by mouth 3 times daily for 30 days. 90 capsule 3    furosemide (LASIX) 20 MG tablet TAKE 1 TABLET BY MOUTH ONCE DAILY.  30 tablet 5    metoprolol tartrate (LOPRESSOR) 25 MG tablet TAKE 2 TABLETS BY MOUTH TWICE DAILY 120 tablet 5    busPIRone (BUSPAR) 7.5 MG tablet TAKE 1 TABLET BY MOUTH UP TO 3 TIMES DAILY AS NEEDED - AFTER 1 WEEK, MAY INCREASE TO 2 TABLETS 3 TIMES DAILY IF NEEDED (Patient taking differently: TAKE 1 TABLET BY MOUTH 3 TIMES DAILY AS NEEDED) 180 tablet 3    aspirin 81 MG chewable tablet Take 81 mg by mouth daily      omeprazole (PRILOSEC) 20 MG delayed release capsule TAKE 1 CAPSULE BY MOUTH DAILY 30 capsule 0    sildenafil external nose or ears, no neck masses noted, no jugular vein distension, no bruit  Cardiac- Regular rate and rhythm  Pulmonary- Good expansion, normal effort without use of accessory muscles  Musculoskeletal  No significant wasting of muscles noted, no bony deformities  Extremities - No clubbing, cyanosis or edema  Skin  Warm, dry, and intact. No rash, erythema, or pallor  Psychiatric  Mood, affect, and behavior appear normal; anxiety     NEUROLOGICAL EXAM     Mental status   [x] Awake, alert, oriented   [x]Affect attention and concentration appear appropriate  [x]Recent and remote memory appears unremarkable  [x]Speech normal without dysarthria or aphasia, comprehension and repetition intact. COMMENTS:    Cranial Nerves [x]No VF deficit to confrontation,  no papilledema on fundoscopic exam.  [x]PERRLA, EOMI, no nystagmus, conjugate eye movements, no ptosis  []Face symmetric  [x]Facial sensation intact  [x]Tongue midline no atrophy or fasciculations present  [x]Palate midline, hearing to finger rub normal bilaterally  [x]Shoulder shrug and SCM testing normal bilaterally  COMMENTS: mild left facial asymmetry, chronic    Motor   []5/5 strength x 4 extremities  [x]Normal bulk and tone  [x]No tremor present  [x]No rigidity or bradykinesia noted  COMMENTS: 4+/5 globally    Sensory  [x]Sensation intact to light touch, pin prick, vibration, and proprioception BLE  [x]Sensation intact to light touch, pin prick, vibration, and proprioception BUE  COMMENTS:    Coordination [x]FTN normal bilaterally   [x]HTS normal bilaterally  [x]CORY normal bilaterally.    COMMENTS:   Reflexes  [x]Symmetric and non-pathological  [x]Toes down going bilaterally  [x]No clonus present  COMMENTS:   Gait                  [x]Normal steady gait    []Ataxic    []Spastic     []Magnetic     []Shuffling  COMMENTS:        LABS RECORD AND IMAGING REVIEW (As below and per HPI)    Lab Results   Component Value Date    [id number] 04/14/2021     Lab Results   Component Value Date    WBC 10.3 03/04/2021    HGB 13.4 (L) 03/04/2021    HCT 40.3 (L) 03/04/2021    MCV 92.4 03/04/2021     03/04/2021     Lab Results   Component Value Date     03/05/2021    K 3.5 03/05/2021     03/05/2021    CO2 25 03/05/2021    BUN 14 03/05/2021    CREATININE 0.6 03/05/2021    GLUCOSE 99 03/05/2021    CALCIUM 8.5 (L) 03/05/2021    PROT 7.1 03/04/2021    LABALBU 4.1 03/04/2021    BILITOT 0.6 03/04/2021    ALKPHOS 56 03/04/2021    AST 17 03/04/2021    ALT 23 03/04/2021    LABGLOM >60 03/05/2021    GFRAA >59 03/05/2021    GLOB 2.7 03/31/2017     Lab Results   Component Value Date    CHOL 106 (L) 03/04/2021    TRIG 199 (H) 03/04/2021    HDL 27 (L) 03/04/2021    LDLCALC 39 03/04/2021     Lab Results   Component Value Date    TSH 1.690 03/03/2021     Lab Results   Component Value Date    CRP 0.46 03/04/2021    SEDRATE 14 03/04/2021      Reviewed hospital records     MRI brain (3/2021)-   Impression   1.  No acute intracranial findings. 2.  A few small subcortical T2 FLAIR hyperintense white matter lesions   likely represent early microvascular ischemic white matter change or   sequela of migraine headaches. Signed by Dr Rufino Dukes on 3/4/2021 2:30 PM     MRA head (3/2021)-   Impression   Impression:   No major vascular occlusion, flow-limiting stenosis, or aneurysm. Signed by Dr Rufino Dukes on 3/4/2021 2:32 PM     MRI cervical spine (3/2021)-   Impression   Chronic degenerative changes of the lumbar spine. Multilevel disc osteophyte complexes and facetal arthropathy. A large predominantly left paracentral disc herniation at C6-7   occupying the left lateral recess and asymmetrical spinal stenosis and   left neural foraminal stenosis at this level. An asymmetrical disc bulging and prominent posterior osteophytes, more   towards left, at C7-T1 with compression on the thecal sac and the left   foot. Other findings as above.    Signed by Dr Cruz Ureña on 3/5/2021 4:40 PM     MRI thoracic spine (3/2021)-   Impression   A negative study. No evidence of an HNP, spinal stenosis   or neural foraminal stenosis. Mild to moderate degenerative arthropathy of the lower thoracic spine. No abnormality or the lesions in the thoracic and visualized proximal   lumbar spine. Signed by Dr Danna Garcia on 3/5/2021 4:12 PM     Carotid artery u/s (3/2021)- smooth plaque visualized in bilateral internal carotid arteries; no stenosis in the right and left internal carotid arteries; normal antegrade flow in bilateral vertebral arteries. Echocardiogram (3/2021)- EF 55-60%; normal valvular function     EEG (3/2021)- normal     Reviewed [facility] [place] neurosurgery notes     ASSESSMENT:    Tristin Gallegos is a 61y.o. year old male here for hospital follow up. He was recently admitted with chari oral numbness, LE numbness and speech changes. Work up in the hospital was largely unremarkable. MRI brain with mild , not overtly concerning for demyelinating disease. MRI C spine with disc bulge and DDD throughout, normal cord. MRI thoracic spine normal. His exam today is largely unremarkable, very mild global weakness noted. He is concerned that he has multiple sclerosis today and requesting a lumbar puncture. I have reviewed all of his films today and discussed with patient that I do not feel a LP would be indicated at this time, my suspicion for demyelinating disease is quite low. Given LE symptoms and mild low back pain will proceed with MRI lumbar spine and NCS/EMG. Agree with Dr. Ellen Montoya that suspicion for myopathy, demyelinating neuropathy, lumbar pathology etc is quite low as well given his exam and history. Discussed that we can always consider referral to tertiary center for additional work up. They are wanting a diagnosis today, explained that with his work up thus far there is no clear source for his symptoms. ICD-10-CM    1. Numbness around mouth  R20.0    2.  Numbness and tingling of both lower extremities  R20.0 Angelica Titer IgG by IFA Reflex    R20.2 HIV Screen     Lyme Disease AB Total W Rflx to IgG/ IgM     MRI LUMBAR SPINE W WO CONTRAST     Nerve Conduction Test with EMG     Electrophoresis Protein, Serum without Reflex to Immunofixation     Rheumatoid Factor     Vitamin B12   3. Imbalance  R26.89 Nerve Conduction Test with EMG       PLAN:  1. MRI lumbar spine   2. NCS/EMG BLE   3. Additional labs as listed above   4. Continue follow up with [facility] neurosurgery as previously scheduled   5. Return in about 2 months (around 6/14/2021) for follow up, sooner if worsening.     Clem Gutiérrez DNP, APRN

## 2021-04-14 NOTE — PROGRESS NOTES
REVIEW OF SYSTEMS    Constitutional: []Fever []Sweats []Chills [] Recent Injury [x] Denies all unless marked  HEENT:[]Headache  [] Head Injury [] Hearing Loss  [] Sore Throat  [] Ear Ache [x] Denies all unless marked  Spine:  [x] Neck pain  [] Back pain  [] Sciaticia  [] Denies all unless marked  Cardiovascular:[]Heart Disease []Palpitations [] Chest Pain   [x] Denies all unless marked  Pulmonary: []Shortness of Breath []Cough   [x] Denies all unless marked  Psychiatric/Behavioral:[] Depression [] Anxiety [x] Denies all unless marked  Gastrointestinal: []Nausea  []Vomiting  []Abdominal Pain  []Constipation  []Diarrhea  [x] Denies all unless marked  Genitourinary:   [] Frequency  [] Urgency  [] Dysuria [] Incontinence  [x] Denies all unless marked  Extremities: []Pain  []Swelling  [x] Denies all unless marked  Musculoskeletal: [] Myalgias  [] Joint Pain  [] Arthritis [] Muscle Cramps [] Muscle Twitches  [x] Denies all unless marked  Sleep: []Insomnia[]Snoring []Restless Legs  [x]Sleep Apnea  []Daytime Sleepiness  [] Denies all unless marked  Skin:[] Rash [] Color Change [x] Denies all unless marked   Neurological:[]Visual Disturbance [] Memory Loss [x]Loss of Balance [x]Slurred Speech [x]Weakness []Seizures  [x] Dizziness [] Denies all unless marked

## 2021-04-17 LAB
ANTINUCLEAR AB INTERPRETIVE COMMENT: NORMAL
ANTINUCLEAR ANTIBODY, HEP-2, IGG: NORMAL
LYME (B. BURGDORFERI) AB IGG WB: NEGATIVE
LYME AB IGM BY WB:: NEGATIVE

## 2021-04-18 LAB
ALBUMIN SERPL-MCNC: 4.2 G/DL (ref 3.75–5.01)
ALPHA-1-GLOBULIN: 0.34 G/DL (ref 0.19–0.46)
ALPHA-2-GLOBULIN: 0.86 G/DL (ref 0.48–1.05)
BETA GLOBULIN: 1.01 G/DL (ref 0.48–1.1)
GAMMA GLOBULIN: 1.1 G/DL (ref 0.62–1.51)
PROTEIN ELECTROPHORESIS, SERUM: NORMAL
SPE/IFE INTERPRETATION: NORMAL
TOTAL PROTEIN: 7.5 G/DL (ref 6.3–8.2)

## 2021-04-22 ENCOUNTER — TELEPHONE (OUTPATIENT)
Dept: PRIMARY CARE CLINIC | Age: 56
End: 2021-04-22

## 2021-04-22 NOTE — TELEPHONE ENCOUNTER
Pt wife called requesting an appointment to be seen in the office d/t his condition has went down and changed. She wanted an appointment with Dr Brandon Florian in office but he didn't have one available, I ask her if he could be seen by someone else. She stated yes I told her that it could be a VV and she stated that he needed to be seen in office and I told her no one had appointment available for them to be seen in office today, she stated that she would just take him to the ED.

## 2021-04-26 ENCOUNTER — APPOINTMENT (OUTPATIENT)
Dept: GENERAL RADIOLOGY | Age: 56
DRG: 885 | End: 2021-04-26
Payer: MEDICAID

## 2021-04-26 ENCOUNTER — HOSPITAL ENCOUNTER (INPATIENT)
Age: 56
LOS: 5 days | Discharge: HOME | DRG: 885 | End: 2021-05-01
Attending: EMERGENCY MEDICINE | Admitting: PSYCHIATRY & NEUROLOGY
Payer: MEDICAID

## 2021-04-26 DIAGNOSIS — F31.2 BIPOLAR AFFECTIVE DISORDER, CURRENTLY MANIC, SEVERE, WITH PSYCHOTIC FEATURES (HCC): ICD-10-CM

## 2021-04-26 DIAGNOSIS — R45.1 AGITATION REQUIRING SEDATION PROTOCOL: ICD-10-CM

## 2021-04-26 DIAGNOSIS — F29 PSYCHOSIS, UNSPECIFIED PSYCHOSIS TYPE (HCC): Primary | ICD-10-CM

## 2021-04-26 PROBLEM — F23 ACUTE PSYCHOSIS (HCC): Status: ACTIVE | Noted: 2021-04-26

## 2021-04-26 LAB
ALBUMIN SERPL-MCNC: 4 G/DL (ref 3.5–5.2)
ALP BLD-CCNC: 51 U/L (ref 40–130)
ALT SERPL-CCNC: 17 U/L (ref 5–41)
AMPHETAMINE SCREEN, URINE: NEGATIVE
ANION GAP SERPL CALCULATED.3IONS-SCNC: 13 MMOL/L (ref 7–19)
AST SERPL-CCNC: 24 U/L (ref 5–40)
BARBITURATE SCREEN URINE: NEGATIVE
BASOPHILS ABSOLUTE: 0.1 K/UL (ref 0–0.2)
BASOPHILS RELATIVE PERCENT: 0.7 % (ref 0–1)
BENZODIAZEPINE SCREEN, URINE: NEGATIVE
BILIRUB SERPL-MCNC: 0.6 MG/DL (ref 0.2–1.2)
BILIRUBIN URINE: NEGATIVE
BLOOD, URINE: NEGATIVE
BUN BLDV-MCNC: 14 MG/DL (ref 6–20)
CALCIUM SERPL-MCNC: 9 MG/DL (ref 8.6–10)
CANNABINOID SCREEN URINE: NEGATIVE
CHLORIDE BLD-SCNC: 105 MMOL/L (ref 98–111)
CLARITY: CLEAR
CO2: 20 MMOL/L (ref 22–29)
COCAINE METABOLITE SCREEN URINE: NEGATIVE
COLOR: YELLOW
CREAT SERPL-MCNC: 0.8 MG/DL (ref 0.5–1.2)
EOSINOPHILS ABSOLUTE: 0 K/UL (ref 0–0.6)
EOSINOPHILS RELATIVE PERCENT: 0.1 % (ref 0–5)
ETHANOL: <10 MG/DL (ref 0–0.08)
GFR AFRICAN AMERICAN: >59
GFR NON-AFRICAN AMERICAN: >60
GLUCOSE BLD-MCNC: 100 MG/DL (ref 74–109)
GLUCOSE URINE: NEGATIVE MG/DL
HCT VFR BLD CALC: 42.7 % (ref 42–52)
HEMOGLOBIN: 13.8 G/DL (ref 14–18)
IMMATURE GRANULOCYTES #: 0 K/UL
KETONES, URINE: 15 MG/DL
LEUKOCYTE ESTERASE, URINE: NEGATIVE
LYMPHOCYTES ABSOLUTE: 1.2 K/UL (ref 1.1–4.5)
LYMPHOCYTES RELATIVE PERCENT: 12 % (ref 20–40)
Lab: NORMAL
MAGNESIUM: 2.3 MG/DL (ref 1.6–2.6)
MCH RBC QN AUTO: 31.7 PG (ref 27–31)
MCHC RBC AUTO-ENTMCNC: 32.3 G/DL (ref 33–37)
MCV RBC AUTO: 97.9 FL (ref 80–94)
MONOCYTES ABSOLUTE: 0.6 K/UL (ref 0–0.9)
MONOCYTES RELATIVE PERCENT: 6 % (ref 0–10)
NEUTROPHILS ABSOLUTE: 8.4 K/UL (ref 1.5–7.5)
NEUTROPHILS RELATIVE PERCENT: 80.9 % (ref 50–65)
NITRITE, URINE: NEGATIVE
OPIATE SCREEN URINE: NEGATIVE
PDW BLD-RTO: 13 % (ref 11.5–14.5)
PH UA: 5.5 (ref 5–8)
PLATELET # BLD: 212 K/UL (ref 130–400)
PMV BLD AUTO: 11.4 FL (ref 9.4–12.4)
POTASSIUM REFLEX MAGNESIUM: 3.5 MMOL/L (ref 3.5–5)
PROTEIN UA: NEGATIVE MG/DL
RBC # BLD: 4.36 M/UL (ref 4.7–6.1)
SARS-COV-2, NAAT: NOT DETECTED
SODIUM BLD-SCNC: 138 MMOL/L (ref 136–145)
SPECIFIC GRAVITY UA: 1.02 (ref 1–1.03)
TOTAL PROTEIN: 7 G/DL (ref 6.6–8.7)
TSH REFLEX FT4: 1.61 UIU/ML (ref 0.35–5.5)
UROBILINOGEN, URINE: 1 E.U./DL
WBC # BLD: 10.4 K/UL (ref 4.8–10.8)

## 2021-04-26 PROCEDURE — 99283 EMERGENCY DEPT VISIT LOW MDM: CPT

## 2021-04-26 PROCEDURE — 87635 SARS-COV-2 COVID-19 AMP PRB: CPT

## 2021-04-26 PROCEDURE — 6360000002 HC RX W HCPCS: Performed by: EMERGENCY MEDICINE

## 2021-04-26 PROCEDURE — 85025 COMPLETE CBC W/AUTO DIFF WBC: CPT

## 2021-04-26 PROCEDURE — 96372 THER/PROPH/DIAG INJ SC/IM: CPT

## 2021-04-26 PROCEDURE — 1240000000 HC EMOTIONAL WELLNESS R&B

## 2021-04-26 PROCEDURE — 81003 URINALYSIS AUTO W/O SCOPE: CPT

## 2021-04-26 PROCEDURE — 83735 ASSAY OF MAGNESIUM: CPT

## 2021-04-26 PROCEDURE — 82077 ASSAY SPEC XCP UR&BREATH IA: CPT

## 2021-04-26 PROCEDURE — 84443 ASSAY THYROID STIM HORMONE: CPT

## 2021-04-26 PROCEDURE — 80053 COMPREHEN METABOLIC PANEL: CPT

## 2021-04-26 PROCEDURE — 6360000002 HC RX W HCPCS

## 2021-04-26 PROCEDURE — 36415 COLL VENOUS BLD VENIPUNCTURE: CPT

## 2021-04-26 PROCEDURE — 80307 DRUG TEST PRSMV CHEM ANLYZR: CPT

## 2021-04-26 RX ORDER — ACETAMINOPHEN 325 MG/1
650 TABLET ORAL EVERY 4 HOURS PRN
Status: DISCONTINUED | OUTPATIENT
Start: 2021-04-26 | End: 2021-05-01 | Stop reason: HOSPADM

## 2021-04-26 RX ORDER — LORAZEPAM 2 MG/ML
2 INJECTION INTRAMUSCULAR ONCE
Status: COMPLETED | OUTPATIENT
Start: 2021-04-26 | End: 2021-04-26

## 2021-04-26 RX ORDER — HALOPERIDOL 5 MG/ML
5 INJECTION INTRAMUSCULAR EVERY 6 HOURS PRN
Status: DISCONTINUED | OUTPATIENT
Start: 2021-04-26 | End: 2021-05-01 | Stop reason: HOSPADM

## 2021-04-26 RX ORDER — LORAZEPAM 2 MG/ML
2 INJECTION INTRAMUSCULAR EVERY 6 HOURS PRN
Status: DISCONTINUED | OUTPATIENT
Start: 2021-04-26 | End: 2021-05-01 | Stop reason: HOSPADM

## 2021-04-26 RX ORDER — HALOPERIDOL 5 MG/ML
INJECTION INTRAMUSCULAR
Status: COMPLETED
Start: 2021-04-26 | End: 2021-04-26

## 2021-04-26 RX ORDER — POLYETHYLENE GLYCOL 3350 17 G/17G
17 POWDER, FOR SOLUTION ORAL DAILY PRN
Status: DISCONTINUED | OUTPATIENT
Start: 2021-04-26 | End: 2021-05-01 | Stop reason: HOSPADM

## 2021-04-26 RX ORDER — DIPHENHYDRAMINE HYDROCHLORIDE 50 MG/ML
INJECTION INTRAMUSCULAR; INTRAVENOUS
Status: COMPLETED
Start: 2021-04-26 | End: 2021-04-26

## 2021-04-26 RX ORDER — RISPERIDONE 1 MG/1
2 TABLET, FILM COATED ORAL EVERY 6 HOURS PRN
Status: DISCONTINUED | OUTPATIENT
Start: 2021-04-26 | End: 2021-05-01 | Stop reason: HOSPADM

## 2021-04-26 RX ADMIN — HALOPERIDOL LACTATE 10 MG: 5 INJECTION, SOLUTION INTRAMUSCULAR at 18:17

## 2021-04-26 RX ADMIN — DIPHENHYDRAMINE HYDROCHLORIDE 50 MG: 50 INJECTION, SOLUTION INTRAMUSCULAR; INTRAVENOUS at 18:17

## 2021-04-26 RX ADMIN — LORAZEPAM 2 MG: 2 INJECTION INTRAMUSCULAR; INTRAVENOUS at 18:50

## 2021-04-26 ASSESSMENT — SLEEP AND FATIGUE QUESTIONNAIRES
DO YOU USE A SLEEP AID: NO
DO YOU HAVE DIFFICULTY SLEEPING: YES
SLEEP PATTERN: RESTLESSNESS;INSOMNIA

## 2021-04-26 NOTE — ED NOTES
Pt argumentitive , tries to leaver er, security at bedside, verbal order for 72 hr hold, pt became combative, had to be forcibly escorted to room by security, pt still combative received verbal order for 10 mg haldol Im and 50 mg benadryl Im, security remains at bedside      Sergey Phillips RN  04/26/21 2007

## 2021-04-26 NOTE — ED PROVIDER NOTES
[facility]  EMERGENCY DEPARTMENT ENCOUNTER      Pt Name: Candelaria Rai  MRN: [id number]  Armsleannagfearnest 1965  Date of evaluation: 4/26/2021  Provider: Batool Ray MD    CHIEF COMPLAINT       Chief Complaint   Patient presents with   [facility] Problem         HISTORY OF PRESENT ILLNESS   (Location/Symptom, Timing/Onset,Context/Setting, Quality, Duration, Modifying Factors, Severity)  Note limiting factors. Candelaria Rai is a 61 y.o. male who presents to the emergency department for evaluation of agitation, confusion, and several other symptoms recently. Patient's wife states that this has been progressively worsening over the last several days. Per medical records, patient has a history of bipolar disorder. Wife states that he had an episode many years ago with similar symptoms and describes psychosis and states he was admitted to the hospital for that. Recently he has been undergoing additional work-up for other symptoms like a speech difficulty that worsens when he gets tired later in the evening and they were concerned for some neurologic problem, possibly a neuromuscular issue. Patient does not provide any additional history, just continuously screaming that he wants water. States if I want to ask questions to just go and read his chart    Per medical records, patient was admitted about 6 weeks ago where work-up showed    HPI    NursingNotes were reviewed. REVIEW OF SYSTEMS    (2-9 systems for level 4, 10 or more for level 5)     Review of Systems   Unable to perform ROS: Psychiatric disorder       A complete review of systems was performed and is negative except as noted above in the HPI.        PAST MEDICAL HISTORY     Past Medical History:   Diagnosis Date    Bipolar disorder ([facility] [address]     Colon polyps     Diverticular disease     GERD (gastroesophageal reflux disease)     Hiatal hernia     Hyperlipidemia     Hypertension     Hypothyroidism     Neuropathy     Unspecified sleep apnea          SURGICAL HISTORY       Past Surgical History:   Procedure Laterality Date    COLONOSCOPY  01/06/2009    Dr Maura Gillis: hyperplastic polyp, resolving diverticulitis    COLONOSCOPY  07/2012    minimal diverticulosis left side o/w normal postsurgical colon    COLONOSCOPY  12/2005    diverticulitis    COLONOSCOPY N/A 11/8/2016    Dr Emigdio Cooper bleeding internal hemorrhoids, diverticular disease-5 yr recall    EGD      IN EGD TRANSORAL BIOPSY SINGLE/MULTIPLE N/A 8/24/2018    Dr JONATHAN Bentley-Gastritis/gastropathy, esophagitis    SUBTOTAL COLECTOMY  2010    recurrant diverticulitis    UPPER GASTROINTESTINAL ENDOSCOPY  12/22/2005    Dr Maura Gillis: duodenal ulcer, gastritis    UPPP UVULOPALATOPHARYGOPLASTY           CURRENT MEDICATIONS       Previous Medications    AMLODIPINE (NORVASC) 10 MG TABLET    TAKE 1 TABLET BY MOUTH ONCE DAILY. ASPIRIN 81 MG CHEWABLE TABLET    Take 81 mg by mouth daily    ATORVASTATIN (LIPITOR) 40 MG TABLET    TAKE 1 TABLET BY MOUTH ONCE DAILY. BUSPIRONE (BUSPAR) 7.5 MG TABLET    TAKE 1 TABLET BY MOUTH UP TO 3 TIMES DAILY AS NEEDED - AFTER 1 WEEK, MAY INCREASE TO 2 TABLETS 3 TIMES DAILY IF NEEDED    FUROSEMIDE (LASIX) 20 MG TABLET    TAKE 1 TABLET BY MOUTH ONCE DAILY. GABAPENTIN (NEURONTIN) 400 MG CAPSULE    Take 1 capsule by mouth 3 times daily for 30 days. LEVOTHYROXINE (SYNTHROID) 50 MCG TABLET    TAKE 1 TABLET BY MOUTH ONCE DAILY. LISINOPRIL-HYDROCHLOROTHIAZIDE (PRINZIDE;ZESTORETIC) 20-25 MG PER TABLET    TAKE 1 TABLET BY MOUTH ONCE DAILY. MECLIZINE (ANTIVERT) 25 MG TABLET    TAKE 1/2 TABLET BY MOUTH THREE TIMES DAILY AS NEEDED FOR DIZZINESS.     MEMANTINE (NAMENDA) 10 MG TABLET    Take 1 tablet by mouth 2 times daily    METOPROLOL TARTRATE (LOPRESSOR) 25 MG TABLET    TAKE 2 TABLETS BY MOUTH TWICE DAILY    MICONAZOLE (MICOTIN) 2 % CREAM    Apply topically 2 times daily for 4 weeks to area    OMEPRAZOLE (PRILOSEC) 20 MG DELAYED RELEASE CAPSULE    TAKE 1 CAPSULE BY MOUTH DAILY    PHENTERMINE 15 MG CAPSULE    TAKE 1 CAPSULE BY MOUTH ONCE DAILY IN THE MORNING. SILDENAFIL (VIAGRA) 100 MG TABLET    Take 1 tablet by mouth as needed for Erectile Dysfunction Generic sildenafil 100 mg or strongest dose       ALLERGIES     Patient has no known allergies.     FAMILY HISTORY       Family History   Problem Relation Age of Onset    Diabetes Mother     Kidney Disease Mother     Dementia Mother          at 68 - Lewy Body Dementia    Coronary Art Dis Father          at [de-identified]    Colon Cancer Neg Hx     Colon Polyps Neg Hx     Esophageal Cancer Neg Hx     Liver Cancer Neg Hx     Liver Disease Neg Hx     Rectal Cancer Neg Hx     Stomach Cancer Neg Hx           SOCIAL HISTORY       Social History     Socioeconomic History    Marital status:      Spouse name: None    Number of children: None    Years of education: None    Highest education level: None   Occupational History    None   Social Needs    Financial resource strain: Not hard at all   [employer] insecurity     Worry: Never true     Inability: Never true    Transportation needs     Medical: No     Non-medical: No   Tobacco Use    Smoking status: Never Smoker    Smokeless tobacco: Never Used   Substance and Sexual Activity    Alcohol use: No    Drug use: No    Sexual activity: Yes   Lifestyle    Physical activity     Days per week: None     Minutes per session: None    Stress: None   Relationships    Social connections     Talks on phone: None     Gets together: None     Attends Voodoo service: None     Active member of club or organization: None     Attends meetings of clubs or organizations: None     Relationship status: None    Intimate partner violence     Fear of current or ex partner: None     Emotionally abused: None     Physically abused: None     Forced sexual activity: None   Other Topics Concern    None   Social History Narrative    None       SCREENINGS             PHYSICAL EXAM (up to 7 for level 4, 8 or more for level 5)     ED Triage Vitals [04/26/21 1710]   BP Temp Temp src Pulse Resp SpO2 Height Weight   (!) 167/87 98.1 °F (36.7 °C) -- 98 18 98 % 5' 9\" (1.753 m) 256 lb (116.1 kg)       Physical Exam  Vitals signs and nursing note reviewed. Constitutional:       General: He is not in acute distress. Appearance: He is well-developed. He is not toxic-appearing or diaphoretic. HENT:      Head: Normocephalic and atraumatic. Eyes:      General: No scleral icterus. Right eye: No discharge. Left eye: No discharge. Pupils: Pupils are equal, round, and reactive to light. Neck:      Musculoskeletal: Normal range of motion. Cardiovascular:      Rate and Rhythm: Normal rate and regular rhythm. Pulmonary:      Effort: Pulmonary effort is normal. No respiratory distress. Breath sounds: No stridor. Abdominal:      General: There is no distension. Musculoskeletal: Normal range of motion. General: No deformity. Skin:     General: Skin is warm and dry. Neurological:      General: No focal deficit present. Mental Status: He is alert and oriented to person, place, and time. GCS: GCS eye subscore is 4. GCS verbal subscore is 5. GCS motor subscore is 6. Cranial Nerves: No cranial nerve deficit. Motor: Motor function is intact. No abnormal muscle tone. Psychiatric:         Mood and Affect: Mood is anxious. Affect is labile and blunt. Behavior: Behavior is uncooperative, agitated, aggressive and combative. Thought Content: Thought content is paranoid. Cognition and Memory: Cognition is impaired. Judgment: Judgment is impulsive and inappropriate.          DIAGNOSTIC RESULTS     EKG: All EKG's are interpreted by the Emergency Department Physician who either signs or Co-signs this chart in the absence of a cardiologist.        RADIOLOGY:   Non-plain film images such as CT, Ultrasound and MRI are read by aggressive and was walking through all parts of the emergency department trying to leave through various doorways and open locked doors to escape. Given severity of patient's presentation here with his wife's report, decision was made to proceed with verbal involuntary hold. When patient was advised that we were not going to let him leave the hospital, he became aggressive and we physically assisted him back to his room. After several minutes trying to verbally de-escalate, Haldol and Benadryl IM were ordered. When the nurse arrived to administer the shot, patient lunged at security and was tackled to the ground. He did not sustain any injuries. He was given the shots without incident. Attempted to reevaluate the patient at this time after injections had time to work, however he is still very agitated and unable or unwilling to answer any questions    [GUILLERMINA]   1822 Patient again ran out of room for the accident and tried to escape. He was able to be redirected back to his room but became very upset again requiring seclusion with a locked door. [GUILLERMINA]   1909 Discussed again with patient's wife. She states that patient does have history of bipolar disorder. She states over the last 2 years he has been very depressed and does not do anything except go to [employer]. States he is normally very grumpy and palacios and does not get out of his recliner. She states over the last 2 months though it was like a switch flipped and he has been constantly active and has decided he wants to be a better father and has been involved in [employer] groups and constantly on the move. She states over the last few weeks he has not been sleeping at all despite her begging him to go to bed. She states at that point she could tell something was changing and this is similar to what he experienced previously when he required hospitalization.     [GUILLERMINA]      ED Course User Index  [GUILLERMINA] Madison Cuba MD     Based on the evaluation and work-up here patient is felt to require further monitoring, work-up, or treatment that is available in the emergency department. Case was discussed with psychiatry who agrees for observation or admission for further management. Treatment and stabilization as necessary were provided in the emergency department prior to transfer of care to the [facility] service. CONSULTS:  None    PROCEDURES:  Unless otherwise notedbelow, none     Procedures    CRITICAL CARE TIME   Total Critical Care time was 45 minutes, excluding separately reportable procedures. There was a high probability of clinically significant/life threatening deterioration in the patient's condition which required my urgent intervention. FINAL IMPRESSION     1. Psychosis, unspecified psychosis type ([facility] [address]    2. Bipolar affective disorder, currently manic, severe, with psychotic features ([facility] [address]    3. Agitation requiring sedation protocol          DISPOSITION/PLAN   DISPOSITION        PATIENT REFERRED TO:  No follow-up provider specified.     DISCHARGE MEDICATIONS:  New Prescriptions    No medications on file          (Please note that portions of this note were completed with a voice recognition program.  Efforts were made to edit the dictations butoccasionally words are mis-transcribed.)    Geneva Galan MD (electronically signed)  AttendingEmerM[facility] Medical Center Physician          Geneva Galan., MD  04/26/21 5664

## 2021-04-27 ENCOUNTER — TELEPHONE (OUTPATIENT)
Dept: PRIMARY CARE CLINIC | Age: 56
End: 2021-04-27

## 2021-04-27 PROCEDURE — 1240000000 HC EMOTIONAL WELLNESS R&B

## 2021-04-27 PROCEDURE — 6370000000 HC RX 637 (ALT 250 FOR IP): Performed by: PSYCHIATRY & NEUROLOGY

## 2021-04-27 PROCEDURE — 99222 1ST HOSP IP/OBS MODERATE 55: CPT | Performed by: PSYCHIATRY & NEUROLOGY

## 2021-04-27 RX ORDER — LANOLIN ALCOHOL/MO/W.PET/CERES
3 CREAM (GRAM) TOPICAL NIGHTLY
Status: DISCONTINUED | OUTPATIENT
Start: 2021-04-27 | End: 2021-05-01 | Stop reason: HOSPADM

## 2021-04-27 RX ORDER — LISINOPRIL 20 MG/1
20 TABLET ORAL DAILY
Status: DISCONTINUED | OUTPATIENT
Start: 2021-04-27 | End: 2021-05-01 | Stop reason: HOSPADM

## 2021-04-27 RX ORDER — PANTOPRAZOLE SODIUM 40 MG/1
40 TABLET, DELAYED RELEASE ORAL
Status: DISCONTINUED | OUTPATIENT
Start: 2021-04-28 | End: 2021-05-01 | Stop reason: HOSPADM

## 2021-04-27 RX ORDER — ATORVASTATIN CALCIUM 40 MG/1
40 TABLET, FILM COATED ORAL NIGHTLY
Status: DISCONTINUED | OUTPATIENT
Start: 2021-04-27 | End: 2021-05-01 | Stop reason: HOSPADM

## 2021-04-27 RX ORDER — LISINOPRIL AND HYDROCHLOROTHIAZIDE 25; 20 MG/1; MG/1
1 TABLET ORAL DAILY
Status: DISCONTINUED | OUTPATIENT
Start: 2021-04-27 | End: 2021-04-27 | Stop reason: CLARIF

## 2021-04-27 RX ORDER — LEVOTHYROXINE SODIUM 0.05 MG/1
50 TABLET ORAL DAILY
Status: DISCONTINUED | OUTPATIENT
Start: 2021-04-27 | End: 2021-05-01 | Stop reason: HOSPADM

## 2021-04-27 RX ORDER — TRAZODONE HYDROCHLORIDE 50 MG/1
50 TABLET ORAL NIGHTLY
Status: DISCONTINUED | OUTPATIENT
Start: 2021-04-27 | End: 2021-05-01 | Stop reason: HOSPADM

## 2021-04-27 RX ORDER — FUROSEMIDE 20 MG/1
20 TABLET ORAL DAILY
Status: DISCONTINUED | OUTPATIENT
Start: 2021-04-27 | End: 2021-05-01 | Stop reason: HOSPADM

## 2021-04-27 RX ORDER — HYDROCHLOROTHIAZIDE 25 MG/1
25 TABLET ORAL DAILY
Status: DISCONTINUED | OUTPATIENT
Start: 2021-04-27 | End: 2021-05-01 | Stop reason: HOSPADM

## 2021-04-27 RX ORDER — AMLODIPINE BESYLATE 5 MG/1
10 TABLET ORAL DAILY
Status: DISCONTINUED | OUTPATIENT
Start: 2021-04-27 | End: 2021-05-01 | Stop reason: HOSPADM

## 2021-04-27 RX ORDER — RISPERIDONE 1 MG/1
1 TABLET, FILM COATED ORAL 2 TIMES DAILY
Status: DISCONTINUED | OUTPATIENT
Start: 2021-04-27 | End: 2021-05-01 | Stop reason: HOSPADM

## 2021-04-27 RX ADMIN — TRAZODONE HYDROCHLORIDE 50 MG: 50 TABLET ORAL at 20:47

## 2021-04-27 RX ADMIN — RISPERIDONE 1 MG: 1 TABLET, FILM COATED ORAL at 12:33

## 2021-04-27 RX ADMIN — ACETAMINOPHEN 650 MG: 325 TABLET ORAL at 21:42

## 2021-04-27 RX ADMIN — AMLODIPINE BESYLATE 10 MG: 5 TABLET ORAL at 12:32

## 2021-04-27 RX ADMIN — METOPROLOL TARTRATE 25 MG: 25 TABLET, FILM COATED ORAL at 20:46

## 2021-04-27 RX ADMIN — Medication 3 MG: at 20:46

## 2021-04-27 RX ADMIN — METOPROLOL TARTRATE 25 MG: 25 TABLET, FILM COATED ORAL at 12:32

## 2021-04-27 RX ADMIN — RISPERIDONE 1 MG: 1 TABLET, FILM COATED ORAL at 12:44

## 2021-04-27 RX ADMIN — FUROSEMIDE 20 MG: 20 TABLET ORAL at 12:32

## 2021-04-27 RX ADMIN — HYDROCHLOROTHIAZIDE 25 MG: 25 TABLET ORAL at 12:32

## 2021-04-27 RX ADMIN — DIVALPROEX SODIUM 750 MG: 500 TABLET, EXTENDED RELEASE ORAL at 20:47

## 2021-04-27 RX ADMIN — LISINOPRIL 20 MG: 20 TABLET ORAL at 12:32

## 2021-04-27 RX ADMIN — ATORVASTATIN CALCIUM 40 MG: 40 TABLET, FILM COATED ORAL at 20:47

## 2021-04-27 RX ADMIN — RISPERIDONE 1 MG: 1 TABLET, FILM COATED ORAL at 20:47

## 2021-04-27 RX ADMIN — LEVOTHYROXINE SODIUM 50 MCG: 50 TABLET ORAL at 12:32

## 2021-04-27 ASSESSMENT — PAIN DESCRIPTION - FREQUENCY: FREQUENCY: INTERMITTENT

## 2021-04-27 ASSESSMENT — PAIN DESCRIPTION - ONSET: ONSET: ON-GOING

## 2021-04-27 ASSESSMENT — PAIN DESCRIPTION - PAIN TYPE: TYPE: ACUTE PAIN

## 2021-04-27 ASSESSMENT — PAIN - FUNCTIONAL ASSESSMENT: PAIN_FUNCTIONAL_ASSESSMENT: ACTIVITIES ARE NOT PREVENTED

## 2021-04-27 ASSESSMENT — PAIN SCALES - GENERAL: PAINLEVEL_OUTOF10: 8

## 2021-04-27 NOTE — PROGRESS NOTES
[facility] Admission From ED  Nursing Admission Note    Admission Type: Involuntary    Reason for Admission: Sultana Julien is a 61 YOA, WM admitted to [address] [address] per services of Dr. Celso Villanueva. Patient was medicated in ED for severe agitation and groggy and unable to focus for this assessment. Information was obtained from his spouse by this authour. Flory Crouch has a psychiatric h/o s/f Bipolar disorder. He has not been treated for this in the last 8-10 years. He has had two hospitalizations on psych/behavioral health units, one at age 22 while in the [place] and one in 2007 or 2008. Most recently symptoms of : insomnia, agitation, amy, labile, disorganized thoughts began 2 months ago, escalating in the past two weeks. 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 Cheri lorenzo in ED to [facility] room 0615/615-01. Arrived on unit via [facility] with security and nursing escort. Pt appropriately attired in paper scrubs. Body assessment completed by ZitaRN and MICHAEL Xie with no contraband discovered. All tubes, lines, and drains were appropriately discontinued by ED staff prior to pt transfer to [facility]. Pt belongings and valuables inventoried and cataloged, stored per policy. Pt oriented to surroundings, program expectations, and copy pt rights given.  Received admit packet: [facility]

## 2021-04-27 NOTE — PROGRESS NOTES
SW met with treatment team to discuss pt's progress and setbacks. SW 2 was present. Pt was admitted, involuntary, for psychosis, has hx of psychiatric treatment/admissions, hx of Bipolar DO, has not been on medications for 9 to 10 years, reports manic behaviors such as not sleeping, agitation, disorganized thoughts, UTD SI/HI/AVH. Since admission, pt reportedly was groggy due to medications given in the ED, confused, but calm during admission process, UTD level of depression/anxiety, UTD SI/HI/AVH, continue current treatment plan.

## 2021-04-27 NOTE — PLAN OF CARE
Problem: Altered Mood, Psychotic Behavior:  Goal: Able to demonstrate trust by eating, participating in treatment and following staff's direction  Description: Able to demonstrate trust by eating, participating in treatment and following staff's direction  4/27/2021 0036 by Neal Cook RN  Outcome: Ongoing  4/27/2021 0002 by Neal Cook RN  Outcome: Ongoing  Goal: Able to verbalize decrease in frequency and intensity of hallucinations  Description: Able to verbalize decrease in frequency and intensity of hallucinations  4/27/2021 0036 by Neal Cook RN  Outcome: Ongoing  4/27/2021 0002 by Neal Cook RN  Outcome: Ongoing  Goal: Able to verbalize reality based thinking  Description: Able to verbalize reality based thinking  4/27/2021 0036 by Neal Cook RN  Outcome: Ongoing  4/27/2021 0002 by Neal Cook RN  Outcome: Ongoing  Goal: Absence of self-harm  Description: Absence of self-harm  4/27/2021 0036 by Neal Cook RN  Outcome: Ongoing  4/27/2021 0002 by Neal Cook RN  Outcome: Ongoing  Goal: Ability to achieve adequate nutritional intake will improve  Description: Ability to achieve adequate nutritional intake will improve  4/27/2021 0036 by Neal Cook RN  Outcome: Ongoing  4/27/2021 0002 by Neal Cook RN  Outcome: Ongoing  Goal: Ability to interact with others will improve  Description: Ability to interact with others will improve  4/27/2021 0036 by Neal Cook RN  Outcome: Ongoing  4/27/2021 0002 by Neal Cook RN  Outcome: Ongoing  Goal: Compliance with prescribed medication regimen will improve  Description: Compliance with prescribed medication regimen will improve  4/27/2021 0036 by Neal Cook RN  Outcome: Ongoing  4/27/2021 0002 by Neal Cook RN  Outcome: Ongoing  Goal: Patient specific goal  Description: Patient specific goal  4/27/2021 0036 by Neal Cook RN  Outcome: Ongoing  4/27/2021 0002 by Neal Cook RN  Outcome: Ongoing Problem:  Activity:  Goal: Will identify at least one activity in which they can participate  Description: Will identify at least one activity in which they can participate  4/27/2021 0036 by Willem Raymond RN  Outcome: Ongoing  4/27/2021 0002 by Willem Raymond RN  Outcome: Ongoing  Goal: Periods of wandering will decrease  Description: Periods of wandering will decrease  4/27/2021 0036 by Willem Raymond RN  Outcome: Ongoing  4/27/2021 0002 by Willem Raymond RN  Outcome: Ongoing     Problem: Safety:  Goal: Risk of elopement will decrease  Description: Risk of elopement will decrease  4/27/2021 0036 by Willem Raymond RN  Outcome: Ongoing  4/27/2021 0002 by Willem Raymond RN  Outcome: Ongoing  Goal: Ability to remain free from injury will improve  Description: Ability to remain free from injury will improve  4/27/2021 0036 by Willem Raymond RN  Outcome: Ongoing  4/27/2021 0002 by Willem Raymond RN  Outcome: Ongoing     Problem: Falls - Risk of:  Goal: Will remain free from falls  Description: Will remain free from falls  4/27/2021 0036 by Willem Raymond RN  Outcome: Ongoing  4/27/2021 0002 by Willem Raymond RN  Outcome: Ongoing  Goal: Absence of physical injury  Description: Absence of physical injury  4/27/2021 0036 by Willem Raymond RN  Outcome: Ongoing  4/27/2021 0002 by Willem Raymond RN  Outcome: Ongoing     Problem: Pain:  Goal: Pain level will decrease  Description: Pain level will decrease  4/27/2021 0036 by Willem Raymond RN  Outcome: Ongoing  4/27/2021 0002 by Willem Raymond RN  Outcome: Ongoing  Goal: Control of acute pain  Description: Control of acute pain  4/27/2021 0036 by Willem Raymond RN  Outcome: Ongoing  4/27/2021 0002 by Willem Raymond RN  Outcome: Ongoing  Goal: Control of chronic pain  Description: Control of chronic pain  4/27/2021 0036 by Willem Raymond RN  Outcome: Ongoing  4/27/2021 0002 by Willem Raymond RN  Outcome: Ongoing     Problem: Altered Mood, Manic Behavior:  Goal: Able to sleep  Description: Able to sleep  4/27/2021 0036 by Ari Hastings RN  Outcome: Ongoing  4/27/2021 0002 by Ari Hastings RN  Outcome: Ongoing  Goal: Able to verbalize decrease in frequency and intensity of racing thoughts  Description: Able to verbalize decrease in frequency and intensity of racing thoughts  4/27/2021 0036 by Ari Hastings RN  Outcome: Ongoing  4/27/2021 0002 by Ari Hastings RN  Outcome: Ongoing  Goal: Ability to disclose and discuss suicidal ideas will improve  Description: Ability to disclose and discuss suicidal ideas will improve  4/27/2021 0036 by Ari Hastings RN  Outcome: Ongoing  4/27/2021 0002 by Ari Hastings RN  Outcome: Ongoing  Goal: Absence of self-harm  Description: Absence of self-harm  4/27/2021 0036 by Ari Hastings RN  Outcome: Ongoing  4/27/2021 0002 by Ari Hastings RN  Outcome: Ongoing  Goal: Ability to achieve adequate nutritional intake will improve  Description: Ability to achieve adequate nutritional intake will improve  4/27/2021 0036 by Ari Hastings RN  Outcome: Ongoing  4/27/2021 0002 by Ari Hastings RN  Outcome: Ongoing  Goal: Ability to interact with others will improve  Description: Ability to interact with others will improve  4/27/2021 0036 by Ari Hastings RN  Outcome: Ongoing  4/27/2021 0002 by Ari Hastings RN  Outcome: Ongoing  Goal: Ability to demonstrate self-control will improve  Description: Ability to demonstrate self-control will improve  4/27/2021 0036 by Ari Hastings RN  Outcome: Ongoing  4/27/2021 0002 by Ari Hastings RN  Outcome: Ongoing  Goal: Mood stable  Description: Mood stable  4/27/2021 0036 by Ari Hastings RN  Outcome: Ongoing  4/27/2021 0002 by Ari Hastings RN  Outcome: Ongoing  Goal: Patient specific goal  Description: Patient specific goal  4/27/2021 0036 by Ari Hastings RN  Outcome: Ongoing  4/27/2021 0002 by Ari Hastings RN  Outcome: Ongoing

## 2021-04-27 NOTE — H&P
Department of Psychiatry  Attending History and Physical        CHIEF COMPLAINT:  \"I just needed water\"     History obtained from: patient, chart    HISTORY OF PRESENT ILLNESS:    76-year-old white male with previous history of bipolar disorder, hypothyroidism, HTN, sleep apnea, admitted for amy. He was agitated in the ER and required Haldol. Presented with pressured speech. Manic symptoms reportedly started about 2 months ago. He has not been sleeping and has been hyperreligious. Per wife, patient had a similar episode many years ago. He has been having problems with speech which reportedly gets worse in the evening. Recent admission for numbness and weakness. Patient has been worked up for a neuromuscular disease. Recently started on Namenda. Followed by Neurology. Dr. Lisa Koch patient. All records and lab results reviewed. Patient reportedly has been on a diet. Per Kaycee Perdomo, phentermine filled in December 2020. Also takes Gabapentin. Buspar listed as current psychotropic. Collateral from wife: Tee Mcnulty, when he was 22, he was in [employer], he had some kind of breakdown and was in a [facility] [address] for awhile before her was discharged from the [address] [place] [address]. In 2007 or 2008 Oralia Galvez had some problems similar to what's been happening to him recently. He had insomnia, quit eating, was hyperactive, spending money and driving erratically, exhibiting weird behavior, like whispering in everyone's ear at [employer] service. He was admitted to [facility] at that time and diagnosed with Bipolar. The medications he was on completely incapacitated him, and he was eventually treated with Depakote. He took that for two to three years but, for at least nine to ten years he has not been on any psych medications.   For about two years now I have noticed signs of depression, such as; little interest in doing things with family, palacios and irritable, just wanting to sit in his recliner after work and not doing any home repairs. Then about two months ago he began praying a lot more, became more active, decided to go on a diet and lost forty pounds. He went from sitting all the time to not sitting down at all. To always having to be up doing something. He was acting manic. For these last two weeks he has stopped sleeping, been agitated, angry, labile and had disorganized thoughts. He repeatedly would say, \"do you believe me, don't you believe me. \"  About six months ago Oralia Galvez was started on Namenda for neuropathy in his legs. About a week ago I had him stop taking it and that seemed to make things worse not better. \"      Patient presents with restlessness and pressured speech when seen this morning. He is cooperative but difficult to interrupt. Thought process is tangential.  He is unable to tell us why he is here. Hyperfocused on getting water. States in the ER he was trying to get water. Randomly starts talking about throat surgery which he had for sleep apnea years ago. Then switches to talking about the upcoming vacation. States he has been sleeping poorly and praying a lot. \"I talk to God. \"  He denies any recent stressors in his life. Reports stable marriage. States he has been losing weight but no longer takes phentermine. States he was on Depakote in the past but has been taken off by his primary care provider. Recently put on Namenda. Denies current alcohol and illicit drug use. Open to medication adjustment. Gave us permission to contact his wife. PSYCHIATRIC HISTORY:    Diagnoses: Bipolar disorder  Suicide attempts/gestures: Denies   Prior hospitalizations: 2N - 2007  Medication trials: Depakote, Buspar  Mental health contact: Lost to follow-up   Head trauma: Denies    SUBSTANCE USE HISTORY:  Denies alcohol and illicit drug use. Denies tobacco use.     Past Medical History:    Past Medical History:   Diagnosis Date    Bipolar disorder ([facility] [address]     Colon polyps     gabapentin (NEURONTIN) 400 MG capsule Take 1 capsule by mouth 3 times daily for 30 days. 20  Everton Acosta MD   furosemide (LASIX) 20 MG tablet TAKE 1 TABLET BY MOUTH ONCE DAILY. 20   Everton Acosta MD   metoprolol tartrate (LOPRESSOR) 25 MG tablet TAKE 2 TABLETS BY MOUTH TWICE DAILY 20   Everton Acosta MD   busPIRone (BUSPAR) 7.5 MG tablet TAKE 1 TABLET BY MOUTH UP TO 3 TIMES DAILY AS NEEDED - AFTER 1 WEEK, MAY INCREASE TO 2 TABLETS 3 TIMES DAILY IF NEEDED  Patient taking differently: TAKE 1 TABLET BY MOUTH 3 TIMES DAILY AS NEEDED 20   Everton Acosta MD   aspirin 81 MG chewable tablet Take 81 mg by mouth daily    Historical Provider, MD   omeprazole (PRILOSEC) 20 MG delayed release capsule TAKE 1 CAPSULE BY MOUTH DAILY 19   Everton Acosta MD   sildenafil (VIAGRA) 100 MG tablet Take 1 tablet by mouth as needed for Erectile Dysfunction Generic sildenafil 100 mg or strongest dose 19   Everton Acosta MD       Allergies:  Patient has no known allergies. Social History:  , lives with wife and adopted children. Works at a [employer]. Family History:   Family History   Problem Relation Age of Onset    Diabetes Mother     Kidney Disease Mother     Dementia Mother          at 68 - Lewy Body Dementia    Coronary Art Dis Father          at [de-identified]    Colon Cancer Neg Hx     Colon Polyps Neg Hx     Esophageal Cancer Neg Hx     Liver Cancer Neg Hx     Liver Disease Neg Hx     Rectal Cancer Neg Hx     Stomach Cancer Neg Hx      No history of psychiatric illness or suicide attempts. REVIEW OF SYSTEMS:  General: No fevers, chills, night sweats. Recent weight loss. Head: No headache, no migraine. Eyes: No recent visual changes. Ears: No recent hearing changes. Nose: No increased congestion or change in sense of smell. Throat: No sore throat, no trouble swallowing. Cardiovascular: No chest pain or palpitations, or dizziness.   Respiratory: No cough, wheezes, congestion, or shortness of breath. Gastrointestinal: No abdominal pain, nausea or vomiting, no diarrhea or constipation. Musculo-skeletal: No edema, deformities, or loss of functions. Neurological: No loss of consciousness, abnormal sensations, or weakness. Skin: No rash, abrasions or bruises. PHYSICAL EXAM:  On observation  GENERAL APPEARANCE: Stated age, obese, in NAD. HEAD: Normocephalic, atraumatic. CHEST: No deformities. MUSCULOSKELTAL: No obvious deformities, clubbing, cyanosis or edema. NEUROLOGICAL: Alert, oriented x 3, CN II-XII grossly intact. No abnormal movements or tremors. Gait stable. SKIN: Warm, dry, intact, no rash, abrasions, bruises. Vitals:  BP (!) 143/83   Pulse 95   Temp 97.4 °F (36.3 °C) (Temporal)   Resp 20   Ht 5' 9\" (1.753 m)   Wt 253 lb (114.8 kg)   SpO2 100%   BMI 37.36 kg/m²     Mental Status Examination:    Appearance: Stated age, obese, casually dressed. Gait stable. No abnormal movements or tremor. Behavior: Restless, cooperative  Speech: Pressured, difficult to interrupt  Mood: UTO   Affect: Connstricted  Thought Process: Appears tangential  Thought Content: Denies suicidal and homicidal ideation. Paranoid. Hyperreligious. Perceptions: States that God is talking to him. Denies visual hallucinations at present time. Not responding to internal stimuli. Concentration: Impaired. Orientation: to person, place, date, and situation. Language: Intact. Fund of information: Intact. Memory: Recent and remote appear intact. Neurovegitative: Fair appetite and poor sleep. Insight: Impaired. Judgment: Impaired.     DATA:  Lab Results   Component Value Date    WBC 10.4 04/26/2021    HGB 13.8 (L) 04/26/2021    HCT 42.7 04/26/2021    MCV 97.9 (H) 04/26/2021     04/26/2021     Lab Results   Component Value Date     04/26/2021    K 3.5 04/26/2021     04/26/2021    CO2 20 (L) 04/26/2021    BUN 14 04/26/2021    CREATININE 0.8 04/26/2021 GLUCOSE 100 04/26/2021    CALCIUM 9.0 04/26/2021    PROT 7.0 04/26/2021    LABALBU 4.0 04/26/2021    BILITOT 0.6 04/26/2021    ALKPHOS 51 04/26/2021    AST 24 04/26/2021    ALT 17 04/26/2021    LABGLOM >60 04/26/2021    GFRAA >59 04/26/2021    GLOB 2.7 03/31/2017       ASSESSMENT AND PLAN:  DSM-5 DIAGNOSIS:   Impression:  Bipolar I disorder, most recent episode manic, severe, with psychotic features    Medical issues  Hypertension  Hypothyroidism  Sleep apnea  Obesity    Patient appears manic and is meeting the criteria for inpatient psychiatric treatment. Plan:   -Admit to [facility] Unit and monitor on 15 minute checks. Suicide precautions.  Ligia Daily reviewed. -Gather collateral information from family with release.  -Medical monitoring to be performed by Dr. Steffany Sandoval and associates. Order routine labs. Hold gabapentin for now. Encourage use of CPAP. -Acclimate to the unit. Provide supportive psychotherapy.  -Encourage participation in groups and therapeutic activities as appropriate. Work on coping skills. -Medications:    Stop BuSpar which may contribute to mood destabilization. Restart Depakote for mood stabilization. Discussed benefits, alternatives, and risks including black box warnings of life-threatening pancreatitis, hepatotoxicity (usually within first six months of therapy), SIADH, hyponatremia, pancytopenia, hyperammonemia, Jenkins-Babatunde syndrome, hallucinations, withdrawal seizures if abruptly stopped, headache, sedation, and alopecia. A trial of Risperdal for psychotic features. As needed antipsychotics for agitation.   Discussed alternatives, benefits, & risks, including - but not limited to - black box warning regarding increased all-cause mortality in elderly with dementia (incl. cardiovascular & infectious risks), extra-pyramidal symptoms/tardive dyskinesia, metabolic side effects (incl. metabolic syndrome/hyperglycemia/dyslipidemia), cardiac side effects including sudden cardiac death, hypotension & increased fall risk. Trazodone and melatonin for sleep. Discussed benefits, alternatives and risks involved with Trazodone, including - but not limited to - possible adverse effects of dizziness, hypotension, increased risk of falls w/ need to slowly transition between positions, excessive drowsiness, dry mouth, constipation or allergic reaction were discussed with the patient. Also discussed increased risk of priapism which is a painful, prolonged erection which constitutes a medical emergency for which the patient would need to notify provider while in the hospital or go to the nearest emergency room for treatment. Further discussed possibility of Serotonin Syndrome (sx including diaphoresis, agitation, muscle tension increase/rigidity, fever) with use of other serotonergic agents.  Advised caution in operating vehicles/machinery after taking trazodone if continued as an outpatient.     -The patient has verbalized understanding and has capacity to give informed consent.  -SW help evaluate home environment and provide outpatient resources.  -Discuss with treatment team.

## 2021-04-27 NOTE — PLAN OF CARE
Problem: VIOLENT RESTRAINTS  Goal: Removal from restraints as soon as assessed to be safe  4/27/2021 0002 by Jennifer Cross RN  Outcome: Ongoing  4/26/2021 2359 by Jennifer Cross RN  Outcome: Ongoing  Goal: No harm/injury to patient while restraints in use  4/27/2021 0002 by Jennifer Cross RN  Outcome: Ongoing  4/26/2021 2359 by Jennifer Cross RN  Outcome: Ongoing  Goal: Patient's dignity will be maintained  4/27/2021 0002 by Jennifer Cross RN  Outcome: Ongoing  4/26/2021 2359 by Jennifer Cross RN  Outcome: Ongoing     Problem:  Activity:  Goal: Will identify at least one activity in which they can participate  Description: Will identify at least one activity in which they can participate  Outcome: Ongoing  Goal: Periods of wandering will decrease  Description: Periods of wandering will decrease  Outcome: Ongoing     Problem: Safety:  Goal: Risk of elopement will decrease  Description: Risk of elopement will decrease  Outcome: Ongoing  Goal: Ability to remain free from injury will improve  Description: Ability to remain free from injury will improve  Outcome: Ongoing     Problem: Falls - Risk of:  Goal: Will remain free from falls  Description: Will remain free from falls  Outcome: Ongoing  Goal: Absence of physical injury  Description: Absence of physical injury  Outcome: Ongoing     Problem: Pain:  Goal: Pain level will decrease  Description: Pain level will decrease  Outcome: Ongoing  Goal: Control of acute pain  Description: Control of acute pain  Outcome: Ongoing  Goal: Control of chronic pain  Description: Control of chronic pain  Outcome: Ongoing     Problem: Altered Mood, Manic Behavior:  Goal: Able to sleep  Description: Able to sleep  Outcome: Ongoing  Goal: Able to verbalize decrease in frequency and intensity of racing thoughts  Description: Able to verbalize decrease in frequency and intensity of racing thoughts  Outcome: Ongoing  Goal: Ability to disclose and discuss suicidal ideas will improve Description: Ability to disclose and discuss suicidal ideas will improve  Outcome: Ongoing  Goal: Absence of self-harm  Description: Absence of self-harm  Outcome: Ongoing  Goal: Ability to achieve adequate nutritional intake will improve  Description: Ability to achieve adequate nutritional intake will improve  Outcome: Ongoing  Goal: Ability to interact with others will improve  Description: Ability to interact with others will improve  Outcome: Ongoing  Goal: Ability to demonstrate self-control will improve  Description: Ability to demonstrate self-control will improve  Outcome: Ongoing  Goal: Mood stable  Description: Mood stable  Outcome: Ongoing  Goal: Patient specific goal  Description: Patient specific goal  Outcome: Ongoing     Problem: Altered Mood, Psychotic Behavior:  Goal: Able to demonstrate trust by eating, participating in treatment and following staff's direction  Description: Able to demonstrate trust by eating, participating in treatment and following staff's direction  Outcome: Ongoing  Goal: Able to verbalize decrease in frequency and intensity of hallucinations  Description: Able to verbalize decrease in frequency and intensity of hallucinations  Outcome: Ongoing  Goal: Able to verbalize reality based thinking  Description: Able to verbalize reality based thinking  Outcome: Ongoing  Goal: Absence of self-harm  Description: Absence of self-harm  Outcome: Ongoing  Goal: Ability to achieve adequate nutritional intake will improve  Description: Ability to achieve adequate nutritional intake will improve  Outcome: Ongoing  Goal: Ability to interact with others will improve  Description: Ability to interact with others will improve  Outcome: Ongoing  Goal: Compliance with prescribed medication regimen will improve  Description: Compliance with prescribed medication regimen will improve  Outcome: Ongoing  Goal: Patient specific goal  Description: Patient specific goal  Outcome: Ongoing

## 2021-04-27 NOTE — TELEPHONE ENCOUNTER
Ry Singh from [facility] called from the 6th floor stating patient wanted to speak with  I informed her patient did not have an appointment and  was not available. Since he is admitted and under another physician care.

## 2021-04-27 NOTE — PLAN OF CARE
Problem: Altered Mood, Manic Behavior:  Goal: Ability to interact with others will improve  Description: Ability to interact with others will improve  4/27/2021 1126 by Gilberto Tillman  Outcome: Ongoing  Note:                                                                     Group Therapy Note    Date: 4/27/2021  Start Time: 1000  End Time:  1100  Number of Participants: 5    Type of Group: Psychoeducation    Wellness Binder Information  Module Name:  [address] [place] [address]  Session Number:  1    Group Goal for Pt: To improve knowledge of practical facts about depression    Notes:  Pt demonstrated improved knowledge of practical facts about depression by actively participating in group activity.     Status After Intervention:  Unchanged    Participation Level: Interactive and Monopolizing    Participation Quality: Sharing      Speech:  pressured      Thought Process/Content: Linear      Affective Functioning: Congruent      Mood: anxious and depressed      Level of consciousness:  Alert and Oriented x4      Response to Learning: Able to verbalize current knowledge/experience, Able to verbalize/acknowledge new learning, and Progressing to goal      Endings: None Reported    Modes of Intervention: Education      Discipline Responsible: Psychoeducational Specialist      Signature:  Gilberto Tillman

## 2021-04-27 NOTE — PROGRESS NOTES
Requirement Note     SW met with pt to complete Psychosocial and CSSR-S on this date. Patients long and short term goals discussed. Patient voiced understanding. Treatment plan sheet signed. Patient verbalized understanding of the treatment plan. Patient participated in goals and objectives of the treatment plan. Patient completed safety plan with , patient received copy of plan, and original was placed into patient's chart. SW explained treatment goals with pt. Decreasing anxiety by improvement of positive coping patterns was discussed. Pt acknowledged understanding of treatment goals and signed treatment plan signature sheet. In the last 6 months has the pt been a danger to self: YES  In the last 6 months has the pt been a danger to others: NO  Legal Guardian/POA: NO     Provided patient with [employer] handout entitled \"Quitting Smoking. \"  Reviewed handout with patient: addressing dangers of smoking, developing coping skills, and providing basic information about quitting. Patient received all components practical counseling of tobacco practical counseling during the hospital stay.

## 2021-04-27 NOTE — BH NOTE
answered per spouse. If yes, when and how many times:  Describe suicide attempts:   HI: does not, answered per spouse  If yes describe:   Delusions: does  If yes describe: Spouse describes patient and family as very Church and of Bahai thao. Their thao believes God speaks to them but, as of late patient has been very focused on this and making bizarre statements such as, 'God told me my phone is going to ring', spouse reports this as unusual behavior. 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 In addition in 2007 or 2008 patient was in-patient at [facility] with diagnosis of Bipolar. Outpatient Psychiatric Treatment: not for years, spouse reports    Family History:    Family history of mental illness: UTD   \"Depression\",\"Anxiety\",\"Bipolar\",\"Schizophrenia\",\"Borderline\",\"ADHD\"}  Family members with suicide attempt: UTD   If yes explain (attempted or completed):    Substance Abuse History:     SBIRT Completed: yes  Brief Intervention completed if needed:  NA    Current ETOH LEVELS: <10    ETOH Usage:     Amount drinking daily: denied    Date of last drink:   Longest period of sobriety:    Substance/Chemical Abuse/Recreational Drug History:  Substance used: denies  Date of last substance use:    Tobacco Use: no   History of rehab treatment: UTD  How many times in rehab:  Last time in rehab:  Family history of substance abuse: UTD    Opiates: It was discussed with pt they would not be receiving opiates unless they were within 3 days post surgery/acute injury. Patient voiced understanding and agreed. Psychiatric Review Of Systems:     Recent Sleep changes: yes , per spouse \"for last two weeks he has slept very little\". Recent appetite changes: Potential, patient has been dieting and lost approximately 40 lbs over unknown amount of time. Recent weight changes/Pounds gained (+) or lost (-): yes, purposely dieting. Medical History:     Medical Diagnosis/Issues:cervical disk disease, hypothyroidism, HAWK with poor compliance with home bi-pap, GERD, Left Bels Palsy-remote, obesity, numbness of both LE and around mouth, dysphagia. CT today in ED: no  Use of 02 or CPAP: yes, HAWK, but poor compliance. Ambulatory: yes  Independent or Need assistance with Self Care: independent     PCP: Kulwant Garcia MD     Current Medications:   Scheduled Meds: No current facility-administered medications for this encounter. Current Outpatient Medications:     phentermine 15 MG capsule, TAKE 1 CAPSULE BY MOUTH ONCE DAILY IN THE MORNING., Disp: 30 capsule, Rfl: 2    miconazole (MICOTIN) 2 % cream, Apply topically 2 times daily for 4 weeks to area, Disp: 120 g, Rfl: 1    levothyroxine (SYNTHROID) 50 MCG tablet, TAKE 1 TABLET BY MOUTH ONCE DAILY. , Disp: 90 tablet, Rfl: 0    lisinopril-hydroCHLOROthiazide (PRINZIDE;ZESTORETIC) 20-25 MG per tablet, TAKE 1 TABLET BY MOUTH ONCE DAILY. , Disp: 90 tablet, Rfl: 0    amLODIPine (NORVASC) 10 MG tablet, TAKE 1 TABLET BY MOUTH ONCE DAILY. , Disp: 90 tablet, Rfl: 0    atorvastatin (LIPITOR) 40 MG tablet, TAKE 1 TABLET BY MOUTH ONCE DAILY. , Disp: 90 tablet, Rfl: 0    meclizine (ANTIVERT) 25 MG tablet, TAKE 1/2 TABLET BY MOUTH THREE TIMES DAILY AS NEEDED FOR DIZZINESS., Disp: 15 tablet, Rfl: 0    memantine (NAMENDA) 10 MG tablet, Take 1 tablet by mouth 2 times daily, Disp: 60 tablet, Rfl: 5   gabapentin (NEURONTIN) 400 MG capsule, Take 1 capsule by mouth 3 times daily for 30 days. , Disp: 90 capsule, Rfl: 3    furosemide (LASIX) 20 MG tablet, TAKE 1 TABLET BY MOUTH ONCE DAILY. , Disp: 30 tablet, Rfl: 5    metoprolol tartrate (LOPRESSOR) 25 MG tablet, TAKE 2 TABLETS BY MOUTH TWICE DAILY, Disp: 120 tablet, Rfl: 5    busPIRone (BUSPAR) 7.5 MG tablet, TAKE 1 TABLET BY MOUTH UP TO 3 TIMES DAILY AS NEEDED - AFTER 1 WEEK, MAY INCREASE TO 2 TABLETS 3 TIMES DAILY IF NEEDED (Patient taking differently: TAKE 1 TABLET BY MOUTH 3 TIMES DAILY AS NEEDED), Disp: 180 tablet, Rfl: 3    aspirin 81 MG chewable tablet, Take 81 mg by mouth daily, Disp: , Rfl:     omeprazole (PRILOSEC) 20 MG delayed release capsule, TAKE 1 CAPSULE BY MOUTH DAILY, Disp: 30 capsule, Rfl: 0    sildenafil (VIAGRA) 100 MG tablet, Take 1 tablet by mouth as needed for Erectile Dysfunction Generic sildenafil 100 mg or strongest dose, Disp: 8 tablet, Rfl: 5     Mental Status Evaluation:     Appearance:  age appropriate, casually dressed and within normal Limits   Behavior:  groggy   Speech:  increased latency of response, normal pitch and normal volume   Mood:   difficult to assess d/t medications received. Affect:   labile   Thought Process:   UTD, d/t sedation medications he received. Thought Content:  UTD   Sensorium:   UTD, does identify year and location before falling asleep. Cognition:  impaired due to Medications received for agitation   Insight:  impaired       Collateral Information:     Name: Criselda Woods, who is a licensed RN  Relationship: spouse  Phone Number: [phone]  Collateral: Before I met Gordo Morfin, when he was 22, he was in [employer], he had some kind of breakdown and was in a [facility] for awhile before her was discharged from the [facility]. In 2007 or 2008 Gordo Morfin had some problems similar to what's been happening to him recently.   He had insomnia, quit eating, was hyperactive, spending money and driving erratically, exhibiting weird behavior, like whispering in everyone's ear at [employer] service. He was admitted to [facility] at that time and diagnosed with Bipolar. The medications he was on completely incapacitated him, and he was eventually treated with Depakote. He took that for two to three years but, for at least nine to ten years he has not been on any psych medications. For about two years now I have noticed signs of depression, such as; little interest in doing things with family, palacios and irritable, just wanting to sit in his recliner after work and not doing any home repairs. Then about two months ago he began praying a lot more, became more active, decided to go on a diet and lost forty pounds. He went from sitting all the time to not sitting down at all. To always having to be up doing something. He was acting manic. For these last two weeks he has stopped sleeping, been agitated, angry, labile and had disorganized thoughts. He repeatedly would say, \"do you believe me, don't you believe me. \"  About six months ago Robin Montanez was started on Namenda for neuropathy in his legs. About a week ago I had him stop taking it and that seemed to make things worse not better. \"      Current living arrangement: with spouse and children  Current Support System: family, Scientologist  Employment: self employed    Disposition:     Choose one of the options below for disposition:     1. Decision to admit to :yes    If yes, which unit Adult or Geriatric Unit:  Geriatric  Is patient voluntary: no  If no has a 72 hold been initiated: yes   Admission Diagnosis:  Psychosis, unspecified. Does the patient have a guardian or Medical POA: no   Has the guardian been notified or Medical POA: na      2. Decision to Discharge:   Does not meet criteria for acceptance to   unit due to:     3. Transferred:       Patient was transferred due to:      Other follow up information provided:      Lazarus Soni RN

## 2021-04-27 NOTE — PROGRESS NOTES
Admission Note      Reason for admission/Target Symptom: Patient admitted to [facility] due to  male who presents to the emergency department for evaluation of agitation, confusion, and several other symptoms recently. Patient's wife states that this has been progressively worsening over the last several days. Per medical records, patient has a history of bipolar disorder. Wife states that he had an episode many years ago with similar symptoms and describes psychosis and states he was admitted to the hospital for that. Recently he has been undergoing additional work-up for other symptoms like a speech difficulty that worsens when he gets tired later in the evening and they were concerned for some neurologic problem, possibly a neuromuscular issue. Diagnoses: Depression NOS  UDS: Neg  BAL:  Neg    SW met with treatment team to discuss patient's treatment including care planning, discharge planning, and follow-up needs. Pt has been admitted to [facility]. Treatment team has identified patient's discharge needs as medication management and outpatient therapy/counseling. Pt confirmed  the need for ongoing treatment post inpatient stay. Pt was also provided a handout of contact information for drug and alcohol treatment centers and other community support service such as [facility], AA, and [facility].

## 2021-04-27 NOTE — ED NOTES
Report called to MICHAEL Villarreal. Security called to transfer pt upstairs.       Leah Sauceda RN  04/26/21 1373

## 2021-04-28 LAB
CHOLESTEROL, TOTAL: 101 MG/DL (ref 160–199)
HBA1C MFR BLD: 5.1 % (ref 4–6)
HDLC SERPL-MCNC: 31 MG/DL (ref 55–121)
LDL CHOLESTEROL CALCULATED: 52 MG/DL
TRIGL SERPL-MCNC: 90 MG/DL (ref 0–149)
VITAMIN D 25-HYDROXY: 19.1 NG/ML

## 2021-04-28 PROCEDURE — 83036 HEMOGLOBIN GLYCOSYLATED A1C: CPT

## 2021-04-28 PROCEDURE — 99231 SBSQ HOSP IP/OBS SF/LOW 25: CPT | Performed by: PSYCHIATRY & NEUROLOGY

## 2021-04-28 PROCEDURE — 6370000000 HC RX 637 (ALT 250 FOR IP): Performed by: PSYCHIATRY & NEUROLOGY

## 2021-04-28 PROCEDURE — 6370000000 HC RX 637 (ALT 250 FOR IP): Performed by: FAMILY MEDICINE

## 2021-04-28 PROCEDURE — 1240000000 HC EMOTIONAL WELLNESS R&B

## 2021-04-28 PROCEDURE — 82306 VITAMIN D 25 HYDROXY: CPT

## 2021-04-28 PROCEDURE — 80061 LIPID PANEL: CPT

## 2021-04-28 PROCEDURE — 36415 COLL VENOUS BLD VENIPUNCTURE: CPT

## 2021-04-28 RX ORDER — ERGOCALCIFEROL 1.25 MG/1
50000 CAPSULE ORAL WEEKLY
Status: DISCONTINUED | OUTPATIENT
Start: 2021-04-28 | End: 2021-05-01 | Stop reason: HOSPADM

## 2021-04-28 RX ORDER — DIVALPROEX SODIUM 500 MG/1
1000 TABLET, EXTENDED RELEASE ORAL NIGHTLY
Status: DISCONTINUED | OUTPATIENT
Start: 2021-04-28 | End: 2021-04-29

## 2021-04-28 RX ADMIN — DIVALPROEX SODIUM 1000 MG: 500 TABLET, EXTENDED RELEASE ORAL at 20:51

## 2021-04-28 RX ADMIN — METOPROLOL TARTRATE 25 MG: 25 TABLET, FILM COATED ORAL at 08:08

## 2021-04-28 RX ADMIN — METOPROLOL TARTRATE 25 MG: 25 TABLET, FILM COATED ORAL at 20:51

## 2021-04-28 RX ADMIN — PANTOPRAZOLE SODIUM 40 MG: 40 TABLET, DELAYED RELEASE ORAL at 05:53

## 2021-04-28 RX ADMIN — RISPERIDONE 1 MG: 1 TABLET, FILM COATED ORAL at 08:09

## 2021-04-28 RX ADMIN — ERGOCALCIFEROL 50000 UNITS: 1.25 CAPSULE ORAL at 10:42

## 2021-04-28 RX ADMIN — ACETAMINOPHEN 650 MG: 325 TABLET ORAL at 01:52

## 2021-04-28 RX ADMIN — RISPERIDONE 1 MG: 1 TABLET, FILM COATED ORAL at 20:51

## 2021-04-28 RX ADMIN — FUROSEMIDE 20 MG: 20 TABLET ORAL at 08:09

## 2021-04-28 RX ADMIN — TRAZODONE HYDROCHLORIDE 50 MG: 50 TABLET ORAL at 20:51

## 2021-04-28 RX ADMIN — AMLODIPINE BESYLATE 10 MG: 5 TABLET ORAL at 08:09

## 2021-04-28 RX ADMIN — ACETAMINOPHEN 650 MG: 325 TABLET ORAL at 11:49

## 2021-04-28 RX ADMIN — ATORVASTATIN CALCIUM 40 MG: 40 TABLET, FILM COATED ORAL at 20:51

## 2021-04-28 RX ADMIN — ACETAMINOPHEN 650 MG: 325 TABLET ORAL at 20:52

## 2021-04-28 RX ADMIN — Medication 3 MG: at 20:51

## 2021-04-28 RX ADMIN — LEVOTHYROXINE SODIUM 50 MCG: 50 TABLET ORAL at 05:53

## 2021-04-28 RX ADMIN — HYDROCHLOROTHIAZIDE 25 MG: 25 TABLET ORAL at 08:09

## 2021-04-28 RX ADMIN — LISINOPRIL 20 MG: 20 TABLET ORAL at 08:09

## 2021-04-28 RX ADMIN — RISPERIDONE 2 MG: 1 TABLET ORAL at 01:50

## 2021-04-28 ASSESSMENT — PAIN DESCRIPTION - DESCRIPTORS
DESCRIPTORS: ACHING
DESCRIPTORS: ACHING

## 2021-04-28 ASSESSMENT — PAIN DESCRIPTION - PAIN TYPE: TYPE: ACUTE PAIN

## 2021-04-28 ASSESSMENT — PAIN - FUNCTIONAL ASSESSMENT
PAIN_FUNCTIONAL_ASSESSMENT: ACTIVITIES ARE NOT PREVENTED
PAIN_FUNCTIONAL_ASSESSMENT: ACTIVITIES ARE NOT PREVENTED

## 2021-04-28 ASSESSMENT — PAIN SCALES - GENERAL
PAINLEVEL_OUTOF10: 0
PAINLEVEL_OUTOF10: 6
PAINLEVEL_OUTOF10: 10

## 2021-04-28 ASSESSMENT — PAIN SCALES - WONG BAKER: WONGBAKER_NUMERICALRESPONSE: 0

## 2021-04-28 ASSESSMENT — PAIN DESCRIPTION - ORIENTATION: ORIENTATION: LEFT

## 2021-04-28 ASSESSMENT — PAIN DESCRIPTION - LOCATION
LOCATION: TEETH
LOCATION: TEETH

## 2021-04-28 ASSESSMENT — PAIN DESCRIPTION - ONSET
ONSET: ON-GOING
ONSET: ON-GOING

## 2021-04-28 ASSESSMENT — PAIN DESCRIPTION - FREQUENCY
FREQUENCY: INTERMITTENT
FREQUENCY: INTERMITTENT

## 2021-04-28 ASSESSMENT — PAIN DESCRIPTION - PROGRESSION
CLINICAL_PROGRESSION: NOT CHANGED
CLINICAL_PROGRESSION: NOT CHANGED

## 2021-04-28 NOTE — PROGRESS NOTES
Pt up this morning with encouragement. Pt reports that he slept fair last night with trouble falling asleep. Pt rated his concentration as good. Pt denies any depression or anxiety. Pt is calm and cooperative with staff. Pt does not display any signs or symptoms of covid-19. No distress noted. Will continue to monitor.

## 2021-04-28 NOTE — PLAN OF CARE
Problem: VIOLENT RESTRAINTS  Goal: Removal from restraints as soon as assessed to be safe  Outcome: Ongoing  Goal: No harm/injury to patient while restraints in use  Outcome: Ongoing  Goal: Patient's dignity will be maintained  Outcome: Ongoing     Problem:  Activity:  Goal: Will identify at least one activity in which they can participate  Description: Will identify at least one activity in which they can participate  4/28/2021 0901 by Justice Almaraz RN  Outcome: Ongoing  4/28/2021 0221 by Sully Rosen RN  Outcome: Ongoing  Goal: Periods of wandering will decrease  Description: Periods of wandering will decrease  4/28/2021 0901 by Justice Almaraz RN  Outcome: Ongoing  4/28/2021 0221 by Sully Rosen RN  Outcome: Ongoing     Problem: Safety:  Goal: Risk of elopement will decrease  Description: Risk of elopement will decrease  4/28/2021 0901 by Justice Almaraz RN  Outcome: Ongoing  4/28/2021 0221 by Sully Rosen RN  Outcome: Ongoing  Goal: Ability to remain free from injury will improve  Description: Ability to remain free from injury will improve  4/28/2021 0901 by Justice Almaraz RN  Outcome: Ongoing  4/28/2021 0221 by Sully Rosen RN  Outcome: Ongoing     Problem: Falls - Risk of:  Goal: Will remain free from falls  Description: Will remain free from falls  4/28/2021 0901 by Justice Almaraz RN  Outcome: Ongoing  4/28/2021 0221 by Sully Rosen RN  Outcome: Met This Shift  Goal: Absence of physical injury  Description: Absence of physical injury  4/28/2021 0901 by Justice Almaraz RN  Outcome: Ongoing  4/28/2021 0221 by Sully Rosen RN  Outcome: Met This Shift     Problem: Pain:  Goal: Pain level will decrease  Description: Pain level will decrease  4/28/2021 0901 by Justice Almaraz RN  Outcome: Ongoing  4/28/2021 0221 by Sully Rosen RN  Outcome: Ongoing  Goal: Control of acute pain  Description: Control of acute pain  4/28/2021 0901 by Justice Almaraz RN  Outcome: Ongoing  4/28/2021 0221 by Sully Rosen RN  Outcome: Ongoing  Goal: Control of chronic pain  Description: Control of chronic pain  4/28/2021 0901 by Yvonne Shen RN  Outcome: Ongoing  4/28/2021 0221 by Keisha Valdivia RN  Outcome: Ongoing     Problem: Altered Mood, Manic Behavior:  Goal: Able to sleep  Description: Able to sleep  4/28/2021 0901 by Yvonne Shen RN  Outcome: Ongoing  4/28/2021 0221 by Keisha Valdivia RN  Outcome: Ongoing  Goal: Able to verbalize decrease in frequency and intensity of racing thoughts  Description: Able to verbalize decrease in frequency and intensity of racing thoughts  4/28/2021 0901 by Yvonne Shen RN  Outcome: Ongoing  4/28/2021 0221 by Keisha Valdivia RN  Outcome: Ongoing  Goal: Ability to disclose and discuss suicidal ideas will improve  Description: Ability to disclose and discuss suicidal ideas will improve  4/28/2021 0901 by Yvonne Shen RN  Outcome: Ongoing  4/28/2021 0221 by Keisha Valdivia RN  Outcome: Ongoing  Goal: Absence of self-harm  Description: Absence of self-harm  4/28/2021 0901 by Yvonne Shen RN  Outcome: Ongoing  4/28/2021 0221 by Keisha Valdivia RN  Outcome: Met This Shift  Goal: Ability to achieve adequate nutritional intake will improve  Description: Ability to achieve adequate nutritional intake will improve  4/28/2021 0901 by Yvonne Shen RN  Outcome: Ongoing  4/28/2021 0221 by Keisha Valdivia RN  Outcome: Ongoing  Goal: Ability to interact with others will improve  Description: Ability to interact with others will improve  4/28/2021 0901 by Yvonne Shen RN  Outcome: Ongoing  4/28/2021 0221 by Keisha Valdivia RN  Outcome: Ongoing  Goal: Ability to demonstrate self-control will improve  Description: Ability to demonstrate self-control will improve  4/28/2021 0901 by Yvonne Shen RN  Outcome: Ongoing  4/28/2021 0221 by Keisha Valdivia RN  Outcome: Ongoing  Goal: Mood stable  Description: Mood stable  4/28/2021 0901 by Yvonne Shen RN  Outcome: Ongoing  4/28/2021 0221 by Keisha Valdivia RN  Outcome: Ongoing  Goal: Patient specific goal  Description: Patient specific goal  4/28/2021 0901 by Nicol Craig RN  Outcome: Ongoing  4/28/2021 0221 by Wendie Groves RN  Outcome: Ongoing     Problem: Altered Mood, Psychotic Behavior:  Goal: Able to demonstrate trust by eating, participating in treatment and following staff's direction  Description: Able to demonstrate trust by eating, participating in treatment and following staff's direction  4/28/2021 0901 by Nicol Craig RN  Outcome: Ongoing  4/28/2021 0221 by Wendie Groves RN  Outcome: Ongoing  Goal: Able to verbalize decrease in frequency and intensity of hallucinations  Description: Able to verbalize decrease in frequency and intensity of hallucinations  4/28/2021 0901 by Nicol Craig RN  Outcome: Ongoing  4/28/2021 0221 by Wendie Groves RN  Outcome: Ongoing  Goal: Able to verbalize reality based thinking  Description: Able to verbalize reality based thinking  4/28/2021 0901 by Nicol Craig RN  Outcome: Ongoing  4/28/2021 0221 by Wendie Groves RN  Outcome: Ongoing  Goal: Absence of self-harm  Description: Absence of self-harm  4/28/2021 0901 by Nicol Craig RN  Outcome: Ongoing  4/28/2021 0221 by Wendie Groves RN  Outcome: Met This Shift  Goal: Ability to achieve adequate nutritional intake will improve  Description: Ability to achieve adequate nutritional intake will improve  4/28/2021 0901 by Nicol Craig RN  Outcome: Ongoing  4/28/2021 0221 by Wendie Groves RN  Outcome: Ongoing  Goal: Ability to interact with others will improve  Description: Ability to interact with others will improve  4/28/2021 0901 by Nicol Craig RN  Outcome: Ongoing  4/28/2021 0221 by Wendie Groves RN  Outcome: Ongoing  Goal: Compliance with prescribed medication regimen will improve  Description: Compliance with prescribed medication regimen will improve  4/28/2021 0901 by Nicol Craig RN  Outcome: Ongoing  4/28/2021 0221 by Wendie Groves RN  Outcome: Ongoing  Goal: Patient specific goal  Description: Patient specific goal  4/28/2021 0901 by [facility] Noah Dean RN  Outcome: Ongoing  4/28/2021 0221 by Otoniel Rodrigez RN  Outcome: Ongoing

## 2021-04-28 NOTE — PROGRESS NOTES
SW met with treatment team to discuss pt's progress and setbacks. SW 2 was present. Pt reportedly slept fair last night, with medications, appetite is good, attends scheduled group activities, social with peers/staff, independent with ADL's, compliant with medications, behavior has been cooperative, speech is slurred/pressured, denies depression/anxiety, denies SI/HI/AVH, continue current treatment plan.

## 2021-04-28 NOTE — PLAN OF CARE
Problem: Falls - Risk of:  Goal: Will remain free from falls  Description: Will remain free from falls  Outcome: Met This Shift  Goal: Absence of physical injury  Description: Absence of physical injury  Outcome: Met This Shift     Problem: Altered Mood, Manic Behavior:  Goal: Absence of self-harm  Description: Absence of self-harm  Outcome: Met This Shift     Problem: Altered Mood, Psychotic Behavior:  Goal: Absence of self-harm  Description: Absence of self-harm  Outcome: Met This Shift     Problem:  Activity:  Goal: Will identify at least one activity in which they can participate  Description: Will identify at least one activity in which they can participate  Outcome: Ongoing  Goal: Periods of wandering will decrease  Description: Periods of wandering will decrease  Outcome: Ongoing     Problem: Safety:  Goal: Risk of elopement will decrease  Description: Risk of elopement will decrease  Outcome: Ongoing  Goal: Ability to remain free from injury will improve  Description: Ability to remain free from injury will improve  Outcome: Ongoing     Problem: Pain:  Goal: Pain level will decrease  Description: Pain level will decrease  Outcome: Ongoing  Goal: Control of acute pain  Description: Control of acute pain  Outcome: Ongoing  Goal: Control of chronic pain  Description: Control of chronic pain  Outcome: Ongoing     Problem: Altered Mood, Manic Behavior:  Goal: Able to sleep  Description: Able to sleep  Outcome: Ongoing  Goal: Able to verbalize decrease in frequency and intensity of racing thoughts  Description: Able to verbalize decrease in frequency and intensity of racing thoughts  Outcome: Ongoing  Goal: Ability to disclose and discuss suicidal ideas will improve  Description: Ability to disclose and discuss suicidal ideas will improve  Outcome: Ongoing  Goal: Ability to achieve adequate nutritional intake will improve  Description: Ability to achieve adequate nutritional intake will improve  Outcome: Ongoing Goal: Ability to interact with others will improve  Description: Ability to interact with others will improve  Outcome: Ongoing  Goal: Ability to demonstrate self-control will improve  Description: Ability to demonstrate self-control will improve  Outcome: Ongoing  Goal: Mood stable  Description: Mood stable  Outcome: Ongoing  Goal: Patient specific goal  Description: Patient specific goal  Outcome: Ongoing     Problem: Altered Mood, Psychotic Behavior:  Goal: Able to demonstrate trust by eating, participating in treatment and following staff's direction  Description: Able to demonstrate trust by eating, participating in treatment and following staff's direction  Outcome: Ongoing  Goal: Able to verbalize decrease in frequency and intensity of hallucinations  Description: Able to verbalize decrease in frequency and intensity of hallucinations  Outcome: Ongoing  Goal: Able to verbalize reality based thinking  Description: Able to verbalize reality based thinking  Outcome: Ongoing  Goal: Ability to achieve adequate nutritional intake will improve  Description: Ability to achieve adequate nutritional intake will improve  Outcome: Ongoing  Goal: Ability to interact with others will improve  Description: Ability to interact with others will improve  Outcome: Ongoing  Goal: Compliance with prescribed medication regimen will improve  Description: Compliance with prescribed medication regimen will improve  Outcome: Ongoing  Goal: Patient specific goal  Description: Patient specific goal  Outcome: Ongoing

## 2021-04-28 NOTE — PROGRESS NOTES
[facility] Daily Shift Assessment-Geriatric Unit  Nursing Progress Note          Room: [address] [place] [address]   Name: Leeanne Licona   Age: 61 y.o. Gender: male   Dx: <principal problem not specified>  Precautions: close watch, suicide risk and fall risk  Inpatient Status: involuntary     SLEEP:    Sleep: No,   Sleep Quality Very poor   Hours Slept: 2   Sleep Medications: Yes  PRN Sleep Meds: No       MEDICAL:      Other PRN Meds: Yes   Med Compliant: Yes   Accu-Chek: No   Oxygen/CPAP/BiPAP: No-patient stated he uses a C-pap at home  CIWA/CINA: No   PAIN Assessment: present - adequately treated  Side Effects from medication: No    Is Patient experiencing any respiratory symptoms (headache, fever, body aches, cough. [facility] Records ): no  Patient educated by nursing to practice social distancing, wear masks, wash hands frequently: yes      Metabolic Screening:    Lab Results   Component Value Date    LABA1C 5.9 01/27/2021       Lab Results   Component Value Date    CHOL 106 (L) 03/04/2021    CHOL 129 (L) 07/01/2019    CHOL 113 (L) 03/26/2017    CHOL 167 11/07/2015     Lab Results   Component Value Date    TRIG 199 (H) 03/04/2021    TRIG 221 (H) 07/01/2019    TRIG 348 (H) 03/26/2017    TRIG 232 (H) 11/07/2015     Lab Results   Component Value Date    HDL 27 (L) 03/04/2021    HDL 27 (L) 07/01/2019    HDL 23 (L) 03/26/2017    HDL 26 (L) 11/07/2015     No components found for: LDLCAL  No results found for: LABVLDL      Body mass index is 37.36 kg/m².     BP Readings from Last 2 Encounters:   04/27/21 136/76   04/14/21 137/81         PSYCH:     SI denies suicidal ideation    HI Negative for homicidal ideation        AVH:Absent      Depression: 0 Anxiety: 0       GENERAL:      Appetite: good  Social: Yes Speech: slurred   Appearance:appropriately dressed and healthy looking  Assistive Devices: noneLevel of Assist: Independent      GROUP:    Group Participation: Yes  Participation LevelInteractive    Participation QualityAppropriate    Notes:  Patient sitting in the day area at the beginning of the shift. Patient played [employer] for a while before taking medication and going to room to rest.  Patient was up by 2142 requesting tylenol for toothache. After taking tylenol patient rested for short time and then was up sitting in the TV room with feet propped up in a chair. Patient was assisted back to room and was given risperdal  2mg for anxiety and tylenol 650 for complaints of toothache. Patient resting in bed at this time with eyes closed. Wife brought him some clothes; clothes were searched and given to patient if they were acceptable. Patient not having any symptoms of respiratory issues, will continue to monitor for issues. Will continue to monitor for safety.           Electronically signed by Jama Pennington RN on 4/28/21 at 3:31 AM CDT

## 2021-04-28 NOTE — PROGRESS NOTES
Pt able to complete zoom meeting with pastor Norman Ash with staff assistance. Meeting went well, no issues noted. Will continue to monitor.

## 2021-04-28 NOTE — H&P
TABLET BY MOUTH ONCE DAILY. meclizine (ANTIVERT) 25 MG tablet, TAKE 1/2 TABLET BY MOUTH THREE TIMES DAILY AS NEEDED FOR DIZZINESS.  memantine (NAMENDA) 10 MG tablet, Take 1 tablet by mouth 2 times daily  gabapentin (NEURONTIN) 400 MG capsule, Take 1 capsule by mouth 3 times daily for 30 days. (Patient taking differently: Take 300 mg by mouth 3 times daily. )  furosemide (LASIX) 20 MG tablet, TAKE 1 TABLET BY MOUTH ONCE DAILY. metoprolol tartrate (LOPRESSOR) 25 MG tablet, TAKE 2 TABLETS BY MOUTH TWICE DAILY  busPIRone (BUSPAR) 7.5 MG tablet, TAKE 1 TABLET BY MOUTH UP TO 3 TIMES DAILY AS NEEDED - AFTER 1 WEEK, MAY INCREASE TO 2 TABLETS 3 TIMES DAILY IF NEEDED (Patient taking differently: TAKE 1 TABLET BY MOUTH 3 TIMES DAILY AS NEEDED)  aspirin 81 MG chewable tablet, Take 81 mg by mouth daily  omeprazole (PRILOSEC) 20 MG delayed release capsule, TAKE 1 CAPSULE BY MOUTH DAILY  sildenafil (VIAGRA) 100 MG tablet, Take 1 tablet by mouth as needed for Erectile Dysfunction Generic sildenafil 100 mg or strongest dose    Allergies:  Patient has no known allergies. Social History:   TOBACCO:   reports that he has never smoked. He has never used smokeless tobacco.  ETOH:   reports no history of alcohol use. DRUGS:   reports no history of drug use.       Family History:       Problem Relation Age of Onset    Diabetes Mother     Kidney Disease Mother     Dementia Mother          at 68 - Lewy Body Dementia    Coronary Art Dis Father          at [de-identified]   Bennett Fierro Colon Cancer Neg Hx     Colon Polyps Neg Hx     Esophageal Cancer Neg Hx     Liver Cancer Neg Hx     Liver Disease Neg Hx     Rectal Cancer Neg Hx     Stomach Cancer Neg Hx      REVIEW OF SYSTEMS:  Constitutional: neg  CV: neg  Pulmonary: neg  GI: neg  : neg  Psych: amy  Neuro: neg  Skin: neg  MusculoSkeletal: neg  HEENT: neg  Joints: neg    Vitals:  /76   Pulse 89   Temp 97.8 °F (36.6 °C) (Temporal)   Resp 18   Ht 5' 9\" (1.753 m)   Wt 253 lb

## 2021-04-28 NOTE — PROGRESS NOTES
Collateral obtained from: pt's wife Konrad Aguilar [phone]    Immediate Stressors & Time Episode Began: Wife reported two months ago pt had a major life change where he wanted to lose weight and just be better. Wife reported pt started dieting. Wife reported pt began not being able to sleep in the last couple of weeks and began having physical symptoms. Diagnosis/Hx of compliance with meds: Wife reported Bipolar and pt is compliant with meds. Tx Hx/Past hospitalizations: Wife reported Sherry BENNETT. Family hx of psychiatric issues: Wife reported pt's dad had bipolar but was never diagnosed and pt's nephew was paranoid not too long ago and took off where no one knew where he was. Substance Abuse: Wife reported none. Pending Legal: Wife reported none. Safety Issues (Weapons? Hx of attempts): Wife reported old gun in home. Wife reported she doesn't even think it works because it was an heriloom. The importance of locking weapons in secured location or removing weapons from home was discussed. Wife voiced understanding and reported the weapon is locked up. Support system/Medication Managed by: The importance of medication management and locking extra medication in a secured location was explained and reccommended to collateral. Wife reported pt manages own meds. Who does the pt live with: Wife reported she and pt live together.      Electronically signed by Adeline Galeana, [id number] on 4/28/2021 at 11:23 AM

## 2021-04-28 NOTE — PROGRESS NOTES
Department of Psychiatry  Attending Progress Note     Chief complaint: \"Better\"    SUBJECTIVE:   Chart reviewed, discussed with the team.  No major issues overnight. Patient complained of anxiety and toothache. Received Tylenol and Risperdal.  Slept somewhat poorly. Compliant with medications. Patient is observed in his room this morning. He appears calmer and less pressured. Less tangential.  Able to carry a conversation. States he slept better last night. Denies paranoia or hallucinations. No physical complaints today except for dry mouth. States he has been talking to his wife who is supportive. Gave the writer permission to discuss his case with his primary care provider, Dr. Samir Wang. OBJECTIVE    Physical  Wt Readings from Last 3 Encounters:   04/28/21 254 lb 8 oz (115.4 kg)   04/14/21 272 lb (123.4 kg)   04/05/21 272 lb 9.6 oz (123.7 kg)     Temp Readings from Last 3 Encounters:   04/28/21 96.3 °F (35.7 °C) (Temporal)   04/14/21 96.8 °F (36 °C)   04/05/21 97.9 °F (36.6 °C) (Temporal)     BP Readings from Last 3 Encounters:   04/28/21 (!) 122/57   04/14/21 137/81   04/05/21 128/82     Pulse Readings from Last 3 Encounters:   04/28/21 97   04/14/21 80   04/05/21 66        Review of Systems: 14-point review of systems negative except as described above    Mental Status Examination:   Appearance:  Stated age, obese. Gait stable. No abnormal movements or tremor. Behavior: Calmer, cooperative  Speech: Less pressured  Mood: \"Better \"   Affect: Superficially bright  Thought Process: More linear  Thought Content:  Denies SI/HI. No overt delusions or paranoia appreciated. Perceptions: Denies auditory or visual hallucinations at present time. Not responding to internal stimuli. Concentration: Improved. Orientation: to person, place, date, and situation. Language: Intact. Fund of information: Intact. Memory: Recent and remote appear intact. Neurovegitative: Fair appetite and improved sleep. Insight: Improving. Judgment: Improving.     Data  Lab Results   Component Value Date    WBC 10.4 04/26/2021    HGB 13.8 (L) 04/26/2021    HCT 42.7 04/26/2021    MCV 97.9 (H) 04/26/2021     04/26/2021      Lab Results   Component Value Date     04/26/2021    K 3.5 04/26/2021     04/26/2021    CO2 20 (L) 04/26/2021    BUN 14 04/26/2021    CREATININE 0.8 04/26/2021    GLUCOSE 100 04/26/2021    CALCIUM 9.0 04/26/2021    PROT 7.0 04/26/2021    LABALBU 4.0 04/26/2021    BILITOT 0.6 04/26/2021    ALKPHOS 51 04/26/2021    AST 24 04/26/2021    ALT 17 04/26/2021    LABGLOM >60 04/26/2021    GFRAA >59 04/26/2021    GLOB 2.7 03/31/2017       Medications    Current Facility-Administered Medications:     divalproex (DEPAKOTE ER) extended release tablet 1,000 mg, 1,000 mg, Oral, Nightly, Mati Santos MD    risperiDONE (RISPERDAL) tablet 1 mg, 1 mg, Oral, BID, Mati Santos MD, 1 mg at 04/28/21 0809    amLODIPine (NORVASC) tablet 10 mg, 10 mg, Oral, Daily, Mati Santos MD, 10 mg at 04/28/21 0809    atorvastatin (LIPITOR) tablet 40 mg, 40 mg, Oral, Nightly, Mati Santos MD, 40 mg at 04/27/21 2047    furosemide (LASIX) tablet 20 mg, 20 mg, Oral, Daily, Mati Santos MD, 20 mg at 04/28/21 0809    levothyroxine (SYNTHROID) tablet 50 mcg, 50 mcg, Oral, Daily, Mati Santos MD, 50 mcg at 04/28/21 0553    pantoprazole (PROTONIX) tablet 40 mg, 40 mg, Oral, QAM AC, Mati Santos MD, 40 mg at 04/28/21 0553    metoprolol tartrate (LOPRESSOR) tablet 25 mg, 25 mg, Oral, BID, Clejulian Santos MD, 25 mg at 04/28/21 0808    lisinopril (PRINIVIL;ZESTRIL) tablet 20 mg, 20 mg, Oral, Daily, 20 mg at 04/28/21 0809 **AND** hydroCHLOROthiazide (HYDRODIURIL) tablet 25 mg, 25 mg, Oral, Daily, Mati Santos MD, 25 mg at 04/28/21 0809    traZODone (DESYREL) tablet 50 mg, 50 mg, Oral, Nightly, Mati Santos MD, 50 mg at 04/27/21 2047    melatonin tablet 3 mg, 3 mg, Oral, Nightly, Gloria Reis, MD, 3 mg at 04/27/21 2046    acetaminophen (TYLENOL) tablet 650 mg, 650 mg, Oral, Q4H PRN, Olive Farah MD, 650 mg at 04/28/21 0152    polyethylene glycol (GLYCOLAX) packet 17 g, 17 g, Oral, Daily PRN, Olive Farah MD    risperiDONE (RISPERDAL) tablet 2 mg, 2 mg, Oral, Q6H PRN, Olive Farah MD, 2 mg at 04/28/21 0150    haloperidol lactate (HALDOL) injection 5 mg, 5 mg, Intramuscular, Q6H PRN, Olive Farah MD    LORazepam (ATIVAN) injection 2 mg, 2 mg, Intramuscular, Q6H PRN, Olive Farah MD    ASSESSMENT AND PLAN  DSM 5 DIAGNOSIS  Impression  Bipolar I disorder, most recent episode manic, severe, with psychotic features     Medical issues  Hypertension  Hypothyroidism  Sleep apnea  Obesity  Anemia, mild  Vitamin D deficiency  Tooth ache    Notable improvement in mental status overnight. Positive response to Depakote and Risperdal.  Continue to observe. Continue medication adjustment. Plan:   1. Psychiatric Medications:   Increase Depakote for mood stabilization. Monitor for side effects. The risks, benefits, side effects, indications, contraindications, alternatives and adverse effects of the medications have been discussed with patient. 2. Continue to provide supportive psychotherapy. Encourage socialization and participation in recreational activities. Work on coping skills. 3. Medical Issues:    Continue medical monitoring by Dr. Janina Denny and associates. Monitor vital signs. Supplement vitamin D.    4. Disposition:     to provide outpatient resources and facilitate disposition.      Amount of time spent with patient:      35 minutes with greater than 50 % of the time spent in counseling and collaboration of care

## 2021-04-29 PROCEDURE — 99231 SBSQ HOSP IP/OBS SF/LOW 25: CPT | Performed by: PSYCHIATRY & NEUROLOGY

## 2021-04-29 PROCEDURE — 6370000000 HC RX 637 (ALT 250 FOR IP): Performed by: FAMILY MEDICINE

## 2021-04-29 PROCEDURE — 6370000000 HC RX 637 (ALT 250 FOR IP): Performed by: PSYCHIATRY & NEUROLOGY

## 2021-04-29 PROCEDURE — 1240000000 HC EMOTIONAL WELLNESS R&B

## 2021-04-29 RX ORDER — DIVALPROEX SODIUM 500 MG/1
1500 TABLET, EXTENDED RELEASE ORAL NIGHTLY
Status: DISCONTINUED | OUTPATIENT
Start: 2021-04-29 | End: 2021-04-30

## 2021-04-29 RX ORDER — IBUPROFEN 400 MG/1
400 TABLET ORAL EVERY 6 HOURS PRN
Status: DISCONTINUED | OUTPATIENT
Start: 2021-04-29 | End: 2021-05-01 | Stop reason: HOSPADM

## 2021-04-29 RX ADMIN — METOPROLOL TARTRATE 25 MG: 25 TABLET, FILM COATED ORAL at 08:20

## 2021-04-29 RX ADMIN — FUROSEMIDE 20 MG: 20 TABLET ORAL at 08:20

## 2021-04-29 RX ADMIN — ACETAMINOPHEN 650 MG: 325 TABLET ORAL at 15:01

## 2021-04-29 RX ADMIN — RISPERIDONE 1 MG: 1 TABLET, FILM COATED ORAL at 08:20

## 2021-04-29 RX ADMIN — DIVALPROEX SODIUM 1500 MG: 500 TABLET, EXTENDED RELEASE ORAL at 20:35

## 2021-04-29 RX ADMIN — ATORVASTATIN CALCIUM 40 MG: 40 TABLET, FILM COATED ORAL at 20:35

## 2021-04-29 RX ADMIN — HYDROCHLOROTHIAZIDE 25 MG: 25 TABLET ORAL at 08:21

## 2021-04-29 RX ADMIN — TRAZODONE HYDROCHLORIDE 50 MG: 50 TABLET ORAL at 20:35

## 2021-04-29 RX ADMIN — PANTOPRAZOLE SODIUM 40 MG: 40 TABLET, DELAYED RELEASE ORAL at 06:12

## 2021-04-29 RX ADMIN — AMLODIPINE BESYLATE 10 MG: 5 TABLET ORAL at 08:21

## 2021-04-29 RX ADMIN — METOPROLOL TARTRATE 25 MG: 25 TABLET, FILM COATED ORAL at 20:35

## 2021-04-29 RX ADMIN — LISINOPRIL 20 MG: 20 TABLET ORAL at 08:21

## 2021-04-29 RX ADMIN — LEVOTHYROXINE SODIUM 50 MCG: 50 TABLET ORAL at 06:12

## 2021-04-29 RX ADMIN — RISPERIDONE 1 MG: 1 TABLET, FILM COATED ORAL at 20:35

## 2021-04-29 RX ADMIN — ACETAMINOPHEN 650 MG: 325 TABLET ORAL at 10:42

## 2021-04-29 RX ADMIN — IBUPROFEN 400 MG: 400 TABLET, FILM COATED ORAL at 20:35

## 2021-04-29 RX ADMIN — Medication 3 MG: at 20:35

## 2021-04-29 ASSESSMENT — PAIN DESCRIPTION - PAIN TYPE
TYPE: ACUTE PAIN

## 2021-04-29 ASSESSMENT — PAIN DESCRIPTION - PROGRESSION
CLINICAL_PROGRESSION: GRADUALLY WORSENING
CLINICAL_PROGRESSION: GRADUALLY WORSENING
CLINICAL_PROGRESSION: NOT CHANGED

## 2021-04-29 ASSESSMENT — PAIN SCALES - WONG BAKER
WONGBAKER_NUMERICALRESPONSE: 0
WONGBAKER_NUMERICALRESPONSE: 0

## 2021-04-29 ASSESSMENT — PAIN SCALES - GENERAL
PAINLEVEL_OUTOF10: 2
PAINLEVEL_OUTOF10: 3
PAINLEVEL_OUTOF10: 6
PAINLEVEL_OUTOF10: 10
PAINLEVEL_OUTOF10: 1

## 2021-04-29 ASSESSMENT — PAIN DESCRIPTION - FREQUENCY
FREQUENCY: INTERMITTENT

## 2021-04-29 ASSESSMENT — PAIN DESCRIPTION - DESCRIPTORS
DESCRIPTORS: ACHING;DISCOMFORT
DESCRIPTORS: ACHING;DISCOMFORT
DESCRIPTORS: ACHING
DESCRIPTORS: ACHING

## 2021-04-29 ASSESSMENT — PAIN DESCRIPTION - ONSET
ONSET: ON-GOING
ONSET: GRADUAL

## 2021-04-29 ASSESSMENT — PAIN DESCRIPTION - ORIENTATION
ORIENTATION: INNER

## 2021-04-29 ASSESSMENT — PAIN DESCRIPTION - LOCATION
LOCATION: MOUTH
LOCATION: TEETH

## 2021-04-29 ASSESSMENT — PAIN - FUNCTIONAL ASSESSMENT: PAIN_FUNCTIONAL_ASSESSMENT: ACTIVITIES ARE NOT PREVENTED

## 2021-04-29 NOTE — PROGRESS NOTES
SW met with treatment team to discuss pt's progress and setbacks. SW 2 was present. Pt reportedly slept well last night, with medications, appetite is good, attends scheduled group activities, social with peers/staff, performs ADL's, compliant with medications, behavior has been cooperative, complained of a tooth pain, denies depression, reports mild anxiety, denies SI/HI/AVH, tentative discharge Friday/Saturday, continue current treatment plan.

## 2021-04-29 NOTE — PROGRESS NOTES
HCT 42.7 04/26/2021    MCV 97.9 (H) 04/26/2021     04/26/2021      Lab Results   Component Value Date     04/26/2021    K 3.5 04/26/2021     04/26/2021    CO2 20 (L) 04/26/2021    BUN 14 04/26/2021    CREATININE 0.8 04/26/2021    GLUCOSE 100 04/26/2021    CALCIUM 9.0 04/26/2021    PROT 7.0 04/26/2021    LABALBU 4.0 04/26/2021    BILITOT 0.6 04/26/2021    ALKPHOS 51 04/26/2021    AST 24 04/26/2021    ALT 17 04/26/2021    LABGLOM >60 04/26/2021    GFRAA >59 04/26/2021    GLOB 2.7 03/31/2017       Medications    Current Facility-Administered Medications:     divalproex (DEPAKOTE ER) extended release tablet 1,500 mg, 1,500 mg, Oral, Nightly, Jean Paul Fontaine MD    vitamin D (ERGOCALCIFEROL) capsule 50,000 Units, 50,000 Units, Oral, Weekly, Eyal Drew MD, 50,000 Units at 04/28/21 1042    risperiDONE (RISPERDAL) tablet 1 mg, 1 mg, Oral, BID, Jean Paul Fontaine MD, 1 mg at 04/29/21 0820    amLODIPine (NORVASC) tablet 10 mg, 10 mg, Oral, Daily, Jean Paul Fontaine MD, 10 mg at 04/29/21 5501    atorvastatin (LIPITOR) tablet 40 mg, 40 mg, Oral, Nightly, Jean Paul Fontaine MD, 40 mg at 04/28/21 2051    furosemide (LASIX) tablet 20 mg, 20 mg, Oral, Daily, Jean Paul Fontaine MD, 20 mg at 04/29/21 0820    levothyroxine (SYNTHROID) tablet 50 mcg, 50 mcg, Oral, Daily, Jean Paul Fontaine MD, 50 mcg at 04/29/21 0612    pantoprazole (PROTONIX) tablet 40 mg, 40 mg, Oral, QAM AC, Jean Paul Fontaine MD, 40 mg at 04/29/21 0612    metoprolol tartrate (LOPRESSOR) tablet 25 mg, 25 mg, Oral, BID, Jean Paul Fontaine MD, 25 mg at 04/29/21 0820    lisinopril (PRINIVIL;ZESTRIL) tablet 20 mg, 20 mg, Oral, Daily, 20 mg at 04/29/21 0821 **AND** hydroCHLOROthiazide (HYDRODIURIL) tablet 25 mg, 25 mg, Oral, Daily, Jean Paul Fontaine MD, 25 mg at 04/29/21 0821    traZODone (DESYREL) tablet 50 mg, 50 mg, Oral, Nightly, Jean Paul Fontaine MD, 50 mg at 04/28/21 2051    melatonin tablet 3 mg, 3 mg, Oral, Nightly, Jean Paul Fontaine MD, 3 mg

## 2021-04-29 NOTE — PROGRESS NOTES
Pt is up this morning, calm and cooperative. Pt is looking forward to discharging home. Pt reports having slept well last night. Pt rated his concentration as good. Pt denies any depression and denies anxiety, however is noted to have some anxiety during conversation and anticipation of going home. Pt denies suicidal thoughts. Pt denies any hallucinations. No distress noted. Will continue to monitor.

## 2021-04-29 NOTE — PROGRESS NOTES
LevelMinimal    Participation QualityAppropriate    Notes:   Patient sitting in the day area at the beginning of the shift coloring on a picture. Patient stated that helped him relax. Patient then went to the TV area with some other patients and was watching the President's speech to [place]. Patient's family had brought in his [de-identified] for patient to use at night. Patient took medications and sat up for a while longer before going to room and C-pap taken to room. Patient had complained of having toothache on the left side of his mouth and was given tylenol. Patient had been resting well and was assessed that pain medication had been effective. Patient up at about 2300 asking for more tylenol because tooth was hurting \"bad\" again. Patient instructed it was too soon for another dose of tylenol and was suggested he brush his teeth and rinse his mouth with mouth wash and place a warm, moist washcloth to side of face the tooth pain was on. Patient did as instructed and stated that helped a little, and is currently resting in bed with c-pap on. Patient not complaining of any respiratory issues, or any signs or symptoms of infection at this time. Will continue to monitor and remind to practice good hand washing and social distancing. Will continue to monitor for safety. Patient in eyesight will using c-pap.         Electronically signed by Gertrude Cuba RN on 4/28/21 at 11:30 PM CDT

## 2021-04-29 NOTE — PROGRESS NOTES
WRAP UP GROUP NOTE    Patient's Goal:  Providing feedback as to their own progress in the care-plan provided. Patients have an opportunity to explore self-reflective skills and share any additional cares and concerns not yet addressed. Pt effectively participated. Energy Level:normal  Appetite:good  Concentration:good  Hallucinations:blank  Depression:blank  Anxiety:blank  How I worked today:worked hard  What helps me sleep:try a relaxation exercise, \"use my Bipap machine  Any questions/complaints/comments:  Blank    Group/activities that helped me today were:     Therapeutic Recreation    Electronically signed by Winnie Tenorio RN on 4/28/2021 at 9:59 PM

## 2021-04-29 NOTE — PLAN OF CARE
Problem: Altered Mood, Psychotic Behavior:  Goal: Ability to interact with others will improve  Description: Ability to interact with others will improve  4/29/2021 1159 by Matt Rg  Outcome: Ongoing  Note:                                                                     Group Therapy Note    Date: 4/29/2021  Start Time: 1100  End Time:  1130  Number of Participants: 2    Type of Group: Psychoeducation    Wellness Binder Information  Module Name:  Stress  Session Number:  5    Group Goal for Pt: To raise awareness of the effectiveness of diversionary coping skills    Notes: Pt demonstrated improved awareness of the effectiveness of diversionary coping skills by actively participating in group activity. Status After Intervention:  Unchanged    Participation Level:  Active Listener and Interactive    Participation Quality: Appropriate and Attentive      Speech:  normal      Thought Process/Content: Logical      Affective Functioning: Congruent      Mood: anxious and depressed      Level of consciousness:  Alert and Oriented x4      Response to Learning: Able to verbalize current knowledge/experience, Able to verbalize/acknowledge new learning, and Progressing to goal      Endings: None Reported    Modes of Intervention: Education      Discipline Responsible: Psychoeducational Specialist      Signature:  Matt Rg
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
## 2021-04-29 NOTE — PLAN OF CARE
Problem: Falls - Risk of:  Goal: Will remain free from falls  Description: Will remain free from falls  4/28/2021 2133 by Jameel Pak RN  Outcome: Met This Shift  4/28/2021 0901 by Shad Mckenna RN  Outcome: Ongoing  Goal: Absence of physical injury  Description: Absence of physical injury  4/28/2021 2133 by Jameel Pak RN  Outcome: Met This Shift  4/28/2021 0901 by Shad Mckenna RN  Outcome: Ongoing     Problem: Altered Mood, Manic Behavior:  Goal: Absence of self-harm  Description: Absence of self-harm  4/28/2021 2133 by Jameel Pak RN  Outcome: Met This Shift  4/28/2021 0901 by Shad Mckenna RN  Outcome: Ongoing     Problem: Altered Mood, Psychotic Behavior:  Goal: Absence of self-harm  Description: Absence of self-harm  4/28/2021 2133 by Jameel Pak RN  Outcome: Met This Shift  4/28/2021 0901 by Shad Mckenna RN  Outcome: Ongoing     Problem: VIOLENT RESTRAINTS  Goal: Removal from restraints as soon as assessed to be safe  4/28/2021 0901 by Shad Mckenna RN  Outcome: Ongoing  Goal: No harm/injury to patient while restraints in use  4/28/2021 0901 by Shad Mckenna RN  Outcome: Ongoing  Goal: Patient's dignity will be maintained  4/28/2021 0901 by Shad Mckenna RN  Outcome: Ongoing     Problem:  Activity:  Goal: Will identify at least one activity in which they can participate  Description: Will identify at least one activity in which they can participate  4/28/2021 2133 by Jameel Pak RN  Outcome: Ongoing  4/28/2021 0901 by Shad Mckenna RN  Outcome: Ongoing  Goal: Periods of wandering will decrease  Description: Periods of wandering will decrease  4/28/2021 2133 by Jameel Pak RN  Outcome: Ongoing  4/28/2021 0901 by Shad Mckenna RN  Outcome: Ongoing     Problem: Safety:  Goal: Risk of elopement will decrease  Description: Risk of elopement will decrease  4/28/2021 2133 by Jameel Pak RN  Outcome: Ongoing  4/28/2021 0901 by Shad Mckenna RN  Outcome: Ongoing  Goal: Ability to remain free from injury will improve  Description: Ability to remain free from injury will improve  4/28/2021 2133 by Rajeev Gates RN  Outcome: Ongoing  4/28/2021 0901 by Akilah Vaca RN  Outcome: Ongoing     Problem: Pain:  Goal: Pain level will decrease  Description: Pain level will decrease  4/28/2021 2133 by Rajeev Gates RN  Outcome: Ongoing  4/28/2021 0901 by Akilah Vaca RN  Outcome: Ongoing  Goal: Control of acute pain  Description: Control of acute pain  4/28/2021 2133 by Rajeev Gates RN  Outcome: Ongoing  4/28/2021 0901 by Akilah Vaca RN  Outcome: Ongoing  Goal: Control of chronic pain  Description: Control of chronic pain  4/28/2021 2133 by Rajeev Gates RN  Outcome: Ongoing  4/28/2021 0901 by Akilah Vaca RN  Outcome: Ongoing     Problem: Altered Mood, Manic Behavior:  Goal: Able to sleep  Description: Able to sleep  4/28/2021 2133 by Rajeev Gates RN  Outcome: Ongoing  4/28/2021 0901 by Akilah Vaca RN  Outcome: Ongoing  Goal: Able to verbalize decrease in frequency and intensity of racing thoughts  Description: Able to verbalize decrease in frequency and intensity of racing thoughts  4/28/2021 2133 by Rajeev Gates RN  Outcome: Ongoing  4/28/2021 0901 by Akilah Vaca RN  Outcome: Ongoing  Goal: Ability to disclose and discuss suicidal ideas will improve  Description: Ability to disclose and discuss suicidal ideas will improve  4/28/2021 2133 by Rajeev Gates RN  Outcome: Ongoing  4/28/2021 0901 by Akilah Vaca RN  Outcome: Ongoing  Goal: Ability to achieve adequate nutritional intake will improve  Description: Ability to achieve adequate nutritional intake will improve  4/28/2021 2133 by Rajeev Gates RN  Outcome: Ongoing  4/28/2021 0901 by Akilah Vaca RN  Outcome: Ongoing  Goal: Ability to interact with others will improve  Description: Ability to interact with others will improve  4/28/2021 2133 by Rajeev Gates RN  Outcome: Ongoing  4/28/2021 0901 by Akilah Vaca RN  Outcome: Ongoing  Goal: Ability to demonstrate self-control will improve  Description: Ability to demonstrate self-control will improve  4/28/2021 2133 by Allen Ramirez RN  Outcome: Ongoing  4/28/2021 0901 by Lashae Villa RN  Outcome: Ongoing  Goal: Mood stable  Description: Mood stable  4/28/2021 2133 by Allen Ramirez RN  Outcome: Ongoing  4/28/2021 0901 by Lashae Villa RN  Outcome: Ongoing  Goal: Patient specific goal  Description: Patient specific goal  4/28/2021 2133 by Allen Ramirez RN  Outcome: Ongoing  4/28/2021 0901 by Lashae Villa RN  Outcome: Ongoing     Problem: Altered Mood, Psychotic Behavior:  Goal: Able to demonstrate trust by eating, participating in treatment and following staff's direction  Description: Able to demonstrate trust by eating, participating in treatment and following staff's direction  4/28/2021 2133 by Allen Ramirez RN  Outcome: Ongoing  4/28/2021 0901 by Lashae Villa RN  Outcome: Ongoing  Goal: Able to verbalize decrease in frequency and intensity of hallucinations  Description: Able to verbalize decrease in frequency and intensity of hallucinations  4/28/2021 2133 by Allen Ramirez RN  Outcome: Ongoing  4/28/2021 0901 by Lashae Villa RN  Outcome: Ongoing  Goal: Able to verbalize reality based thinking  Description: Able to verbalize reality based thinking  4/28/2021 2133 by Allen Ramirez RN  Outcome: Ongoing  4/28/2021 0901 by Lashae Villa RN  Outcome: Ongoing  Goal: Ability to achieve adequate nutritional intake will improve  Description: Ability to achieve adequate nutritional intake will improve  4/28/2021 2133 by Allen Ramirez RN  Outcome: Ongoing  4/28/2021 0901 by Lashae Villa RN  Outcome: Ongoing  Goal: Ability to interact with others will improve  Description: Ability to interact with others will improve  4/28/2021 2133 by Allen Ramirez RN  Outcome: Ongoing  4/28/2021 1100 by Gildardo Lee  Outcome: Ongoing  Note:                                                                     Group Therapy Note    Date: 4/28/2021  Start Time: 1000  End Time:  9541  Number of Participants: 5    Type of Group: Psychoeducation    Wellness Binder Information  Module Name:  Women's Issues  Session Number:  4    Group Goal for Pt: To raise awareness of how thoughts influence feelings    Notes:  Pt demonstrated improved awareness of how thoughts influence feelings by actively participating in group discussion.     Status After Intervention:  Unchanged    Participation Level: Interactive    Participation Quality: Appropriate and Attentive      Speech:  pressured and slurred      Thought Process/Content: Linear      Affective Functioning: Congruent      Mood: anxious and depressed      Level of consciousness:  Alert and Oriented x4      Response to Learning: Able to verbalize current knowledge/experience, Able to verbalize/acknowledge new learning, and Progressing to goal      Endings: None Reported    Modes of Intervention: Education      Discipline Responsible: Psychoeducational Specialist      Signature:  Concepción Miramontes    4/28/2021 0901 by Eboni Kraus RN  Outcome: Ongoing  Goal: Compliance with prescribed medication regimen will improve  Description: Compliance with prescribed medication regimen will improve  4/28/2021 2133 by Rosette Gastelum RN  Outcome: Ongoing  4/28/2021 0901 by Eboni Kraus RN  Outcome: Ongoing  Goal: Patient specific goal  Description: Patient specific goal  4/28/2021 2133 by Rosette Gastelum RN  Outcome: Ongoing  4/28/2021 0901 by Eboni Kraus RN  Outcome: Ongoing

## 2021-04-29 NOTE — PROGRESS NOTES
Progress Note  Nava Minors  4/29/2021 12:07 AM  Subjective:   Admit Date:   4/26/2021      CC/ADMIT DX:       Interval History:   Reviewed overnight events and nursing notes. No new physical complaints. I have reviewed all labs/diagnostics from the last 24hrs. ROS:   I have done a 10 point ROS and all are negative, except what is mentioned in the HPI. DIET GENERAL;    Medications:      divalproex  1,000 mg Oral Nightly    vitamin D  50,000 Units Oral Weekly    risperiDONE  1 mg Oral BID    amLODIPine  10 mg Oral Daily    atorvastatin  40 mg Oral Nightly    furosemide  20 mg Oral Daily    levothyroxine  50 mcg Oral Daily    pantoprazole  40 mg Oral QAM AC    metoprolol tartrate  25 mg Oral BID    lisinopril  20 mg Oral Daily    And    hydroCHLOROthiazide  25 mg Oral Daily    traZODone  50 mg Oral Nightly    melatonin  3 mg Oral Nightly           Objective:   Vitals: /68   Pulse 93   Temp 98.5 °F (36.9 °C) (Temporal)   Resp 16   Ht 5' 9\" (1.753 m)   Wt 254 lb 8 oz (115.4 kg)   SpO2 98%   BMI 37.58 kg/m²  No intake or output data in the 24 hours ending 04/29/21 0007  General appearance: alert and cooperative with exam  Extremities: extremities normal, atraumatic, no cyanosis or edema  Neurologic:  No obvious focal neurologic deficits. Skin: no rashes    Assessment and Plan: Active Problems:    Acute psychosis ([facility] [address]    Psychosis ([facility] [address]  Resolved Problems:    * No resolved hospital problems. *    Vit D Def    Plan:  1. Continue present medication(s)   2. Replace Vit D  3. Follow with Psych      Discharge planning:   home     Reviewed treatment plans with the patient and/or family.              Electronically signed by Mikayla Madsen MD on 4/29/2021 at 12:07 AM

## 2021-04-30 LAB
BASOPHILS ABSOLUTE: 0 K/UL (ref 0–0.2)
BASOPHILS RELATIVE PERCENT: 0.6 % (ref 0–1)
EOSINOPHILS ABSOLUTE: 0.1 K/UL (ref 0–0.6)
EOSINOPHILS RELATIVE PERCENT: 1.6 % (ref 0–5)
HCT VFR BLD CALC: 38.5 % (ref 42–52)
HEMOGLOBIN: 12.5 G/DL (ref 14–18)
IMMATURE GRANULOCYTES #: 0 K/UL
LYMPHOCYTES ABSOLUTE: 2.8 K/UL (ref 1.1–4.5)
LYMPHOCYTES RELATIVE PERCENT: 40.5 % (ref 20–40)
MCH RBC QN AUTO: 31.1 PG (ref 27–31)
MCHC RBC AUTO-ENTMCNC: 32.5 G/DL (ref 33–37)
MCV RBC AUTO: 95.8 FL (ref 80–94)
MONOCYTES ABSOLUTE: 0.5 K/UL (ref 0–0.9)
MONOCYTES RELATIVE PERCENT: 7.6 % (ref 0–10)
NEUTROPHILS ABSOLUTE: 3.4 K/UL (ref 1.5–7.5)
NEUTROPHILS RELATIVE PERCENT: 49.4 % (ref 50–65)
PDW BLD-RTO: 13 % (ref 11.5–14.5)
PLATELET # BLD: 214 K/UL (ref 130–400)
PMV BLD AUTO: 11.7 FL (ref 9.4–12.4)
RBC # BLD: 4.02 M/UL (ref 4.7–6.1)
VALPROIC ACID LEVEL: 54.1 UG/ML (ref 50–100)
WBC # BLD: 6.9 K/UL (ref 4.8–10.8)

## 2021-04-30 PROCEDURE — 99231 SBSQ HOSP IP/OBS SF/LOW 25: CPT | Performed by: PSYCHIATRY & NEUROLOGY

## 2021-04-30 PROCEDURE — 80164 ASSAY DIPROPYLACETIC ACD TOT: CPT

## 2021-04-30 PROCEDURE — 6370000000 HC RX 637 (ALT 250 FOR IP): Performed by: FAMILY MEDICINE

## 2021-04-30 PROCEDURE — 85025 COMPLETE CBC W/AUTO DIFF WBC: CPT

## 2021-04-30 PROCEDURE — 36415 COLL VENOUS BLD VENIPUNCTURE: CPT

## 2021-04-30 PROCEDURE — 6370000000 HC RX 637 (ALT 250 FOR IP): Performed by: PSYCHIATRY & NEUROLOGY

## 2021-04-30 PROCEDURE — 1240000000 HC EMOTIONAL WELLNESS R&B

## 2021-04-30 RX ORDER — DIVALPROEX SODIUM 500 MG/1
2000 TABLET, EXTENDED RELEASE ORAL NIGHTLY
Status: DISCONTINUED | OUTPATIENT
Start: 2021-04-30 | End: 2021-05-01 | Stop reason: HOSPADM

## 2021-04-30 RX ADMIN — IBUPROFEN 400 MG: 400 TABLET, FILM COATED ORAL at 20:58

## 2021-04-30 RX ADMIN — Medication 3 MG: at 20:59

## 2021-04-30 RX ADMIN — TRAZODONE HYDROCHLORIDE 50 MG: 50 TABLET ORAL at 20:58

## 2021-04-30 RX ADMIN — IBUPROFEN 400 MG: 400 TABLET, FILM COATED ORAL at 09:48

## 2021-04-30 RX ADMIN — ATORVASTATIN CALCIUM 40 MG: 40 TABLET, FILM COATED ORAL at 20:59

## 2021-04-30 RX ADMIN — FUROSEMIDE 20 MG: 20 TABLET ORAL at 08:55

## 2021-04-30 RX ADMIN — METOPROLOL TARTRATE 25 MG: 25 TABLET, FILM COATED ORAL at 20:58

## 2021-04-30 RX ADMIN — LEVOTHYROXINE SODIUM 50 MCG: 50 TABLET ORAL at 05:45

## 2021-04-30 RX ADMIN — RISPERIDONE 1 MG: 1 TABLET, FILM COATED ORAL at 20:59

## 2021-04-30 RX ADMIN — HYDROCHLOROTHIAZIDE 25 MG: 25 TABLET ORAL at 08:55

## 2021-04-30 RX ADMIN — RISPERIDONE 1 MG: 1 TABLET, FILM COATED ORAL at 08:55

## 2021-04-30 RX ADMIN — DIVALPROEX SODIUM 2000 MG: 500 TABLET, EXTENDED RELEASE ORAL at 20:59

## 2021-04-30 RX ADMIN — PANTOPRAZOLE SODIUM 40 MG: 40 TABLET, DELAYED RELEASE ORAL at 05:46

## 2021-04-30 RX ADMIN — LISINOPRIL 20 MG: 20 TABLET ORAL at 08:55

## 2021-04-30 ASSESSMENT — PAIN DESCRIPTION - ONSET
ONSET: GRADUAL
ONSET: GRADUAL

## 2021-04-30 ASSESSMENT — PAIN DESCRIPTION - PAIN TYPE: TYPE: ACUTE PAIN

## 2021-04-30 ASSESSMENT — PAIN SCALES - GENERAL
PAINLEVEL_OUTOF10: 0
PAINLEVEL_OUTOF10: 0
PAINLEVEL_OUTOF10: 3
PAINLEVEL_OUTOF10: 4

## 2021-04-30 ASSESSMENT — PAIN DESCRIPTION - LOCATION: LOCATION: TEETH

## 2021-04-30 ASSESSMENT — PAIN DESCRIPTION - DESCRIPTORS: DESCRIPTORS: ACHING;DISCOMFORT

## 2021-04-30 ASSESSMENT — PAIN DESCRIPTION - PROGRESSION: CLINICAL_PROGRESSION: GRADUALLY WORSENING

## 2021-04-30 ASSESSMENT — PAIN DESCRIPTION - FREQUENCY
FREQUENCY: INTERMITTENT
FREQUENCY: INTERMITTENT

## 2021-04-30 ASSESSMENT — PAIN DESCRIPTION - ORIENTATION: ORIENTATION: INNER

## 2021-04-30 NOTE — PROGRESS NOTES
Obtained signature from pt on Voluntary Placement paperwork tonight.      Electronically signed by Golda Schlatter, RN on 4/29/2021 at 10:51 PM

## 2021-04-30 NOTE — PROGRESS NOTES
Pt has rested well tonight, getting up a few times to use the restroom only. The pt returned right to bed and to sleep. The pt has slept an approximate 6 hours so far tonight and is currently still sleeping. The pt did not voice any distress overnight after his initial tooth pain that was treated with IBU. Monitoring for safety continues as ordered.      Electronically signed by Bettie Khalil RN on 4/30/2021 at 5:23 AM

## 2021-04-30 NOTE — PROGRESS NOTES
[facility] Daily Shift Assessment-Geriatric Unit  Nursing Progress Note          Room: [address] [place] [address]   Name: Nava Minors   Age: 61 y.o. Gender: male   Dx: <principal problem not specified>  Precautions: suicide risk and fall risk  Inpatient Status: voluntary     SLEEP:    Sleep: Yes,   Sleep Quality Good   Hours Slept:    Sleep Medications: Yes; trazadone 50mg, melatonin 3mg  PRN Sleep Meds: No       MEDICAL:      Other PRN Meds: Yes; IBU 400mg  Med Compliant: Yes   Accu-Chek: No   Oxygen/CPAP/BiPAP: Yes  CIWA/CINA: No   PAIN Assessment: receiving treatment  Side Effects from medication: none voiced    Is Patient experiencing any respiratory symptoms (headache, fever, body aches, cough. Chriss Santoyo ): no  Patient educated by nursing to practice social distancing, wear masks, wash hands frequently: yes      Metabolic Screening:    Lab Results   Component Value Date    LABA1C 5.1 04/28/2021       Lab Results   Component Value Date    CHOL 101 (L) 04/28/2021    CHOL 106 (L) 03/04/2021    CHOL 129 (L) 07/01/2019    CHOL 113 (L) 03/26/2017    CHOL 167 11/07/2015     Lab Results   Component Value Date    TRIG 90 04/28/2021    TRIG 199 (H) 03/04/2021    TRIG 221 (H) 07/01/2019    TRIG 348 (H) 03/26/2017    TRIG 232 (H) 11/07/2015     Lab Results   Component Value Date    HDL 31 (L) 04/28/2021    HDL 27 (L) 03/04/2021    HDL 27 (L) 07/01/2019    HDL 23 (L) 03/26/2017    HDL 26 (L) 11/07/2015     No components found for: LDLCAL  No results found for: LABVLDL      Body mass index is 37.58 kg/m².     BP Readings from Last 2 Encounters:   04/29/21 119/65   04/14/21 137/81         PSYCH:     SI denies suicidal ideation    HI Negative for homicidal ideation        AVH:denies      Depression: 0 Anxiety: 0       GENERAL:      Appetite: good  Social: Yes Speech: normal   Appearance:appropriately dressed and healthy looking  Assistive Devices: noneLevel of Assist: Independent      GROUP:    Group Participation: Yes  Participation LevelMinimal Participation QualityAppropriate    Notes: Pt is calm and cooperative with a brightened affect. Pt voices extreme alba in the prospect of discharge home and states \"I'm ready. \" Pt denies anxiety, depression, SI, HI, and AVH. Pt is social, med compliant, attends groups and performs his ADL's. Pt has used coloring as a form of therapeutic relaxation. Pt continues to use his CPAP at night for sleep with LOS monitoring. Monitoring for safety continues as ordered.      Electronically signed by Ruben Barrera RN on 4/30/2021 at 1:57 AM

## 2021-04-30 NOTE — PLAN OF CARE
Problem:  Activity:  Goal: Will identify at least one activity in which they can participate  Description: Will identify at least one activity in which they can participate  Outcome: Ongoing  Goal: Periods of wandering will decrease  Description: Periods of wandering will decrease  Outcome: Ongoing     Problem: Safety:  Goal: Risk of elopement will decrease  Description: Risk of elopement will decrease  Outcome: Ongoing  Goal: Ability to remain free from injury will improve  Description: Ability to remain free from injury will improve  Outcome: Ongoing     Problem: Falls - Risk of:  Goal: Will remain free from falls  Description: Will remain free from falls  Outcome: Ongoing  Goal: Absence of physical injury  Description: Absence of physical injury  Outcome: Ongoing     Problem: Pain:  Goal: Pain level will decrease  Description: Pain level will decrease  Outcome: Ongoing  Goal: Control of acute pain  Description: Control of acute pain  Outcome: Ongoing  Goal: Control of chronic pain  Description: Control of chronic pain  Outcome: Ongoing     Problem: Altered Mood, Manic Behavior:  Goal: Able to sleep  Description: Able to sleep  Outcome: Ongoing  Goal: Able to verbalize decrease in frequency and intensity of racing thoughts  Description: Able to verbalize decrease in frequency and intensity of racing thoughts  Outcome: Ongoing  Goal: Ability to disclose and discuss suicidal ideas will improve  Description: Ability to disclose and discuss suicidal ideas will improve  Outcome: Ongoing  Goal: Absence of self-harm  Description: Absence of self-harm  Outcome: Ongoing  Goal: Ability to achieve adequate nutritional intake will improve  Description: Ability to achieve adequate nutritional intake will improve  Outcome: Ongoing  Goal: Ability to interact with others will improve  Description: Ability to interact with others will improve  Outcome: Ongoing  Goal: Ability to demonstrate self-control will improve  Description: Ability to demonstrate self-control will improve  Outcome: Ongoing  Goal: Mood stable  Description: Mood stable  Outcome: Ongoing  Goal: Patient specific goal  Description: Patient specific goal  Outcome: Ongoing     Problem: Altered Mood, Psychotic Behavior:  Goal: Able to demonstrate trust by eating, participating in treatment and following staff's direction  Description: Able to demonstrate trust by eating, participating in treatment and following staff's direction  Outcome: Ongoing  Goal: Able to verbalize decrease in frequency and intensity of hallucinations  Description: Able to verbalize decrease in frequency and intensity of hallucinations  Outcome: Ongoing  Goal: Able to verbalize reality based thinking  Description: Able to verbalize reality based thinking  Outcome: Ongoing  Goal: Absence of self-harm  Description: Absence of self-harm  Outcome: Ongoing  Goal: Ability to achieve adequate nutritional intake will improve  Description: Ability to achieve adequate nutritional intake will improve  Outcome: Ongoing  Goal: Ability to interact with others will improve  Description: Ability to interact with others will improve  4/30/2021 0130 by Iram Rocha RN  Outcome: Ongoing  4/29/2021 1159 by Harry Aggarwal  Outcome: Ongoing  Note:                                                                     Group Therapy Note    Date: 4/29/2021  Start Time: 1100  End Time:  1130  Number of Participants: 2    Type of Group: Psychoeducation    Wellness Binder Information  Module Name:  Stress  Session Number:  5    Group Goal for Pt: To raise awareness of the effectiveness of diversionary coping skills    Notes: Pt demonstrated improved awareness of the effectiveness of diversionary coping skills by actively participating in group activity. Status After Intervention:  Unchanged    Participation Level:  Active Listener and Interactive    Participation Quality: Appropriate and Attentive      Speech:  normal Thought Process/Content: Logical      Affective Functioning: Congruent      Mood: anxious and depressed      Level of consciousness:  Alert and Oriented x4      Response to Learning: Able to verbalize current knowledge/experience, Able to verbalize/acknowledge new learning, and Progressing to goal      Endings: None Reported    Modes of Intervention: Education      Discipline Responsible: Psychoeducational Specialist      Signature:  Matt Rg    Goal: Compliance with prescribed medication regimen will improve  Description: Compliance with prescribed medication regimen will improve  Outcome: Ongoing  Goal: Patient specific goal  Description: Patient specific goal  Outcome: Ongoing

## 2021-04-30 NOTE — PROGRESS NOTES
SW met with treatment team to discuss pt's progress and setbacks. SW 2 was present. Pt reportedly slept well last night, with medications, appetite is good, attends scheduled group activities, social with peers/staff, performs ADL's, compliant with medications, behavior has been calm/cooperative, affect bright, denies depression/anxiety, denies SI/HI/AVH, possible discharge today, follow-up appointments will be scheduled.

## 2021-04-30 NOTE — SUICIDE SAFETY PLAN
SAFETY PLAN    A suicide Safety Plan is a document that supports someone when they are having thoughts of suicide. Warning Signs that indicate a suicidal crisis may be developing: What (situations, thoughts, feelings, body sensations, behaviors, etc.) do you experience that lets you know you are beginning to think about suicide? 1. N/A  Internal Coping Strategies:  What things can I do (relaxation techniques, hobbies, physical activities, etc.) to take my mind off my problems without contacting another person? 1. photography  2. fish  3. paint    People and social settings that provide distraction: Who can I call or where can I go to distract me? 1. Name: N/A    People whom I can ask for help: Who can I call when I need help - for example, friends, family, clergy, someone else? 1. Name: wife                  2. Name:     3. Adonis    Professionals or [employer] agencies I can contact during a crisis: Who can I call for help - for example, my doctor, my psychiatrist, my psychologist, a mental health provider, a suicide hotline? 1. Clinician Name: [id number]   Phone: [phone]      Clinician Pager or Emergency Contact #: [phone]    2. Clinician Name: [address]   Phone: [phone]      Clinician Pager or Emergency Contact #: [phone]    3. Suicide Prevention Lifeline: 9-209-800-TALK (2332)    4. [address] [place] [address] Emergency Services -  for example, [facility] suicide hotline, [facility] Hotline: [facility]      Emergency Services Address: [address]       Emergency Services Phone: [phone]    Making the environment safe: How can I make my environment (house/apartment/living space) safer? For example, can I remove guns, medications, and other items? 1. Remove weapons from home  2.  Remove extra medications from home

## 2021-04-30 NOTE — GROUP NOTE
Group Therapy Note    Date: 4/30/2021    Group Start Time: 0830  Group End Time: 0930  Group Topic: Community Meeting    [facility] GERIATRIC BE[facility]AVIORAL UNIT    Dalia Aviles RN        Group Therapy Note    Attendees:          Patient's Goal:  \"going home\"    Notes:  Pt calm and gooperative during group    Status After Intervention:  Improved    Participation Level:  Active Listener    Participation Quality: Appropriate, Attentive, Sharing and Supportive      Speech:  normal      Thought Process/Content: Logical      Affective Functioning: Congruent      Mood: bright      Level of consciousness:  Alert and Oriented x4      Response to Learning: Able to verbalize current knowledge/experience, Able to verbalize/acknowledge new learning, Able to retain information and Capable of insight      Endings: None Reported    Modes of Intervention: Education and Support      Discipline Responsible: Registered Nurse      Signature:  Foster Dey RN

## 2021-04-30 NOTE — PLAN OF CARE
Problem: VIOLENT RESTRAINTS  Goal: Removal from restraints as soon as assessed to be safe  Outcome: Ongoing  Goal: No harm/injury to patient while restraints in use  Outcome: Ongoing  Goal: Patient's dignity will be maintained  Outcome: Ongoing     Problem:  Activity:  Goal: Will identify at least one activity in which they can participate  Description: Will identify at least one activity in which they can participate  4/30/2021 1100 by David Reece RN  Outcome: Ongoing  4/30/2021 0130 by Brenda Aguila RN  Outcome: Ongoing  Goal: Periods of wandering will decrease  Description: Periods of wandering will decrease  4/30/2021 1100 by David Reece RN  Outcome: Ongoing  4/30/2021 0130 by Brenda Aguila RN  Outcome: Ongoing     Problem: Safety:  Goal: Risk of elopement will decrease  Description: Risk of elopement will decrease  4/30/2021 1100 by David Reece RN  Outcome: Ongoing  4/30/2021 0130 by Brenda Aguila RN  Outcome: Ongoing  Goal: Ability to remain free from injury will improve  Description: Ability to remain free from injury will improve  4/30/2021 1100 by David Reece RN  Outcome: Ongoing  4/30/2021 0130 by Brenda Aguila RN  Outcome: Ongoing     Problem: Falls - Risk of:  Goal: Will remain free from falls  Description: Will remain free from falls  4/30/2021 1100 by David Reece RN  Outcome: Ongoing  4/30/2021 0130 by Brenda Aguila RN  Outcome: Ongoing  Goal: Absence of physical injury  Description: Absence of physical injury  4/30/2021 1100 by David Reece RN  Outcome: Ongoing  4/30/2021 0130 by Brenda Aguila RN  Outcome: Ongoing     Problem: Pain:  Goal: Pain level will decrease  Description: Pain level will decrease  4/30/2021 1100 by David Reece RN  Outcome: Ongoing  4/30/2021 0130 by Brenda Aguila RN  Outcome: Ongoing  Goal: Control of acute pain  Description: Control of acute pain  4/30/2021 1100 by David Reece RN  Outcome: Ongoing  4/30/2021 0130 by Juve Munoz MICHAEL Noguera  Outcome: Ongoing  Goal: Control of chronic pain  Description: Control of chronic pain  4/30/2021 1100 by Mariia Townsend RN  Outcome: Ongoing  4/30/2021 0130 by Juanita Montoya RN  Outcome: Ongoing     Problem: Altered Mood, Manic Behavior:  Goal: Able to sleep  Description: Able to sleep  4/30/2021 1100 by Mariia Townsend RN  Outcome: Ongoing  4/30/2021 0130 by Juanita Montoya RN  Outcome: Ongoing  Goal: Able to verbalize decrease in frequency and intensity of racing thoughts  Description: Able to verbalize decrease in frequency and intensity of racing thoughts  4/30/2021 1100 by Mariia Townsend RN  Outcome: Ongoing  4/30/2021 0130 by Juanita Montoya RN  Outcome: Ongoing  Goal: Ability to disclose and discuss suicidal ideas will improve  Description: Ability to disclose and discuss suicidal ideas will improve  4/30/2021 1100 by Mariia Townsend RN  Outcome: Ongoing  4/30/2021 0130 by Juanita Montoya RN  Outcome: Ongoing  Goal: Absence of self-harm  Description: Absence of self-harm  4/30/2021 1100 by Mariia Townsend RN  Outcome: Ongoing  4/30/2021 0130 by Juanita Montoya RN  Outcome: Ongoing  Goal: Ability to achieve adequate nutritional intake will improve  Description: Ability to achieve adequate nutritional intake will improve  4/30/2021 1100 by Mariia Townsend RN  Outcome: Ongoing  4/30/2021 0130 by Juanita Montoya RN  Outcome: Ongoing  Goal: Ability to interact with others will improve  Description: Ability to interact with others will improve  4/30/2021 1100 by Mariia Townsend RN  Outcome: Ongoing  4/30/2021 0130 by Juanita Montoya RN  Outcome: Ongoing  Goal: Ability to demonstrate self-control will improve  Description: Ability to demonstrate self-control will improve  4/30/2021 1100 by Mariia Townsend RN  Outcome: Ongoing  4/30/2021 0130 by Juanita Montoya RN  Outcome: Ongoing  Goal: Mood stable  Description: Mood stable  4/30/2021 1100 by Mariia Townsend RN  Outcome: Ongoing  4/30/2021 0130 by Ruben Barrera RN  Outcome: Ongoing  Goal: Patient specific goal  Description: Patient specific goal  4/30/2021 1100 by Davida Machado RN  Outcome: Ongoing  4/30/2021 0130 by Ruben Barrera RN  Outcome: Ongoing     Problem: Altered Mood, Psychotic Behavior:  Goal: Able to demonstrate trust by eating, participating in treatment and following staff's direction  Description: Able to demonstrate trust by eating, participating in treatment and following staff's direction  4/30/2021 1100 by Davida Machado RN  Outcome: Ongoing  4/30/2021 0130 by Ruben Barrera RN  Outcome: Ongoing  Goal: Able to verbalize decrease in frequency and intensity of hallucinations  Description: Able to verbalize decrease in frequency and intensity of hallucinations  4/30/2021 1100 by Davida Machado RN  Outcome: Ongoing  4/30/2021 0130 by Ruben Barrera RN  Outcome: Ongoing  Goal: Able to verbalize reality based thinking  Description: Able to verbalize reality based thinking  4/30/2021 1100 by Davida Machado RN  Outcome: Ongoing  4/30/2021 0130 by Ruben Barrera RN  Outcome: Ongoing  Goal: Absence of self-harm  Description: Absence of self-harm  4/30/2021 1100 by Davida Machado RN  Outcome: Ongoing  4/30/2021 0130 by Ruben Barrera RN  Outcome: Ongoing  Goal: Ability to achieve adequate nutritional intake will improve  Description: Ability to achieve adequate nutritional intake will improve  4/30/2021 1100 by Davida Machado RN  Outcome: Ongoing  4/30/2021 0130 by Ruben Barrera RN  Outcome: Ongoing  Goal: Ability to interact with others will improve  Description: Ability to interact with others will improve  4/30/2021 1100 by Davida Machado RN  Outcome: Ongoing  4/30/2021 0130 by Ruben Barrera RN  Outcome: Ongoing  Goal: Compliance with prescribed medication regimen will improve  Description: Compliance with prescribed medication regimen will improve  4/30/2021 1100 by Davida Machado RN  Outcome: Ongoing 4/30/2021 0130 by Sylvia Willis, RN  Outcome: Ongoing  Goal: Patient specific goal  Description: Patient specific goal  4/30/2021 1100 by Marni Harmon RN  Outcome: Ongoing  4/30/2021 0130 by Sylvia Willis RN  Outcome: Ongoing

## 2021-04-30 NOTE — PROGRESS NOTES
Department of Psychiatry  Attending Progress Note     Chief complaint: \"I am ok\"    SUBJECTIVE:   Chart reviewed, discussed with the team.  No major issues overnight. Patient complained of toothache. Received Ibuprofen. Slept about 6 h. Compliant with medications. Goes to groups. Patient is observed in the hallway this morning. Brighter affect. He is calm and cooperative. Still somewhat pressured. States he is doing ok and hoping to go home soon. Talked about his adoptive children and wife. Would like to follow up at our clinic. OBJECTIVE    Physical  Wt Readings from Last 3 Encounters:   04/28/21 254 lb 8 oz (115.4 kg)   04/14/21 272 lb (123.4 kg)   04/05/21 272 lb 9.6 oz (123.7 kg)     Temp Readings from Last 3 Encounters:   04/30/21 97.8 °F (36.6 °C) (Temporal)   04/14/21 96.8 °F (36 °C)   04/05/21 97.9 °F (36.6 °C) (Temporal)     BP Readings from Last 3 Encounters:   04/30/21 109/67   04/14/21 137/81   04/05/21 128/82     Pulse Readings from Last 3 Encounters:   04/30/21 94   04/14/21 80   04/05/21 66        Review of Systems: 14-point review of systems negative except as described above    Mental Status Examination:   Appearance:   Stated age, obese. Hygiene improved. Gait stable. No abnormal movements or tremor. Behavior: Calmer, cooperative  Speech: Still somewhat pressured  Mood: \"Ok\"   Affect: Brighter  Thought Process: More linear  Thought Content:  Denies SI/HI. No overt delusions or paranoia appreciated. Perceptions: Denies auditory or visual hallucinations at present time. Not responding to internal stimuli. Concentration: Improved. Orientation: to person, place, date, and situation. Language: Intact. Fund of information: Intact. Memory: Recent and remote appear intact. Neurovegitative: Fair appetite and improved sleep. Insight: Improving. Judgment: Improving.     Data  Lab Results   Component Value Date    WBC 6.9 04/30/2021    HGB 12.5 (L) 04/30/2021    HCT 38.5 (L) 04/30/2021    MCV 95.8 (H) 04/30/2021     04/30/2021      Lab Results   Component Value Date     04/26/2021    K 3.5 04/26/2021     04/26/2021    CO2 20 (L) 04/26/2021    BUN 14 04/26/2021    CREATININE 0.8 04/26/2021    GLUCOSE 100 04/26/2021    CALCIUM 9.0 04/26/2021    PROT 7.0 04/26/2021    LABALBU 4.0 04/26/2021    BILITOT 0.6 04/26/2021    ALKPHOS 51 04/26/2021    AST 24 04/26/2021    ALT 17 04/26/2021    LABGLOM >60 04/26/2021    GFRAA >59 04/26/2021    GLOB 2.7 03/31/2017       Medications    Current Facility-Administered Medications:     divalproex (DEPAKOTE ER) extended release tablet 1,500 mg, 1,500 mg, Oral, Nightly, Ganga Cornell MD, 1,500 mg at 04/29/21 2035    ibuprofen (ADVIL;MOTRIN) tablet 400 mg, 400 mg, Oral, Q6H PRN, Yesy Real MD, 400 mg at 04/30/21 4164    vitamin D (ERGOCALCIFEROL) capsule 50,000 Units, 50,000 Units, Oral, Weekly, Yesy Real MD, 50,000 Units at 04/28/21 1042    risperiDONE (RISPERDAL) tablet 1 mg, 1 mg, Oral, BID, Ganga Cornell MD, 1 mg at 04/30/21 0855    amLODIPine (NORVASC) tablet 10 mg, 10 mg, Oral, Daily, Ganga Cornell MD, Stopped at 04/30/21 0855    atorvastatin (LIPITOR) tablet 40 mg, 40 mg, Oral, Nightly, Ganga Cornell MD, 40 mg at 04/29/21 2035    furosemide (LASIX) tablet 20 mg, 20 mg, Oral, Daily, Ganga Cornell MD, 20 mg at 04/30/21 0855    levothyroxine (SYNTHROID) tablet 50 mcg, 50 mcg, Oral, Daily, Ganga Cornell MD, 50 mcg at 04/30/21 0545    pantoprazole (PROTONIX) tablet 40 mg, 40 mg, Oral, QAM AC, Ganga Cornell MD, 40 mg at 04/30/21 0546    metoprolol tartrate (LOPRESSOR) tablet 25 mg, 25 mg, Oral, BID, Ganga Cornell MD, Stopped at 04/30/21 0855    lisinopril (PRINIVIL;ZESTRIL) tablet 20 mg, 20 mg, Oral, Daily, 20 mg at 04/30/21 0855 **AND** hydroCHLOROthiazide (HYDRODIURIL) tablet 25 mg, 25 mg, Oral, Daily, Ganga Cornell MD, 25 mg at 04/30/21 0855    traZODone (DESYREL) tablet 50 mg, 50 mg, Oral, Nightly, Erwin Harp MD, 50 mg at 04/29/21 2035    melatonin tablet 3 mg, 3 mg, Oral, Nightly, Erwin Harp MD, 3 mg at 04/29/21 2035    acetaminophen (TYLENOL) tablet 650 mg, 650 mg, Oral, Q4H PRN, Erwin Harp MD, 650 mg at 04/29/21 1501    polyethylene glycol (GLYCOLAX) packet 17 g, 17 g, Oral, Daily PRN, Erwin Harp MD    risperiDONE (RISPERDAL) tablet 2 mg, 2 mg, Oral, Q6H PRN, Erwin Harp MD, 2 mg at 04/28/21 0150    haloperidol lactate (HALDOL) injection 5 mg, 5 mg, Intramuscular, Q6H PRN, Erwin Harp MD    LORazepam (ATIVAN) injection 2 mg, 2 mg, Intramuscular, Q6H PRN, Erwin Harp MD    ASSESSMENT AND PLAN  DSM 5 DIAGNOSIS  Impression  Bipolar I disorder, most recent episode manic, severe, with psychotic features     Medical issues  Hypertension  Hypothyroidism  Sleep apnea  Obesity  Anemia, mild  Vitamin D deficiency  Tooth ache    Still pressured. VPA level low. Continue to observe. Continue medication adjustment. Plan:   1. Psychiatric Medications:   Increase Depakote for mood stabilization. Monitor for side effects. The risks, benefits, side effects, indications, contraindications, alternatives and adverse effects of the medications have been discussed with patient. 2. Continue to provide supportive psychotherapy. Encourage socialization and participation in recreational activities. Work on coping skills. 3. Medical Issues:    Continue medical monitoring by Dr. Riana Fuentes and associates. Monitor vital signs. Supplement vitamin D.    4. Disposition:     to provide outpatient resources and facilitate disposition.      Amount of time spent with patient:      35 minutes with greater than 50 % of the time spent in counseling and collaboration of care

## 2021-05-01 VITALS
HEIGHT: 69 IN | BODY MASS INDEX: 37.69 KG/M2 | WEIGHT: 254.5 LBS | DIASTOLIC BLOOD PRESSURE: 71 MMHG | TEMPERATURE: 98.1 F | OXYGEN SATURATION: 100 % | RESPIRATION RATE: 17 BRPM | HEART RATE: 58 BPM | SYSTOLIC BLOOD PRESSURE: 124 MMHG

## 2021-05-01 PROBLEM — K22.89 ESOPHAGEAL PAIN: Status: RESOLVED | Noted: 2018-06-27 | Resolved: 2021-05-01

## 2021-05-01 PROBLEM — F29 PSYCHOSIS (HCC): Status: RESOLVED | Noted: 2021-04-26 | Resolved: 2021-05-01

## 2021-05-01 PROBLEM — F31.9 BIPOLAR DISORDER (HCC): Status: RESOLVED | Noted: 2017-03-25 | Resolved: 2021-05-01

## 2021-05-01 PROBLEM — F23 ACUTE PSYCHOSIS (HCC): Status: RESOLVED | Noted: 2021-04-26 | Resolved: 2021-05-01

## 2021-05-01 PROBLEM — R29.90 STROKE-LIKE SYMPTOMS: Status: RESOLVED | Noted: 2021-03-03 | Resolved: 2021-05-01

## 2021-05-01 PROBLEM — G45.9 TIA (TRANSIENT ISCHEMIC ATTACK): Status: RESOLVED | Noted: 2017-03-25 | Resolved: 2021-05-01

## 2021-05-01 PROCEDURE — 99239 HOSP IP/OBS DSCHRG MGMT >30: CPT | Performed by: PSYCHIATRY & NEUROLOGY

## 2021-05-01 PROCEDURE — 5130000000 HC BRIDGE APPOINTMENT

## 2021-05-01 PROCEDURE — 6370000000 HC RX 637 (ALT 250 FOR IP): Performed by: PSYCHIATRY & NEUROLOGY

## 2021-05-01 RX ORDER — LANOLIN ALCOHOL/MO/W.PET/CERES
3 CREAM (GRAM) TOPICAL NIGHTLY
Qty: 30 TABLET | Refills: 1 | Status: SHIPPED | OUTPATIENT
Start: 2021-05-01 | End: 2021-06-23 | Stop reason: ALTCHOICE

## 2021-05-01 RX ORDER — DIVALPROEX SODIUM 500 MG/1
2000 TABLET, EXTENDED RELEASE ORAL NIGHTLY
Qty: 120 TABLET | Refills: 1 | Status: SHIPPED | OUTPATIENT
Start: 2021-05-01 | End: 2021-05-06

## 2021-05-01 RX ORDER — RISPERIDONE 1 MG/1
1 TABLET, FILM COATED ORAL NIGHTLY
Qty: 30 TABLET | Refills: 1 | Status: SHIPPED | OUTPATIENT
Start: 2021-05-01 | End: 2021-05-06

## 2021-05-01 RX ORDER — TRAZODONE HYDROCHLORIDE 50 MG/1
50 TABLET ORAL NIGHTLY
Qty: 30 TABLET | Refills: 1 | Status: SHIPPED | OUTPATIENT
Start: 2021-05-01 | End: 2021-05-06

## 2021-05-01 RX ORDER — ERGOCALCIFEROL 1.25 MG/1
50000 CAPSULE ORAL WEEKLY
Qty: 11 CAPSULE | Refills: 1 | Status: SHIPPED | OUTPATIENT
Start: 2021-05-05 | End: 2021-12-15

## 2021-05-01 RX ADMIN — LISINOPRIL 20 MG: 20 TABLET ORAL at 08:53

## 2021-05-01 RX ADMIN — METOPROLOL TARTRATE 25 MG: 25 TABLET, FILM COATED ORAL at 08:52

## 2021-05-01 RX ADMIN — AMLODIPINE BESYLATE 10 MG: 5 TABLET ORAL at 08:53

## 2021-05-01 RX ADMIN — HYDROCHLOROTHIAZIDE 25 MG: 25 TABLET ORAL at 08:52

## 2021-05-01 RX ADMIN — RISPERIDONE 1 MG: 1 TABLET, FILM COATED ORAL at 08:53

## 2021-05-01 RX ADMIN — LEVOTHYROXINE SODIUM 50 MCG: 50 TABLET ORAL at 06:09

## 2021-05-01 RX ADMIN — FUROSEMIDE 20 MG: 20 TABLET ORAL at 08:53

## 2021-05-01 RX ADMIN — PANTOPRAZOLE SODIUM 40 MG: 40 TABLET, DELAYED RELEASE ORAL at 06:09

## 2021-05-01 ASSESSMENT — PAIN SCALES - GENERAL: PAINLEVEL_OUTOF10: 0

## 2021-05-01 NOTE — PROGRESS NOTES
[facility]  Discharge Note  Bridge Appointment completed: Reviewed Discharge Instructions with patient. Patient verbalizes understanding and agreement with the discharge plan using the teachback method. Referral for Outpatient Tobacco Cessation Counseling, upon discharge (key X if applicable and completed):    ( )  Hospital staff assisted patient to call Quit Line or faxed referral                                   during hospitalization                  ( )  Recognizing danger situations (included triggers and roadblocks), if not completed on admission                    ( )  Coping skills (new ways to manage stress, exercise, relaxation techniques, changing routine, distraction), if not completed on admission                                                           ( )  Basic information about quitting (benefits of quitting, techniques in how to quit, available resources, if not completed on admission  ( ) [facility]   (x ) Patient refused referral  (x ) Patient refused counseling  (x ) Patient refused smoking cessation medication upon discharge    Vaccinations (key X if applicable and completed):  ( ) Patient states already received influenza vaccine elsewhere  ( ) Patient received influenza vaccine during this hospitalization  (x ) Patient refused influenza vaccine at this time      Pt discharged with followings belongings:   Dentures: None  Vision - Corrective Lenses: None  Hearing Aid: None  Jewelry: None  Body Piercings Removed: N/A  Clothing: Socks, Undergarments (Comment)  Were All Patient Medications Collected?: Not Applicable  Other Valuables: None   Valuables sent home with pt. Valuables retrieved from safe and returned to patient. Patient left department with   via  , discharged to  . Patient education on aftercare instructions: yes  Patient verbalize understanding of AVS:  yes. Suicidal Ideations? No AVH? denies HI?  Negative

## 2021-05-01 NOTE — DISCHARGE SUMMARY
Discharge Summary     Patient ID:  Marifer Galarza  [id number]  49 y.o.  1965    Admit date: 4/26/2021  Discharge date: 5/1/2021    Admitting Physician: Celio Bowman MD   Attending Physician: Ivon Paniagua MD  Discharge Provider: Celio Bowman MD     Admission Diagnoses:   Bipolar I disorder, most recent episode manic, severe, with psychotic features  Hypertension  Hypothyroidism  Sleep apnea  Obesity    Discharge Diagnoses:   Bipolar I disorder, most recent episode manic, severe, with psychotic features  Hypertension  Hypothyroidism  Sleep apnea  Obesity  Anemia, mild  Vitamin D deficiency  Tooth ache    Admission Condition: poor    Discharged Condition: stable    Indication for Admission: psychosis    CHIEF COMPLAINT:  \"I just needed water\"      History obtained from: patient, chart     HISTORY OF PRESENT ILLNESS:    58-year-old white male with previous history of bipolar disorder, hypothyroidism, HTN, sleep apnea, admitted for amy. He was agitated in the ER and required Haldol. Presented with pressured speech. Manic symptoms reportedly started about 2 months ago. He has not been sleeping and has been hyperreligious. Per wife, patient had a similar episode many years ago. He has been having problems with speech which reportedly gets worse in the evening. Recent admission for numbness and weakness. Patient has been worked up for a neuromuscular disease. Recently started on Namenda. Followed by Neurology. Dr. Angelo Dawson patient. All records and lab results reviewed. Patient reportedly has been on a diet. Per Aline Camara, phentermine filled in December 2020. Also takes Gabapentin. Buspar listed as current psychotropic.       Collateral from wife: \"Before I met Ketan Morris, when he was 22, he was in [employer], he had some kind of breakdown and was in a [address] for awhile before her was discharged from the [facility] 2007 or 2008 Ketan Morris had some problems similar to what's been happening to him recently. [facility] had insomnia, quit eating, was hyperactive, spending money and driving erratically, exhibiting weird behavior, like whispering in everyone's ear at [employer] service. [facility] was admitted to [facility] at that time and diagnosed with Bipolar.  The medications he was on completely incapacitated him, and he was eventually treated with Ainsley Melissa took that for two to three years but, for at least nine to ten years he has not been on any psych medications.  For about two years now I have noticed signs of depression, such as; little interest in doing things with family, palacios and irritable, just wanting to sit in his recliner after work and not doing any home repairs. Then about two months ago he began praying a lot more, became more active, decided to go on a diet and lost forty pounds. [facility] went from sitting all the time to not sitting down at all.  To always having to be up doing something. He was acting manic.  For these last two weeks he has stopped sleeping, been agitated, angry, labile and had disorganized thoughts. [facility] repeatedly would say, \"do you believe me, don't you believe me. \"  About six months ago Juan Manuel Goodwin was started on Namenda for neuropathy in his legs.  About a week ago I had him stop taking it and that seemed to make things worse not better. \"       Patient presents with restlessness and pressured speech when seen this morning. He is cooperative but difficult to interrupt. Thought process is tangential.  He is unable to tell us why he is here. Hyperfocused on getting water. States in the ER he was trying to get water. Randomly starts talking about throat surgery which he had for sleep apnea years ago. Then switches to talking about the upcoming vacation. States he has been sleeping poorly and praying a lot. \"I talk to God. \"  He denies any recent stressors in his life. Reports stable marriage. States he has been losing weight but no longer takes phentermine.   States he was on Depakote in the past but has been taken off by his primary care provider. Recently put on Namenda. Denies current alcohol and illicit drug use. Open to medication adjustment. Gave us permission to contact his wife.     PSYCHIATRIC HISTORY:    Diagnoses: Bipolar disorder  Suicide attempts/gestures: Denies   Prior hospitalizations: 2N - 2007  Medication trials: Depakote, Buspar  Mental health contact: Lost to follow-up   Head trauma: Denies     SUBSTANCE USE HISTORY:  Denies alcohol and illicit drug use. Denies tobacco use. Hospital Course:  Patient was admitted to the behavioral health floor and was acclimated to the unit. He was placed on suicide and fall precautions. Labs were reviewed and physical exam was completed by Dr. Abida Ngo and associates. Home medications were reconciled. HORACIO was obtained and reviewed. BuSpar and Namenda were discontinued. Gabapentin was held. Patient was started on Depakote for mood stabilization and Risperdal for psychotic features. Trazodone was given for sleep. Patient was continued on his home regimen for chronic medical issues. Patient tolerated all his medications well, without side effects, and was compliant. Patient complained of toothache. Pain control was ensured. Follow-up with a dentist was recommended. With treatment, psychotic features resolved. Patient attended and participated in groups. All interactions with the peers and staff members were appropriate. Behaviorally, he was not a problem. Sleep and appetite improved. With the above-mentioned medications changes as well as psychotherapeutic interventions, patient reported considerable improvement in his condition and requested discharge. He was future oriented and looking forward to going home to his family. It was felt that patient was at his baseline. Patient has no access to guns at home. Patient's wife voiced no concerns about patient's safety and agreed to take him back home.     On 5/1/2021 it was therefore decided to discharge the patient, as it was felt that he received maximal benefit from his hospitalization. This patient is not suicidal, homicidal or psychotic at discharge. He does not present danger to self or others.       Number of antipsychotic medication prescribed at discharge: 1  IF MORE THAN ONE IS USED: NA    History of greater than 3 failed multiple monotherapy trials: NA  Monotherapy taper plan/ cross taper in progress: NA  Augmentation of Clozapine: NA    Referral to addiction treatment: patient refused    Prescription for Alcohol or Drug Disorder Medication: patient refused    Prescription for Tobacco Cessation medication: none    If no prescriptions for Tobacco Cessation must document why: nonsmoker    Consults: Internal medicine    Significant Diagnostic Studies:   Lab Results   Component Value Date    WBC 6.9 04/30/2021    HGB 12.5 (L) 04/30/2021    HCT 38.5 (L) 04/30/2021    MCV 95.8 (H) 04/30/2021     04/30/2021     Lab Results   Component Value Date     04/26/2021    K 3.5 04/26/2021     04/26/2021    CO2 20 (L) 04/26/2021    BUN 14 04/26/2021    CREATININE 0.8 04/26/2021    GLUCOSE 100 04/26/2021    CALCIUM 9.0 04/26/2021    PROT 7.0 04/26/2021    LABALBU 4.0 04/26/2021    BILITOT 0.6 04/26/2021    ALKPHOS 51 04/26/2021    AST 24 04/26/2021    ALT 17 04/26/2021    LABGLOM >60 04/26/2021    GFRAA >59 04/26/2021    GLOB 2.7 03/31/2017         Lab Results   Component Value Date    CRKFMGQW00 862 04/14/2021     Lab Results   Component Value Date    VITD25 19.1 (L) 04/28/2021     Lab Results   Component Value Date    CHOL 101 (L) 04/28/2021    CHOL 106 (L) 03/04/2021    CHOL 129 (L) 07/01/2019     Lab Results   Component Value Date    TRIG 90 04/28/2021    TRIG 199 (H) 03/04/2021    TRIG 221 (H) 07/01/2019     Lab Results   Component Value Date    HDL 31 (L) 04/28/2021    HDL 27 (L) 03/04/2021    HDL 27 (L) 07/01/2019     Lab Results   Component Value Date    LDLCALC 52 04/28/2021    LDLCALC 39 03/04/2021    [address] 07/01/2019     No results found for: LABVLDL, VLDL  No results found for: [facility]  Lab Results   Component Value Date    LABA1C 5.1 04/28/2021     No results found for: EAG  Lab Results   Component Value Date    TSHFT4 1.61 04/26/2021    TSH 1.690 03/03/2021     Lab Results   Component Value Date    VALPROATE 54.1 04/30/2021         Treatments: RN and SW    Mental status examination at the time of discharge:  Alert, Oriented X 4  Appearance:  Improved Hygiene, smiling  Speech with Regular Rate and Rhythm  Eye Contact:  Good  No Psychomotor Agitation/Retardation Noted  Attitude:  Cooperative  Mood:  \"Good. Can't wait to go home. \"  Affective: Congruent, appropriate to the situation, with a normal range and intensity  Thought Processes:  Coherently communicated, logical and goal oriented  Thought Content:  No Suicidal Ideation, No Homicidal Ideation, No Auditory or Visual Hallucinations, NO Overt Delusions  Insight: Improved  Judgement: Improved  Memory is intact for both remote and recent  Intellectual Functioning:  Within the [address] of Knowledge:  Adequate  Attention and Concentration:  Adequate    Discharge Exam:  Please, see medical note    Disposition: home    Patient Instructions:      Medication List      START taking these medications    divalproex 500 MG extended release tablet  Commonly known as: DEPAKOTE ER  Take 4 tablets by mouth nightly     melatonin 3 MG Tabs tablet  Take 1 tablet by mouth nightly     risperiDONE 1 MG tablet  Commonly known as: RISPERDAL  Take 1 tablet by mouth nightly     traZODone 50 MG tablet  Commonly known as: DESYREL  Take 1 tablet by mouth nightly     vitamin D 1.25 MG (18082 UT) Caps capsule  Commonly known as: ERGOCALCIFEROL  Take 1 capsule by mouth once a week for 11 doses  Start taking on:  May 5, 2021        CHANGE how you take these medications    gabapentin 400 MG capsule  Commonly known as: Neurontin  Take 1 capsule by mouth 3 times daily for 30 days. What changed: how much to take        CONTINUE taking these medications    amLODIPine 10 MG tablet  Commonly known as: NORVASC  TAKE 1 TABLET BY MOUTH ONCE DAILY. aspirin 81 MG chewable tablet     atorvastatin 40 MG tablet  Commonly known as: LIPITOR  TAKE 1 TABLET BY MOUTH ONCE DAILY. furosemide 20 MG tablet  Commonly known as: LASIX  TAKE 1 TABLET BY MOUTH ONCE DAILY. levothyroxine 50 MCG tablet  Commonly known as: SYNTHROID  TAKE 1 TABLET BY MOUTH ONCE DAILY. lisinopril-hydroCHLOROthiazide 20-25 MG per tablet  Commonly known as: PRINZIDE;ZESTORETIC  TAKE 1 TABLET BY MOUTH ONCE DAILY.      meclizine 25 MG tablet  Commonly known as: ANTIVERT  TAKE 1/2 TABLET BY MOUTH THREE TIMES DAILY AS NEEDED FOR DIZZINESS.     metoprolol tartrate 25 MG tablet  Commonly known as: LOPRESSOR  TAKE 2 TABLETS BY MOUTH TWICE DAILY     miconazole 2 % cream  Commonly known as: MICOTIN  Apply topically 2 times daily for 4 weeks to area     omeprazole 20 MG delayed release capsule  Commonly known as: PRILOSEC  TAKE 1 CAPSULE BY MOUTH DAILY     sildenafil 100 MG tablet  Commonly known as: VIAGRA  Take 1 tablet by mouth as needed for Erectile Dysfunction Generic sildenafil 100 mg or strongest dose        STOP taking these medications    busPIRone 7.5 MG tablet  Commonly known as: BUSPAR     memantine 10 MG tablet  Commonly known as: NAMENDA     phentermine 15 MG capsule           Where to Get Your Medications      These medications were sent to [facility] [address] [address]    Phone: [phone]   · divalproex 500 MG extended release tablet  · melatonin 3 MG Tabs tablet  · risperiDONE 1 MG tablet  · traZODone 50 MG tablet  · vitamin D 1.25 MG (86612 UT) Caps capsule           Activity: as tolerated  Diet: regular diet  Wound Care: none needed    Follow-up:  Follow up with Briseyda Manuel MD in 2 week(s)  on follow up with PCP, please review labs/imaging done during this Hospital stay, and discuss any additional/repeat testing or treatment needed with your PCP Man  [facility]  Thursday May 6, 2021 9:30 AM (Arrive by 9:00 AM)  PATIENT INSTRUCTIONS:   *ALL METALLIC OBJECTS NEED TO BE REMOVED PRIOR TO COMING TO THE MRI DEPT   *BRING A LIST OF HOME MEDS   *Patient is to report directly to [address] emergency room and the [address] [place] [address] Road is located behind [facility] [address]  [phone]          [facility]  Thursday May 6, 2021 10:30 AM [facility] EEG  [address] Patient Appointment with TRIP Jarvis CNP  Tuesday May 11, 2021 11:00 AM  Please arrive 15 minutes prior to scheduled appointment time to complete paper work, bring photo ID and insurance cards. [address] [facility]  [address] [place] [address]  [phone]          Go to [facility]  Tuesday May 11, 2021  Med management appointment at 11 AM with Justice Zepeda. Please bring an updated med list.  [facility], [address] [place] [address]   Phone: [phone]   Fax: [phone] Office Visit with Vasu Moran MD  Thursday May 13, 2021 2:45 PM  Please arrive 15 minutes prior to appointment, bring photo ID and insurance card. [facility]   TDS76 New Patient Appointment with Priya Navas LCSW  Tuesday May 25, 2021 11:00 AM  Please arrive 15 minutes prior to scheduled appointment time to complete paper work, bring photo ID and insurance cards. [address] [facility]  [address] [place] [address]

## 2021-05-01 NOTE — PLAN OF CARE
Problem: VIOLENT RESTRAINTS  Goal: Removal from restraints as soon as assessed to be safe  Outcome: Ongoing  Goal: No harm/injury to patient while restraints in use  Outcome: Ongoing  Goal: Patient's dignity will be maintained  Outcome: Ongoing     Problem:  Activity:  Goal: Will identify at least one activity in which they can participate  Description: Will identify at least one activity in which they can participate  Outcome: Ongoing  Goal: Periods of wandering will decrease  Description: Periods of wandering will decrease  Outcome: Ongoing     Problem: Safety:  Goal: Risk of elopement will decrease  Description: Risk of elopement will decrease  Outcome: Ongoing  Goal: Ability to remain free from injury will improve  Description: Ability to remain free from injury will improve  Outcome: Ongoing     Problem: Falls - Risk of:  Goal: Will remain free from falls  Description: Will remain free from falls  Outcome: Ongoing  Goal: Absence of physical injury  Description: Absence of physical injury  Outcome: Ongoing     Problem: Pain:  Goal: Pain level will decrease  Description: Pain level will decrease  Outcome: Ongoing  Goal: Control of acute pain  Description: Control of acute pain  Outcome: Ongoing  Goal: Control of chronic pain  Description: Control of chronic pain  Outcome: Ongoing     Problem: Altered Mood, Manic Behavior:  Goal: Able to sleep  Description: Able to sleep  Outcome: Ongoing  Goal: Able to verbalize decrease in frequency and intensity of racing thoughts  Description: Able to verbalize decrease in frequency and intensity of racing thoughts  Outcome: Ongoing  Goal: Ability to disclose and discuss suicidal ideas will improve  Description: Ability to disclose and discuss suicidal ideas will improve  Outcome: Ongoing  Goal: Absence of self-harm  Description: Absence of self-harm  Outcome: Ongoing  Goal: Ability to achieve adequate nutritional intake will improve  Description: Ability to achieve adequate nutritional intake will improve  Outcome: Ongoing  Goal: Ability to interact with others will improve  Description: Ability to interact with others will improve  Outcome: Ongoing  Goal: Ability to demonstrate self-control will improve  Description: Ability to demonstrate self-control will improve  Outcome: Ongoing  Goal: Mood stable  Description: Mood stable  Outcome: Ongoing  Goal: Patient specific goal  Description: Patient specific goal  Outcome: Ongoing     Problem: Altered Mood, Psychotic Behavior:  Goal: Able to demonstrate trust by eating, participating in treatment and following staff's direction  Description: Able to demonstrate trust by eating, participating in treatment and following staff's direction  Outcome: Ongoing  Goal: Able to verbalize decrease in frequency and intensity of hallucinations  Description: Able to verbalize decrease in frequency and intensity of hallucinations  Outcome: Ongoing  Goal: Able to verbalize reality based thinking  Description: Able to verbalize reality based thinking  Outcome: Ongoing  Goal: Absence of self-harm  Description: Absence of self-harm  Outcome: Ongoing  Goal: Ability to achieve adequate nutritional intake will improve  Description: Ability to achieve adequate nutritional intake will improve  Outcome: Ongoing  Goal: Ability to interact with others will improve  Description: Ability to interact with others will improve  Outcome: Ongoing  Goal: Compliance with prescribed medication regimen will improve  Description: Compliance with prescribed medication regimen will improve  Outcome: Ongoing  Goal: Patient specific goal  Description: Patient specific goal  Outcome: Ongoing

## 2021-05-01 NOTE — GROUP NOTE
Group Therapy Note    Date: 5/1/2021    Group Start Time: 0830  Group End Time: 0900  Group Topic: Community Meeting    [facility] GERIATRIC BEHAVIORAL UNIT    Goran Damian RN        Group Therapy Note    Attendees:          Patient's Goal:  \"going home\"    Notes:  Pt calm and cooperative during group    Status After Intervention:  Improved    Participation Level:  Active Listener    Participation Quality: Appropriate      Speech:  normal      Thought Process/Content: Logical      Affective Functioning: Congruent      Mood: bright      Level of consciousness:  Alert      Response to Learning: Able to verbalize current knowledge/experience, Able to verbalize/acknowledge new learning, Able to retain information and Capable of insight      Endings: None Reported    Modes of Intervention: Education and Support      Discipline Responsible: Registered Nurse      Signature:  David Sheikh RN

## 2021-05-01 NOTE — PROGRESS NOTES
WRAP UP GROUP NOTE:     Patient's Goal:  Providing feedback as to their own progress in the care-plan provided. Pt's have an opportunity to explore self-reflective skills and share any additional cares and concerns not yet addressed. Pt effectively participated.      Energy level:NORMAL  Appetite:NORMAL  Concentration: GOOD  Hallucinations:GONE  Depression:BLANK  Anxiety:BLANK  How I worked today:BLANK  What helps me sleep:DEEP BREATHING  Any questions/complaints/comments:BLANK

## 2021-05-01 NOTE — PROGRESS NOTES
Group Note    Number of Participants in Group: 3  Number of Patients on Unit:4      Patient attended group:Yes  Reason for Absence:  Intervention for patient absence:        Type of Group:   Wrap-Up/Relaxation    Patient's Goal: See wrap up group sheet    Participation Level:    Monopolizing, Minimal and None           Patient Response to Learning: Yes    Patient's Behavior: Cooperative and Pleasant    Is Patient Social/Interacting: Yes    Relaxation:   Television:Yes   Reading:Yes   Game/Puzzle:Yes   Phone: Yes       Notes/Comments: pt interactive with peers and staff, compliant with group activities, is welcoming to new ideas. Pt participates in group activities.        Please see patient's wrap up group sheet for patient's comments       Electronically signed by Tabitha Camacho RN on 5/1/21 at 5:01 AM CDT

## 2021-05-01 NOTE — PROGRESS NOTES
[facility] Daily Shift Assessment-Geriatric Unit  Nursing Progress Note          Room: [facility]-   Name: Randa Myrick   Age: 61 y.o. Gender: male   Dx: <principal problem not specified>  Precautions: suicide risk and fall risk  Inpatient Status: voluntary     SLEEP:    Sleep: Yes,   Sleep Quality Fair   Hours Slept:    Sleep Medications: Yes  PRN Sleep Meds: No       MEDICAL:      Other PRN Meds: Yes   Med Compliant: Yes   Accu-Chek: No   Oxygen/CPAP/BiPAP: Yes  CIWA/CINA: No   PAIN Assessment: location, toothache  Side Effects from medication: No    Is Patient experiencing any respiratory symptoms (headache, fever, body aches, cough. Clement Fly ): no  Patient educated by nursing to practice social distancing, wear masks, wash hands frequently: yes      Metabolic Screening:    Lab Results   Component Value Date    LABA1C 5.1 04/28/2021       Lab Results   Component Value Date    CHOL 101 (L) 04/28/2021    CHOL 106 (L) 03/04/2021    CHOL 129 (L) 07/01/2019    CHOL 113 (L) 03/26/2017    CHOL 167 11/07/2015     Lab Results   Component Value Date    TRIG 90 04/28/2021    TRIG 199 (H) 03/04/2021    TRIG 221 (H) 07/01/2019    TRIG 348 (H) 03/26/2017    TRIG 232 (H) 11/07/2015     Lab Results   Component Value Date    HDL 31 (L) 04/28/2021    HDL 27 (L) 03/04/2021    HDL 27 (L) 07/01/2019    HDL 23 (L) 03/26/2017    HDL 26 (L) 11/07/2015     No components found for: LDLCAL  No results found for: LABVLDL      Body mass index is 37.58 kg/m².     BP Readings from Last 2 Encounters:   04/30/21 125/83   04/14/21 137/81         PSYCH:     SI denies suicidal ideation    HI Negative for homicidal ideation        AVH:Absent      Depression: 0 Anxiety: 0       GENERAL:      Appetite: no change from normal  Social: Yes Speech: normal   Appearance:appropriately dressed  Assistive Devices: noneLevel of Assist: Independent      GROUP:    Group Participation: Yes  Participation LevelActive Listener    Participation QualityAppropriate    Notes: Patient has been out some this evening and social with his peers. He has been working on writing a poem for [employer] in his room. Patient likes to talk a lot. Patient continues to use his home CPAP machine at night for sleep apnea. No s/s of acute distress. Patient reports that he is doing better. He is denying depression, anxiety, SI, HI, AVH this evening. He continues to complain of a toothache this evening.          Electronically signed by Carlos Rome RN on 5/1/21 at 12:41 AM CDT

## 2021-05-01 NOTE — PROGRESS NOTES
Progress Note  Cynthia Devine  4/30/2021 9:42 PM  Subjective:   Admit Date:   4/26/2021      CC/ADMIT DX:       Interval History:   Reviewed overnight events and nursing notes. He has no new medical complaints. I have reviewed all labs/diagnostics from the last 24hrs. ROS:   I have done a 10 point ROS and all are negative, except what is mentioned in the HPI. DIET GENERAL;    Medications:      divalproex  2,000 mg Oral Nightly    vitamin D  50,000 Units Oral Weekly    risperiDONE  1 mg Oral BID    amLODIPine  10 mg Oral Daily    atorvastatin  40 mg Oral Nightly    furosemide  20 mg Oral Daily    levothyroxine  50 mcg Oral Daily    pantoprazole  40 mg Oral QAM AC    metoprolol tartrate  25 mg Oral BID    lisinopril  20 mg Oral Daily    And    hydroCHLOROthiazide  25 mg Oral Daily    traZODone  50 mg Oral Nightly    melatonin  3 mg Oral Nightly           Objective:   Vitals: /83   Pulse 104   Temp 97.5 °F (36.4 °C) (Temporal)   Resp 18   Ht 5' 9\" (1.753 m)   Wt 254 lb 8 oz (115.4 kg)   SpO2 100%   BMI 37.58 kg/m²  No intake or output data in the 24 hours ending 04/30/21 2142  General appearance: alert and cooperative with exam  Extremities: extremities normal, atraumatic, no cyanosis or edema  Neurologic:  No obvious focal neurologic deficits. Skin: no rashes    Assessment and Plan: Active Problems:    Acute psychosis ([address]    Psychosis ([address]    Bipolar I disorder, most recent episode manic, severe with psychotic features ([address]  Resolved Problems:    * No resolved hospital problems. *    Vit D Def    Plan:  1. Continue present medication(s)   2.  He remains medically stable. I will monitor for any changes or concerns. 3.  Follow with Psych      Discharge planning:   home     Reviewed treatment plans with the patient and/or family.              Electronically signed by Lazaro Jang MD on 4/30/2021 at 9:42 PM

## 2021-05-02 NOTE — PROGRESS NOTES
SW attempted to contact pt to follow-up with him after he discharged from the unit yesterday to see if he had any questions or concerns that needed to be addressed. SW called the contact number listed for the pt and kept getting a busy signal after calling numerous times.

## 2021-05-03 NOTE — PROGRESS NOTES
Discharge Note    Pt discharged on 5/1/2021. Pt denied SI, HI, and AVH at time of discharge. Pt reported improvement in behavior and is leaving unit in overall good condition. SW and pt discussed pt's follow up appointments and importance of attending appointments as scheduled, pt voiced understanding and agreement. Pt able to verbally identify: warning signs, coping strategies, places and people that help make the pt feel better/distract negative thoughts, friends/family/agencies/professionals the pt can reach out to in a crisis, and something that is important to the pt/worth living for. Pt provided the national suicide prevention hotline number (5-804-782-371-693-8314) as well as local community behavioral health [facility]) crisis number for emergencies (1-869.340.8572). Pt to follow up with [facility] for intake/therapy appointment on 5/25/2021 at 11 AM with Carla London and a med management appointment on 5/11/2021 at 11 AM with Sanford Donovan. SW offered to assist pt with transportation, pt reported having transportation. Referral to out patient tobacco cessation counseling treatment: Patient refused tobacco cessation counseling. SW spoke with pt's wife Hailey Glover about weapons in home. Wife reported old gun in home. The importance of locking weapons in secured location or removing weapons from home was discussed. Wife voiced understanding and reported the weapon is locked up. Pt denies use of substances. Referral refused/declined.      Electronically signed by DALTON Nazario [facility] on 5/3/2021 at 9:06 AM

## 2021-05-06 ENCOUNTER — OFFICE VISIT (OUTPATIENT)
Dept: PSYCHIATRY | Age: 56
End: 2021-05-06
Payer: MEDICAID

## 2021-05-06 ENCOUNTER — TELEPHONE (OUTPATIENT)
Dept: PSYCHIATRY | Age: 56
End: 2021-05-06

## 2021-05-06 VITALS
OXYGEN SATURATION: 96 % | TEMPERATURE: 98 F | BODY MASS INDEX: 38.16 KG/M2 | HEIGHT: 69 IN | DIASTOLIC BLOOD PRESSURE: 70 MMHG | HEART RATE: 91 BPM | WEIGHT: 257.6 LBS | SYSTOLIC BLOOD PRESSURE: 113 MMHG

## 2021-05-06 DIAGNOSIS — F31.2 SEVERE MANIC BIPOLAR 1 DISORDER WITH PSYCHOTIC BEHAVIOR (HCC): Primary | ICD-10-CM

## 2021-05-06 DIAGNOSIS — F99 INSOMNIA DUE TO OTHER MENTAL DISORDER: ICD-10-CM

## 2021-05-06 DIAGNOSIS — F51.05 INSOMNIA DUE TO OTHER MENTAL DISORDER: ICD-10-CM

## 2021-05-06 DIAGNOSIS — F41.1 GAD (GENERALIZED ANXIETY DISORDER): ICD-10-CM

## 2021-05-06 PROCEDURE — 99215 OFFICE O/P EST HI 40 MIN: CPT | Performed by: NURSE PRACTITIONER

## 2021-05-06 RX ORDER — DIVALPROEX SODIUM 500 MG/1
TABLET, EXTENDED RELEASE ORAL
Qty: 150 TABLET | Refills: 3 | Status: SHIPPED | OUTPATIENT
Start: 2021-05-06 | End: 2021-06-04

## 2021-05-06 RX ORDER — RISPERIDONE 2 MG/1
TABLET, FILM COATED ORAL
Qty: 45 TABLET | Refills: 3 | Status: SHIPPED | OUTPATIENT
Start: 2021-05-06 | End: 2021-05-26 | Stop reason: ALTCHOICE

## 2021-05-06 RX ORDER — TRAZODONE HYDROCHLORIDE 50 MG/1
TABLET ORAL
Qty: 60 TABLET | Refills: 3 | Status: SHIPPED | OUTPATIENT
Start: 2021-05-06 | End: 2021-06-14

## 2021-05-06 ASSESSMENT — ANXIETY QUESTIONNAIRES
3. WORRYING TOO MUCH ABOUT DIFFERENT THINGS: 0-NOT AT ALL
4. TROUBLE RELAXING: 3-NEARLY EVERY DAY
GAD7 TOTAL SCORE: 5
6. BECOMING EASILY ANNOYED OR IRRITABLE: 0-NOT AT ALL
7. FEELING AFRAID AS IF SOMETHING AWFUL MIGHT HAPPEN: 0-NOT AT ALL

## 2021-05-06 ASSESSMENT — PATIENT HEALTH QUESTIONNAIRE - PHQ9
7. TROUBLE CONCENTRATING ON THINGS, SUCH AS READING THE NEWSPAPER OR WATCHING TELEVISION: 0
2. FEELING DOWN, DEPRESSED OR HOPELESS: 0
3. TROUBLE FALLING OR STAYING ASLEEP: 2
8. MOVING OR SPEAKING SO SLOWLY THAT OTHER PEOPLE COULD HAVE NOTICED. OR THE OPPOSITE, BEING SO FIGETY OR RESTLESS THAT YOU HAVE BEEN MOVING AROUND A LOT MORE THAN USUAL: 1
5. POOR APPETITE OR OVEREATING: 0
6. FEELING BAD ABOUT YOURSELF - OR THAT YOU ARE A FAILURE OR HAVE LET YOURSELF OR YOUR FAMILY DOWN: 0
SUM OF ALL RESPONSES TO PHQ QUESTIONS 1-9: 5
SUM OF ALL RESPONSES TO PHQ9 QUESTIONS 1 & 2: 0

## 2021-05-06 NOTE — PATIENT INSTRUCTIONS
PLAN    Continue:  Vit D, 67472 units, weekly  Gabapentin, 400mg, three times daily  Melatonin, 3mg, take 1-2 tabs as needed for sleep    Increase:  Depakote ER, 500mg, take 1 tablet in the morning and 4 tablets at night  Risperdal, 2mg, take 1/2 tablet in the morning and 1 tablet at night  Trazodone, take 1-2 tablets nightly as needed for sleep      DO NOT take any more Buspirone, Memantine, Phentermine    Follow up with therapy with Chet Almodovar on 5/25/21    Follow up: Return in about 1 week (around 5/13/2021). If you become suicidal, follow up with this office or call the [address] at [phone] ([phone]), or dial [phone].

## 2021-05-06 NOTE — PROGRESS NOTES
awakening with severe SOB (paroxysmal nocturnal dyspnea))    Gastrointestinal: (NVD, constipation, abdominal pain, bright red stools, black tarry stools, stool incontinence)     Genitourinary:  (pelvic pain, burning or frequency of urination, urinary urgency, blood in urine incomplete bladder emptying, urinary incontinence, STD; MEN: testicular pain or swelling, erectile dysfunction; WOMEN: LMP, heavy menstrual bleeding (menorrhagia), irregular periods, postmenopausal bleeding, menstrual pain (dymenorrhea, vaginal discharge)    Musculoskeletal: (bone pain/fracture, joint pain or swelling, musle pain)    Integumentary: (rashes, non-healing sores, itching, breast lumps, breast pain, nipple discharge, hair loss)    Neurologic: (HA, muscle weakness, paresthesias (numbness, coldness, crawling or prickling), memory loss, seizure, dizziness)    Psychiatric:  (anxiety, sadness, irritability/anger, insomnia, suicidality)    Endocrine: (heat or cold intolerance, excessive thirst (polydipsia), excessive hunger (polyphagia))    Immune/Allergic: (hives, seasonal or environmental allergies, HIV exposure)    Hematologic/Lymphatic: (lymph node enlargement, easy bleeding or bruising)      Prior to Admission medications    Medication Sig Start Date End Date Taking? Authorizing Provider   risperiDONE (RISPERDAL) 2 MG tablet Take 1/2 tablet by mouth in the morning and 1 whole tablet by mouth at night. 5/6/21  Yes Carlus Barthel, APRN - NP   divalproex (DEPAKOTE ER) 500 MG extended release tablet Take 1 tablet by mouth in the morning and 4 tablets by mouth at bedtime. 5/6/21  Yes Carlus Barthel, APRN - NP   traZODone (DESYREL) 50 MG tablet Take 1-2 tablets by mouth nightly as needed for sleep.  5/6/21  Yes Carlus Barthel, APRN - NP   vitamin D (ERGOCALCIFEROL) 1.25 MG (15508 UT) CAPS capsule Take 1 capsule by mouth once a week for 11 doses 5/5/21 7/15/21  Jack Gasca MD   melatonin 3 MG TABS tablet Take 1 tablet by mouth nightly 5/1/21 5/31/21  Jean Paul Fontaine MD   miconazole (MICOTIN) 2 % cream Apply topically 2 times daily for 4 weeks to area 4/5/21   Humberto Beaulieu MD   levothyroxine (SYNTHROID) 50 MCG tablet TAKE 1 TABLET BY MOUTH ONCE DAILY. 3/21/21   Humberto Beaulieu MD   lisinopril-hydroCHLOROthiazide (PRINZIDE;ZESTORETIC) 20-25 MG per tablet TAKE 1 TABLET BY MOUTH ONCE DAILY. 3/21/21   Humberto Beaulieu MD   amLODIPine (NORVASC) 10 MG tablet TAKE 1 TABLET BY MOUTH ONCE DAILY. 3/21/21   Humberto Beaulieu MD   atorvastatin (LIPITOR) 40 MG tablet TAKE 1 TABLET BY MOUTH ONCE DAILY. 3/21/21   Humberto Beaulieu MD   meclizine (ANTIVERT) 25 MG tablet TAKE 1/2 TABLET BY MOUTH THREE TIMES DAILY AS NEEDED FOR DIZZINESS. 3/21/21   Humberto Beaulieu MD   gabapentin (NEURONTIN) 400 MG capsule Take 1 capsule by mouth 3 times daily for 30 days. Patient taking differently: Take 300 mg by mouth 3 times daily. 12/1/20 4/14/21  Humberto Beaulieu MD   furosemide (LASIX) 20 MG tablet TAKE 1 TABLET BY MOUTH ONCE DAILY.  11/2/20   Humberto Beaulieu MD   metoprolol tartrate (LOPRESSOR) 25 MG tablet TAKE 2 TABLETS BY MOUTH TWICE DAILY 8/2/20   Humberto Beaulieu MD   aspirin 81 MG chewable tablet Take 81 mg by mouth daily    Historical Provider, MD   omeprazole (PRILOSEC) 20 MG delayed release capsule TAKE 1 CAPSULE BY MOUTH DAILY 8/19/19   Humberto Beaulieu MD   sildenafil (VIAGRA) 100 MG tablet Take 1 tablet by mouth as needed for Erectile Dysfunction Generic sildenafil 100 mg or strongest dose 7/1/19   Humberto Beaulieu MD       Past Medical History:   Diagnosis Date    Bipolar disorder [facility])     Colon polyps     Diverticular disease     GERD (gastroesophageal reflux disease)     Hiatal hernia     Hyperlipidemia     Hypertension     Hypothyroidism     Neuropathy     Unspecified sleep apnea        Past Surgical History:   Procedure Laterality Date    COLONOSCOPY  01/06/2009    Dr Kela Ganser: hyperplastic polyp, resolving diverticulitis    COLONOSCOPY  07/2012    minimal diverticulosis left side o/w normal postsurgical colon    COLONOSCOPY  2005    diverticulitis    COLONOSCOPY N/A 2016    Dr Nadine Gaona bleeding internal hemorrhoids, diverticular disease-5 yr recall    EGD      SC EGD TRANSORAL BIOPSY SINGLE/MULTIPLE N/A 2018    Dr JONATHAN Bentley-Gastritis/gastropathy, esophagitis    SUBTOTAL COLECTOMY      recurrant diverticulitis    UPPER GASTROINTESTINAL ENDOSCOPY  2005    Dr Malika Gongora: duodenal ulcer, gastritis    UPPP UVULOPALATOPHARYGOPLASTY           Family History   Problem Relation Age of Onset    Diabetes Mother     Kidney Disease Mother     Dementia Mother          at 68 - Lewy Body Dementia    Coronary Art Dis Father          at [de-identified]   [de-identified] Colon Cancer Neg Hx     Colon Polyps Neg Hx     Esophageal Cancer Neg Hx     Liver Cancer Neg Hx     Liver Disease Neg Hx     Rectal Cancer Neg Hx     Stomach Cancer Neg Hx      Social History     Socioeconomic History    Marital status:      Spouse name: None    Number of children: None    Years of education: None    Highest education level: None   Occupational History    None   Social Needs    Financial resource strain: Not hard at all   [place] insecurity     Worry: Never true     Inability: Never true    Transportation needs     Medical: No     Non-medical: No   Tobacco Use    Smoking status: Never Smoker    Smokeless tobacco: Never Used   Substance and Sexual Activity    Alcohol use: No    Drug use: No    Sexual activity: Yes   Lifestyle    Physical activity     Days per week: None     Minutes per session: None    Stress: None   Relationships    Social connections     Talks on phone: None     Gets together: None     Attends Gnosticism service: None     Active member of club or organization: None     Attends meetings of clubs or organizations: None     Relationship status: None    Intimate partner violence     Fear of current or ex partner: None     Emotionally abused: None     Physically abused: None     Forced sexual activity: None   Other Topics Concern    None   Social History Narrative    5/6/2021    PRIOR MEDICATION TRIALS    Abilify    Risperdal (current)    Depakote (was on a couple of years)    Buspirone    Trazodone (current)    Diazepam    Ativan    Gabapentin (current)    Melatonin        . SLEEP STUDY: yes - [facility], Bipap    .    negative history of seizures. .    negative history of head trauma. [place] PREVIOUS PSYCHIATRIC HISTORY    Had an episode of brief acute psychosis when he was in the [employer] (age 22). About ten years ago he had another episode but he doesn't remember what medicines he was taking - his inpatient intake in April, 2021 states that he was started on something which incapacitated him, then was started on Depakote. (He may have been started on Abilify at this time as he and his wife reports that Abilify did not work well for him.) He stopped having problems some years after that and he asked a doctor to take him off medications. He has been off medications for about 7 years. He has recently been admitted to inpatient with a severe episode of amy, 4/26/21-5/1/21. [place] FAMILY PSYCHIATRIC HISTORY    Father, bipolar    Mother, depression    . SOCIAL HISTORY    Born and Raised - [place], [place], in a 2 parent home with 3 siblings. He says he had a wonderful childhood.  twice. Has one daughter from the first marriage. Has been  to his second wife for 26 years. They have 3 adopted children and 1 adult adopted child. .    Trauma and/or Abuse - Denies trauma. He was devastated with his divorce from his first wife. .    Legal - denies    Substance Use - see history    Work History - see history    Education - see history     status - [employer] for a few months, honorably discharged after an episode of Acute Psychosis    .     PAST SUICIDE ATTEMPTS:    no    .    INPATIENT HOSPITALIZATIONS:    yes - three times, [employer] tactile, olfactory)    . Paranoia: negative    . Delusions:  negative      (TV, radio, thought broadcasting, mind control, referential thinking)      (persecutory delusion - e.g., believing one is being followed and harassed by gangs)      (grandiose delusion - e.g., believing one is a billionaire  who owns casinos around the world)      (erotomanic delusion - e.g., believing a famous  is in love with them)      (somatic delusion - e.g., believing one's sinuses have been infested by worms)       (delusions of reference - e.g., believing dialogue on a TV program is directed specifically towards the patient)      (delusions of control - e.g., believing one's thoughts and movements are controlled by planetary overlords)    . Patient's perception: negative      (Spiritual or cultural context of symptoms, reality testing)    . ADHD symptoms: negative      (able to focus and concentrate, scattered thoughts, disorganized thoughts)    . Eating Disorder symptoms:  negative      (binging, purging, excessive exercising)            Psychiatric Review of Systems: See history      MENTAL STATUS EXAMINATION  Patient is  A & O x 4. Appearance:  well-appearing appropriately dressed for age and season. Cognition:  Recent memory intact , remote memory intact , good fund of knowledge, of average intelligence level. Speech:  Slurred, pressured no evidence of language or speech disorder or defect. Language: Naming: intact; Word Finding: intact fluent.  Conversation:no evidence of delusions  Behavior:  Cooperative and Good eye contact  Mood: \"good\"  Affect: congruent with mood  Thought Content: negative delusions, negative hallucinations, negative obsessions,  negativehomicidal and negative suicidal   Thought Process: linear, goal directed, coherent, rapid, overabundance of ideas, flight of ideas, tangential and circumstantial  Associations: logical connections  Attention Span and Concentration: Impaired Judgement Insight:  diminished and inappropriate   Gait and Station:normal gait and station                         Abnormal side effects: Denies   Sleep: difficulty falling and staying asleep   Appetite: ok        Lab Results   Component Value Date     04/26/2021    K 3.5 04/26/2021     04/26/2021    CO2 20 (L) 04/26/2021    BUN 14 04/26/2021    CREATININE 0.8 04/26/2021    GLUCOSE 100 04/26/2021    CALCIUM 9.0 04/26/2021    PROT 7.0 04/26/2021    LABALBU 4.0 04/26/2021    BILITOT 0.6 04/26/2021    ALKPHOS 51 04/26/2021    AST 24 04/26/2021    ALT 17 04/26/2021    LABGLOM >60 04/26/2021    GFRAA >59 04/26/2021    GLOB 2.7 03/31/2017     Lab Results   Component Value Date     04/26/2021    K 3.5 04/26/2021     04/26/2021    CO2 20 04/26/2021    BUN 14 04/26/2021    CREATININE 0.8 04/26/2021    GLUCOSE 100 04/26/2021    CALCIUM 9.0 04/26/2021      Lab Results   Component Value Date    CHOL 101 (L) 04/28/2021     Lab Results   Component Value Date    TRIG 90 04/28/2021     Lab Results   Component Value Date    HDL 31 (L) 04/28/2021     Lab Results   Component Value Date    LDLCALC 52 04/28/2021     No results found for: LABVLDL, VLDL  No results found for: [facility]  Lab Results   Component Value Date    LABA1C 5.1 04/28/2021     No results found for: EAG  Lab Results   Component Value Date    TSHFT4 1.61 04/26/2021    TSH 1.690 03/03/2021     Lab Results   Component Value Date    VITD25 19.1 (L) 04/28/2021       Assessment:   1. Severe manic bipolar 1 disorder with psychotic behavior ([facility] [address]    2. TEVIN (generalized anxiety disorder)    3.  Insomnia due to other mental disorder        Assessments Administered:    PHQ9: 5, mild  GAD7: 5, mild  MDQ: + 11, Yes, severe      PLAN    Continue:  Vit D, 67659 units, weekly  Gabapentin, 400mg, three times daily  Melatonin, 3mg, take 1-2 tabs as needed for sleep    Increase:  Depakote ER, 500mg, take 1 tablet in the morning and 4 tablets at night Risperdal, 2mg, take 1/2 tablet in the morning and 1 tablet at night  Trazodone, take 1-2 tablets nightly as needed for sleep      DO NOT take any more Buspirone, Memantine, Phentermine    Follow up with therapy with Ronn Catalan on 5/25/21    Follow up: Return in about 1 week (around 5/13/2021). If you become suicidal, follow up with this office or call the [facility] at [phone]), or dial [phone].    1. The risks, benefits, side effects, indications, contraindications, and adverse effects of the medications have been discussed. Yes.  2. The pt has verbalized understanding and has capacity to give informed consent. 3. The Ariana Marroquinbs report has been reviewed according to [facility] regulations. 4. Supportive therapy offered. 5. Follow up: Return in about 1 week (around 5/13/2021). 6. The patient has been advised to call with any problems. 7. Controlled substance Treatment Plan: none. 8. The above listed medications have been continued, modifications in meds and other orders/labs as follows:      Orders Placed This Encounter   Medications    risperiDONE (RISPERDAL) 2 MG tablet     Sig: Take 1/2 tablet by mouth in the morning and 1 whole tablet by mouth at night. Dispense:  45 tablet     Refill:  3    divalproex (DEPAKOTE ER) 500 MG extended release tablet     Sig: Take 1 tablet by mouth in the morning and 4 tablets by mouth at bedtime. Dispense:  150 tablet     Refill:  3    traZODone (DESYREL) 50 MG tablet     Sig: Take 1-2 tablets by mouth nightly as needed for sleep. Dispense:  60 tablet     Refill:  3      No orders of the defined types were placed in this encounter. 9. Additional comments: His speech is pressured, tangential, flight of ideas, circumstantial. His wife is having difficulty dealing with the stress of his symptoms. He is severely manic but generally pleasant in demeanor.  Will increase Depakote and Risperdal as he has only slept about 5 hrs since discharging from the hospital. His discharge on Saturday was late and he did not have access to medications until Monday morning. He has been back on medications now for about 3 days. Will make no other changes today and will follow up in a week. 10.Over 50% of the total visit time of   60  minutes was spent on counseling and/or coordination of care of:          1. Severe manic bipolar 1 disorder with psychotic behavior ([facility] [address]    2. TEVIN (generalized anxiety disorder)    3.  Insomnia due to other mental disorder        Venecia Ellington, REKHAP-BC

## 2021-05-06 NOTE — TELEPHONE ENCOUNTER
Pt's wife called stating her  has an appt at [facility] outpt as F/U to inpt and needs to be sooner than scheduled. She reported that he was as bad off as when he went in the hospital.  She stated that he was manic and slept 5 min last night. She said she felt the Depakote and Risperdal were making him agitated and difficult to deal with. She denied that she felt he was a risk of harm to himself or others. Spoke to [address] Juna[facility] Lipscomb who wanted pt to go to ER for evaluation. Pt's wife said that the pt would refuse ER as it was a bad experience for him. Spoke with A Leslie Running as SEDRICK DOMINIQUE did not have any opening and as per her direction, pt was set up with an appt with Suzanne DOMINIQUE at 1:30 pm today-pt's wife made aware and agreeable to this appt.

## 2021-05-11 ENCOUNTER — TELEPHONE (OUTPATIENT)
Dept: PSYCHIATRY | Age: 56
End: 2021-05-11

## 2021-05-11 ENCOUNTER — TELEPHONE (OUTPATIENT)
Dept: NEUROLOGY | Age: 56
End: 2021-05-11

## 2021-05-11 NOTE — TELEPHONE ENCOUNTER
Called and left patient a  to get a current dwnld report on his sleep machine from [facility] [place] for his appointment with Dr. Naman Macdonald. And to where Dr. Naman Macdonald can see the report and to see how well he is doing on the machine.

## 2021-05-11 NOTE — TELEPHONE ENCOUNTER
Called pt for appointment reminder.    Pt confirmed      Electronically signed by Charlee Roland MA on 5/11/2021 at 9:03 AM

## 2021-05-12 ENCOUNTER — TELEPHONE (OUTPATIENT)
Dept: PRIMARY CARE CLINIC | Age: 56
End: 2021-05-12

## 2021-05-12 ENCOUNTER — OFFICE VISIT (OUTPATIENT)
Dept: PSYCHIATRY | Age: 56
End: 2021-05-12
Payer: MEDICAID

## 2021-05-12 VITALS
WEIGHT: 255.4 LBS | OXYGEN SATURATION: 99 % | BODY MASS INDEX: 37.72 KG/M2 | DIASTOLIC BLOOD PRESSURE: 83 MMHG | SYSTOLIC BLOOD PRESSURE: 144 MMHG | TEMPERATURE: 98.6 F | HEART RATE: 75 BPM

## 2021-05-12 DIAGNOSIS — Z79.899 HIGH RISK MEDICATION USE: ICD-10-CM

## 2021-05-12 DIAGNOSIS — F41.1 GAD (GENERALIZED ANXIETY DISORDER): ICD-10-CM

## 2021-05-12 DIAGNOSIS — F31.2 SEVERE MANIC BIPOLAR 1 DISORDER WITH PSYCHOTIC BEHAVIOR (HCC): Primary | ICD-10-CM

## 2021-05-12 DIAGNOSIS — F99 INSOMNIA DUE TO OTHER MENTAL DISORDER: ICD-10-CM

## 2021-05-12 DIAGNOSIS — F51.05 INSOMNIA DUE TO OTHER MENTAL DISORDER: ICD-10-CM

## 2021-05-12 PROCEDURE — 99213 OFFICE O/P EST LOW 20 MIN: CPT | Performed by: NURSE PRACTITIONER

## 2021-05-12 ASSESSMENT — PATIENT HEALTH QUESTIONNAIRE - PHQ9
SUM OF ALL RESPONSES TO PHQ QUESTIONS 1-9: 1
6. FEELING BAD ABOUT YOURSELF - OR THAT YOU ARE A FAILURE OR HAVE LET YOURSELF OR YOUR FAMILY DOWN: 0
9. THOUGHTS THAT YOU WOULD BE BETTER OFF DEAD, OR OF HURTING YOURSELF: 0
4. FEELING TIRED OR HAVING LITTLE ENERGY: 1
7. TROUBLE CONCENTRATING ON THINGS, SUCH AS READING THE NEWSPAPER OR WATCHING TELEVISION: 0
2. FEELING DOWN, DEPRESSED OR HOPELESS: 0

## 2021-05-12 ASSESSMENT — ANXIETY QUESTIONNAIRES
1. FEELING NERVOUS, ANXIOUS, OR ON EDGE: 0-NOT AT ALL
2. NOT BEING ABLE TO STOP OR CONTROL WORRYING: 0-NOT AT ALL
6. BECOMING EASILY ANNOYED OR IRRITABLE: 0-NOT AT ALL
7. FEELING AFRAID AS IF SOMETHING AWFUL MIGHT HAPPEN: 0-NOT AT ALL
3. WORRYING TOO MUCH ABOUT DIFFERENT THINGS: 2-OVER HALF THE DAYS

## 2021-05-12 NOTE — TELEPHONE ENCOUNTER
----- Message from Salvatore Salgado MD sent at 5/8/2021 11:35 AM CDT -----  Abnormal swallowing noted. This does increase the risk of aspiration. Would recommend nectar thickened liquids as well as either puréed or very minced food.   If this is causing further issues or any pain, would recommend to see gastroenterology for EGD

## 2021-05-12 NOTE — TELEPHONE ENCOUNTER
Attempted to contact patient went straight to voicemail I was able to leave a message informing patient to call office at his earliest convenience to discuss results.

## 2021-05-12 NOTE — PROGRESS NOTES
5/12/2021 1:16 PM   Progress Note    IN:  1145  OUT: 1210      [facility] 1965      Chief Complaint   Patient presents with    Follow-up    Other     amy         Subjective:  Patient is a 61 y.o. male diagnosed with Bipolar Disorder and presents today for follow-up. Last seen in clinic on 5/6/21 and prior records were reviewed. Today patient states, \"I feel good. \"    Patient reports side effects as follows: none. No evidence of EPS, no cogwheeling or abnormal motor movements. Absent  suicidal ideation. Reports compliance with medications as poor.  (has missed the morning doses of Depakote)     Current Substance Use:  See history    BP: BP (!) 144/83   Pulse 75   Temp 98.6 °F (37 °C)   Wt 255 lb 6.4 oz (115.8 kg)   SpO2 99%   BMI 37.72 kg/m²       Review of Systems - 14 point review:  Negative except being treated for: HAWK, Vit D deficiency, HTN, hypothyroidism, [place] Palsy, nerve conduction problems, mood stability, insomnia    Constitutional: (fevers, chills, night sweats, wt loss/gain, change in appetite, fatigue, somnolence)    HEENT: (ear pain or discharge, hearing loss, ear ringing, sinus pressure, nosebleed, nasal discharge, sore throat, oral sores, tooth pain, bleeding gums, hoarse voice, neck pain)      Cardiovascular: (HTN, chest pain, elevated cholesterol/lipids, palpitations, leg swelling, leg pain with walking)    Respiratory: (cough, wheezing, snoring, SOB with activity (dyspnea), SOB while lying flat (orthopnea), awakening with severe SOB (paroxysmal nocturnal dyspnea))    Gastrointestinal: (NVD, constipation, abdominal pain, bright red stools, black tarry stools, stool incontinence)     Genitourinary:  (pelvic pain, burning or frequency of urination, urinary urgency, blood in urine incomplete bladder emptying, urinary incontinence, STD; MEN: testicular pain or swelling, erectile dysfunction; WOMEN: LMP, heavy menstrual bleeding (menorrhagia), irregular periods, postmenopausal bleeding, menstrual pain (dymenorrhea, vaginal discharge)    Musculoskeletal: (bone pain/fracture, joint pain or swelling, musle pain)    Integumentary: (rashes, acne, non-healing sores, itching, breast lumps, breast pain, nipple discharge, hair loss)    Neurologic: (HA, muscle weakness, paresthesias (numbness, coldness, crawling or prickling), memory loss, seizure, dizziness)    Psychiatric:  (anxiety, sadness, irritability/anger, insomnia, suicidality)    Endocrine: (heat or cold intolerance, excessive thirst (polydipsia), excessive hunger (polyphagia))    Immune/Allergic: (hives, seasonal or environmental allergies, HIV exposure)    Hematologic/Lymphatic: (lymph node enlargement, easy bleeding or bruising)    History obtained via chart review and patient    PCP is Yanelis Larsen MD       Current Meds:    Prior to Admission medications    Medication Sig Start Date End Date Taking? Authorizing Provider   risperiDONE (RISPERDAL) 2 MG tablet Take 1/2 tablet by mouth in the morning and 1 whole tablet by mouth at night. 5/6/21   TRIP Chavez NP   divalproex (DEPAKOTE ER) 500 MG extended release tablet Take 1 tablet by mouth in the morning and 4 tablets by mouth at bedtime. 5/6/21   TRIP Chavez NP   traZODone (DESYREL) 50 MG tablet Take 1-2 tablets by mouth nightly as needed for sleep. 5/6/21   TRIP Chavez NP   vitamin D (ERGOCALCIFEROL) 1.25 MG (14040 UT) CAPS capsule Take 1 capsule by mouth once a week for 11 doses 5/5/21 7/15/21  Eder Parham MD   melatonin 3 MG TABS tablet Take 1 tablet by mouth nightly 5/1/21 5/31/21  Eder Parham MD   miconazole (MICOTIN) 2 % cream Apply topically 2 times daily for 4 weeks to area 4/5/21   Zac Hastings MD   levothyroxine (SYNTHROID) 50 MCG tablet TAKE 1 TABLET BY MOUTH ONCE DAILY. 3/21/21   Zac Hastings MD   lisinopril-hydroCHLOROthiazide (PRINZIDE;ZESTORETIC) 20-25 MG per tablet TAKE 1 TABLET BY MOUTH ONCE DAILY.  3/21/21   Zac Hastings, MD   amLODIPine (NORVASC) 10 MG tablet TAKE 1 TABLET BY MOUTH ONCE DAILY. 3/21/21   Chapis Mccauley MD   atorvastatin (LIPITOR) 40 MG tablet TAKE 1 TABLET BY MOUTH ONCE DAILY. 3/21/21   Chapis Mccauley MD   meclizine (ANTIVERT) 25 MG tablet TAKE 1/2 TABLET BY MOUTH THREE TIMES DAILY AS NEEDED FOR DIZZINESS. 3/21/21   Chapis Mccauley MD   gabapentin (NEURONTIN) 400 MG capsule Take 1 capsule by mouth 3 times daily for 30 days. Patient taking differently: Take 300 mg by mouth 3 times daily. 12/1/20 4/14/21  Chapis Mccauley MD   furosemide (LASIX) 20 MG tablet TAKE 1 TABLET BY MOUTH ONCE DAILY.  11/2/20   Chapis Mccauley MD   metoprolol tartrate (LOPRESSOR) 25 MG tablet TAKE 2 TABLETS BY MOUTH TWICE DAILY 8/2/20   Chapis Mccauley MD   aspirin 81 MG chewable tablet Take 81 mg by mouth daily    Historical Provider, MD   omeprazole (PRILOSEC) 20 MG delayed release capsule TAKE 1 CAPSULE BY MOUTH DAILY 8/19/19   Chapis Mccauley MD   sildenafil (VIAGRA) 100 MG tablet Take 1 tablet by mouth as needed for Erectile Dysfunction Generic sildenafil 100 mg or strongest dose 7/1/19   Chapis Mccauley MD     Social History     Socioeconomic History    Marital status:      Spouse name: None    Number of children: None    Years of education: None    Highest education level: None   Occupational History    None   Social Needs    Financial resource strain: Not hard at all   Chantal-Julio César insecurity     Worry: Never true     Inability: Never true    Transportation needs     Medical: No     Non-medical: No   Tobacco Use    Smoking status: Never Smoker    Smokeless tobacco: Never Used   Substance and Sexual Activity    Alcohol use: No    Drug use: No    Sexual activity: Yes   Lifestyle    Physical activity     Days per week: None     Minutes per session: None    Stress: None   Relationships    Social connections     Talks on phone: None     Gets together: None     Attends Mandaeism service: None     Active member of club or organization: None     Attends meetings of clubs or organizations: None     Relationship status: None    Intimate partner violence     Fear of current or ex partner: None     Emotionally abused: None     Physically abused: None     Forced sexual activity: None   Other Topics Concern    None   Social History Narrative    5/6/2021    PRIOR MEDICATION TRIALS    Abilify    Risperdal (current)    Depakote (was on a couple of years)    Buspirone    Trazodone (current)    Diazepam    Ativan    Gabapentin (current)    Melatonin        . SLEEP STUDY: yes - HAWK, Bipap    .    negative history of seizures. .    negative history of head trauma. Miriam Aquino PREVIOUS PSYCHIATRIC HISTORY    Had an episode of brief acute psychosis when he was in the [employer] (age 22). About ten years ago he had another episode but he doesn't remember what medicines he was taking - his inpatient intake in April, 2021 states that he was started on something which incapacitated him, then was started on Depakote. (He may have been started on Abilify at this time as he and his wife reports that Abilify did not work well for him.) He stopped having problems some years after that and he asked a doctor to take him off medications. He has been off medications for about 7 years. He has recently been admitted to inpatient with a severe episode of amy, 4/26/21-5/1/21. Miriam Aquino FAMILY PSYCHIATRIC HISTORY    Father, bipolar    Mother, depression    . SOCIAL HISTORY    Born and Raised - [place], [place], in a 2 parent home with 3 siblings. He says he had a wonderful childhood.  twice. Has one daughter from the first marriage. Has been  to his second wife for 26 years. They have 3 adopted children and 1 adult adopted child. .    Trauma and/or Abuse - Denies trauma. He was devastated with his divorce from his first wife.      .    Legal - denies    Substance Use - see history    Work History - see history    Education - see history     negative      (heights, planes, spiders, etc.)    . Psychosis: positive for visions and dreams from God (denies hearing music or seeing people)      (hallucinations, auditory, visual, tactile, olfactory)    . Paranoia: negative    . Delusions:  negative      (TV, radio, thought broadcasting, mind control, referential thinking)      (persecutory delusion - e.g., believing one is being followed and harassed by gangs)      (grandiose delusion - e.g., believing one is a billionaire  who owns casinos around the world)      (erotomanic delusion - e.g., believing a famous  is in love with them)      (somatic delusion - e.g., believing one's sinuses have been infested by worms)       (delusions of reference - e.g., believing dialogue on a TV program is directed specifically towards the patient)      (delusions of control - e.g., believing one's thoughts and movements are controlled by planetary overlords)    . Patient's perception: negative      (Spiritual or cultural context of symptoms, reality testing)    . ADHD symptoms: negative      (able to focus and concentrate, scattered thoughts, disorganized thoughts)    . Eating Disorder symptoms:  negative      (binging, purging, excessive exercising)            MSE:  Patient is  A & O x 4. Appearance:  well-appearing appropriately dressed for season and age. Cognition:  Recent memory intact , remote memory intact , good fund of knowledge, average  intelligence level. Speech:  Pressured (less pressured than last visit), slurred (patient and wife says that slurred speech is normal for the patient)  Language: Naming: intact;  Word Finding: intact  Conversation no evidence of delusions  Behavior:  Cooperative and Good eye contact  Mood: euthymic  Affect: congruent with mood  Thought Content: negative delusions, negative hallucinations, negative obsessions,  negativehomicidal and negative suicidal   Thought Process: linear, goal directed, coherent, overabundance of ideas, flight of ideas and tangential  Associations: logical connections  Attention Span and concentration: Normal   Judgement Insight:  diminished and inappropriate  Gait and Station:normal gait and station   Sleep: says that he is sleeping fine   Appetite: ok      Lab Results   Component Value Date     04/26/2021    K 3.5 04/26/2021     04/26/2021    CO2 20 (L) 04/26/2021    BUN 14 04/26/2021    CREATININE 0.8 04/26/2021    GLUCOSE 100 04/26/2021    CALCIUM 9.0 04/26/2021    PROT 7.0 04/26/2021    LABALBU 4.0 04/26/2021    BILITOT 0.6 04/26/2021    ALKPHOS 51 04/26/2021    AST 24 04/26/2021    ALT 17 04/26/2021    LABGLOM >60 04/26/2021    GFRAA >59 04/26/2021    GLOB 2.7 03/31/2017     Lab Results   Component Value Date     04/26/2021    K 3.5 04/26/2021     04/26/2021    CO2 20 04/26/2021    BUN 14 04/26/2021    CREATININE 0.8 04/26/2021    GLUCOSE 100 04/26/2021    CALCIUM 9.0 04/26/2021      Lab Results   Component Value Date    CHOL 101 (L) 04/28/2021     Lab Results   Component Value Date    TRIG 90 04/28/2021     Lab Results   Component Value Date    HDL 31 (L) 04/28/2021     Lab Results   Component Value Date    LDLCALC 52 04/28/2021     No results found for: LABVLDL, VLDL  No results found for: [facility]  Lab Results   Component Value Date    LABA1C 5.1 04/28/2021     No results found for: EAG  Lab Results   Component Value Date    TSHFT4 1.61 04/26/2021    TSH 1.690 03/03/2021     Lab Results   Component Value Date    VITD25 19.1 (L) 04/28/2021       Assessments Administered:    PHQ9: 1, normal  GAD7: 2, normal    Cognition: Can spell \"world\" backwards: Yes                    Can do serial 7's: Yes    Assessment:   1. Severe manic bipolar 1 disorder with psychotic behavior ([facility] [address]    2. TEVIN (generalized anxiety disorder)    3. Insomnia due to other mental disorder    4.  High risk medication use        PLAN     Continue:  Vit D, 96926 units, weekly  Gabapentin, 400mg, three times daily  Melatonin, 3mg, take 1-2 tabs as needed for sleep    Depakote ER, 500mg, take 1 tablet in the morning and 4 tablets at night    Risperdal, 2mg, take 1/2 tablet in the morning and 1 tablet at night    Trazodone, 50mg, take 1-2 tablets nightly as needed for sleep     After you have taken Depakote consistently for 1 week (1 tablet in the morning and 4 tablets at night), then come in to the lab for a Depakote level (do not take the morning dose until after you have had your blood drawn.)     DO NOT take any more Buspirone, Memantine, Phentermine     Follow up with therapy with Angelika Benitez on 5/25/21    Follow up: Return in about 2 weeks (around 5/26/2021). If you become suicidal, follow up with this office or call the [facility] at [phone]), or dial [phone].    1. The risks, benefits, side effects, indications, contraindications, and adverse effects of the medications have been discussed. Yes.  2. The pt has verbalized understanding and has capacity to give informed consent. 3. The Ramya Cobian report has been reviewed according to [facility] regulations. 4. Supportive therapy offered. 5. Follow up: Return in about 2 weeks (around 5/26/2021). 6. The patient has been advised to call with any problems. 7. Controlled substance Treatment Plan: none. 8. The above listed medications have been continued, modifications in meds and other orders/labs as follows: No orders of the defined types were placed in this encounter. Orders Placed This Encounter   Procedures    Valproic Acid Level, Total     Standing Status:   Future     Standing Expiration Date:   6/12/2021     Order Specific Question:   Dose Schedule & Time of Last Dose? Answer:   night before he comes in for a blood draw       9. Additional comments: His speech continues to be pressured and he continues to have flight of ideas and is tangential. These are improved from the last visit.  He reports that the last 2 mornings he has not taken the morning dose of Depakote. Emphasized that he needs to take his medication as prescribed. He is somewhat labile today as he became tearful when discussing his wife saying that \"she needs to slow down. \" He was encouraged to help her as he can. Ordered a depakote level and emphasized that before he comes in for the level that he needs to be taking the medication as prescribed, not missing doses. He was also educated that on the day he comes in for a blood draw that he should not take the morning dose until after he has his blood drawn. It is also not known if he is taking the morning Risperdal as prescribed. Emphasized that he needs to take all of his medications as prescribed so that we can know if they are effective at those doses. He verbalized understanding. Will follow up in 2 weeks. 10.Over 50% of the total visit time of   25  minutes was spent on counseling and/or coordination of care of:                        1. Severe manic bipolar 1 disorder with psychotic behavior ([facility] [address]    2. TEVIN (generalized anxiety disorder)    3. Insomnia due to other mental disorder    4.  High risk medication use                      Psychotherapy Topics: mood/medication effectiveness       Rishabh Isaacs PMHNP-BC

## 2021-05-13 ENCOUNTER — HOSPITAL ENCOUNTER (EMERGENCY)
Facility: HOSPITAL | Age: 56
Discharge: ANOTHER HOSPITAL | End: 2021-05-14
Attending: EMERGENCY MEDICINE | Admitting: EMERGENCY MEDICINE

## 2021-05-13 ENCOUNTER — TELEPHONE (OUTPATIENT)
Dept: NEUROLOGY | Age: 56
End: 2021-05-13

## 2021-05-13 DIAGNOSIS — F30.9 MANIC EPISODE (HCC): ICD-10-CM

## 2021-05-13 DIAGNOSIS — F31.9 BIPOLAR 1 DISORDER (HCC): Primary | ICD-10-CM

## 2021-05-13 LAB
ALBUMIN SERPL-MCNC: 4.3 G/DL (ref 3.5–5.2)
ALBUMIN/GLOB SERPL: 1.3 G/DL
ALP SERPL-CCNC: 46 U/L (ref 39–117)
ALT SERPL W P-5'-P-CCNC: 16 U/L (ref 1–41)
AMPHET+METHAMPHET UR QL: NEGATIVE
AMPHETAMINES UR QL: NEGATIVE
ANION GAP SERPL CALCULATED.3IONS-SCNC: 11 MMOL/L (ref 5–15)
APAP SERPL-MCNC: <5 MCG/ML (ref 0–30)
AST SERPL-CCNC: 19 U/L (ref 1–40)
BARBITURATES UR QL SCN: NEGATIVE
BASOPHILS # BLD AUTO: 0.05 10*3/MM3 (ref 0–0.2)
BASOPHILS NFR BLD AUTO: 0.5 % (ref 0–1.5)
BENZODIAZ UR QL SCN: NEGATIVE
BILIRUB SERPL-MCNC: 0.4 MG/DL (ref 0–1.2)
BUN SERPL-MCNC: 19 MG/DL (ref 6–20)
BUN/CREAT SERPL: 21.8 (ref 7–25)
BUPRENORPHINE SERPL-MCNC: NEGATIVE NG/ML
CALCIUM SPEC-SCNC: 9.2 MG/DL (ref 8.6–10.5)
CANNABINOIDS SERPL QL: NEGATIVE
CHLORIDE SERPL-SCNC: 103 MMOL/L (ref 98–107)
CO2 SERPL-SCNC: 26 MMOL/L (ref 22–29)
COCAINE UR QL: NEGATIVE
CREAT SERPL-MCNC: 0.87 MG/DL (ref 0.76–1.27)
DEPRECATED RDW RBC AUTO: 41.1 FL (ref 37–54)
EOSINOPHIL # BLD AUTO: 0.04 10*3/MM3 (ref 0–0.4)
EOSINOPHIL NFR BLD AUTO: 0.4 % (ref 0.3–6.2)
ERYTHROCYTE [DISTWIDTH] IN BLOOD BY AUTOMATED COUNT: 12.4 % (ref 12.3–15.4)
ETHANOL UR QL: <0.01 %
GFR SERPL CREATININE-BSD FRML MDRD: 90 ML/MIN/1.73
GLOBULIN UR ELPH-MCNC: 3.2 GM/DL
GLUCOSE BLDC GLUCOMTR-MCNC: 154 MG/DL (ref 70–130)
GLUCOSE SERPL-MCNC: 162 MG/DL (ref 65–99)
HCT VFR BLD AUTO: 39.6 % (ref 37.5–51)
HGB BLD-MCNC: 13.2 G/DL (ref 13–17.7)
HOLD SPECIMEN: NORMAL
HOLD SPECIMEN: NORMAL
IMM GRANULOCYTES # BLD AUTO: 0.11 10*3/MM3 (ref 0–0.05)
IMM GRANULOCYTES NFR BLD AUTO: 1.1 % (ref 0–0.5)
LYMPHOCYTES # BLD AUTO: 1.78 10*3/MM3 (ref 0.7–3.1)
LYMPHOCYTES NFR BLD AUTO: 17.6 % (ref 19.6–45.3)
MCH RBC QN AUTO: 30.3 PG (ref 26.6–33)
MCHC RBC AUTO-ENTMCNC: 33.3 G/DL (ref 31.5–35.7)
MCV RBC AUTO: 91 FL (ref 79–97)
METHADONE UR QL SCN: NEGATIVE
MONOCYTES # BLD AUTO: 0.73 10*3/MM3 (ref 0.1–0.9)
MONOCYTES NFR BLD AUTO: 7.2 % (ref 5–12)
NEUTROPHILS NFR BLD AUTO: 7.43 10*3/MM3 (ref 1.7–7)
NEUTROPHILS NFR BLD AUTO: 73.2 % (ref 42.7–76)
NRBC BLD AUTO-RTO: 0 /100 WBC (ref 0–0.2)
OPIATES UR QL: NEGATIVE
OXYCODONE UR QL SCN: NEGATIVE
PCP UR QL SCN: NEGATIVE
PLATELET # BLD AUTO: 240 10*3/MM3 (ref 140–450)
PMV BLD AUTO: 10.6 FL (ref 6–12)
POTASSIUM SERPL-SCNC: 3.8 MMOL/L (ref 3.5–5.2)
PROPOXYPH UR QL: NEGATIVE
PROT SERPL-MCNC: 7.5 G/DL (ref 6–8.5)
RBC # BLD AUTO: 4.35 10*6/MM3 (ref 4.14–5.8)
SALICYLATES SERPL-MCNC: <0.3 MG/DL
SODIUM SERPL-SCNC: 140 MMOL/L (ref 136–145)
TRICYCLICS UR QL SCN: NEGATIVE
VALPROATE SERPL-MCNC: 132.9 MCG/ML (ref 50–125)
WBC # BLD AUTO: 10.14 10*3/MM3 (ref 3.4–10.8)
WHOLE BLOOD HOLD SPECIMEN: NORMAL
WHOLE BLOOD HOLD SPECIMEN: NORMAL

## 2021-05-13 PROCEDURE — 25010000002 DROPERIDOL PER 5 MG: Performed by: EMERGENCY MEDICINE

## 2021-05-13 PROCEDURE — 80306 DRUG TEST PRSMV INSTRMNT: CPT | Performed by: EMERGENCY MEDICINE

## 2021-05-13 PROCEDURE — 82077 ASSAY SPEC XCP UR&BREATH IA: CPT | Performed by: EMERGENCY MEDICINE

## 2021-05-13 PROCEDURE — 85025 COMPLETE CBC W/AUTO DIFF WBC: CPT | Performed by: EMERGENCY MEDICINE

## 2021-05-13 PROCEDURE — 80179 DRUG ASSAY SALICYLATE: CPT | Performed by: EMERGENCY MEDICINE

## 2021-05-13 PROCEDURE — 99285 EMERGENCY DEPT VISIT HI MDM: CPT

## 2021-05-13 PROCEDURE — 80143 DRUG ASSAY ACETAMINOPHEN: CPT | Performed by: EMERGENCY MEDICINE

## 2021-05-13 PROCEDURE — 80164 ASSAY DIPROPYLACETIC ACD TOT: CPT | Performed by: EMERGENCY MEDICINE

## 2021-05-13 PROCEDURE — 96372 THER/PROPH/DIAG INJ SC/IM: CPT

## 2021-05-13 PROCEDURE — 80053 COMPREHEN METABOLIC PANEL: CPT | Performed by: EMERGENCY MEDICINE

## 2021-05-13 PROCEDURE — 82962 GLUCOSE BLOOD TEST: CPT

## 2021-05-13 RX ORDER — DROPERIDOL 2.5 MG/ML
2.5 INJECTION, SOLUTION INTRAMUSCULAR; INTRAVENOUS ONCE
Status: COMPLETED | OUTPATIENT
Start: 2021-05-13 | End: 2021-05-13

## 2021-05-13 RX ORDER — SODIUM CHLORIDE 0.9 % (FLUSH) 0.9 %
10 SYRINGE (ML) INJECTION AS NEEDED
Status: DISCONTINUED | OUTPATIENT
Start: 2021-05-13 | End: 2021-05-14 | Stop reason: HOSPADM

## 2021-05-13 RX ADMIN — DROPERIDOL 2.5 MG: 2.5 INJECTION, SOLUTION INTRAMUSCULAR; INTRAVENOUS at 23:33

## 2021-05-13 RX ADMIN — DROPERIDOL 2.5 MG: 2.5 INJECTION, SOLUTION INTRAMUSCULAR; INTRAVENOUS at 21:54

## 2021-05-13 NOTE — TELEPHONE ENCOUNTER
Called patient to see if patient would like to reschedule no show appt with Dr. Brock Ferrari, could not reach patient. I left a voicemail for them to call us back if they would like to reschedule.

## 2021-05-14 VITALS
SYSTOLIC BLOOD PRESSURE: 156 MMHG | BODY MASS INDEX: 37.92 KG/M2 | HEIGHT: 69 IN | TEMPERATURE: 98.4 F | HEART RATE: 78 BPM | RESPIRATION RATE: 16 BRPM | OXYGEN SATURATION: 100 % | WEIGHT: 256 LBS | DIASTOLIC BLOOD PRESSURE: 75 MMHG

## 2021-05-14 LAB — SARS-COV-2 RDRP RESP QL NAA+PROBE: NORMAL

## 2021-05-14 PROCEDURE — 25010000002 DROPERIDOL PER 5 MG: Performed by: EMERGENCY MEDICINE

## 2021-05-14 PROCEDURE — 87635 SARS-COV-2 COVID-19 AMP PRB: CPT | Performed by: EMERGENCY MEDICINE

## 2021-05-14 PROCEDURE — 96372 THER/PROPH/DIAG INJ SC/IM: CPT

## 2021-05-14 RX ORDER — DROPERIDOL 2.5 MG/ML
2.5 INJECTION, SOLUTION INTRAMUSCULAR; INTRAVENOUS ONCE
Status: COMPLETED | OUTPATIENT
Start: 2021-05-14 | End: 2021-05-14

## 2021-05-14 RX ADMIN — DROPERIDOL 2.5 MG: 2.5 INJECTION, SOLUTION INTRAMUSCULAR; INTRAVENOUS at 04:42

## 2021-05-21 ENCOUNTER — TELEPHONE (OUTPATIENT)
Dept: PSYCHIATRY | Age: 56
End: 2021-05-21

## 2021-05-21 NOTE — TELEPHONE ENCOUNTER
Called pt to move their appt up on 5/25 from 11 to 10 due to provider being double booked. Left message with his wife.     Electronically signed by Jason Yeboah MA on 5/21/2021 at 10:14 AM

## 2021-05-24 ENCOUNTER — TELEPHONE (OUTPATIENT)
Dept: PSYCHIATRY | Age: 56
End: 2021-05-24

## 2021-05-24 NOTE — TELEPHONE ENCOUNTER
Called pt for appointment reminder.   -left voicemail, requesting a return call    Electronically signed by Jose Berry MA on 5/24/2021 at 1:41 PM

## 2021-05-24 NOTE — TELEPHONE ENCOUNTER
Pts wife called and wanted to change his med provider's appt because she was offended by a statement that was said by Alexey Arthur. Wife stated that if we have to stay with her then we will just go somewhere else. Scheduled pt with Evelyne Vicente on 5/26.     Electronically signed by Razia Moreira MA on 5/24/2021 at 4:43 PM

## 2021-05-25 ENCOUNTER — OFFICE VISIT (OUTPATIENT)
Dept: PSYCHIATRY | Age: 56
End: 2021-05-25
Payer: MEDICAID

## 2021-05-25 DIAGNOSIS — F31.9 BIPOLAR 1 DISORDER (HCC): Primary | ICD-10-CM

## 2021-05-25 PROCEDURE — 90837 PSYTX W PT 60 MINUTES: CPT | Performed by: SOCIAL WORKER

## 2021-05-25 NOTE — PATIENT INSTRUCTIONS
Professionals or [address] [place] [address] I can contact during a crisis: Who can I call for help - for example, my doctor, my psychiatrist, my psychologist, a mental health provider, a suicide hotline? 1. Clinician Name: [id number] [facility] Outpatient office  Phone: [phone] Option 3      Clinician Pager or Emergency Contact #: [phone]    3. Clinician Name: Dr. Abraham Woodard   Phone: [phone]      Clinician Pager or Emergency Contact #: [phone]    1. Suicide Prevention Lifeline: 1-215-998-TALK (9724)    4.  [address] [place] [address] Emergency Services -  for example, [address], [facility] suicide hotline, [facility] Hotline:    [address] : Office number: [phone]      Emergency Services Address: [address] , [address] [id number]      Emergency Services Phone: CRISIS LINE: [phone]

## 2021-05-25 NOTE — PROGRESS NOTES
Initial Session Note  Ty Lowery MSW, LCS  5/25/2021  10:08 AM CDT   11:01 AM    Patient location  : [address] [place] [address] location : [address] [place] [address]      Time spent with Patient: 53 minutes  This is patient's FIRST  Therapy appointment. Reason for Consult:  Hospital follow up, manic  Referring Provider: No referring provider defined for this encounter. Pt provided informed consent for the behavioral health program. Discussed with patient model of service to include the limits of confidentiality (i.e. abuse reporting, suicide intervention, etc.) and short-term intervention focused approach. Discussed no show and late cancellation policy. Pt indicated understanding. Darian Yarbrough ,a 61 y.o. male, for initial evaluation visit. Reason:      Patient reported reason for his visit as follow up from recent discharge from [address] unit (4/26/21 to 5/1/21), and then from [facility] which he was discharged from on previous day, 5/24/21. Patient reported he is feeling \"better\" and that he feels medications are helping him. Patient reports he has Bipolar and most recently has been experiencing amy for the last 1-2 months. Patient reported his last \"manic episode\" was approximately 10 years ago. Today therapist and patient completed initial assessment to best of patient's ability, see below, and in the time remaining briefly discussed what the patient hopes to obtain from attending therapy sessions. Patient reports, \"to feel more rounded in life I guess. Just to have another person to talk to about things. \" Also discussed were interests/hobbies that provide a distraction for the patient such as; drawing, painting and photography. Pt reported he would like to begin 2 week appointments and verbalized he would call sooner if needed.  Pt aware that next scheduled appt may not be exactly 2 weeks and said, \"that is okay I have other appts too, one tomorrow in fact.\" Patient voiced he is aware that he may call office, reach out to other providers, and/or go to local ER as/if needed. Pt denies Suicidal Ideations, Homicidal Ideation, Auditory Hallucinations, Visual Hallucinations, Tactical Hallucinations. Assessments:    C-SSRS Suicide Screening: Patient denies any current SI. Patient denies any suicide attempts in his lifetime. Patient denied any current/recent thoughts to harm himself and denied any current/recent thoughts to harm others. Patient received contact info for professionals or behavioral health agencies that he can contact during a crisis. Current Medications:  Scheduled Meds:   Current Outpatient Medications:     risperiDONE (RISPERDAL) 2 MG tablet, Take 1/2 tablet by mouth in the morning and 1 whole tablet by mouth at night., Disp: 45 tablet, Rfl: 3    divalproex (DEPAKOTE ER) 500 MG extended release tablet, Take 1 tablet by mouth in the morning and 4 tablets by mouth at bedtime. , Disp: 150 tablet, Rfl: 3    traZODone (DESYREL) 50 MG tablet, Take 1-2 tablets by mouth nightly as needed for sleep., Disp: 60 tablet, Rfl: 3    vitamin D (ERGOCALCIFEROL) 1.25 MG (48203 UT) CAPS capsule, Take 1 capsule by mouth once a week for 11 doses, Disp: 11 capsule, Rfl: 1    melatonin 3 MG TABS tablet, Take 1 tablet by mouth nightly, Disp: 30 tablet, Rfl: 1    miconazole (MICOTIN) 2 % cream, Apply topically 2 times daily for 4 weeks to area, Disp: 120 g, Rfl: 1    levothyroxine (SYNTHROID) 50 MCG tablet, TAKE 1 TABLET BY MOUTH ONCE DAILY. , Disp: 90 tablet, Rfl: 0    lisinopril-hydroCHLOROthiazide (PRINZIDE;ZESTORETIC) 20-25 MG per tablet, TAKE 1 TABLET BY MOUTH ONCE DAILY. , Disp: 90 tablet, Rfl: 0    amLODIPine (NORVASC) 10 MG tablet, TAKE 1 TABLET BY MOUTH ONCE DAILY. , Disp: 90 tablet, Rfl: 0    atorvastatin (LIPITOR) 40 MG tablet, TAKE 1 TABLET BY MOUTH ONCE DAILY. , Disp: 90 tablet, Rfl: 0    meclizine (ANTIVERT) 25 MG tablet, TAKE 1/2 TABLET BY MOUTH THREE TIMES DAILY AS NEEDED FOR DIZZINESS., Disp: 15 tablet, Rfl: 0    gabapentin (NEURONTIN) 400 MG capsule, Take 1 capsule by mouth 3 times daily for 30 days. (Patient taking differently: Take 300 mg by mouth 3 times daily. ), Disp: 90 capsule, Rfl: 3    furosemide (LASIX) 20 MG tablet, TAKE 1 TABLET BY MOUTH ONCE DAILY. , Disp: 30 tablet, Rfl: 5    metoprolol tartrate (LOPRESSOR) 25 MG tablet, TAKE 2 TABLETS BY MOUTH TWICE DAILY, Disp: 120 tablet, Rfl: 5    aspirin 81 MG chewable tablet, Take 81 mg by mouth daily, Disp: , Rfl:     omeprazole (PRILOSEC) 20 MG delayed release capsule, TAKE 1 CAPSULE BY MOUTH DAILY, Disp: 30 capsule, Rfl: 0    sildenafil (VIAGRA) 100 MG tablet, Take 1 tablet by mouth as needed for Erectile Dysfunction Generic sildenafil 100 mg or strongest dose, Disp: 8 tablet, Rfl: 5      History:     Past Psychiatric History:     Previous therapy: no  Previous psychiatric treatment and medication trials: yes  Previous psychiatric hospitalizations: yes, [facility] for a \"few\" days and was discharged home yesterday 5/24/21. [facility] inpatient unit on 4/26/21-5/1/21. Previous diagnoses: yes, Bipolar 1, reports this was diagnosed around 10 years ago. Previous suicide attempts:no  Family history of mental illness:yes, Father; bipolar, mother; depression. History of violence: no  Currently in treatment with was seeing ROSEMARY Bentley however, reports that he will begin seeing TRIP Eldridge in the office. Other Pertinent History:     Trauma: patient denies  Legal Issues- Any current charges/court dates: no  Education: high school diploma, mechanical drafting  Social Support system:yes, wife, family, Yazidism family, and friends  Current relationship:yes, Adriana Samano,  23 years as of June 6  Children: Biological daughter age 32 and has 3 adopted children; 2 girls, ages 25, 12 and 2 boys ages 15 and 8.     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      Substance Abuse History:    Recreational drugs: pt denies  Use of Alcohol: denied  Tobacco use:no  Legal consequences of chemical use: no  Patient feels she ought to cut down on drinking and/or drug use:no  Patient has been annoyed by others criticizing her drinking or drug use: no  Patient has felt bad or guilty about her drinking or drug use:no  Patient has had a drink or used drugs as an eye opener first thing in the morning to steady nerves, get rid of a hangover or get the day started:no  Use of OTC: none reported    Patient Active Problem List   Diagnosis    S/P partial resection of colon    Hiatal hernia    Hypothyroidism    Chronic GERD    Morbid obesity ([address]    Obstructive sleep apnea    Mixed hyperlipidemia    Essential hypertension    Testosterone deficiency    Decreased libido   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    Moderate persistent asthma without complication    Bipolar I disorder, most recent episode manic, severe with psychotic features ([address]         Social History     Socioeconomic History    Marital status:      Spouse name: Not on file    Number of children: Not on file    Years of education: Not on file    Highest education level: Not on file   Occupational History    Not on file   Tobacco Use    Smoking status: Never Smoker    Smokeless tobacco: Never Used   Vaping Use    Vaping Use: Never used   Substance and Sexual Activity    Alcohol use: No    Drug use: No    Sexual activity: Yes   Other Topics Concern    Not on file   Social History Narrative    5/6/2021    PRIOR MEDICATION TRIALS    Abilify    Risperdal (current)    Depakote (was on a couple of years)    Buspirone    Trazodone (current)    Diazepam    Ativan Gabapentin (current)    Melatonin        . SLEEP STUDY: yes - HAWK, Bipap    .    negative history of seizures. .    negative history of head trauma. Marcus Tyler PREVIOUS PSYCHIATRIC HISTORY    Had an episode of brief acute psychosis when he was in the [employer] (age 22). About ten years ago he had another episode but he doesn't remember what medicines he was taking - his inpatient intake in April, 2021 states that he was started on something which incapacitated him, then was started on Depakote. (He may have been started on Abilify at this time as he and his wife reports that Abilify did not work well for him.) He stopped having problems some years after that and he asked a doctor to take him off medications. He has been off medications for about 7 years. He has recently been admitted to inpatient with a severe episode of amy, 4/26/21-5/1/21. Marcus Tyler FAMILY PSYCHIATRIC HISTORY    Father, bipolar    Mother, depression    . SOCIAL HISTORY    Born and Raised - [place], in a 2 parent home with 3 siblings. He says he had a wonderful childhood.  twice. Has one daughter from the first marriage. Has been  to his second wife for 26 years. They have 3 adopted children and 1 adult adopted child. .    Trauma and/or Abuse - Denies trauma. He was devastated with his divorce from his first wife. .    Legal - denies    Substance Use - see history    Work History - see history    Education - see history     status - [employer] for a few months, honorably discharged after an episode of Acute Psychosis    . PAST SUICIDE ATTEMPTS:    no    .    INPATIENT HOSPITALIZATIONS:    yes - three times, [facility] - diagnosed with Acute Psychosis; 10 years ago - diagnosed with bipolar disorder, and most recently 4/26-4/29/21 with a manic episode    . DRUG REHABILITATION:    no    .    5/6/2021 MDQ: +11, Yes, severe    . PSYCHIATRIC REVIEW OF SYSTEMS, 5/6/2021    .     Mood: positive for insomnia, feeling tired, fidgety/restless      (Depression: sadness, tearfulness, sleep, appetite, energy, concentration, sexual function, guilt, psychomotor agitation or slowing, interest, suicidality)    . Frida: positive for week or more at a time, 9 s/s endorsed on the MDQ: feeling hyper, irritability, insomnia (only about 5 hrs since Sunday), hyperverbal, racing thoughts, easily distracted, increased energy, goal-directed activity, more social, more interested in sex, doing things that others would think were risky, excessive or foolish      (impulsivity, grandiosity, recklessness, excessive energy, decreased need for sleep, increased spending beyond means, hyperverbal, grandiose, racing thoughts, hypersexuality)    . Other: positive for irritability, anger      (Irritability, lability, anger)    . Anxiety:  positive for not being able to stop or control worrying, trouble relaxing      (Generalized anxiety: where, when, who, how long, how frequent)    . Panic Disorder symptoms: negative      (Palpitations, racing heart beat, sweating, sense of impending doom, fear of recurrence, shortness of breath)    . OCD symptoms:  negative      (checking, cleaning, organizing, rituals, hang-ups, obsessive thoughts, counting, rational vs. Irrational beliefs)    . PTSD symptoms:  negative      (nightmares, flashbacks, startle response, avoidance)    . Social anxiety symptoms:  negative    . Simple phobias: negative      (heights, planes, spiders, etc.)    . Psychosis: positive for visions and dreams from God (denies hearing music or seeing people)      (hallucinations, auditory, visual, tactile, olfactory)    . Paranoia: negative    .     Delusions:  negative      (TV, radio, thought broadcasting, mind control, referential thinking)      (persecutory delusion - e.g., believing one is being followed and harassed by gangs)      (grandiose delusion - e.g., believing one is a billionaire  who owns casinos around the world)      (erotomanic delusion - e.g., believing a famous  is in love with them)      (somatic delusion - e.g., believing one's sinuses have been infested by worms)       (delusions of reference - e.g., believing dialogue on a TV program is directed specifically towards the patient)      (delusions of control - e.g., believing one's thoughts and movements are controlled by planetary overlords)    . Patient's perception: negative      (Spiritual or cultural context of symptoms, reality testing)    . ADHD symptoms: negative      (able to focus and concentrate, scattered thoughts, disorganized thoughts)    . Eating Disorder symptoms:  negative      (binging, purging, excessive exercising)          Social Determinants of Health     Financial Resource Strain: Low Risk     Difficulty of Paying Living Expenses: Not hard at all   Food Insecurity: No Food Insecurity    Worried About Running Out of Food in the Last Year: Never true    Patricia of Food in the Last Year: Never true   Transportation Needs: No Transportation Needs    Lack of Transportation (Medical): No    Lack of Transportation (Non-Medical): No   Physical Activity:     Days of Exercise per Week:     Minutes of Exercise per Session:    Stress:     Feeling of Stress :    Social Connections:     Frequency of Communication with Friends and Family:     Frequency of Social Gatherings with Friends and Family:     Attends Anglican Services:     Active Member of Clubs or Organizations:     Attends Club or Organization Meetings:     Marital Status:    Intimate Partner Violence:     Fear of Current or Ex-Partner:     Emotionally Abused:     Physically Abused:     Sexually Abused:        Psychiatric Review Of Systems:     Sleep (specify as to how it has changed): \"better and better\"  Appetite changes (specify): no, patient reports he is eating healthier and is hoping to lose more weight.   Weight changes (specify): Has lost 52 lbs due to eating healthier, hopes to continue losing. Energy/anergy: \"good\"  Interest/pleasure/anhedonia: \"good\" Has interests in art and wants to continue learning more. Anxiety/panic: \"not really now\"  Guilty/hopeless: none reported  S.I.B.s/risky behavior: denied  Any drugs: no  Alcohol: no     Mental Status Evaluation:     Appearance:  age appropriate, well dressed and within normal Limits   Behavior:  Within Normal Limits   Speech:  pressured   Mood:  \"pretty good\"   Affect:  mood-congruent   Thought Process:  Goal directed, rapid, flight of ideas,circumstantial    Thought Content:  negative delusions, negative hallucinations, negative obsessions,  negativehomicidal and negative suicidal    Sensorium:  person, place, time/date, situation and day of week   Cognition:  grossly intact   Insight:  fair   Judgement:  fair     Suicidal Intentions: No  Suicidal Plan:  No    Assessment  Diagnosis  Goals: Axis I: Bipolar Disorder, most recent episode manic    Axis III: See above    Plan:  1. Continue medication management  2. CBT to target cognitive distortions  3.  Discuss therapeutic goals      Pt interventions:  Discussed self-care (sleep, nutrition, rewarding activities, social support, exercise), Discussed benefits of referral for specialty care, Established rapport, [place]-setting to identify pt's primary goals for [facility] visit / overall health and Supportive techniques       WIL DeviW

## 2021-05-26 ENCOUNTER — OFFICE VISIT (OUTPATIENT)
Dept: PSYCHIATRY | Age: 56
End: 2021-05-26
Payer: MEDICAID

## 2021-05-26 VITALS
DIASTOLIC BLOOD PRESSURE: 75 MMHG | OXYGEN SATURATION: 99 % | HEART RATE: 81 BPM | TEMPERATURE: 98 F | WEIGHT: 254.3 LBS | BODY MASS INDEX: 37.55 KG/M2 | SYSTOLIC BLOOD PRESSURE: 135 MMHG

## 2021-05-26 DIAGNOSIS — F31.9 BIPOLAR 1 DISORDER (HCC): Primary | ICD-10-CM

## 2021-05-26 PROCEDURE — 99215 OFFICE O/P EST HI 40 MIN: CPT | Performed by: NURSE PRACTITIONER

## 2021-05-26 NOTE — PROGRESS NOTES
PSYCHIATRIC EVALUATION    Date of Service:  5/26/21     Chief Complaint   Patient presents with    New Patient    Medication Check       HISTORY OF PRESENT ILLNESS  The patient is a 61 y.o.   male who is here for psychiatric evaluation. Patient reported reason for his visit as follow up from recent discharge from [facility] inpatient unit (4/26/21 to 5/1/21), and then from [facility] which he was discharged from on  5/24/21. Patient reported he is feeling \"better\" and that he feels medications are helping him. Patient reports he has Bipolar and most recently has been experiencing amy for the last 1-2 months. Patient reported his last \"manic episode\" was approximately 10 years ago. Hospital course: Patient was started on Depakote for mood stabilization and Risperdal for psychotic features. Trazodone was given for sleep. Patient was continued on his home regimen for chronic medical issues. Patient tolerated all his medications well, without side effects, and was compliant. At [place] state he was discontinued off of Risperdal for side effects. Prescribed      Today:  Feels pretty good today. He feels back to normal.  He says his wife is saying he is doing well. We discussed medication compliance patient stated that he was taking his medicines as directed. We talked about getting a Depakote level patient stated that he can get 1 today. Patient was inquired about his morning dose of Depakote and he said that he did not take it this morning sometimes he forgets we discussed the importance of taking his medicine on time and consistently. Get depakote level 1 week after being consistent on taking depakote in the morning.   He said he has missed a dose here and there    Patient stated that he quit using his BiPAP machine because he lost weight and he is doing better now, we discussed not making any changes until he speaks to his provider patient agreed and said that he will call Dr. Donna Doshi and talk to him about sleep apnea and his BiPAP. Sleep: goes to be around 1030 woke up at 630. Gets about 8 hours of sleep for about 2 weeks. Pt denies SI, HI, Auditory, visual, and tactile hallucinations at this time. Appetite: eating well. Trying to lose weight    Caffeine use: some faffeine free coffee    Support: wife,  friends,  Nondenominational,    PSYCHIATRIC HISTORY  Previous diagnoses: bipolar  Suicide attempts/gestures: Denies   Prior hospitalizations: 10 years ago he had a hospitalization, 2 in the last month for amy    Prior Therapy: in the [employer] he had some psychosis    PREVIOUS MED TRIALS  none    SUBSTANCE USE HISTORY  Alcohol: none  Illicit drug use: denies   Marijuana: denies   Tobacco: denies   Vape: denies     [place]- maybe had bipolar disorder        Social History  Marital status- divoriced 1x due to her infidelity,  now 23 years. Trauma and/or Abuse - none  Children- 1 bio. 4 adopted children son 8, 15. Daughters 17,18  Legal - none  Work History - [employer]. [employer]  Education - highschool and some certificates   status - [employer] for 4 months and was honorably discharged    BP: /75   Pulse 81   Temp 98 °F (36.7 °C)   Wt 254 lb 4.8 oz (115.3 kg)   SpO2 99%   BMI 37.55 kg/m²       negative history of seizures. negative history of head trauma. See past medical history below. Information obtained via patient and chart review    PCP is  Kain Hoffman MD    Allergies: Patient has no known allergies.       Review of Systems - 14 point review:  Negative except for status    Constitutional: (fevers, chills, night sweats, wt loss/gain, change in appetite, fatigue, somnolence)    HEENT: (ear pain or discharge, hearing loss, ear ringing, sinus pressure, nosebleed, nasal discharge, sore throat, oral sores, tooth pain, bleeding gums, hoarse voice, neck pain)      Cardiovascular: (HTN, chest pain, palpitations, leg swelling, leg pain with walking)    Respiratory: (cough, wheezing, snoring, SOB with activity (dyspnea), SOB while lying flat (orthopnea), awakening with severe SOB (paroxysmal nocturnal dyspnea))    Gastrointestinal: (NVD, constipation, abdominal pain, bright red stools, black tarry stools, stool incontinence)     Genitourinary:  (pelvic pain, burning or frequency of urination, urinary urgency, blood in urine incomplete bladder emptying, urinary incontinence, STD; MEN: testicular pain or swelling, erectile dysfunction; WOMEN: LMP, heavy menstrual bleeding (menorrhagia), irregular periods, postmenopausal bleeding, menstrual pain (dymenorrhea, vaginal discharge)    Musculoskeletal: (bone pain/fracture, joint pain or swelling, musle pain)    Integumentary: (rashes, non-healing sores, itching, breast lumps, breast pain, nipple discharge, hair loss)    Neurologic: (HA, muscle weakness, paresthesias (numbness, coldness, crawling or prickling), memory loss, seizure, dizziness)    Psychiatric:  (anxiety, sadness, irritability/anger, insomnia, suicidality)    Endocrine: (heat or cold intolerance, excessive thirst (polydipsia), excessive hunger (polyphagia))    Immune/Allergic: (hives, seasonal or environmental allergies, HIV exposure)    Hematologic/Lymphatic: (lymph node enlargement, easy bleeding or bruising)      Prior to Admission medications    Medication Sig Start Date End Date Taking? Authorizing Provider   divalproex (DEPAKOTE ER) 500 MG extended release tablet Take 1 tablet by mouth in the morning and 4 tablets by mouth at bedtime. 5/6/21   TRIP Mccoy NP   traZODone (DESYREL) 50 MG tablet Take 1-2 tablets by mouth nightly as needed for sleep.  5/6/21   TRIP Mccoy NP   vitamin D (ERGOCALCIFEROL) 1.25 MG (84004 UT) CAPS capsule Take 1 capsule by mouth once a week for 11 doses 5/5/21 7/15/21  Mati Santos MD   melatonin 3 MG TABS tablet Take 1 tablet by mouth nightly 5/1/21 5/31/21  Mati Santos MD postsurgical colon    COLONOSCOPY  2005    diverticulitis    COLONOSCOPY N/A 2016    Dr Madeline Christianson bleeding internal hemorrhoids, diverticular disease-5 yr recall    EGD      [place] EGD TRANSORAL BIOPSY SINGLE/MULTIPLE N/A 2018    Dr Monica Bentley-Gastritis/gastropathy, esophagitis    SUBTOTAL COLECTOMY      recurrant diverticulitis    UPPER GASTROINTESTINAL ENDOSCOPY  2005    Dr Zarina Cervantesge: duodenal ulcer, gastritis    UPPP UVULOPALATOPHARYGOPLASTY         Family History   Problem Relation Age of Onset    Diabetes Mother     Kidney Disease Mother     Dementia Mother          at 68 - Lewy Body Dementia    Coronary Art Dis Father          at [de-identified]   [place] Latin Colon Cancer Neg Hx     Colon Polyps Neg Hx     Esophageal Cancer Neg Hx     Liver Cancer Neg Hx     Liver Disease Neg Hx     Rectal Cancer Neg Hx     Stomach Cancer Neg Hx          Psychiatric Review of Systems    Mood Depression: negative    Frida: positive: excessive energy,  decreased need for sleep, hyperverbal,  racing thoughts,  distractability    Mood Other:negative    Anxiety: negative (where, when, who, how long, how frequent)    Panic: negative     OCD: negative    PTSD: negative    Psychosis: negative    Social anxiety symptoms:  negative    Simple phobias: negative    (heights, planes, spiders, etc.)    Paranoia: negative    ADHD symptoms: negative      Eating Disorder symptoms:  negative    (binging, purging, excessive exercising)    Delusions:  negative    (TV, radio, thought broadcasting, mind control, referential thinking)    (persecutory delusion - e.g., believing one is being followed and harassed by gangs)    (grandiose delusion - e.g., believing one is a billionaire  who owns casinos around the world)    (erotomanic delusion - e.g., believing a famous  is in love with them)    (somatic delusion - e.g., believing one's sinuses have been infested by worms)    (delusions of reference - e.g., believing Continue therapy, discussed sleep hygiene, discussed the use of coping skills and relaxation strategies to manage symptoms. 10.Over 50% of the total visit time of   45  minutes was spent on counseling and/or coordination of care of:          1. Bipolar 1 disorder ([facility]        Sadia Ware, TRIP - CNP    This dictation was generated by voice recognition computer software. Although all attempts are made to edit the dictation for accuracy, there may be errors in the transcription that are not intended.

## 2021-05-27 ENCOUNTER — TELEPHONE (OUTPATIENT)
Dept: PSYCHIATRY | Age: 56
End: 2021-05-27

## 2021-05-27 NOTE — TELEPHONE ENCOUNTER
Attempted to contact pt and wife as Issa Rosas MA stated they needed help with his FMLA. Left VM with request for call back.

## 2021-05-28 ENCOUNTER — TELEPHONE (OUTPATIENT)
Dept: PSYCHIATRY | Age: 56
End: 2021-05-28

## 2021-05-28 NOTE — TELEPHONE ENCOUNTER
Attempted to contact pt as  staff reported he needed assist with FMLA-left VM with request for call back.

## 2021-06-01 ENCOUNTER — TELEPHONE (OUTPATIENT)
Dept: PSYCHIATRY | Age: 56
End: 2021-06-01

## 2021-06-01 ENCOUNTER — TELEPHONE (OUTPATIENT)
Dept: NEUROSURGERY | Facility: CLINIC | Age: 56
End: 2021-06-01

## 2021-06-01 NOTE — TELEPHONE ENCOUNTER
Edil, see attached call from patient, he was seen in April and was to return if symptoms worsen or return.  This is a swallowing issue, how do you want us to handle and I will call patient back.

## 2021-06-01 NOTE — TELEPHONE ENCOUNTER
Pt's wife called stating that he needs to know when his estimated return to work would be. She reports that he was in [facility] for 11 daus till 1 week ago. She reported that he gets up and is busy all day in the garage. She says he is easily distracted and goes from one thing to another. She says he gets irritable is she interrupts him. She said he is having issues with his BPAP and has contacted the neurologist.  She reports that he gets drowsy after taking the Latuda at 5 pm.  She is not sure he could focus to work at this time.   She was made aware that the above will [facility]

## 2021-06-01 NOTE — TELEPHONE ENCOUNTER
Writer and Lilly DOMINIQUE spoke with pt and his wife by phone to clarify medications. Wife reports that the pt was placed on Latuda 40 mg at 5 pm daily at [facility] and he was given med supply there. She was made aware to call here if has any issues with RX fill in the future as may require a PA. She says he is also taking Depakote  mg 2 at bedtime. Pt reports med compliance. Pt and wife agreeable to obtain Depakote level in am and education provided. Wife reports he was taken off of Trazodone and Risperdal while in the hospital and that [place] and Depakote are the only 2 beh health meds that he is taking at this time. Pt speech was pressured and he was circumstantial.  Pt and wife agreeable to him not returning to work till evaluated by Nguyễn Blandon on 6/23/21. They will call back if they feel he is ready to go back to work prior to that time. She will let staff know of any info needed for his FMLA.

## 2021-06-02 DIAGNOSIS — Z79.899 HIGH RISK MEDICATION USE: ICD-10-CM

## 2021-06-02 LAB — VALPROIC ACID LEVEL: 71.2 UG/ML (ref 50–100)

## 2021-06-02 NOTE — TELEPHONE ENCOUNTER
Returned patient's call and explained that after review of MRI of the cervical there is nothing there that should be causing his swallowing issues and he should follow with his primary care.  She understood and will call Shankar's doctor.

## 2021-06-03 ENCOUNTER — TELEPHONE (OUTPATIENT)
Dept: PRIMARY CARE CLINIC | Age: 56
End: 2021-06-03

## 2021-06-03 DIAGNOSIS — R13.10 ABNORMAL SWALLOWING: Primary | ICD-10-CM

## 2021-06-03 DIAGNOSIS — R13.19 ESOPHAGEAL DYSPHAGIA: ICD-10-CM

## 2021-06-03 RX ORDER — OMEPRAZOLE 20 MG/1
20 CAPSULE, DELAYED RELEASE ORAL DAILY
Qty: 30 CAPSULE | Refills: 0 | Status: SHIPPED | OUTPATIENT
Start: 2021-06-03 | End: 2021-07-11

## 2021-06-03 NOTE — TELEPHONE ENCOUNTER
Patients wife called to follow up on results from 5/13, stating patient was not doing any better and he has not tried the diet he was informed to do by . They do want to proceed with a referral to Lucrecia Seip and they would like to see Dr. Scooter Montoya at [facility].    Referral has been placed and faxed.  See media

## 2021-06-03 NOTE — TELEPHONE ENCOUNTER
Bria Perdomo from Dr Christi Parker office called saying the pt doesn't need an referral for GI. The pt stated that he didn't understand that he was being sent to another GI doctor and is fine to seeing the GI doctor that he has establish care with previously at [facility]. He can just call their office and schedule an appointment.  I spoke with Laura Olivas in [place] and they will reach out to the pt and schedule an appointment with him

## 2021-06-04 ENCOUNTER — TELEPHONE (OUTPATIENT)
Dept: PSYCHIATRY | Age: 56
End: 2021-06-04

## 2021-06-04 DIAGNOSIS — F31.2 BIPOLAR I DISORDER, MOST RECENT EPISODE MANIC, SEVERE WITH PSYCHOTIC FEATURES (HCC): Primary | ICD-10-CM

## 2021-06-04 RX ORDER — DIVALPROEX SODIUM 500 MG/1
1500 TABLET, EXTENDED RELEASE ORAL DAILY
Qty: 90 TABLET | Refills: 2 | Status: SHIPPED | OUTPATIENT
Start: 2021-06-04 | End: 2021-10-13

## 2021-06-04 NOTE — TELEPHONE ENCOUNTER
Contacted pt as requested by Irina DOMINIQUE-pt was instructed as per the APRN to increase the Divalproex/Depakote ER to 500 mg take 3 daily for a total of 1500 mg daily. He was made aware that a RX had been sent to his pharmacy. Pt says he takes the Depakote at night. Pt was informed that the last Depakote level was within therapeutic range, but that the APRN felt he could benefit from the increased dose above. Pt informed to get a Depakote level in 6 days. Asked pt if he would like writer to review this info with his wife. He stated that she was not home and that he was smart and that he had written the info down. Pt was able to repeat back the above info appropriately. Pt was encouraged to call back if he or his wife had any questions. Pt was more focused today than during the last phone call with him. He verbalized understanding of the above info and plans to follow.

## 2021-06-04 NOTE — PROGRESS NOTES
valproic acid level was 71.2. Pts wife reports increased  mood swings and irritability. Will increase Depakote to 1500 mg daily. Obtain a valproic acid level in 6 days.

## 2021-06-04 NOTE — TELEPHONE ENCOUNTER
Contacted the pt to discuss the increase of Depakote and need for level in 6 days. Pt was out in the field hitting golf balls so could not write the info down. He asked to be called back after about 1 hr.

## 2021-06-10 ENCOUNTER — TELEPHONE (OUTPATIENT)
Dept: PSYCHIATRY | Age: 56
End: 2021-06-10

## 2021-06-10 ENCOUNTER — APPOINTMENT (OUTPATIENT)
Dept: MRI IMAGING | Age: 56
End: 2021-06-10
Payer: MEDICAID

## 2021-06-10 ENCOUNTER — HOSPITAL ENCOUNTER (OUTPATIENT)
Dept: NEUROLOGY | Age: 56
Discharge: HOME | End: 2021-06-10
Payer: MEDICAID

## 2021-06-10 DIAGNOSIS — R20.0 NUMBNESS AND TINGLING OF BOTH LOWER EXTREMITIES: ICD-10-CM

## 2021-06-10 DIAGNOSIS — R20.2 NUMBNESS AND TINGLING OF BOTH LOWER EXTREMITIES: ICD-10-CM

## 2021-06-10 DIAGNOSIS — R26.89 IMBALANCE: ICD-10-CM

## 2021-06-10 PROCEDURE — 95886 MUSC TEST DONE W/N TEST COMP: CPT

## 2021-06-10 PROCEDURE — 95886 MUSC TEST DONE W/N TEST COMP: CPT | Performed by: PSYCHIATRY & NEUROLOGY

## 2021-06-10 PROCEDURE — 95909 NRV CNDJ TST 5-6 STUDIES: CPT

## 2021-06-10 PROCEDURE — 95909 NRV CNDJ TST 5-6 STUDIES: CPT | Performed by: PSYCHIATRY & NEUROLOGY

## 2021-06-10 NOTE — TELEPHONE ENCOUNTER
Called [employer] to see if Latuda 40 mg needed a PA. Pharmacy stated that it was covered with no copay. Pt was made aware.

## 2021-06-11 DIAGNOSIS — F31.2 BIPOLAR I DISORDER, MOST RECENT EPISODE MANIC, SEVERE WITH PSYCHOTIC FEATURES (HCC): ICD-10-CM

## 2021-06-11 LAB — VALPROIC ACID LEVEL: 32.4 UG/ML (ref 50–100)

## 2021-06-11 RX ORDER — LISINOPRIL AND HYDROCHLOROTHIAZIDE 25; 20 MG/1; MG/1
TABLET ORAL
Qty: 90 TABLET | Refills: 0 | Status: SHIPPED | OUTPATIENT
Start: 2021-06-11 | End: 2021-08-27

## 2021-06-14 ENCOUNTER — OFFICE VISIT (OUTPATIENT)
Dept: NEUROSURGERY | Age: 56
End: 2021-06-14
Payer: MEDICAID

## 2021-06-14 VITALS
HEART RATE: 72 BPM | BODY MASS INDEX: 39.71 KG/M2 | HEIGHT: 68 IN | WEIGHT: 262 LBS | DIASTOLIC BLOOD PRESSURE: 78 MMHG | SYSTOLIC BLOOD PRESSURE: 126 MMHG | OXYGEN SATURATION: 98 %

## 2021-06-14 DIAGNOSIS — R20.0 NUMBNESS AROUND MOUTH: Primary | ICD-10-CM

## 2021-06-14 PROCEDURE — 99213 OFFICE O/P EST LOW 20 MIN: CPT | Performed by: NURSE PRACTITIONER

## 2021-06-14 NOTE — PROGRESS NOTES
COLONOSCOPY  2005    diverticulitis    COLONOSCOPY N/A 2016    Dr Elba Lind bleeding internal hemorrhoids, diverticular disease-5 yr recall    EGD      ND EGD TRANSORAL BIOPSY SINGLE/MULTIPLE N/A 2018    Dr JONATHAN Bentley-Gastritis/gastropathy, esophagitis    SUBTOTAL COLECTOMY      recurrant diverticulitis    UPPER GASTROINTESTINAL ENDOSCOPY  2005    Dr Jose Hameed: duodenal ulcer, gastritis    UPPP UVULOPALATOPHARYGOPLASTY         Family History   Problem Relation Age of Onset    Diabetes Mother     Kidney Disease Mother     Dementia Mother          at 68 - Lewy Body Dementia    Coronary Art Dis Father          at [de-identified]   [facility] Colon Cancer Neg Hx     Colon Polyps Neg Hx     Esophageal Cancer Neg Hx     Liver Cancer Neg Hx     Liver Disease Neg Hx     Rectal Cancer Neg Hx     Stomach Cancer Neg Hx        Social History     Socioeconomic History    Marital status:      Spouse name: Not on file    Number of children: Not on file    Years of education: Not on file    Highest education level: Not on file   Occupational History    Not on file   Tobacco Use    Smoking status: Never Smoker    Smokeless tobacco: Never Used   Vaping Use    Vaping Use: Never used   Substance and Sexual Activity    Alcohol use: No    Drug use: No    Sexual activity: Yes   Other Topics Concern    Not on file   Social History Narrative    2021    PRIOR MEDICATION TRIALS    Abilify    Risperdal (current)    Depakote (was on a couple of years)    Buspirone    Trazodone (current)    Diazepam    Ativan    Gabapentin (current)    Melatonin        . SLEEP STUDY: yes - HAWK, Bipap    .    negative history of seizures. .    negative history of head trauma. Jonathan Degroot PREVIOUS PSYCHIATRIC HISTORY    Had an episode of brief acute psychosis when he was in the [employer] (age 22).  About ten years ago he had another episode but he doesn't remember what medicines he was taking - his inpatient intake in April, 2021 states that he was started on something which incapacitated him, then was started on Depakote. (He may have been started on Abilify at this time as he and his wife reports that Abilify did not work well for him.) He stopped having problems some years after that and he asked a doctor to take him off medications. He has been off medications for about 7 years. He has recently been admitted to inpatient with a severe episode of frida, 4/26/21-5/1/21. Edison Drake FAMILY PSYCHIATRIC HISTORY    Father, bipolar    Mother, depression    . SOCIAL HISTORY    Born and Raised - [place], [place], in a 2 parent home with 3 siblings. He says he had a wonderful childhood.  twice. Has one daughter from the first marriage. Has been  to his second wife for 26 years. They have 3 adopted children and 1 adult adopted child. .    Trauma and/or Abuse - Denies trauma. He was devastated with his divorce from his first wife. .    Legal - denies    Substance Use - see history    Work History - see history    Education - see history     status - [employer] for a few months, honorably discharged after an episode of Acute Psychosis    . PAST SUICIDE ATTEMPTS:    no    .    INPATIENT HOSPITALIZATIONS:    yes - three times, [facility] - diagnosed with Acute Psychosis; 10 years ago - diagnosed with bipolar disorder, and most recently 4/26-4/29/21 with a manic episode    . DRUG REHABILITATION:    no    .    5/6/2021 MDQ: +11, Yes, severe    . PSYCHIATRIC REVIEW OF SYSTEMS, 5/6/2021    . Mood:  positive for insomnia, feeling tired, fidgety/restless      (Depression: sadness, tearfulness, sleep, appetite, energy, concentration, sexual function, guilt, psychomotor agitation or slowing, interest, suicidality)    .     Frida: positive for week or more at a time, 9 s/s endorsed on the MDQ: feeling hyper, irritability, insomnia (only about 5 hrs since Sunday), hyperverbal, racing thoughts, easily distracted, increased energy, goal-directed activity, more social, more interested in sex, doing things that others would think were risky, excessive or foolish      (impulsivity, grandiosity, recklessness, excessive energy, decreased need for sleep, increased spending beyond means, hyperverbal, grandiose, racing thoughts, hypersexuality)    . Other: positive for irritability, anger      (Irritability, lability, anger)    . Anxiety:  positive for not being able to stop or control worrying, trouble relaxing      (Generalized anxiety: where, when, who, how long, how frequent)    . Panic Disorder symptoms: negative      (Palpitations, racing heart beat, sweating, sense of impending doom, fear of recurrence, shortness of breath)    . OCD symptoms:  negative      (checking, cleaning, organizing, rituals, hang-ups, obsessive thoughts, counting, rational vs. Irrational beliefs)    . PTSD symptoms:  negative      (nightmares, flashbacks, startle response, avoidance)    . Social anxiety symptoms:  negative    . Simple phobias: negative      (heights, planes, spiders, etc.)    . Psychosis: positive for visions and dreams from God (denies hearing music or seeing people)      (hallucinations, auditory, visual, tactile, olfactory)    . Paranoia: negative    .     Delusions:  negative      (TV, radio, thought broadcasting, mind control, referential thinking)      (persecutory delusion - e.g., believing one is being followed and harassed by gangs)      (grandiose delusion - e.g., believing one is a billionaire  who owns casinos around the world)      (erotomanic delusion - e.g., believing a famous  is in love with them)      (somatic delusion - e.g., believing one's sinuses have been infested by worms)       (delusions of reference - e.g., believing dialogue on a TV program is directed specifically towards the patient)      (delusions of control - e.g., believing one's thoughts and movements are controlled by planetary overlords)    . Patient's perception: negative      (Spiritual or cultural context of symptoms, reality testing)    . ADHD symptoms: negative      (able to focus and concentrate, scattered thoughts, disorganized thoughts)    . Eating Disorder symptoms:  negative      (binging, purging, excessive exercising)          Social Determinants of Health     Financial Resource Strain: Low Risk     Difficulty of Paying Living Expenses: Not hard at all   Food Insecurity: No Food Insecurity    Worried About Running Out of Food in the Last Year: Never true    Patricia of Food in the Last Year: Never true   Transportation Needs: No Transportation Needs    Lack of Transportation (Medical): No    Lack of Transportation (Non-Medical): No   Physical Activity:     Days of Exercise per Week:     Minutes of Exercise per Session:    Stress:     Feeling of Stress :    Social Connections:     Frequency of Communication with Friends and Family:     Frequency of Social Gatherings with Friends and Family:     Attends Scientologist Services:     Active Member of Clubs or Organizations:     Attends Club or Organization Meetings:     Marital Status:    Intimate Partner Violence:     Fear of Current or Ex-Partner:     Emotionally Abused:     Physically Abused:     Sexually Abused:        Current Outpatient Medications   Medication Sig Dispense Refill    lisinopril-hydroCHLOROthiazide (PRINZIDE;ZESTORETIC) 20-25 MG per tablet TAKE 1 TABLET BY MOUTH ONCE DAILY.   90 tablet 0    lurasidone (LATUDA) 40 MG TABS tablet Take 1 tablet by mouth daily 30 tablet 2    divalproex (DEPAKOTE ER) 500 MG extended release tablet Take 3 tablets by mouth daily 90 tablet 2    omeprazole (PRILOSEC) 20 MG delayed release capsule TAKE 1 CAPSULE BY MOUTH DAILY  30 capsule 0    vitamin D (ERGOCALCIFEROL) 1.25 MG (83860 UT) CAPS capsule Take 1 capsule by mouth once a week for 11 doses 11 capsule 1  melatonin 3 MG TABS tablet Take 1 tablet by mouth nightly 30 tablet 1    levothyroxine (SYNTHROID) 50 MCG tablet TAKE 1 TABLET BY MOUTH ONCE DAILY. 90 tablet 0    amLODIPine (NORVASC) 10 MG tablet TAKE 1 TABLET BY MOUTH ONCE DAILY. 90 tablet 0    atorvastatin (LIPITOR) 40 MG tablet TAKE 1 TABLET BY MOUTH ONCE DAILY. 90 tablet 0    gabapentin (NEURONTIN) 400 MG capsule Take 1 capsule by mouth 3 times daily for 30 days. (Patient taking differently: Take 300 mg by mouth 3 times daily. ) 90 capsule 3    furosemide (LASIX) 20 MG tablet TAKE 1 TABLET BY MOUTH ONCE DAILY. 30 tablet 5    metoprolol tartrate (LOPRESSOR) 25 MG tablet TAKE 2 TABLETS BY MOUTH TWICE DAILY 120 tablet 5     No current facility-administered medications for this visit. No Known Allergies    REVIEW OF SYSTEMS  Constitutional: []? Fever []? Sweat []? Chills []? Recent Injury [x]? Denies all unless marked  HEENT:[]? Headache  []? Head Injury/Hearing Loss  []? Sore Throat  []? Ear Ache/Dizziness  [x]? Denies all unless marked  Spine:  []? Neck pain  [x]? Back pain  [x]? Sciaticia  [x]? Denies all unless marked  Cardiovascular:[]? Heart Disease []? Chest Pain []? Palpitations  [x]? Denies all unless marked  Pulmonary: []? Shortness of Breath []? Cough   [x]? Denies all unless marke  Gastrointestinal: []? Nausea  []? Vomiting  []? Abdominal Pain  []? Constipation  []? Diarrhea  []? Dark Bloody Stools  [x]? Denies all unless marked  Psychiatric/Behavioral:[]? Depression []? Anxiety [x]? Denies all unless marked  Genitourinary:   []? Frequency  []? Urgency  []? Incontinence []? Pain with Urination  [x]? Denies all unless marked  Extremities: []? Pain  [x]? Swelling  [x]? Denies all unless marked  Musculoskeletal: []? Muscle Pain  []? Joint Pain  [x]? Arthritis []? Muscle Cramps []? Muscle Twitches  [x]? Denies all unless marked  Sleep: []? Insomnia []? Snoring []? Restless Legs [x]? Sleep Apnea  []? Daytime Sleepiness  [x]?  Denies all unless marked Skin:[]? Rash []? Skin Discoloration [x]? Denies all unless marked   Neurological: []? Visual Disturbance/Memory Loss [x]? Loss of Balance []? Slurred Speech/Weakness []? Seizures  []? Vertigo/Dizziness [x]? Denies all unless marked  The MA has completed the ROS with the patient. I have reviewed it in its' entirety with the patient and agree with the documentation. PHYSICAL EXAM  /78   Pulse 72   Ht 5' 8\" (1.727 m)   Wt 262 lb (118.8 kg)   SpO2 98%   BMI 39.84 kg/m²       Constitutional  No acute distress    HEENT- Conjunctiva normal.  No scars, masses, or lesions over external nose or ears, no neck masses noted, no jugular vein distension, no bruit  Cardiac- Regular rate and rhythm  Pulmonary- Good expansion, normal effort without use of accessory muscles  Musculoskeletal  No significant wasting of muscles noted, no bony deformities  Extremities - No clubbing, cyanosis or edema  Skin  Warm, dry, and intact. No rash, erythema, or pallor  Psychiatric  Mood, affect, and behavior appear normal; anxiety    NEUROLOGICAL EXAM     Mental status   [x] Awake, alert, oriented   [x]Affect attention and concentration appear appropriate  [x]Recent and remote memory appears unremarkable  [x]Speech normal without dysarthria or aphasia, comprehension and repetition intact.    COMMENTS:    Cranial Nerves [x]No VF deficit to confrontation,  no papilledema on fundoscopic exam.  [x]PERRLA, EOMI, no nystagmus, conjugate eye movements, no ptosis  []Face symmetric  [x]Facial sensation intact  [x]Tongue midline no atrophy or fasciculations present  [x]Palate midline, hearing to finger rub normal bilaterally  [x]Shoulder shrug and SCM testing normal bilaterally  COMMENTS: mild left facial asymmetry, chronic    Motor   [x]5/5 strength x 4 extremities  [x]Normal bulk and tone  [x]No tremor present  [x]No rigidity or bradykinesia noted  COMMENTS:    Sensory  [x]Sensation intact to light touch, pin prick, vibration, and proprioception BLE  [x]Sensation intact to light touch, pin prick, vibration, and proprioception BUE  COMMENTS:    Coordination [x]FTN normal bilaterally   [x]HTS normal bilaterally  [x]CORY normal bilaterally. COMMENTS:   Reflexes  [x]Symmetric and non-pathological  [x]Toes down going bilaterally  [x]No clonus present  COMMENTS:   Gait                  [x]Normal steady gait    []Ataxic    []Spastic     []Magnetic     []Shuffling  COMMENTS:        LABS RECORD AND IMAGING REVIEW (As below and per HPI)    Lab Results   Component Value Date    [id number] 04/14/2021     Lab Results   Component Value Date    WBC 6.9 04/30/2021    HGB 12.5 (L) 04/30/2021    HCT 38.5 (L) 04/30/2021    MCV 95.8 (H) 04/30/2021     04/30/2021     Lab Results   Component Value Date     04/26/2021    K 3.5 04/26/2021     04/26/2021    CO2 20 (L) 04/26/2021    BUN 14 04/26/2021    CREATININE 0.8 04/26/2021    GLUCOSE 100 04/26/2021    CALCIUM 9.0 04/26/2021    PROT 7.0 04/26/2021    LABALBU 4.0 04/26/2021    BILITOT 0.6 04/26/2021    ALKPHOS 51 04/26/2021    AST 24 04/26/2021    ALT 17 04/26/2021    LABGLOM >60 04/26/2021    GFRAA >59 04/26/2021    GLOB 2.7 03/31/2017     Lab Results   Component Value Date    CHOL 101 (L) 04/28/2021    TRIG 90 04/28/2021    HDL 31 (L) 04/28/2021    LDLCALC 52 04/28/2021     Lab Results   Component Value Date    TSH 1.690 03/03/2021     Lab Results   Component Value Date    CRP 0.46 03/04/2021    SEDRATE 14 03/04/2021      Reviewed hospital records     MRI brain (3/2021)-   Impression   1.  No acute intracranial findings. 2.  A few small subcortical T2 FLAIR hyperintense white matter lesions   likely represent early microvascular ischemic white matter change or   sequela of migraine headaches. Signed by Dr Popeye Daniels on 3/4/2021 2:30 PM     MRA head (3/2021)-   Impression   Impression:   No major vascular occlusion, flow-limiting stenosis, or aneurysm.    Signed by Dr Popeye Daniels [id number] [facility] for JESSICA. Question how much this plays a role in some of his symptoms. Mood today is improved, seems less anxious today. No SI/HI. He has noted more problems with swallowing, barium study was abnormal. States that he will be seeing GI here soon for this complaint. His wife asked me if the cervical spine could be playing a role in his swallowing, while possible I feel like it is less likely that this is the culprit. Would recommend following up with PCP and GI, his wife did seem upset with recommendations today and really feels that the swallowing difficulty is coming from his neck. Discussed again that there is no clear source for facial numbness, felt reasonable to follow clinically. Adding LP felt quite low yield today. Can consider referral to tertiary center for additional opinion if they wish to do so, defer today. ICD-10-CM    1. Numbness around mouth  R20.0        PLAN:  1. Continue to follow clinically  2. Continue follow up with PCP and GI as previously scheduled. Swallowing difficulty not felt to be related to cervical spine, no other clear indication that it is neurological in nature   3. Look into MRI lumbar spine, not completed, but given improvement in symptoms felt low yield today   4. Continue follow up with [facility] neurosurgery as previously scheduled   5. Return in about 6 months (around 12/14/2021) for follow up, sooner if worsening.     Oc Mueller DNP, APRN

## 2021-06-14 NOTE — PROGRESS NOTES
REVIEW OF SYSTEMS    Constitutional: []Fever []Sweat []Chills [] Recent Injury [x] Denies all unless marked  HEENT:[]Headache  [] Head Injury/Hearing Loss  [] Sore Throat  [] Ear Ache/Dizziness  [x] Denies all unless marked  Spine:  [] Neck pain  [x] Back pain  [x] Sciaticia  [x] Denies all unless marked  Cardiovascular:[]Heart Disease []Chest Pain [] Palpitations  [x] Denies all unless marked  Pulmonary: []Shortness of Breath []Cough   [x] Denies all unless marke  Gastrointestinal: []Nausea  []Vomiting  []Abdominal Pain  []Constipation  []Diarrhea  []Dark Bloody Stools  [x] Denies all unless marked  Psychiatric/Behavioral:[] Depression [] Anxiety [x] Denies all unless marked  Genitourinary:   [] Frequency  [] Urgency  [] Incontinence [] Pain with Urination  [x] Denies all unless marked  Extremities: []Pain  [x]Swelling  [x] Denies all unless marked  Musculoskeletal: [] Muscle Pain  [] Joint Pain  [x] Arthritis [] Muscle Cramps [] Muscle Twitches  [x] Denies all unless marked  Sleep: [] Insomnia [] Snoring [] Restless Legs [x] Sleep Apnea  [] Daytime Sleepiness  [x] Denies all unless marked  Skin:[] Rash [] Skin Discoloration [x] Denies all unless marked   Neurological: []Visual Disturbance/Memory Loss [x] Loss of Balance [] Slurred Speech/Weakness [] Seizures  [] Vertigo/Dizziness [x] Denies all unless marked

## 2021-06-15 ENCOUNTER — OFFICE VISIT (OUTPATIENT)
Dept: GASTROENTEROLOGY | Facility: CLINIC | Age: 56
End: 2021-06-15

## 2021-06-15 VITALS
TEMPERATURE: 97.4 F | OXYGEN SATURATION: 98 % | BODY MASS INDEX: 39.56 KG/M2 | WEIGHT: 261 LBS | HEART RATE: 74 BPM | HEIGHT: 68 IN | DIASTOLIC BLOOD PRESSURE: 78 MMHG | SYSTOLIC BLOOD PRESSURE: 132 MMHG

## 2021-06-15 DIAGNOSIS — E66.9 OBESITY, UNSPECIFIED OBESITY SEVERITY, UNSPECIFIED OBESITY TYPE: ICD-10-CM

## 2021-06-15 DIAGNOSIS — R13.12 OROPHARYNGEAL DYSPHAGIA: Primary | ICD-10-CM

## 2021-06-15 DIAGNOSIS — Z78.9 NONSMOKER: ICD-10-CM

## 2021-06-15 PROCEDURE — 99214 OFFICE O/P EST MOD 30 MIN: CPT | Performed by: CLINICAL NURSE SPECIALIST

## 2021-06-15 NOTE — PROGRESS NOTES
Shankar Velasco  1965    6/15/2021  Chief Complaint   Patient presents with   • GI Problem     New patient ref by Dr. Dolan for problems swallowing      Subjective   HPI  Shankar Velasco is a 56 y.o. male who presents with a complaint of difficulty swallowing for years ongoing persistent oropharyngeal region with liquids primarily.  He says it is not with all eating or drinking daily but intermittent occurring a few times per week. Moderate to severe with cough and like something is I in the back of his throat like a small piece of hamburger meat or water specifically. He says he will chew and chew and at times will just not go down correctly he feels. Last time was with an apple.  He has seen Dr Scar Craig in the past with Endoscopy 2018 noted below. No current melena. Location is in the upper esophageal region.   He has had a barium swallow 4/7/21 showing small residue in the vallecula and piriform sinuses which clears on second attempt. There is one time laryngeal penetration without aspiration with thin contrast bolus. Moderate difficulty with pudding consistency mechanical soft and solid boluses.   Esophagram 3/11/2021 showed small intermittent sliding hiatal hernia. No GERD. Esophagus was normal.   He does have hx of acute psychosis and recent severe episode of carlitos 4/26/21-5/1/21 as noted. He does not feel that these episodes are related to his stress or anxiety. He says he has them when he is just relaxing at home and eating.   4/21 speech evaluation performed and it was recommended that he complete a home exercise program (pharyneal exercises). He says that he has not performed these or continued with this.     Past Medical History:   Diagnosis Date   • Acid reflux    • Arthritis    • Bell palsy    • Chronic pain disorder    • Disease of thyroid gland    • Diverticulitis of colon    • Hyperlipidemia    • Hypertension    • Low back pain    • Neck pain    • Sleep apnea    • Stomach ulcer      Past Surgical  History:   Procedure Laterality Date   • ADENOIDECTOMY     • COLON RESECTION     • COLONOSCOPY  11/08/2016    large amount of diverticular disease in sigmoid colon, internal hemorrhoids   • ENDOSCOPY  08/27/2018    meld inflammation and focal lamina propria fibrosis (-) h-Pylori   • TONSILLECTOMY         Outpatient Medications Marked as Taking for the 6/15/21 encounter (Office Visit) with Stephania Simental APRN   Medication Sig Dispense Refill   • amLODIPine (NORVASC) 10 MG tablet Take 10 mg by mouth.     • atorvastatin (LIPITOR) 40 MG tablet Take 40 mg by mouth.     • furosemide (LASIX) 20 MG tablet TAKE 1 TABLET BY MOUTH ONCE DAILY.     • gabapentin (NEURONTIN) 300 MG capsule Take 1 capsule by mouth 3 (Three) Times a Day. 90 capsule 0   • levothyroxine (SYNTHROID, LEVOTHROID) 50 MCG tablet Take 1 tablet by mouth Daily.     • lisinopril-hydrochlorothiazide (PRINZIDE,ZESTORETIC) 20-25 MG per tablet Take  by mouth.     • metoprolol succinate XL (TOPROL-XL) 100 MG 24 hr tablet Take 1 tablet by mouth 2 (Two) Times a Day.     • naproxen (NAPROSYN) 500 MG tablet Take 1 tablet by mouth 2 (Two) Times a Day As Needed for Moderate Pain . 30 tablet 0   • omeprazole (priLOSEC) 20 MG capsule Take 1 capsule by mouth Daily.       No Known Allergies  Social History     Socioeconomic History   • Marital status:      Spouse name: Not on file   • Number of children: Not on file   • Years of education: Not on file   • Highest education level: Not on file   Tobacco Use   • Smoking status: Never Smoker   • Smokeless tobacco: Never Used   Vaping Use   • Vaping Use: Never used   Substance and Sexual Activity   • Alcohol use: No   • Drug use: No   • Sexual activity: Defer     Family History   Problem Relation Age of Onset   • Heart disease Mother    • Diabetes Mother    • Heart disease Father    • Lung disease Father    • Colon cancer Neg Hx    • Colon polyps Neg Hx      Health Maintenance   Topic Date Due   • ANNUAL PHYSICAL   "Never done   • Pneumococcal Vaccine 0-64 (1 of 1 - PPSV23) Never done   • COVID-19 Vaccine (1) Never done   • ZOSTER VACCINE (1 of 2) Never done   • INFLUENZA VACCINE  08/01/2021   • LIPID PANEL  04/28/2022   • TDAP/TD VACCINES (2 - Td or Tdap) 04/19/2023   • COLORECTAL CANCER SCREENING  11/08/2026   • HEPATITIS C SCREENING  Completed     Review of Systems   Constitutional: Negative for activity change, appetite change, chills, diaphoresis, fatigue, fever and unexpected weight change.   HENT: Positive for trouble swallowing. Negative for ear pain, hearing loss, mouth sores, sore throat and voice change.    Eyes: Negative.    Respiratory: Negative for cough, choking, shortness of breath and wheezing.    Cardiovascular: Negative for chest pain and palpitations.   Gastrointestinal: Negative for abdominal pain, blood in stool, constipation, diarrhea, nausea and vomiting.   Endocrine: Negative for cold intolerance and heat intolerance.   Genitourinary: Negative for decreased urine volume, dysuria, frequency, hematuria and urgency.   Musculoskeletal: Negative for back pain, gait problem and myalgias.   Skin: Negative for color change, pallor and rash.   Allergic/Immunologic: Negative for food allergies and immunocompromised state.   Neurological: Negative for dizziness, tremors, seizures, syncope, weakness, light-headedness, numbness and headaches.   Hematological: Negative for adenopathy. Does not bruise/bleed easily.   Psychiatric/Behavioral: Negative for agitation and confusion. The patient is not nervous/anxious.    All other systems reviewed and are negative.    Objective   Vitals:    06/15/21 0951   BP: 132/78   Pulse: 74   Temp: 97.4 °F (36.3 °C)   SpO2: 98%   Weight: 118 kg (261 lb)   Height: 172.7 cm (68\")     Body mass index is 39.68 kg/m².  Physical Exam  Constitutional:       Appearance: He is well-developed.   HENT:      Head: Normocephalic and atraumatic.   Eyes:      Pupils: Pupils are equal, round, and " reactive to light.   Neck:      Trachea: No tracheal deviation.   Cardiovascular:      Rate and Rhythm: Normal rate and regular rhythm.      Heart sounds: Normal heart sounds. No murmur heard.   No friction rub. No gallop.    Pulmonary:      Effort: Pulmonary effort is normal. No respiratory distress.      Breath sounds: Normal breath sounds. No wheezing or rales.   Chest:      Chest wall: No tenderness.   Abdominal:      General: Bowel sounds are normal. There is no distension.      Palpations: Abdomen is soft. Abdomen is not rigid.      Tenderness: There is no abdominal tenderness. There is no guarding or rebound.   Musculoskeletal:         General: No tenderness or deformity. Normal range of motion.      Cervical back: Normal range of motion and neck supple.   Skin:     General: Skin is warm and dry.      Coloration: Skin is not pale.      Findings: No rash.   Neurological:      Mental Status: He is alert and oriented to person, place, and time.      Deep Tendon Reflexes: Reflexes are normal and symmetric.   Psychiatric:         Behavior: Behavior normal.         Thought Content: Thought content normal.         Judgment: Judgment normal.       Assessment/Plan   Diagnoses and all orders for this visit:    1. Oropharyngeal dysphagia (Primary)    2. Nonsmoker    3. Obesity, unspecified obesity severity, unspecified obesity type    I recommended he follow up speech therapy and do the exercises that they suggested to see if that makes a difference for him. He may consider seeing ENT as well given the location of his difficulty. Last Endoscopy reviewed. I have offered an Endoscopy however I do not feel that this could benefit or resolve his symptoms given the nature of them and speech findings. His symptoms do not appear to be esophageal at this time but more mechanical in nature.   Long discussion regarding plan and recommendations going forward. He want to try speech therapy recommendations.     Part of this note may  be an electronic transcription/translation of spoken language to printed text using the Dragon Dictation System.  Body mass index is 39.68 kg/m².  Return in about 2 months (around 8/15/2021).    Patient's Body mass index is 39.68 kg/m². indicating that he is morbidly obese (BMI > 40 or > 35 with obesity - related health condition). Obesity-related health conditions include the following: hypertension. Obesity is unchanged. BMI is is above average; BMI management plan is completed. We discussed portion control and increasing exercise..      All risks, benefits, alternatives, and indications of colonoscopy and/or Endoscopy procedure have been discussed with the patient. Risks to include perforation of the colon requiring possible surgery or colostomy, risk of bleeding from biopsies or removal of colon tissue, possibility of missing a colon polyp or cancer, or adverse drug reaction.  Benefits to include the diagnosis and management of disease of the colon and rectum. Alternatives to include barium enema, radiographic evaluation, lab testing or no intervention. Pt verbalizes understanding and agrees.     Stephania Simental, APRN  6/15/2021  10:35 CDT          If you smoke or use tobacco, 4 minutes reading provided  Steps to Quit Smoking  Smoking tobacco can be harmful to your health and can affect almost every organ in your body. Smoking puts you, and those around you, at risk for developing many serious chronic diseases. Quitting smoking is difficult, but it is one of the best things that you can do for your health. It is never too late to quit.  What are the benefits of quitting smoking?  When you quit smoking, you lower your risk of developing serious diseases and conditions, such as:  · Lung cancer or lung disease, such as COPD.  · Heart disease.  · Stroke.  · Heart attack.  · Infertility.  · Osteoporosis and bone fractures.  Additionally, symptoms such as coughing, wheezing, and shortness of breath may get better  when you quit. You may also find that you get sick less often because your body is stronger at fighting off colds and infections. If you are pregnant, quitting smoking can help to reduce your chances of having a baby of low birth weight.  How do I get ready to quit?  When you decide to quit smoking, create a plan to make sure that you are successful. Before you quit:  · Pick a date to quit. Set a date within the next two weeks to give you time to prepare.  · Write down the reasons why you are quitting. Keep this list in places where you will see it often, such as on your bathroom mirror or in your car or wallet.  · Identify the people, places, things, and activities that make you want to smoke (triggers) and avoid them. Make sure to take these actions:  ¨ Throw away all cigarettes at home, at work, and in your car.  ¨ Throw away smoking accessories, such as ashtrays and lighters.  ¨ Clean your car and make sure to empty the ashtray.  ¨ Clean your home, including curtains and carpets.  · Tell your family, friends, and coworkers that you are quitting. Support from your loved ones can make quitting easier.  · Talk with your health care provider about your options for quitting smoking.  · Find out what treatment options are covered by your health insurance.  What strategies can I use to quit smoking?  Talk with your healthcare provider about different strategies to quit smoking. Some strategies include:  · Quitting smoking altogether instead of gradually lessening how much you smoke over a period of time. Research shows that quitting “cold turkey” is more successful than gradually quitting.  · Attending in-person counseling to help you build problem-solving skills. You are more likely to have success in quitting if you attend several counseling sessions. Even short sessions of 10 minutes can be effective.  · Finding resources and support systems that can help you to quit smoking and remain smoke-free after you quit.  These resources are most helpful when you use them often. They can include:  ¨ Online chats with a counselor.  ¨ Telephone quitlines.  ¨ Printed self-help materials.  ¨ Support groups or group counseling.  ¨ Text messaging programs.  ¨ Mobile phone applications.  · Taking medicines to help you quit smoking. (If you are pregnant or breastfeeding, talk with your health care provider first.) Some medicines contain nicotine and some do not. Both types of medicines help with cravings, but the medicines that include nicotine help to relieve withdrawal symptoms. Your health care provider may recommend:  ¨ Nicotine patches, gum, or lozenges.  ¨ Nicotine inhalers or sprays.  ¨ Non-nicotine medicine that is taken by mouth.  Talk with your health care provider about combining strategies, such as taking medicines while you are also receiving in-person counseling. Using these two strategies together makes you more likely to succeed in quitting than if you used either strategy on its own.  If you are pregnant or breastfeeding, talk with your health care provider about finding counseling or other support strategies to quit smoking. Do not take medicine to help you quit smoking unless told to do so by your health care provider.  What things can I do to make it easier to quit?  Quitting smoking might feel overwhelming at first, but there is a lot that you can do to make it easier. Take these important actions:  · Reach out to your family and friends and ask that they support and encourage you during this time. Call telephone quitlines, reach out to support groups, or work with a counselor for support.  · Ask people who smoke to avoid smoking around you.  · Avoid places that trigger you to smoke, such as bars, parties, or smoke-break areas at work.  · Spend time around people who do not smoke.  · Lessen stress in your life, because stress can be a smoking trigger for some people. To lessen stress, try:  ¨ Exercising  regularly.  ¨ Deep-breathing exercises.  ¨ Yoga.  ¨ Meditating.  ¨ Performing a body scan. This involves closing your eyes, scanning your body from head to toe, and noticing which parts of your body are particularly tense. Purposefully relax the muscles in those areas.  · Download or purchase mobile phone or tablet apps (applications) that can help you stick to your quit plan by providing reminders, tips, and encouragement. There are many free apps, such as QuitGuide from the CDC (Centers for Disease Control and Prevention). You can find other support for quitting smoking (smoking cessation) through smokefree.youblisher.com and other websites.  How will I feel when I quit smoking?  Within the first 24 hours of quitting smoking, you may start to feel some withdrawal symptoms. These symptoms are usually most noticeable 2-3 days after quitting, but they usually do not last beyond 2-3 weeks. Changes or symptoms that you might experience include:  · Mood swings.  · Restlessness, anxiety, or irritation.  · Difficulty concentrating.  · Dizziness.  · Strong cravings for sugary foods in addition to nicotine.  · Mild weight gain.  · Constipation.  · Nausea.  · Coughing or a sore throat.  · Changes in how your medicines work in your body.  · A depressed mood.  · Difficulty sleeping (insomnia).  After the first 2-3 weeks of quitting, you may start to notice more positive results, such as:  · Improved sense of smell and taste.  · Decreased coughing and sore throat.  · Slower heart rate.  · Lower blood pressure.  · Clearer skin.  · The ability to breathe more easily.  · Fewer sick days.  Quitting smoking is very challenging for most people. Do not get discouraged if you are not successful the first time. Some people need to make many attempts to quit before they achieve long-term success. Do your best to stick to your quit plan, and talk with your health care provider if you have any questions or concerns.  This information is not intended  to replace advice given to you by your health care provider. Make sure you discuss any questions you have with your health care provider.  Document Released: 12/12/2002 Document Revised: 08/15/2017 Document Reviewed: 05/03/2016  [employer] Interactive Patient Education © 2017 [employer]

## 2021-06-22 ENCOUNTER — TELEPHONE (OUTPATIENT)
Dept: PSYCHIATRY | Age: 56
End: 2021-06-22

## 2021-06-22 NOTE — TELEPHONE ENCOUNTER
Called pt for appointment reminder.   -left voicemail, requesting a return call    Electronically signed by María Clements MA on 6/22/2021 at 2:16 PM

## 2021-06-23 ENCOUNTER — OFFICE VISIT (OUTPATIENT)
Dept: PSYCHIATRY | Age: 56
End: 2021-06-23
Payer: MEDICAID

## 2021-06-23 ENCOUNTER — OFFICE VISIT (OUTPATIENT)
Dept: PRIMARY CARE CLINIC | Age: 56
End: 2021-06-23
Payer: MEDICAID

## 2021-06-23 VITALS
BODY MASS INDEX: 38.72 KG/M2 | HEIGHT: 68 IN | WEIGHT: 255.5 LBS | TEMPERATURE: 97.7 F | OXYGEN SATURATION: 99 % | SYSTOLIC BLOOD PRESSURE: 113 MMHG | DIASTOLIC BLOOD PRESSURE: 65 MMHG | HEART RATE: 73 BPM

## 2021-06-23 VITALS
WEIGHT: 261 LBS | DIASTOLIC BLOOD PRESSURE: 78 MMHG | SYSTOLIC BLOOD PRESSURE: 114 MMHG | BODY MASS INDEX: 38.66 KG/M2 | HEIGHT: 69 IN | OXYGEN SATURATION: 99 % | HEART RATE: 67 BPM | TEMPERATURE: 97 F

## 2021-06-23 DIAGNOSIS — F31.9 BIPOLAR 1 DISORDER (HCC): Primary | ICD-10-CM

## 2021-06-23 DIAGNOSIS — K62.5 BRIGHT RED RECTAL BLEEDING: Primary | ICD-10-CM

## 2021-06-23 DIAGNOSIS — Z87.19 HISTORY OF DIVERTICULITIS OF COLON: ICD-10-CM

## 2021-06-23 DIAGNOSIS — K64.4 EXTERNAL HEMORRHOID: ICD-10-CM

## 2021-06-23 DIAGNOSIS — K64.8 INTERNAL HEMORRHOID: ICD-10-CM

## 2021-06-23 LAB
HEMOCCULT STL QL: NORMAL

## 2021-06-23 PROCEDURE — 99213 OFFICE O/P EST LOW 20 MIN: CPT | Performed by: FAMILY MEDICINE

## 2021-06-23 PROCEDURE — 82270 OCCULT BLOOD FECES: CPT | Performed by: FAMILY MEDICINE

## 2021-06-23 PROCEDURE — 99213 OFFICE O/P EST LOW 20 MIN: CPT | Performed by: NURSE PRACTITIONER

## 2021-06-23 RX ORDER — HYDROCORTISONE ACETATE 25 MG/1
25 SUPPOSITORY RECTAL EVERY 12 HOURS
Qty: 10 SUPPOSITORY | Refills: 0 | Status: SHIPPED | OUTPATIENT
Start: 2021-06-23 | End: 2021-06-28

## 2021-06-23 ASSESSMENT — ENCOUNTER SYMPTOMS
NAUSEA: 0
RECTAL PAIN: 0
COUGH: 0
ANAL BLEEDING: 1
BLOOD IN STOOL: 1
SHORTNESS OF BREATH: 0
VOMITING: 0
ABDOMINAL PAIN: 0

## 2021-06-23 NOTE — PROGRESS NOTES
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    Moderate persistent asthma without complication    Bipolar I disorder, most recent episode manic, severe with psychotic features ([address]    Bipolar 1 disorder (HCC)    Numbness and tingling of both lower extremities       PSHx:  Past Surgical History:   Procedure Laterality Date    COLONOSCOPY  2009    Dr Gisell Salcedo: hyperplastic polyp, resolving diverticulitis    COLONOSCOPY  2012    minimal diverticulosis left side o/w normal postsurgical colon    COLONOSCOPY  2005    diverticulitis    COLONOSCOPY N/A 2016    Dr Guera Kimball bleeding internal hemorrhoids, diverticular disease-5 yr recall    EGD      NC EGD TRANSORAL BIOPSY SINGLE/MULTIPLE N/A 2018    Dr JONATHAN Bentley-Gastritis/gastropathy, esophagitis    SUBTOTAL COLECTOMY      recurrant diverticulitis    UPPER GASTROINTESTINAL ENDOSCOPY  2005    Dr Gisell Salcedo: duodenal ulcer, gastritis    UPPP UVULOPALATOPHARYGOPLASTY         PFHx:  Family History   Problem Relation Age of Onset    Diabetes Mother     Kidney Disease Mother     Dementia Mother          at 68 - Lewy Body Dementia    Coronary Art Dis Father          at [de-identified]   [place] Colon Cancer Neg Hx     Colon Polyps Neg Hx     Esophageal Cancer Neg Hx     Liver Cancer Neg Hx     Liver Disease Neg Hx     Rectal Cancer Neg Hx     Stomach Cancer Neg Hx        SocialHx:  Social History     Tobacco Use    Smoking status: Never Smoker    Smokeless tobacco: Never Used   Substance Use Topics    Alcohol use: No       Allergies:  No Known Allergies    Medications:  Current Outpatient Medications   Medication Sig Dispense Refill    hydrocortisone (ANUSOL-HC) 25 MG suppository Place 1 suppository rectally every 12 hours for 5 days 10 suppository 0    lisinopril-hydroCHLOROthiazide (PRINZIDE;ZESTORETIC) 20-25 MG per tablet TAKE 1 TABLET BY MOUTH ONCE DAILY. 90 tablet 0    lurasidone (LATUDA) 40 MG TABS tablet Take 1 tablet by mouth daily 30 tablet 2    divalproex (DEPAKOTE ER) 500 MG extended release tablet Take 3 tablets by mouth daily 90 tablet 2    omeprazole (PRILOSEC) 20 MG delayed release capsule TAKE 1 CAPSULE BY MOUTH DAILY  30 capsule 0    vitamin D (ERGOCALCIFEROL) 1.25 MG (30854 UT) CAPS capsule Take 1 capsule by mouth once a week for 11 doses 11 capsule 1    levothyroxine (SYNTHROID) 50 MCG tablet TAKE 1 TABLET BY MOUTH ONCE DAILY. 90 tablet 0    amLODIPine (NORVASC) 10 MG tablet TAKE 1 TABLET BY MOUTH ONCE DAILY. 90 tablet 0    atorvastatin (LIPITOR) 40 MG tablet TAKE 1 TABLET BY MOUTH ONCE DAILY. 90 tablet 0    furosemide (LASIX) 20 MG tablet TAKE 1 TABLET BY MOUTH ONCE DAILY. 30 tablet 5    metoprolol tartrate (LOPRESSOR) 25 MG tablet TAKE 2 TABLETS BY MOUTH TWICE DAILY 120 tablet 5    gabapentin (NEURONTIN) 400 MG capsule Take 1 capsule by mouth 3 times daily for 30 days. (Patient taking differently: Take 300 mg by mouth 3 times daily. ) 90 capsule 3     No current facility-administered medications for this visit. Objective:   PE:  /78   Pulse 67   Temp 97 °F (36.1 °C)   Ht 5' 9\" (1.753 m)   Wt 261 lb (118.4 kg)   SpO2 99%   BMI 38.54 kg/m²   Physical Exam  Vitals and nursing note reviewed. Exam conducted with a chaperone present. Constitutional:       General: He is not in acute distress. Appearance: He is well-developed. He is not toxic-appearing or diaphoretic. HENT:      Head: Normocephalic and atraumatic. Cardiovascular:      Rate and Rhythm: Normal rate and regular rhythm. Heart sounds: Normal heart sounds. No murmur heard. No friction rub. No gallop. Pulmonary:      Effort: Pulmonary effort is normal. No respiratory distress. Breath sounds: Normal breath sounds. No wheezing or rales.    Chest:      Chest wall: No tenderness. Abdominal:      General: Abdomen is protuberant. Bowel sounds are normal. There is no distension. Palpations: Abdomen is soft. There is no mass. Tenderness: There is no abdominal tenderness. There is no guarding or rebound. Comments: +central obesity    RECTAL : + external & internal  hemorrhoid present - not thrombosed, no anal fissures, no palpable mass, hard stools palpable in rectal vault,  hemoccult test +   Musculoskeletal:      Right lower leg: No edema. Left lower leg: No edema. Skin:     General: Skin is warm and dry. Nails: There is no clubbing. Neurological:      Mental Status: He is alert and oriented to person, place, and time. Coordination: Coordination normal.      Gait: Gait normal.   Psychiatric:         Mood and Affect: Mood is anxious. Mood is not depressed. Speech: Speech normal.         Behavior: Behavior normal.         Thought Content: Thought content does not include homicidal or suicidal ideation. Judgment: Judgment normal.            Assessment & Plan   Abby Garrido was seen today for rectal bleeding and abdominal pain. Diagnoses and all orders for this visit:    Bright red rectal bleeding  -     POCT occult blood stool; Future  -     POCT occult blood stool  -     External Referral To Gastroenterology    External hemorrhoid  -     hydrocortisone (ANUSOL-HC) 25 MG suppository; Place 1 suppository rectally every 12 hours for 5 days    Internal hemorrhoid  -     hydrocortisone (ANUSOL-HC) 25 MG suppository; Place 1 suppository rectally every 12 hours for 5 days    History of diverticulitis of colon  -     External Referral To Gastroenterology    Hot sitz bath 2-3 times a day  Stool softener at bedtime - OTC brand okay  Healthy bowel habits  Increase oral fluid intake  Increase fiber in diet  Continue efforts at weight loss  Encouraged to get Covid vaccination.     Return if symptoms worsen or fail to improve, for Keep appt with  [place] on 6/29/21. All questions were answered. Medications, including possible adverse effects, and instructions were reviewed and  understanding was confirmed. Follow-up recommendations, including when to contact or return to office (ie; if symptoms worsen or fail to improve), were discussed and acknowledged.     Electronically signed by Evelyn Rushing MD on 6/23/21 at 1:42 PM [facility]

## 2021-06-23 NOTE — PROGRESS NOTES
6/23/21        Progress Note    Ed Bernal 1965      Chief Complaint   Patient presents with    Medication Check         Subjective:    Patient is a 61 y.o. male diagnosed with bipolar 1 and presents today for follow-up. Last seen in clinic on 5/26/2021  and prior records were reviewed. Last visit: Feels pretty good today. He feels back to normal.  He says his wife is saying he is doing well. We discussed medication compliance patient stated that he was taking his medicines as directed. We talked about getting a Depakote level patient stated that he can get 1 today. Patient was inquired about his morning dose of Depakote and he said that he did not take it this morning sometimes he forgets we discussed the importance of taking his medicine on time and consistently. Get depakote level 1 week after being consistent on taking depakote in the morning. He said he has missed a dose here and there     Patient stated that he quit using his BiPAP machine because he lost weight and he is doing better now, we discussed not making any changes until he speaks to his provider patient agreed and said that he will call Dr. Fang Ramirez and talk to him about sleep apnea and his BiPAP. Today:  Wife Noticed some things such as his belongings were always placed in particular places, now he has been forgetting things and misplacing items. Washed his phone in his pants the other night. He is very afraid that he is developing alzheimers, his mother was diagnosed with dementia when she was in her early 63's and he witnessed her death which was traumatic for him. He has had some minor personality shifts : He did not like any kind of detergent but now he likes those. He used to not be an animal lover and now he is letting animals lick him in the face. Could not stand diet drinks and now he really likes diet drinks. Wife says he is more pleasant.   She stated that he is sleeping better, he used to snore really bad but he has not been snoring as much. He has follow up appointment with Dr Martita Chavez today where he is going to ask for a referral to neuro and ENT to get his drooling assessed. Other than drooling and the memory concerns he is not experiencing any distress. He is appropriately dressed and is beginning back to work tomorrow which he is excited about. We discussed a repeat valproic acid level due to last one being low, he is going to check it on Friday. Absent  suicidal ideation. Reports compliance with medications as good . Sleep: sleeping in a chair upright getting 6-7 hours per night. Caffeine use: tea, diet cokes    PREVIOUS MED TRIALS      Current Substance Use:   Alcohol: none  Illicit drug use: denies   Marijuana: denies   Tobacco: denies   Vape: denies       BP: There were no vitals taken for this visit.       Review of Systems - 14 point review:  Negative     Constitutional: (fevers, chills, night sweats, wt loss/gain, change in appetite, fatigue, somnolence)    HEENT: (ear pain or discharge, hearing loss, ear ringing, sinus pressure, nosebleed, nasal discharge, sore throat, oral sores, tooth pain, bleeding gums, hoarse voice, neck pain)      Cardiovascular: (HTN, chest pain, elevated cholesterol/lipids, palpitations, leg swelling, leg pain with walking)    Respiratory: (cough, wheezing, snoring, SOB with activity (dyspnea), SOB while lying flat (orthopnea), awakening with severe SOB (paroxysmal nocturnal dyspnea))    Gastrointestinal: (NVD, constipation, abdominal pain, bright red stools, black tarry stools, stool incontinence)     Genitourinary:  (pelvic pain, burning or frequency of urination, urinary urgency, blood in urine incomplete bladder emptying, urinary incontinence, STD; MEN: testicular pain or swelling, erectile dysfunction; WOMEN: LMP, heavy menstrual bleeding (menorrhagia), irregular periods, postmenopausal bleeding, menstrual pain (dymenorrhea, vaginal discharge)    Musculoskeletal: (bone pain/fracture, joint pain or swelling, musle pain)    Integumentary: (rashes, acne, non-healing sores, itching, breast lumps, breast pain, nipple discharge, hair loss)    Neurologic: (HA, muscle weakness, paresthesias (numbness, coldness, crawling or prickling), memory loss, seizure, dizziness)    Psychiatric:  (anxiety, sadness, irritability/anger, insomnia, suicidality)    Endocrine: (heat or cold intolerance, excessive thirst (polydipsia), excessive hunger (polyphagia))    Immune/Allergic: (hives, seasonal or environmental allergies, HIV exposure)    Hematologic/Lymphatic: (lymph node enlargement, easy bleeding or bruising)    History obtained via chart review and patient    PCP is Anna Contreras MD       Current Meds:    Prior to Admission medications    Medication Sig Start Date End Date Taking? Authorizing Provider   lisinopril-hydroCHLOROthiazide (PRINZIDE;ZESTORETIC) 20-25 MG per tablet TAKE 1 TABLET BY MOUTH ONCE DAILY. 6/11/21   Marius Lesches, MD   lurasidone (LATUDA) 40 MG TABS tablet Take 1 tablet by mouth daily 6/10/21   TRIP Madsen CNP   divalproex (DEPAKOTE ER) 500 MG extended release tablet Take 3 tablets by mouth daily 6/4/21   TRIP Madsen CNP   omeprazole (PRILOSEC) 20 MG delayed release capsule TAKE 1 CAPSULE BY MOUTH DAILY  6/3/21   Marius Lesches, MD   vitamin D (ERGOCALCIFEROL) 1.25 MG (44876 UT) CAPS capsule Take 1 capsule by mouth once a week for 11 doses 5/5/21 7/15/21  Chuckie López MD   levothyroxine (SYNTHROID) 50 MCG tablet TAKE 1 TABLET BY MOUTH ONCE DAILY. 3/21/21   Marius Lesches, MD   amLODIPine (NORVASC) 10 MG tablet TAKE 1 TABLET BY MOUTH ONCE DAILY. 3/21/21   Marius Lesches, MD   atorvastatin (LIPITOR) 40 MG tablet TAKE 1 TABLET BY MOUTH ONCE DAILY. 3/21/21   Marius Lesches, MD   gabapentin (NEURONTIN) 400 MG capsule Take 1 capsule by mouth 3 times daily for 30 days. Patient taking differently: Take 300 mg by mouth 3 times daily.   12/1/20 6/14/21 Riaz Rojas MD   furosemide (LASIX) 20 MG tablet TAKE 1 TABLET BY MOUTH ONCE DAILY. 11/2/20   Riaz Rojas MD   metoprolol tartrate (LOPRESSOR) 25 MG tablet TAKE 2 TABLETS BY MOUTH TWICE DAILY 8/2/20   Riaz Rojas MD     Social History     Socioeconomic History    Marital status:      Spouse name: Not on file    Number of children: Not on file    Years of education: Not on file    Highest education level: Not on file   Occupational History    Not on file   Tobacco Use    Smoking status: Never Smoker    Smokeless tobacco: Never Used   Vaping Use    Vaping Use: Never used   Substance and Sexual Activity    Alcohol use: No    Drug use: No    Sexual activity: Yes   Other Topics Concern    Not on file   Social History Narrative    5/6/2021    PRIOR MEDICATION TRIALS    Abilify    Risperdal (current)    Depakote (was on a couple of years)    Buspirone    Trazodone (current)    Diazepam    Ativan    Gabapentin (current)    Melatonin        . SLEEP STUDY: yes - HAWK, Bipap    .    negative history of seizures. .    negative history of head trauma. Boo Chairez PREVIOUS PSYCHIATRIC HISTORY    Had an episode of brief acute psychosis when he was in the [employer] (age 22). About ten years ago he had another episode but he doesn't remember what medicines he was taking - his inpatient intake in April, 2021 states that he was started on something which incapacitated him, then was started on Depakote. (He may have been started on Abilify at this time as he and his wife reports that Abilify did not work well for him.) He stopped having problems some years after that and he asked a doctor to take him off medications. He has been off medications for about 7 years. He has recently been admitted to inpatient with a severe episode of amy, 4/26/21-5/1/21. Boo Chairez FAMILY PSYCHIATRIC HISTORY    Father, bipolar    Mother, depression    .     SOCIAL HISTORY    Born and Raised - [place], [place], in a 2 parent home with 3 siblings. He says he had a wonderful childhood.  twice. Has one daughter from the first marriage. Has been  to his second wife for 26 years. They have 3 adopted children and 1 adult adopted child. .    Trauma and/or Abuse - Denies trauma. He was devastated with his divorce from his first wife. .    Legal - denies    Substance Use - see history    Work History - see history    Education - see history     status - [employer] for a few months, honorably discharged after an episode of Acute Psychosis    . PAST SUICIDE ATTEMPTS:    no    .    INPATIENT HOSPITALIZATIONS:    yes - three times, [facility] - diagnosed with Acute Psychosis; 10 years ago - diagnosed with bipolar disorder, and most recently 4/26-4/29/21 with a manic episode    . DRUG REHABILITATION:    no    .    5/6/2021 MDQ: +11, Yes, severe    . PSYCHIATRIC REVIEW OF SYSTEMS, 5/6/2021    . Mood:  positive for insomnia, feeling tired, fidgety/restless      (Depression: sadness, tearfulness, sleep, appetite, energy, concentration, sexual function, guilt, psychomotor agitation or slowing, interest, suicidality)    . Frida: positive for week or more at a time, 9 s/s endorsed on the MDQ: feeling hyper, irritability, insomnia (only about 5 hrs since Sunday), hyperverbal, racing thoughts, easily distracted, increased energy, goal-directed activity, more social, more interested in sex, doing things that others would think were risky, excessive or foolish      (impulsivity, grandiosity, recklessness, excessive energy, decreased need for sleep, increased spending beyond means, hyperverbal, grandiose, racing thoughts, hypersexuality)    . Other: positive for irritability, anger      (Irritability, lability, anger)    . Anxiety:  positive for not being able to stop or control worrying, trouble relaxing      (Generalized anxiety: where, when, who, how long, how frequent)    .     Panic Disorder in the Last Year: Never true   Transportation Needs: No Transportation Needs    Lack of Transportation (Medical): No    Lack of Transportation (Non-Medical): No   Physical Activity:     Days of Exercise per Week:     Minutes of Exercise per Session:    Stress:     Feeling of Stress :    Social Connections:     Frequency of Communication with Friends and Family:     Frequency of Social Gatherings with Friends and Family:     Attends Oriental orthodox Services:     Active Member of Clubs or Organizations:     Attends Club or Organization Meetings:     Marital Status:    Intimate Partner Violence:     Fear of Current or Ex-Partner:     Emotionally Abused:     Physically Abused:     Sexually Abused:        MSE:  Appearance: Appropriately groomed. Made good eye contact. Gait stable. No abnormal movements or tremor. Behavior: Calm, cooperative, and socially appropriate. No psychomotor retardation/agitation appreciated. Speech: Normal in tone, volume, and quality. No slurring, dysarthria or pressured speech noted. Mood: \"good\"   Affect: Mood congruent. Thought Process: Appears linear, logical and goal oriented. Causality appears intact. Thought Content: Denies active suicidal and homicidal ideations. No overt delusions or paranoia appreciated. Perceptions: Denies auditory or visual hallucinations at present time. Not responding to internal stimuli. Concentration: Intact. Orientation: to person, place, date, and situation. Language: Intact. Fund of information: Intact. Memory: Recent and remote appear intact. Impulsivity: Limited. Neurovegitative: Fair appetite and sleep. Insight: Fair. Judgment: Fair. Cognition: Can spell \"world\" backwards: Yes                    Can do serial 7's:  Yes    Lab Results   Component Value Date     04/26/2021    K 3.5 04/26/2021     04/26/2021    CO2 20 (L) 04/26/2021    BUN 14 04/26/2021    CREATININE 0.8 04/26/2021    GLUCOSE 100

## 2021-07-01 ENCOUNTER — TELEPHONE (OUTPATIENT)
Dept: PSYCHIATRY | Age: 56
End: 2021-07-01

## 2021-07-01 NOTE — TELEPHONE ENCOUNTER
Attempted to contact pt to remind him of need for Depakote level as ordered per Celso DOMINIQUE. Left VM with the above info and info on prep and location for lab. Pt encouraged to call back with any questions.

## 2021-07-10 DIAGNOSIS — G62.9 NEUROPATHY: Primary | ICD-10-CM

## 2021-07-11 RX ORDER — OMEPRAZOLE 20 MG/1
20 CAPSULE, DELAYED RELEASE ORAL DAILY
Qty: 30 CAPSULE | Refills: 11 | Status: SHIPPED | OUTPATIENT
Start: 2021-07-11

## 2021-07-11 NOTE — TELEPHONE ENCOUNTER
Andres Hooper called to request a refill on his medication. Last office visit : 6/23/2021   Next office visit : Visit date not found     Last UDS:   Amphetamine Screen, Urine   Date Value Ref Range Status   04/26/2021 Negative Negative <1000 ng/mL Final     Barbiturate Screen, Urine   Date Value Ref Range Status   03/08/2021 -  Final     Benzodiazepine Screen, Urine   Date Value Ref Range Status   04/26/2021 Negative Negative <100 ng/mL Final     Buprenorphine Urine   Date Value Ref Range Status   03/08/2021 -  Final     Cocaine Metabolite Screen, Urine   Date Value Ref Range Status   04/26/2021 Negative Negative <300 ng/mL Final     Gabapentin Screen, Urine   Date Value Ref Range Status   03/08/2021 -  Final     MDMA, Urine   Date Value Ref Range Status   03/08/2021 -  Final     Methamphetamine, Urine   Date Value Ref Range Status   03/08/2021 -  Final     Opiate Scrn, Ur   Date Value Ref Range Status   04/26/2021 Negative Negative < 300 ng/mL Final     Oxycodone Screen, Ur   Date Value Ref Range Status   03/08/2021 -  Final     PCP Screen, Urine   Date Value Ref Range Status   03/08/2021 -  Final     Propoxyphene Screen, Urine   Date Value Ref Range Status   03/08/2021 -  Final     THC Screen, Urine   Date Value Ref Range Status   03/08/2021 -  Final     Tricyclic Antidepressants, Urine   Date Value Ref Range Status   03/08/2021 -  Final       Last Ambrosio Clutter: 6-23-21  Medication Contract: 3-2021   Last Fill: 12-1 with 3    Requested Prescriptions     Pending Prescriptions Disp Refills    omeprazole (PRILOSEC) 20 MG delayed release capsule [Pharmacy Med Name: OMEPRAZOLE 20MG CAPSULE DELAYED RELEASE] 30 capsule 0     Sig: TAKE 1 CAPSULE BY MOUTH DAILY    gabapentin (NEURONTIN) 300 MG capsule [Pharmacy Med Name: GABAPENTIN 300MG CAPSULE] 90 capsule 0     Sig: TAKE 1 CAPSULE BY MOUTH THREE TIMES DAILY. Please approve or refuse this medication.    Jovanni Kelley LPN

## 2021-07-12 ENCOUNTER — TELEPHONE (OUTPATIENT)
Dept: PSYCHIATRY | Age: 56
End: 2021-07-12

## 2021-07-12 RX ORDER — GABAPENTIN 300 MG/1
CAPSULE ORAL
Qty: 90 CAPSULE | Refills: 5 | Status: SHIPPED | OUTPATIENT
Start: 2021-07-12 | End: 2022-02-11

## 2021-07-12 NOTE — TELEPHONE ENCOUNTER
HORACIO was reviewed today per office protocol. Report shows No discrepancies. Fill pattern is consistent from single provider(s) at single pharmacy(s).     Presciption Escribed

## 2021-07-12 NOTE — TELEPHONE ENCOUNTER
Received fax stating PA needed for Latuda 40 mg-called pharmacy on 6/10/21 and was told it was covered by insurance with no copay. Called [facility] and staff reported that the med went through for no copay on 7/10/21. PA request was sent in error.

## 2021-07-27 ENCOUNTER — TELEPHONE (OUTPATIENT)
Dept: PSYCHIATRY | Age: 56
End: 2021-07-27

## 2021-07-27 NOTE — TELEPHONE ENCOUNTER
Pt's wife called stating that she feels Kim Jensen needs to be seen by the APRN. She stated that he may be having some issues with the Latuda. Having some mouth movement that pt tells her he is doing on purpose to keep saliva in his mouth. She reports that he is also walking with a shuffle. She says she feels he is on a downhill side with depression. She says when he gets home from work he just sits in a chair. She reported he is also having difficulties managing their finances. She denied that she felt he was at a risk to harm himself ot anyone. She was made aware the above info would be given to Ann Waters and then she would be called back.

## 2021-07-28 DIAGNOSIS — F31.9 BIPOLAR 1 DISORDER (HCC): ICD-10-CM

## 2021-07-28 LAB — VALPROIC ACID LEVEL: 87.4 UG/ML (ref 50–100)

## 2021-07-29 ENCOUNTER — OFFICE VISIT (OUTPATIENT)
Dept: PSYCHIATRY | Age: 56
End: 2021-07-29
Payer: MEDICAID

## 2021-07-29 ENCOUNTER — TELEPHONE (OUTPATIENT)
Dept: PSYCHIATRY | Age: 56
End: 2021-07-29

## 2021-07-29 VITALS
WEIGHT: 255.8 LBS | HEART RATE: 59 BPM | SYSTOLIC BLOOD PRESSURE: 146 MMHG | TEMPERATURE: 98.5 F | BODY MASS INDEX: 37.78 KG/M2 | DIASTOLIC BLOOD PRESSURE: 88 MMHG | OXYGEN SATURATION: 96 %

## 2021-07-29 DIAGNOSIS — F31.9 BIPOLAR 1 DISORDER (HCC): Primary | ICD-10-CM

## 2021-07-29 PROCEDURE — 99214 OFFICE O/P EST MOD 30 MIN: CPT | Performed by: NURSE PRACTITIONER

## 2021-07-29 NOTE — TELEPHONE ENCOUNTER
Spoke with pharmacy staff at [facility] who reports that the Latuda 20 mg was covered on [employer] with no copay.

## 2021-07-29 NOTE — PROGRESS NOTES
up with hogancamp and sleep apnea machine? Vpa level 87.4 (did you get it done before you took your pill)  States he does not have any motivation. Feels like he is kind of in a blah mood. Does not have the ambition to do the things that he used to do. Beginning to notice some lip movements. He states he feels like he is drooling a lot and he does it to keep the slobber in. He got an appointment for the ENT but he missed his appointment and he has not rescheduled it. He is having trouble remembering log in information. Work says he is doing ok there he is working Monday Tuesday and Wednesday working 12 hour shifts. He says he missed the work bid due to being off work and he got stuck with the bad shift. We discussed decreasing latuda to 20 mg as patient seems too sedated / depressed at this time. Pt has a depressed and dysthymic affect and mood. Pt was encouraged to make his ENT, neuro, and sleep apnea appointments. Pt is concerned for finances and is overwhelmed because they are behind on bills. We discussed that this could be a cause of his depression as well. Pt denies si hi avh at this time. Pt states he is able to control his mouth movements. He was instructed to be diligent in observing them so that they do not become worse, his wife said she would watch as well. He denies any other side effects at this time. Absent  suicidal ideation. Reports compliance with medications as good . Sleep:  sleeping in a chair upright getting 6-7 hours per night.     Caffeine use: tea, diet cokes    PREVIOUS MED TRIALS      Current Substance Use:   Alcohol: none  Illicit drug use: denies   Marijuana: denies   Tobacco: denies   Vape: denies       BP: BP (!) 146/88   Pulse 59   Temp 98.5 °F (36.9 °C)   Wt 255 lb 12.8 oz (116 kg)   SpO2 96%   BMI 37.78 kg/m²       Review of Systems - 14 point review:  Negative     Constitutional: (fevers, chills, night sweats, wt loss/gain, change in appetite, fatigue, somnolence)    HEENT: (ear pain or discharge, hearing loss, ear ringing, sinus pressure, nosebleed, nasal discharge, sore throat, oral sores, tooth pain, bleeding gums, hoarse voice, neck pain)      Cardiovascular: (HTN, chest pain, elevated cholesterol/lipids, palpitations, leg swelling, leg pain with walking)    Respiratory: (cough, wheezing, snoring, SOB with activity (dyspnea), SOB while lying flat (orthopnea), awakening with severe SOB (paroxysmal nocturnal dyspnea))    Gastrointestinal: (NVD, constipation, abdominal pain, bright red stools, black tarry stools, stool incontinence)     Genitourinary:  (pelvic pain, burning or frequency of urination, urinary urgency, blood in urine incomplete bladder emptying, urinary incontinence, STD; MEN: testicular pain or swelling, erectile dysfunction; WOMEN: LMP, heavy menstrual bleeding (menorrhagia), irregular periods, postmenopausal bleeding, menstrual pain (dymenorrhea, vaginal discharge)    Musculoskeletal: (bone pain/fracture, joint pain or swelling, musle pain)    Integumentary: (rashes, acne, non-healing sores, itching, breast lumps, breast pain, nipple discharge, hair loss)    Neurologic: (HA, muscle weakness, paresthesias (numbness, coldness, crawling or prickling), memory loss, seizure, dizziness)    Psychiatric:  (anxiety, sadness, irritability/anger, insomnia, suicidality)    Endocrine: (heat or cold intolerance, excessive thirst (polydipsia), excessive hunger (polyphagia))    Immune/Allergic: (hives, seasonal or environmental allergies, HIV exposure)    Hematologic/Lymphatic: (lymph node enlargement, easy bleeding or bruising)    History obtained via chart review and patient    PCP is Radha Velazco MD       Current Meds:    Prior to Admission medications    Medication Sig Start Date End Date Taking?  Authorizing Provider   lurasidone (LATUDA) 20 MG TABS tablet Take 1 tablet by mouth daily 7/29/21  Yes Toyin Brito, APRN - CNP   gabapentin (NEURONTIN) 300 MG capsule TAKE 1 CAPSULE BY MOUTH THREE TIMES DAILY. 7/12/21 8/11/21  Gavin Gutiérrez MD   omeprazole (PRILOSEC) 20 MG delayed release capsule TAKE 1 CAPSULE BY MOUTH DAILY  7/11/21   Gavin Gutiérrez MD   lisinopril-hydroCHLOROthiazide (PRINZIDE;ZESTORETIC) 20-25 MG per tablet TAKE 1 TABLET BY MOUTH ONCE DAILY. 6/11/21   Gavin Gutiérrez MD   divalproex (DEPAKOTE ER) 500 MG extended release tablet Take 3 tablets by mouth daily 6/4/21   TRIP Leon - CNP   vitamin D (ERGOCALCIFEROL) 1.25 MG (59986 UT) CAPS capsule Take 1 capsule by mouth once a week for 11 doses 5/5/21 7/15/21  Luli Guzmán MD   levothyroxine (SYNTHROID) 50 MCG tablet TAKE 1 TABLET BY MOUTH ONCE DAILY. 3/21/21   Gavin Gutiérrez MD   amLODIPine (NORVASC) 10 MG tablet TAKE 1 TABLET BY MOUTH ONCE DAILY. 3/21/21   Gavin Gutiérrez MD   atorvastatin (LIPITOR) 40 MG tablet TAKE 1 TABLET BY MOUTH ONCE DAILY. 3/21/21   Gavin Gutiérrez MD   furosemide (LASIX) 20 MG tablet TAKE 1 TABLET BY MOUTH ONCE DAILY. 11/2/20   Gavin Gutiérrez MD   metoprolol tartrate (LOPRESSOR) 25 MG tablet TAKE 2 TABLETS BY MOUTH TWICE DAILY 8/2/20   Gavin Gutiérrez MD     Social History     Socioeconomic History    Marital status:      Spouse name: Not on file    Number of children: Not on file    Years of education: Not on file    Highest education level: Not on file   Occupational History    Not on file   Tobacco Use    Smoking status: Never Smoker    Smokeless tobacco: Never Used   Vaping Use    Vaping Use: Never used   Substance and Sexual Activity    Alcohol use: No    Drug use: No    Sexual activity: Yes   Other Topics Concern    Not on file   Social History Narrative    5/6/2021    PRIOR MEDICATION TRIALS    Abilify    Risperdal (current)    Depakote (was on a couple of years)    Buspirone    Trazodone (current)    Diazepam    Ativan    Gabapentin (current)    Melatonin        .     SLEEP STUDY: yes - HAWK, Bipap    .    negative sleep, appetite, energy, concentration, sexual function, guilt, psychomotor agitation or slowing, interest, suicidality)    . Frida: positive for week or more at a time, 9 s/s endorsed on the MDQ: feeling hyper, irritability, insomnia (only about 5 hrs since Sunday), hyperverbal, racing thoughts, easily distracted, increased energy, goal-directed activity, more social, more interested in sex, doing things that others would think were risky, excessive or foolish      (impulsivity, grandiosity, recklessness, excessive energy, decreased need for sleep, increased spending beyond means, hyperverbal, grandiose, racing thoughts, hypersexuality)    . Other: positive for irritability, anger      (Irritability, lability, anger)    . Anxiety:  positive for not being able to stop or control worrying, trouble relaxing      (Generalized anxiety: where, when, who, how long, how frequent)    . Panic Disorder symptoms: negative      (Palpitations, racing heart beat, sweating, sense of impending doom, fear of recurrence, shortness of breath)    . OCD symptoms:  negative      (checking, cleaning, organizing, rituals, hang-ups, obsessive thoughts, counting, rational vs. Irrational beliefs)    . PTSD symptoms:  negative      (nightmares, flashbacks, startle response, avoidance)    . Social anxiety symptoms:  negative    . Simple phobias: negative      (heights, planes, spiders, etc.)    . Psychosis: positive for visions and dreams from God (denies hearing music or seeing people)      (hallucinations, auditory, visual, tactile, olfactory)    . Paranoia: negative    .     Delusions:  negative      (TV, radio, thought broadcasting, mind control, referential thinking)      (persecutory delusion - e.g., believing one is being followed and harassed by gangs)      (grandiose delusion - e.g., believing one is a [address] who owns casinos [place] the [place])      (erotomanic delusion - e.g., believing a famous  is in love with them)      (somatic delusion - e.g., believing one's sinuses have been infested by worms)       (delusions of reference - e.g., believing dialogue on a TV program is directed specifically towards the patient)      (delusions of control - e.g., believing one's thoughts and movements are controlled by planetary overlords)    . Patient's perception: negative      (Spiritual or cultural context of symptoms, reality testing)    . ADHD symptoms: negative      (able to focus and concentrate, scattered thoughts, disorganized thoughts)    . Eating Disorder symptoms:  negative      (binging, purging, excessive exercising)          Social Determinants of Health     Financial Resource Strain: Low Risk     Difficulty of Paying Living Expenses: Not hard at all   Food Insecurity: No Food Insecurity    Worried About Running Out of Food in the Last Year: Never true    Patricia of Food in the Last Year: Never true   Transportation Needs: No Transportation Needs    Lack of Transportation (Medical): No    Lack of Transportation (Non-Medical): No   Physical Activity:     Days of Exercise per Week:     Minutes of Exercise per Session:    Stress:     Feeling of Stress :    Social Connections:     Frequency of Communication with Friends and Family:     Frequency of Social Gatherings with Friends and Family:     Attends Christian Services:     Active Member of Clubs or Organizations:     Attends Club or Organization Meetings:     Marital Status:    Intimate Partner Violence:     Fear of Current or Ex-Partner:     Emotionally Abused:     Physically Abused:     Sexually Abused:        MSE:  Appearance: Appropriately groomed. Made good eye contact. Gait stable. No abnormal movements or tremor. Some lip movement but he states it is deliberate. Behavior: Calm, cooperative, and socially appropriate. No psychomotor retardation/agitation appreciated.    Speech: Normal in tone, volume, and quality. No slurring, dysarthria or pressured speech noted. Mood: \"sad, ok I guess, but I don't want to do anything\"   Affect: Mood congruent. Thought Process: Appears linear, logical and goal oriented. Causality appears intact. Thought Content: Denies active suicidal and homicidal ideations. No overt delusions or paranoia appreciated. Perceptions: Denies auditory or visual hallucinations at present time. Not responding to internal stimuli. Concentration: Intact. Orientation: to person, place, date, and situation. Language: Intact. Fund of information: Intact. Memory: Recent and remote appear intact. Impulsivity: Limited. Neurovegitative: Fair appetite and sleep. Insight: Fair. Judgment: Fair. Cognition: Can spell \"world\" backwards: Yes                    Can do serial 7's:  Yes    Lab Results   Component Value Date     04/26/2021    K 3.5 04/26/2021     04/26/2021    CO2 20 (L) 04/26/2021    BUN 14 04/26/2021    CREATININE 0.8 04/26/2021    GLUCOSE 100 04/26/2021    CALCIUM 9.0 04/26/2021    PROT 7.0 04/26/2021    LABALBU 4.0 04/26/2021    BILITOT 0.6 04/26/2021    ALKPHOS 51 04/26/2021    AST 24 04/26/2021    ALT 17 04/26/2021    LABGLOM >60 04/26/2021    GFRAA >59 04/26/2021    GLOB 2.7 03/31/2017     Lab Results   Component Value Date     04/26/2021    K 3.5 04/26/2021     04/26/2021    CO2 20 04/26/2021    BUN 14 04/26/2021    CREATININE 0.8 04/26/2021    GLUCOSE 100 04/26/2021    CALCIUM 9.0 04/26/2021      Lab Results   Component Value Date    CHOL 101 (L) 04/28/2021     Lab Results   Component Value Date    TRIG 90 04/28/2021     Lab Results   Component Value Date    HDL 31 (L) 04/28/2021     Lab Results   Component Value Date    LDLCALC 52 04/28/2021     No results found for: LABVLDL, VLDL  No results found for: [facility]  Lab Results   Component Value Date    LABA1C 5.1 04/28/2021     No results found for: EAG  Lab Results   Component Value Date TSHFT4 1.61 04/26/2021    TSH 1.690 03/03/2021     Lab Results   Component Value Date    VITD25 19.1 (L) 04/28/2021     Lab Results   Component Value Date    KRSZLEVB22 945 04/14/2021      No results found for: FOLATE     Assessment:   1. Bipolar 1 disorder (HCC)        No evidence of acute suicidality, homicidality or psychosis observed. Patient is psychiatrically stable    Plan:    1. Continue   depakote ER 1500 mg nightly for mood stability  Vitamin D 50,000 units weekly    Decrease   latuda to 20 mg nightly with dinner     Start      Discontinue      The risks, benefits, side effects, indications, contraindications, and adverse effects of the medications have been discussed. Yes.  2. The pt has verbalized understanding and has capacity to give informed consent. 3. The [place] report has been reviewed according to [facility] regulations. 4. Supportive therapy offered. 5. Follow up: Return in about 3 weeks (around 8/19/2021). 6. The patient has been advised to call with any problems. 7. Controlled substance Treatment Plan: NA.  8. The above listed medications have been continued, modifications in meds and other orders/labs as follows:      Orders Placed This Encounter   Medications    lurasidone (LATUDA) 20 MG TABS tablet     Sig: Take 1 tablet by mouth daily     Dispense:  30 tablet     Refill:  2        No orders of the defined types were placed in this encounter. 9. Additional comments: Continue therapy, discussed sleep hygiene, discussed the use of coping skills and relaxation strategies to manage symptoms. Reinforced appointment compliance      10. Over 50% of the total visit time of  30  minutes was spent on counseling and/or coordination of care of:                        1. Bipolar 1 disorder [facility])                      Psychotherapy Topics: mood/medication effectiveness family, financial, health and occupational    Vanesa Days, APRN - CNP    This dictation was generated by voice recognition computer software. Although all attempts are made to edit the dictation for accuracy, there may be errors in the transcription that are not intended.

## 2021-08-07 RX ORDER — LEVOTHYROXINE SODIUM 0.05 MG/1
TABLET ORAL
Qty: 90 TABLET | Refills: 0 | Status: SHIPPED | OUTPATIENT
Start: 2021-08-07 | End: 2021-12-15

## 2021-08-19 ENCOUNTER — TELEPHONE (OUTPATIENT)
Dept: PSYCHIATRY | Age: 56
End: 2021-08-19

## 2021-08-19 NOTE — TELEPHONE ENCOUNTER
Called pt for appointment reminder.     -Pt confirmed    Pt was changed to a virtual visit per providers request    Electronically signed by Nia Li MA on 8/19/2021 at 2:19 PM

## 2021-08-20 ENCOUNTER — TELEPHONE (OUTPATIENT)
Dept: PSYCHIATRY | Age: 56
End: 2021-08-20

## 2021-08-20 ENCOUNTER — VIRTUAL VISIT (OUTPATIENT)
Dept: PSYCHIATRY | Age: 56
End: 2021-08-20
Payer: MEDICAID

## 2021-08-20 DIAGNOSIS — F31.9 BIPOLAR 1 DISORDER (HCC): Primary | ICD-10-CM

## 2021-08-20 PROCEDURE — 99443 PR PHYS/QHP TELEPHONE EVALUATION 21-30 MIN: CPT | Performed by: NURSE PRACTITIONER

## 2021-08-20 NOTE — TELEPHONE ENCOUNTER
Pt was called for virtual appointment check in.     Electronically signed by Derek Borjas MA on 8/20/2021 at 9:38 AM

## 2021-08-20 NOTE — PROGRESS NOTES
dysthymic affect and mood. Pt was encouraged to make his ENT, neuro, and sleep apnea appointments. Pt is concerned for finances and is overwhelmed because they are behind on bills. We discussed that this could be a cause of his depression as well. Pt denies si hi avh at this time. Pt states he is able to control his mouth movements. He was instructed to be diligent in observing them so that they do not become worse, his wife said she would watch as well. He denies any other side effects at this time. Today:  Doing better. Still stressed out about his job. Feels like it is a dead end job. Has been there for 4 years and no route for promotion. Mouth and shuffling have improved since decreasing the latuda. Still feels sad but mostly from his job. He has reported some hair loss, he believes it is from depakote. His PCP told him to start a multivitamin to offset. We discussed keeping track of hair loss if it does not improve we can adjust medication. Pt was encouraged to make his ENT, neuro, and sleep apnea appointments. He is still sleeping in the chair, he tried to sleep laying down in the bed but it bothered his back. He tossed and turned all night and was miserable. He moved back to the chair. He denies side effects from medications except for stated above. He is bright on the phone. He is able to hold a conversation well. He denies si hi avh at this time. He stated his wife has noticed he is not as depressed and the facial movements have decreased significantly. Absent  suicidal ideation. Reports compliance with medications as good . Sleep:  sleeping in a chair upright getting 6-7 hours per night. Caffeine use: tea, diet cokes    PREVIOUS MED TRIALS      Current Substance Use:   Alcohol: none  Illicit drug use: denies   Marijuana: denies   Tobacco: denies   Vape: denies       BP: There were no vitals taken for this visit.       Review of Systems - 14 point review:  Negative 1 TABLET BY MOUTH ONCE DAILY. 8/7/21   Lisa Thrasher MD   lurasidone (LATUDA) 20 MG TABS tablet Take 1 tablet by mouth daily 7/29/21   TRIP Ko - CNP   gabapentin (NEURONTIN) 300 MG capsule TAKE 1 CAPSULE BY MOUTH THREE TIMES DAILY. 7/12/21 8/11/21  Lisa Thrasher MD   omeprazole (PRILOSEC) 20 MG delayed release capsule TAKE 1 CAPSULE BY MOUTH DAILY  7/11/21   Lisa Thrasher MD   lisinopril-hydroCHLOROthiazide (PRINZIDE;ZESTORETIC) 20-25 MG per tablet TAKE 1 TABLET BY MOUTH ONCE DAILY. 6/11/21   Lisa Thrasher MD   divalproex (DEPAKOTE ER) 500 MG extended release tablet Take 3 tablets by mouth daily 6/4/21   TRIP Ko - CNP   vitamin D (ERGOCALCIFEROL) 1.25 MG (51034 UT) CAPS capsule Take 1 capsule by mouth once a week for 11 doses 5/5/21 7/15/21  Ramiro Estrada MD   amLODIPine (NORVASC) 10 MG tablet TAKE 1 TABLET BY MOUTH ONCE DAILY. 3/21/21   Lisa Thrasher MD   atorvastatin (LIPITOR) 40 MG tablet TAKE 1 TABLET BY MOUTH ONCE DAILY. 3/21/21   Lisa Thrasher MD   furosemide (LASIX) 20 MG tablet TAKE 1 TABLET BY MOUTH ONCE DAILY.  11/2/20   Lisa Thrasher MD   metoprolol tartrate (LOPRESSOR) 25 MG tablet TAKE 2 TABLETS BY MOUTH TWICE DAILY 8/2/20   Lisa Thrasher MD     Social History     Socioeconomic History    Marital status:      Spouse name: Not on file    Number of children: Not on file    Years of education: Not on file    Highest education level: Not on file   Occupational History    Not on file   Tobacco Use    Smoking status: Never Smoker    Smokeless tobacco: Never Used   Vaping Use    Vaping Use: Never used   Substance and Sexual Activity    Alcohol use: No    Drug use: No    Sexual activity: Yes   Other Topics Concern    Not on file   Social History Narrative    5/6/2021    PRIOR MEDICATION TRIALS    Abilify    Risperdal (current)    Depakote (was on a couple of years)    Buspirone    Trazodone (current)    Diazepam    Ativan    Gabapentin (current) Melatonin        . SLEEP STUDY: yes - HAWK, Bipap    .    negative history of seizures. .    negative history of head trauma. Susan Louie PREVIOUS PSYCHIATRIC HISTORY    Had an episode of brief acute psychosis when he was in the [employer] (age 22). About ten years ago he had another episode but he doesn't remember what medicines he was taking - his inpatient intake in April, 2021 states that he was started on something which incapacitated him, then was started on Depakote. (He may have been started on Abilify at this time as he and his wife reports that Abilify did not work well for him.) He stopped having problems some years after that and he asked a doctor to take him off medications. He has been off medications for about 7 years. He has recently been admitted to inpatient with a severe episode of amy, 4/26/21-5/1/21. Susan Louie FAMILY PSYCHIATRIC HISTORY    Father, bipolar    Mother, depression    . SOCIAL HISTORY    Born and Raised - [place], [place], in a 2 parent home with 3 siblings. He says he had a wonderful childhood.  twice. Has one daughter from the first marriage. Has been  to his second wife for 26 years. They have 3 adopted children and 1 adult adopted child. .    Trauma and/or Abuse - Denies trauma. He was devastated with his divorce from his first wife. .    Legal - denies    Substance Use - see history    Work History - see history    Education - see history     status - [employer] for a few months, honorably discharged after an episode of Acute Psychosis    . PAST SUICIDE ATTEMPTS:    no    .    INPATIENT HOSPITALIZATIONS:    yes - three times, [facility] - diagnosed with Acute Psychosis; 10 years ago - diagnosed with bipolar disorder, and most recently 4/26-4/29/21 with a manic episode    . DRUG REHABILITATION:    no    .    5/6/2021 MDQ: +11, Yes, severe    . PSYCHIATRIC REVIEW OF SYSTEMS, 5/6/2021    .     Mood:  positive for insomnia, around the world)      (erotomanic delusion - e.g., believing a famous  is in love with them)      (somatic delusion - e.g., believing one's sinuses have been infested by worms)       (delusions of reference - e.g., believing dialogue on a TV program is directed specifically towards the patient)      (delusions of control - e.g., believing one's thoughts and movements are controlled by planetary overlords)    . Patient's perception: negative      (Spiritual or cultural context of symptoms, reality testing)    . ADHD symptoms: negative      (able to focus and concentrate, scattered thoughts, disorganized thoughts)    . Eating Disorder symptoms:  negative      (binging, purging, excessive exercising)          Social Determinants of Health     Financial Resource Strain: Low Risk     Difficulty of Paying Living Expenses: Not hard at all   Food Insecurity: No Food Insecurity    Worried About Running Out of Food in the Last Year: Never true    Patricia of Food in the Last Year: Never true   Transportation Needs: No Transportation Needs    Lack of Transportation (Medical): No    Lack of Transportation (Non-Medical): No   Physical Activity:     Days of Exercise per Week:     Minutes of Exercise per Session:    Stress:     Feeling of Stress :    Social Connections:     Frequency of Communication with Friends and Family:     Frequency of Social Gatherings with Friends and Family:     Attends Caodaism Services:     Active Member of Clubs or Organizations:     Attends Club or Organization Meetings:     Marital Status:    Intimate Partner Violence:     Fear of Current or Ex-Partner:     Emotionally Abused:     Physically Abused:     Sexually Abused:        MSE:  Appearance: UTO due to phone call  Behavior: Calm, cooperative, and socially appropriate. Speech: Normal in tone, volume, and quality. No slurring, dysarthria or pressured speech noted. Mood: \"pretty good. \"   Affect: UTO due to phone call   Thought Process: Appears linear, logical and goal oriented. Causality appears intact. Thought Content: Denies active suicidal and homicidal ideations. No overt delusions or paranoia appreciated. Perceptions: Denies auditory or visual hallucinations at present time. Not responding to internal stimuli. Concentration: Intact. Orientation: to person, place, date, and situation. Language: Intact. Fund of information: Intact. Memory: Recent and remote appear intact. Impulsivity: Limited. Neurovegitative: Fair appetite and sleep. Insight: Fair. Judgment: Fair. Cognition: Can spell \"world\" backwards: Yes                    Can do serial 7's:  Yes    Lab Results   Component Value Date     04/26/2021    K 3.5 04/26/2021     04/26/2021    CO2 20 (L) 04/26/2021    BUN 14 04/26/2021    CREATININE 0.8 04/26/2021    GLUCOSE 100 04/26/2021    CALCIUM 9.0 04/26/2021    PROT 7.0 04/26/2021    LABALBU 4.0 04/26/2021    BILITOT 0.6 04/26/2021    ALKPHOS 51 04/26/2021    AST 24 04/26/2021    ALT 17 04/26/2021    LABGLOM >60 04/26/2021    GFRAA >59 04/26/2021    GLOB 2.7 03/31/2017     Lab Results   Component Value Date     04/26/2021    K 3.5 04/26/2021     04/26/2021    CO2 20 04/26/2021    BUN 14 04/26/2021    CREATININE 0.8 04/26/2021    GLUCOSE 100 04/26/2021    CALCIUM 9.0 04/26/2021      Lab Results   Component Value Date    CHOL 101 (L) 04/28/2021     Lab Results   Component Value Date    TRIG 90 04/28/2021     Lab Results   Component Value Date    HDL 31 (L) 04/28/2021     Lab Results   Component Value Date    LDLCALC 52 04/28/2021     No results found for: LABVLDL, VLDL  No results found for: [facility]  Lab Results   Component Value Date    LABA1C 5.1 04/28/2021     No results found for: EAG  Lab Results   Component Value Date    TSHFT4 1.61 04/26/2021    TSH 1.690 03/03/2021     Lab Results   Component Value Date    VITD25 19.1 (L) 04/28/2021     Lab Results Component Value Date    LLUTUGLZ05 366 04/14/2021      No results found for: FOLATE     Assessment:   1. Bipolar 1 disorder (HCC)        No evidence of acute suicidality, homicidality or psychosis observed. Patient is psychiatrically stable    Plan:    1. Continue   depakote ER 1500 mg nightly for mood stability  Vitamin D 50,000 units weekly  latuda  20 mg nightly with dinner     Decrease     Start      Discontinue      The risks, benefits, side effects, indications, contraindications, and adverse effects of the medications have been discussed. Yes.  2. The pt has verbalized understanding and has capacity to give informed consent. 3. The Ayecele Mariscalns report has been reviewed according to [facility] regulations. 4. Supportive therapy offered. 5. Follow up: Return in about 2 months (around 10/20/2021). 6. The patient has been advised to call with any problems. 7. Controlled substance Treatment Plan: NA.  8. The above listed medications have been continued, modifications in meds and other orders/labs as follows: No orders of the defined types were placed in this encounter. No orders of the defined types were placed in this encounter. 9. Additional comments: Continue therapy, discussed sleep hygiene, discussed the use of coping skills and relaxation strategies to manage symptoms. Reinforced appointment compliance      10. Over 50% of the total visit time of  22 minutes was spent on counseling and/or coordination of care of:                        1. Bipolar 1 disorder [facility])                      Psychotherapy Topics: mood/medication effectiveness family, financial, health and occupational    Ana Paula Copeland, APRN - CNP    This dictation was generated by voice recognition computer software. Although all attempts are made to edit the dictation for accuracy, there may be errors in the transcription that are not intended.

## 2021-08-27 RX ORDER — AMLODIPINE BESYLATE 10 MG/1
TABLET ORAL
Qty: 90 TABLET | Refills: 0 | Status: SHIPPED | OUTPATIENT
Start: 2021-08-27 | End: 2021-12-15

## 2021-08-27 RX ORDER — FUROSEMIDE 20 MG/1
TABLET ORAL
Qty: 30 TABLET | Refills: 5 | Status: SHIPPED | OUTPATIENT
Start: 2021-08-27 | End: 2022-03-22

## 2021-08-27 RX ORDER — LISINOPRIL AND HYDROCHLOROTHIAZIDE 25; 20 MG/1; MG/1
TABLET ORAL
Qty: 90 TABLET | Refills: 0 | Status: SHIPPED | OUTPATIENT
Start: 2021-08-27 | End: 2021-12-15

## 2021-10-06 ENCOUNTER — TELEPHONE (OUTPATIENT)
Dept: PSYCHIATRY | Age: 56
End: 2021-10-06

## 2021-10-06 NOTE — TELEPHONE ENCOUNTER
Called pt for appointment reminder.   -left voicemail, requesting a return call      Electronically signed by Candelaria Russell MA on 10/6/2021 at 2:15 PM

## 2021-10-07 ENCOUNTER — OFFICE VISIT (OUTPATIENT)
Dept: PSYCHIATRY | Age: 56
End: 2021-10-07
Payer: MEDICAID

## 2021-10-07 ENCOUNTER — NURSE TRIAGE (OUTPATIENT)
Dept: OTHER | Facility: CLINIC | Age: 56
End: 2021-10-07

## 2021-10-07 VITALS
OXYGEN SATURATION: 98 % | HEIGHT: 69 IN | WEIGHT: 263.8 LBS | HEART RATE: 89 BPM | SYSTOLIC BLOOD PRESSURE: 130 MMHG | DIASTOLIC BLOOD PRESSURE: 83 MMHG | BODY MASS INDEX: 39.07 KG/M2 | TEMPERATURE: 98.1 F

## 2021-10-07 DIAGNOSIS — F31.9 BIPOLAR 1 DISORDER (HCC): Primary | ICD-10-CM

## 2021-10-07 PROCEDURE — 99214 OFFICE O/P EST MOD 30 MIN: CPT | Performed by: NURSE PRACTITIONER

## 2021-10-07 NOTE — PROGRESS NOTES
10/7/21             Progress Note    Natalie File 1965      Chief Complaint   Patient presents with    Medication Check    Follow-up         Subjective:    Patient is a 61 y.o. male diagnosed with bipolar 1 and presents today for follow-up. Last seen in clinic on 8/20/2021  and prior records were reviewed. Last visit: Doing better. Still stressed out about his job. Feels like it is a dead end job. Has been there for 4 years and no route for promotion. Mouth and shuffling have improved since decreasing the latuda. Still feels sad but mostly from his job. He has reported some hair loss, he believes it is from depakote. His PCP told him to start a multivitamin to offset. We discussed keeping track of hair loss if it does not improve we can adjust medication. Pt was encouraged to make his ENT, neuro, and sleep apnea appointments. He is still sleeping in the chair, he tried to sleep laying down in the bed but it bothered his back. He tossed and turned all night and was miserable. He moved back to the chair. He denies side effects from medications except for stated above. He is bright on the phone. He is able to hold a conversation well. He denies si hi avh at this time. He stated his wife has noticed he is not as depressed and the facial movements have decreased significantly. Today:  \"Doing pretty good today. \" came into the office today agitated and irritated. He went on to describe his ER experience from several months ago in may. He was visibly distressed while he was recalling events that happened during his ER visit. He reported that he felt mistreated in the ER but whenever he got to the unit the treated him like Kita headley. \"  He reports better mood coverage today since stopping the Latuda. He has had decreased facial movements and reports that his hair is not thinning.   He reports not using his sleep apnea machine we discussed the importance of following with treatment he reports that he will begin using it. We are checking a Depakote level in the morning. He denies SI HI AVH he denies side effects of medications at this time. Big concern is that he feels that he does not have the energy to do things that he used to enjoy. We discussed beginning therapy a list of referrals was presented to him at this time. We discussed the importance of therapy that would help him manage his anxiety. We also discussed following up with hospital representative to convey his experience in the ER. Patient verbalized understanding. Absent  suicidal ideation. Reports compliance with medications as good . Sleep:  sleeping in a chair upright getting 6-7 hours per night.     Caffeine use: tea, diet cokes    PREVIOUS MED TRIALS      Current Substance Use:   Alcohol: none  Illicit drug use: denies   Marijuana: denies   Tobacco: denies   Vape: denies       BP: /83   Pulse 89   Temp 98.1 °F (36.7 °C)   Ht 5' 9\" (1.753 m)   Wt 263 lb 12.8 oz (119.7 kg)   SpO2 98%   BMI 38.96 kg/m²       Review of Systems - 14 point review:  Negative     Constitutional: (fevers, chills, night sweats, wt loss/gain, change in appetite, fatigue, somnolence)    HEENT: (ear pain or discharge, hearing loss, ear ringing, sinus pressure, nosebleed, nasal discharge, sore throat, oral sores, tooth pain, bleeding gums, hoarse voice, neck pain)      Cardiovascular: (HTN, chest pain, elevated cholesterol/lipids, palpitations, leg swelling, leg pain with walking)    Respiratory: (cough, wheezing, snoring, SOB with activity (dyspnea), SOB while lying flat (orthopnea), awakening with severe SOB (paroxysmal nocturnal dyspnea))    Gastrointestinal: (NVD, constipation, abdominal pain, bright red stools, black tarry stools, stool incontinence)     Genitourinary:  (pelvic pain, burning or frequency of urination, urinary urgency, blood in urine incomplete bladder emptying, urinary incontinence, STD; MEN: testicular pain or swelling, erectile dysfunction; WOMEN: LMP, heavy menstrual bleeding (menorrhagia), irregular periods, postmenopausal bleeding, menstrual pain (dymenorrhea, vaginal discharge)    Musculoskeletal: (bone pain/fracture, joint pain or swelling, musle pain)    Integumentary: (rashes, acne, non-healing sores, itching, breast lumps, breast pain, nipple discharge, hair loss)    Neurologic: (HA, muscle weakness, paresthesias (numbness, coldness, crawling or prickling), memory loss, seizure, dizziness)    Psychiatric:  (anxiety, sadness, irritability/anger, insomnia, suicidality)    Endocrine: (heat or cold intolerance, excessive thirst (polydipsia), excessive hunger (polyphagia))    Immune/Allergic: (hives, seasonal or environmental allergies, HIV exposure)    Hematologic/Lymphatic: (lymph node enlargement, easy bleeding or bruising)    History obtained via chart review and patient    PCP is Randa Cristina MD       Current Meds:    Prior to Admission medications    Medication Sig Start Date End Date Taking? Authorizing Provider   metoprolol tartrate (LOPRESSOR) 25 MG tablet TAKE 2 TABLETS BY MOUTH TWICE DAILY  8/27/21   Octavia Portillo MD   amLODIPine (NORVASC) 10 MG tablet TAKE 1 TABLET BY MOUTH ONCE DAILY. 8/27/21   Octavia Portillo MD   lisinopril-hydroCHLOROthiazide (PRINZIDE;ZESTORETIC) 20-25 MG per tablet TAKE 1 TABLET BY MOUTH ONCE DAILY  8/27/21   Octavia Portillo MD   furosemide (LASIX) 20 MG tablet TAKE 1 TABLET BY MOUTH ONCE DAILY. 8/27/21   Octavia Portillo MD   levothyroxine (SYNTHROID) 50 MCG tablet TAKE 1 TABLET BY MOUTH ONCE DAILY. 8/7/21   Octavia Portillo MD   gabapentin (NEURONTIN) 300 MG capsule TAKE 1 CAPSULE BY MOUTH THREE TIMES DAILY.   7/12/21 8/11/21  Octavia Portillo MD   omeprazole (PRILOSEC) 20 MG delayed release capsule TAKE 1 CAPSULE BY MOUTH DAILY  7/11/21   Octavia Portillo MD   divalproex (DEPAKOTE ER) 500 MG extended release tablet Take 3 tablets by mouth daily 6/4/21   TRIP Tariq - CNP vitamin D (ERGOCALCIFEROL) 1.25 MG (64228 UT) CAPS capsule Take 1 capsule by mouth once a week for 11 doses 5/5/21 7/15/21  Hood Blankenship MD   atorvastatin (LIPITOR) 40 MG tablet TAKE 1 TABLET BY MOUTH ONCE DAILY. 3/21/21   Carey Lim MD     Social History     Socioeconomic History    Marital status:      Spouse name: Not on file    Number of children: Not on file    Years of education: Not on file    Highest education level: Not on file   Occupational History    Not on file   Tobacco Use    Smoking status: Never Smoker    Smokeless tobacco: Never Used   Vaping Use    Vaping Use: Never used   Substance and Sexual Activity    Alcohol use: No    Drug use: No    Sexual activity: Yes   Other Topics Concern    Not on file   Social History Narrative    5/6/2021    PRIOR MEDICATION TRIALS    Abilify    Risperdal (current)    Depakote (was on a couple of years)    Buspirone    Trazodone (current)    Diazepam    Ativan    Gabapentin (current)    Melatonin        . SLEEP STUDY: yes - HAWK, Bipap    .    negative history of seizures. .    negative history of head trauma. Jamil Ellington PREVIOUS PSYCHIATRIC HISTORY    Had an episode of brief acute psychosis when he was in the [employer] (age 22). About ten years ago he had another episode but he doesn't remember what medicines he was taking - his inpatient intake in April, 2021 states that he was started on something which incapacitated him, then was started on Depakote. (He may have been started on Abilify at this time as he and his wife reports that Abilify did not work well for him.) He stopped having problems some years after that and he asked a doctor to take him off medications. He has been off medications for about 7 years. He has recently been admitted to inpatient with a severe episode of amy, 4/26/21-5/1/21. Jamil Ellington FAMILY PSYCHIATRIC HISTORY    Father, bipolar    Mother, depression    .     SOCIAL HISTORY    Born and Raised - [place], in a 2 parent home with 3 siblings. He says he had a wonderful childhood.  twice. Has one daughter from the first marriage. Has been  to his second wife for 26 years. They have 3 adopted children and 1 adult adopted child. .    Trauma and/or Abuse - Denies trauma. He was devastated with his divorce from his first wife. .    Legal - denies    Substance Use - see history    Work History - see history    Education - see history     status - [employer] for a few months, honorably discharged after an episode of Acute Psychosis    . PAST SUICIDE ATTEMPTS:    no    .    INPATIENT HOSPITALIZATIONS:    yes - three times, [facility] - diagnosed with Acute Psychosis; 10 years ago - diagnosed with bipolar disorder, and most recently 4/26-4/29/21 with a manic episode    . DRUG REHABILITATION:    no    .    5/6/2021 MDQ: +11, Yes, severe    . PSYCHIATRIC REVIEW OF SYSTEMS, 5/6/2021    . Mood:  positive for insomnia, feeling tired, fidgety/restless      (Depression: sadness, tearfulness, sleep, appetite, energy, concentration, sexual function, guilt, psychomotor agitation or slowing, interest, suicidality)    . Frida: positive for week or more at a time, 9 s/s endorsed on the MDQ: feeling hyper, irritability, insomnia (only about 5 hrs since Sunday), hyperverbal, racing thoughts, easily distracted, increased energy, goal-directed activity, more social, more interested in sex, doing things that others would think were risky, excessive or foolish      (impulsivity, grandiosity, recklessness, excessive energy, decreased need for sleep, increased spending beyond means, hyperverbal, grandiose, racing thoughts, hypersexuality)    . Other: positive for irritability, anger      (Irritability, lability, anger)    . Anxiety:  positive for not being able to stop or control worrying, trouble relaxing      (Generalized anxiety: where, when, who, how long, how frequent)    . Panic Disorder symptoms: negative      (Palpitations, racing heart beat, sweating, sense of impending doom, fear of recurrence, shortness of breath)    . OCD symptoms:  negative      (checking, cleaning, organizing, rituals, hang-ups, obsessive thoughts, counting, rational vs. Irrational beliefs)    . PTSD symptoms:  negative      (nightmares, flashbacks, startle response, avoidance)    . Social anxiety symptoms:  negative    . Simple phobias: negative      (heights, planes, spiders, etc.)    . Psychosis: positive for visions and dreams from God (denies hearing music or seeing people)      (hallucinations, auditory, visual, tactile, olfactory)    . Paranoia: negative    . Delusions:  negative      (TV, radio, thought broadcasting, mind control, referential thinking)      (persecutory delusion - e.g., believing one is being followed and harassed by gangs)      (grandiose delusion - e.g., believing one is a billionaire  who owns casinos around the world)      (erotomanic delusion - e.g., believing a famous  is in love with them)      (somatic delusion - e.g., believing one's sinuses have been infested by worms)       (delusions of reference - e.g., believing dialogue on a TV program is directed specifically towards the patient)      (delusions of control - e.g., believing one's thoughts and movements are controlled by planetary overlords)    . Patient's perception: negative      (Spiritual or cultural context of symptoms, reality testing)    . ADHD symptoms: negative      (able to focus and concentrate, scattered thoughts, disorganized thoughts)    .     Eating Disorder symptoms:  negative      (binging, purging, excessive exercising)          Social Determinants of Health     Financial Resource Strain: Low Risk     Difficulty of Paying Living Expenses: Not hard at all   Food Insecurity: No Food Insecurity    Worried About Running Out of Food in the Last Year: Never true   Sheeba Se GLUCOSE 100 04/26/2021    CALCIUM 9.0 04/26/2021    PROT 7.0 04/26/2021    LABALBU 4.0 04/26/2021    BILITOT 0.6 04/26/2021    ALKPHOS 51 04/26/2021    AST 24 04/26/2021    ALT 17 04/26/2021    LABGLOM >60 04/26/2021    GFRAA >59 04/26/2021    GLOB 2.7 03/31/2017     Lab Results   Component Value Date     04/26/2021    K 3.5 04/26/2021     04/26/2021    CO2 20 04/26/2021    BUN 14 04/26/2021    CREATININE 0.8 04/26/2021    GLUCOSE 100 04/26/2021    CALCIUM 9.0 04/26/2021      Lab Results   Component Value Date    CHOL 101 (L) 04/28/2021     Lab Results   Component Value Date    TRIG 90 04/28/2021     Lab Results   Component Value Date    HDL 31 (L) 04/28/2021     Lab Results   Component Value Date    LDLCALC 52 04/28/2021     No results found for: LABVLDL, VLDL  No results found for: [facility]  Lab Results   Component Value Date    LABA1C 5.1 04/28/2021     No results found for: EAG  Lab Results   Component Value Date    TSHFT4 1.61 04/26/2021    TSH 1.690 03/03/2021     Lab Results   Component Value Date    VITD25 19.1 (L) 04/28/2021     Lab Results   Component Value Date    OMIQDNSU93 220 04/14/2021      No results found for: FOLATE     Assessment:   1. Bipolar 1 disorder (HCC)        No evidence of acute suicidality, homicidality or psychosis observed. Patient is psychiatrically stable    Plan:    1. Continue   depakote ER 1500 mg nightly for mood stability  Vitamin D 50,000 units weekly      Decrease     Start      Discontinue      The risks, benefits, side effects, indications, contraindications, and adverse effects of the medications have been discussed. Yes.  2. The pt has verbalized understanding and has capacity to give informed consent. 3. The Alida [place] report has been reviewed according to [facility] regulations. 4. Supportive therapy offered. 5. Follow up: Return in about 3 months (around 1/7/2022). 6. The patient has been advised to call with any problems.   7. Controlled substance Treatment Plan: NA.  8. The above listed medications have been continued, modifications in meds and other orders/labs as follows: No orders of the defined types were placed in this encounter. Orders Placed This Encounter   Procedures    Valproic Acid Level, Total     Standing Status:   Future     Standing Expiration Date:   10/7/2022     Order Specific Question:   Dose Schedule & Time of Last Dose? Answer:   night before       9. Additional comments: begin therapy, discussed sleep hygiene, discussed the use of coping skills and relaxation strategies to manage symptoms. Reinforced appointment compliance      10. Over 50% of the total visit time of  30 minutes was spent on counseling and/or coordination of care of:                        1. Bipolar 1 disorder [facility])                      Psychotherapy Topics: mood/medication effectiveness family, financial, health and occupational    Domenic Subramanian, APRN - CNP    This dictation was generated by voice recognition computer software. Although all attempts are made to edit the dictation for accuracy, there may be errors in the transcription that are not intended.

## 2021-10-13 RX ORDER — DIVALPROEX SODIUM 500 MG/1
TABLET, EXTENDED RELEASE ORAL
Qty: 90 TABLET | Refills: 2 | Status: SHIPPED | OUTPATIENT
Start: 2021-10-13 | End: 2022-01-10

## 2021-10-13 NOTE — TELEPHONE ENCOUNTER
Pharmacy sent med refill request below. Last office visit : 10/7/2021 with Ashley DOMINIQUE  Next office visit : 1/6/2022 with Ashley DOMINIQUE    Requested Prescriptions     Pending Prescriptions Disp Refills    divalproex (DEPAKOTE ER) 500 MG extended release tablet [Pharmacy Med Name: DIVALPROEX SODIUM ER 500MG TABLET EXTENDED RELEASE 24 HOUR] 90 tablet 2     Sig: TAKE 3 TABLETS BY MOUTH EVERY DAY. Drea Stacy RN        10/7/21                                               Progress Note     Marycarmen Lombardi 1965                                 Chief Complaint   Patient presents with    Medication Check    Follow-up            Subjective:    Patient is a 61 y.o. male diagnosed with bipolar 1 and presents today for follow-up. Last seen in clinic on 8/20/2021  and prior records were reviewed.     Last visit: Doing better. Still stressed out about his job. Feels like it is a dead end job. Has been there for 4 years and no route for promotion. Mouth and shuffling have improved since decreasing the latuda. Still feels sad but mostly from his job. He has reported some hair loss, he believes it is from depakote. His PCP told him to start a multivitamin to offset. We discussed keeping track of hair loss if it does not improve we can adjust medication. Pt was encouraged to make his ENT, neuro, and sleep apnea appointments. He is still sleeping in the chair, he tried to sleep laying down in the bed but it bothered his back. He tossed and turned all night and was miserable. He moved back to the chair. He denies side effects from medications except for stated above. He is bright on the phone. He is able to hold a conversation well. He denies si hi avh at this time. He stated his wife has noticed he is not as depressed and the facial movements have decreased significantly.     Today:  \"Doing pretty good today. \" came into the office today agitated and irritated.   He went on to describe his cholesterol/lipids, palpitations, leg swelling, leg pain with walking)     Respiratory: (cough, wheezing, snoring, SOB with activity (dyspnea), SOB while lying flat (orthopnea), awakening with severe SOB (paroxysmal nocturnal dyspnea))     Gastrointestinal: (NVD, constipation, abdominal pain, bright red stools, black tarry stools, stool incontinence)     Genitourinary:  (pelvic pain, burning or frequency of urination, urinary urgency, blood in urine incomplete bladder emptying, urinary incontinence, STD; MEN: testicular pain or swelling, erectile dysfunction; WOMEN: LMP, heavy menstrual bleeding (menorrhagia), irregular periods, postmenopausal bleeding, menstrual pain (dymenorrhea, vaginal discharge)     Musculoskeletal: (bone pain/fracture, joint pain or swelling, musle pain)     Integumentary: (rashes, acne, non-healing sores, itching, breast lumps, breast pain, nipple discharge, hair loss)     Neurologic: (HA, muscle weakness, paresthesias (numbness, coldness, crawling or prickling), memory loss, seizure, dizziness)     Psychiatric:  (anxiety, sadness, irritability/anger, insomnia, suicidality)     Endocrine: (heat or cold intolerance, excessive thirst (polydipsia), excessive hunger (polyphagia))     Immune/Allergic: (hives, seasonal or environmental allergies, HIV exposure)     Hematologic/Lymphatic: (lymph node enlargement, easy bleeding or bruising)     History obtained via chart review and patient     PCP is Jessica Gustafson MD         Current Meds:     Home Medications           Prior to Admission medications    Medication Sig Start Date End Date Taking? Authorizing Provider   metoprolol tartrate (LOPRESSOR) 25 MG tablet TAKE 2 TABLETS BY MOUTH TWICE DAILY  8/27/21     Sushila Rodriguez MD   amLODIPine (NORVASC) 10 MG tablet TAKE 1 TABLET BY MOUTH ONCE DAILY.   8/27/21     Sushila Rodriguez MD   lisinopril-hydroCHLOROthiazide (PRINZIDE;ZESTORETIC) 20-25 MG per tablet TAKE 1 TABLET BY MOUTH ONCE DAILY  8/27/21     Geovany Vaughn MD   furosemide (LASIX) 20 MG tablet TAKE 1 TABLET BY MOUTH ONCE DAILY. 8/27/21     Geovany Vaughn MD   levothyroxine (SYNTHROID) 50 MCG tablet TAKE 1 TABLET BY MOUTH ONCE DAILY. 8/7/21     Geovany Vaughn MD   gabapentin (NEURONTIN) 300 MG capsule TAKE 1 CAPSULE BY MOUTH THREE TIMES DAILY. 7/12/21 8/11/21   Geovany Vaughn MD   omeprazole (PRILOSEC) 20 MG delayed release capsule TAKE 1 CAPSULE BY MOUTH DAILY  7/11/21     Geovany Vaughn MD   divalproex (DEPAKOTE ER) 500 MG extended release tablet Take 3 tablets by mouth daily 6/4/21     TRIP Beltran - CNP   vitamin D (ERGOCALCIFEROL) 1.25 MG (27035 UT) CAPS capsule Take 1 capsule by mouth once a week for 11 doses 5/5/21 7/15/21   Antonio Gardiner MD   atorvastatin (LIPITOR) 40 MG tablet TAKE 1 TABLET BY MOUTH ONCE DAILY. 3/21/21     Geovany Vaughn MD         Social History   Social History            Socioeconomic History    Marital status:        Spouse name: Not on file    Number of children: Not on file    Years of education: Not on file    Highest education level: Not on file   Occupational History    Not on file   Tobacco Use    Smoking status: Never Smoker    Smokeless tobacco: Never Used   Vaping Use    Vaping Use: Never used   Substance and Sexual Activity    Alcohol use: No    Drug use: No    Sexual activity: Yes   Other Topics Concern    Not on file   Social History Narrative     5/6/2021     PRIOR MEDICATION TRIALS     Abilify     Risperdal (current)     Depakote (was on a couple of years)     Buspirone     Trazodone (current)     Diazepam     Ativan     Gabapentin (current)     Melatonin           .     SLEEP STUDY: yes - [facility], Bipap     .     negative history of seizures.     .     negative history of head trauma.     .     PREVIOUS PSYCHIATRIC HISTORY     Had an episode of brief acute psychosis when he was in the [employer] (age 22).  About ten years ago he had another episode but he doesn't remember what medicines he was taking - his inpatient intake in April, 2021 states that he was started on something which incapacitated him, then was started on Depakote. (He may have been started on Abilify at this time as he and his wife reports that Abilify did not work well for him.) He stopped having problems some years after that and he asked a doctor to take him off medications. He has been off medications for about 7 years. He has recently been admitted to inpatient with a severe episode of frida, 4/26/21-5/1/21.      .     FAMILY PSYCHIATRIC HISTORY     Father, bipolar     Mother, depression     .     SOCIAL HISTORY     Born and Raised - [place], [place], in a 2 parent home with 3 siblings. He says he had a wonderful childhood.  twice. Has one daughter from the first marriage. Has been  to his second wife for 26 years. They have 3 adopted children and 1 adult adopted child.      .     Trauma and/or Abuse - Denies trauma.  He was devastated with his divorce from his first wife.      .     Legal - denies     Substance Use - see history     Work History - see history     Education - see history      status - [employer] for a few months, honorably discharged after an episode of Acute Psychosis     .     PAST SUICIDE ATTEMPTS:     no     .     INPATIENT HOSPITALIZATIONS:     yes - three times, [facility] - diagnosed with Acute Psychosis; 10 years ago - diagnosed with bipolar disorder, and most recently 4/26-4/29/21 with a manic episode     .     DRUG REHABILITATION:     no     .     5/6/2021 MDQ: +11, Yes, severe     .      PSYCHIATRIC REVIEW OF SYSTEMS, 5/6/2021     .     Mood:  positive for insomnia, feeling tired, fidgety/restless       (Depression: sadness, tearfulness, sleep, appetite, energy, concentration, sexual function, guilt, psychomotor agitation or slowing, interest, suicidality)     .     Frida: positive for week or more at a time, 9 s/s endorsed on the MDQ: feeling hyper, irritability, insomnia (only about 5 hrs since Sunday), hyperverbal, racing thoughts, easily distracted, increased energy, goal-directed activity, more social, more interested in sex, doing things that others would think were risky, excessive or foolish       (impulsivity, grandiosity, recklessness, excessive energy, decreased need for sleep, increased spending beyond means, hyperverbal, grandiose, racing thoughts, hypersexuality)     .     Other: positive for irritability, anger       (Irritability, lability, anger)     .     Anxiety:  positive for not being able to stop or control worrying, trouble relaxing       (Generalized anxiety: where, when, who, how long, how frequent)     .     Panic Disorder symptoms: negative       (Palpitations, racing heart beat, sweating, sense of impending doom, fear of recurrence, shortness of breath)     .     OCD symptoms:  negative       (checking, cleaning, organizing, rituals, hang-ups, obsessive thoughts, counting, rational vs. Irrational beliefs)     .     PTSD symptoms:  negative       (nightmares, flashbacks, startle response, avoidance)     .     Social anxiety symptoms:  negative     .     Simple phobias: negative       (heights, planes, spiders, etc.)     .     Psychosis: positive for visions and dreams from God (denies hearing music or seeing people)       (hallucinations, auditory, visual, tactile, olfactory)     .     Paranoia: negative     .     Delusions:  negative       (TV, radio, thought broadcasting, mind control, referential thinking)       (persecutory delusion - e.g., believing one is being followed and harassed by gangs)       (grandiose delusion - e.g., believing one is a billionaire  who owns casinos around the world)       (erotomanic delusion - e.g., believing a famous  is in love with them)       (somatic delusion - e.g., believing one's sinuses have been infested by worms)        (delusions of reference - e.g., believing dialogue on a TV program is directed specifically towards the patient)       (delusions of control - e.g., believing one's thoughts and movements are controlled by planetary overlords)     .     Patient's perception: negative       (Spiritual or cultural context of symptoms, reality testing)     .     ADHD symptoms: negative       (able to focus and concentrate, scattered thoughts, disorganized thoughts)     .     Eating Disorder symptoms:  negative       (binging, purging, excessive exercising)            Social Determinants of Health          Financial Resource Strain: Low Risk     Difficulty of Paying Living Expenses: Not hard at all   Food Insecurity: No Food Insecurity    Worried About Running Out of Food in the Last Year: Never true    Patricia of Food in the Last Year: Never true   Transportation Needs: No Transportation Needs    Lack of Transportation (Medical): No    Lack of Transportation (Non-Medical): No   Physical Activity:     Days of Exercise per Week:     Minutes of Exercise per Session:    Stress:     Feeling of Stress :    Social Connections:     Frequency of Communication with Friends and Family:     Frequency of Social Gatherings with Friends and Family:     Attends Sabianism Services:     Active Member of Clubs or Organizations:     Attends Club or Organization Meetings:     Marital Status:    Intimate Partner Violence:     Fear of Current or Ex-Partner:     Emotionally Abused:     Physically Abused:     Sexually Abused:             MSE:     Appearance: Appropriately groomed. Made good eye contact.  Gait stable.  No abnormal movements or tremor. Behavior: Calm, cooperative, and socially appropriate. No psychomotor retardation/agitation appreciated. Speech: Normal in tone, volume, and quality. No slurring, dysarthria or pressured speech noted. Mood: \"good\"   Affect: Mood congruent. Thought Process: Appears linear, logical and goal oriented. Causality appears intact.    Thought Content: Denies active suicidal and homicidal ideations. No overt delusions or paranoia appreciated. Perceptions: Denies auditory or visual hallucinations at present time. Not responding to internal stimuli. Concentration: Intact. Orientation: to person, place, date, and situation. Language: Intact. Fund of information: Intact. Memory: Recent and remote appear intact. Impulsivity: Limited. Neurovegitative: Fair appetite and sleep. Insight: Fair. Judgment: Fair.     Cognition: Can spell \"world\" backwards: Yes                    Can do serial 7's:  Yes           Lab Results   Component Value Date      04/26/2021     K 3.5 04/26/2021      04/26/2021     CO2 20 (L) 04/26/2021     BUN 14 04/26/2021     CREATININE 0.8 04/26/2021     GLUCOSE 100 04/26/2021     CALCIUM 9.0 04/26/2021     PROT 7.0 04/26/2021     LABALBU 4.0 04/26/2021     BILITOT 0.6 04/26/2021     ALKPHOS 51 04/26/2021     AST 24 04/26/2021     ALT 17 04/26/2021     LABGLOM >60 04/26/2021     GFRAA >59 04/26/2021     GLOB 2.7 03/31/2017            Lab Results   Component Value Date      04/26/2021     K 3.5 04/26/2021      04/26/2021     CO2 20 04/26/2021     BUN 14 04/26/2021     CREATININE 0.8 04/26/2021     GLUCOSE 100 04/26/2021     CALCIUM 9.0 04/26/2021            Lab Results   Component Value Date     CHOL 101 (L) 04/28/2021            Lab Results   Component Value Date     TRIG 90 04/28/2021            Lab Results   Component Value Date     HDL 31 (L) 04/28/2021            Lab Results   Component Value Date     LDLCALC 52 04/28/2021      No results found for: LABVLDL, VLDL  No results found for: [facility]        Lab Results   Component Value Date     LABA1C 5.1 04/28/2021      No results found for: EAG        Lab Results   Component Value Date     TSHFT4 1.61 04/26/2021     TSH 1.690 03/03/2021            Lab Results   Component Value Date     VITD25 19.1 (L) 04/28/2021            Lab Results   Component Value Date     YQYLINPF10 806 04/14/2021      No results found for: FOLATE      Assessment:    1. Bipolar 1 disorder ([facility] [address]          No evidence of acute suicidality, homicidality or psychosis observed. Patient is psychiatrically stable     Plan:     1. Continue   depakote ER 1500 mg nightly for mood stability  Vitamin D 50,000 units weekly        Decrease      Start      Discontinue        The risks, benefits, side effects, indications, contraindications, and adverse effects of the medications have been discussed. Yes.  2. The pt has verbalized understanding and has capacity to give informed consent. 3. The [facility] report has been reviewed according to [facility] regulations. 4. Supportive therapy offered. 5. Follow up:    Return in about 3 months (around 1/7/2022). 6. The patient has been advised to call with any problems. 7. Controlled substance Treatment Plan: NA.  8. The above listed medications have been continued, modifications in meds and other orders/labs as follows:                   Encounter Medications    No orders of the defined types were placed in this encounter.                          Orders Placed This Encounter   Procedures    Valproic Acid Level, Total       Standing Status:   Future       Standing Expiration Date:   10/7/2022       Order Specific Question:   Dose Schedule & Time of Last Dose?       Answer:   night before         9. Additional comments: begin therapy, discussed sleep hygiene, discussed the use of coping skills and relaxation strategies to manage symptoms. Reinforced appointment compliance        10. Over 50% of the total visit time of  30 minutes was spent on counseling and/or coordination of care of:                         1. Bipolar 1 disorder ([facility] [address]                        Psychotherapy Topics: mood/medication effectiveness family, financial, health and occupational     TRIP Moran - CNP

## 2021-10-14 ENCOUNTER — TELEPHONE (OUTPATIENT)
Dept: PSYCHIATRY | Age: 56
End: 2021-10-14

## 2021-10-14 NOTE — TELEPHONE ENCOUNTER
VM left to inform pt that refill RX for Depakote had been sent to his pharmacy. Pt encouraged to call back with any questions.

## 2021-10-28 ENCOUNTER — OFFICE VISIT (OUTPATIENT)
Dept: PRIMARY CARE CLINIC | Age: 56
End: 2021-10-28
Payer: MEDICAID

## 2021-10-28 ENCOUNTER — HOSPITAL ENCOUNTER (OUTPATIENT)
Dept: GENERAL RADIOLOGY | Age: 56
Discharge: HOME | End: 2021-10-28
Payer: MEDICAID

## 2021-10-28 VITALS
HEART RATE: 74 BPM | TEMPERATURE: 98.5 F | DIASTOLIC BLOOD PRESSURE: 70 MMHG | HEIGHT: 69 IN | OXYGEN SATURATION: 99 % | BODY MASS INDEX: 39.84 KG/M2 | SYSTOLIC BLOOD PRESSURE: 118 MMHG | WEIGHT: 269 LBS

## 2021-10-28 DIAGNOSIS — F43.12 POST-TRAUMATIC STRESS DISORDER, CHRONIC: Primary | ICD-10-CM

## 2021-10-28 DIAGNOSIS — F31.2 BIPOLAR I DISORDER, MOST RECENT EPISODE MANIC, SEVERE WITH PSYCHOTIC FEATURES (HCC): ICD-10-CM

## 2021-10-28 DIAGNOSIS — S49.91XA INJURY OF RIGHT SHOULDER, INITIAL ENCOUNTER: ICD-10-CM

## 2021-10-28 DIAGNOSIS — Z91.89: ICD-10-CM

## 2021-10-28 PROCEDURE — 99215 OFFICE O/P EST HI 40 MIN: CPT | Performed by: FAMILY MEDICINE

## 2021-10-28 PROCEDURE — 73030 X-RAY EXAM OF SHOULDER: CPT
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
## 2021-11-05 ENCOUNTER — TELEPHONE (OUTPATIENT)
Dept: PRIMARY CARE CLINIC | Age: 56
End: 2021-11-05
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
## 2021-11-05 NOTE — TELEPHONE ENCOUNTER
----- Message from Joel Sen MD sent at 11/4/2021  6:46 PM CDT -----  [employer] message sent to patient about results and plan. Can we also please call the patient to let them know? Thank you!

## 2021-11-05 NOTE — TELEPHONE ENCOUNTER
Called and told pt results, he stated that he will continue with injections and if he wants the referral to PT he will let us know.

## 2021-11-11 ENCOUNTER — OFFICE VISIT (OUTPATIENT)
Dept: PRIMARY CARE CLINIC | Age: 56
End: 2021-11-11
Payer: MEDICAID

## 2021-11-11 VITALS
WEIGHT: 275 LBS | BODY MASS INDEX: 40.73 KG/M2 | TEMPERATURE: 98.3 F | HEART RATE: 78 BPM | HEIGHT: 69 IN | OXYGEN SATURATION: 98 % | DIASTOLIC BLOOD PRESSURE: 88 MMHG | SYSTOLIC BLOOD PRESSURE: 132 MMHG

## 2021-11-11 DIAGNOSIS — S46.012S ROTATOR CUFF STRAIN, LEFT, SEQUELA: ICD-10-CM

## 2021-11-11 DIAGNOSIS — Z91.89: Primary | ICD-10-CM

## 2021-11-11 DIAGNOSIS — M25.512 ACUTE PAIN OF LEFT SHOULDER: ICD-10-CM

## 2021-11-11 DIAGNOSIS — S29.011S STRAIN OF LEFT PECTORALIS MUSCLE, SEQUELA: ICD-10-CM

## 2021-11-11 PROCEDURE — 99214 OFFICE O/P EST MOD 30 MIN: CPT | Performed by: FAMILY MEDICINE

## 2021-11-11 PROCEDURE — 20610 DRAIN/INJ JOINT/BURSA W/O US: CPT | Performed by: FAMILY MEDICINE

## 2021-11-11 RX ORDER — TRIAMCINOLONE ACETONIDE 40 MG/ML
40 INJECTION, SUSPENSION INTRA-ARTICULAR; INTRAMUSCULAR ONCE
Status: COMPLETED | OUTPATIENT
Start: 2021-11-11 | End: 2021-11-11

## 2021-11-11 RX ORDER — BUPIVACAINE HYDROCHLORIDE 2.5 MG/ML
8 INJECTION, SOLUTION INFILTRATION; PERINEURAL ONCE
Status: COMPLETED | OUTPATIENT
Start: 2021-11-11 | End: 2021-11-11

## 2021-11-11 RX ADMIN — TRIAMCINOLONE ACETONIDE 40 MG: 40 INJECTION, SUSPENSION INTRA-ARTICULAR; INTRAMUSCULAR at 10:07

## 2021-11-11 RX ADMIN — BUPIVACAINE HYDROCHLORIDE 20 MG: 2.5 INJECTION, SOLUTION INFILTRATION; PERINEURAL at 10:07

## 2021-11-11 NOTE — PROGRESS NOTES
Wendy Anderson is a 61 y.o. male who presents today for   Chief Complaint   Patient presents with    Injections     left shoulder       HPI  Patient is here for left shoulder injections. He notes that he is taken diclofenac as directed but has not helped much. He does get pain along the insertion of the left pectoral muscle. He is having some issues as well sometimes spasms whenever his arm is moved posteriorly. He has been trying to cope with some of the PTSD from the situation. No change in PMH, family, social, or surgical history unless mentioned above. I have reviewed the above chief complaint and HPI details charted by staff and claim ownership of the documentation. Review of Systems    Past Medical History:   Diagnosis Date    Bipolar disorder ([facility] [address]     Colon polyps     Diverticular disease     GERD (gastroesophageal reflux disease)     Hiatal hernia     Hyperlipidemia     Hypertension     Hypothyroidism     Neuropathy     Unspecified sleep apnea        Current Outpatient Medications   Medication Sig Dispense Refill    Multiple Vitamin (MULTIVITAMIN ADULT PO) Take by mouth daily      diclofenac (VOLTAREN) 50 MG EC tablet Take 1 tablet by mouth 3 times daily (with meals) For 2 weeks and then as needed thereafter 90 tablet 0    divalproex (DEPAKOTE ER) 500 MG extended release tablet TAKE 3 TABLETS BY MOUTH EVERY DAY. 90 tablet 2    metoprolol tartrate (LOPRESSOR) 25 MG tablet TAKE 2 TABLETS BY MOUTH TWICE DAILY  120 tablet 4    amLODIPine (NORVASC) 10 MG tablet TAKE 1 TABLET BY MOUTH ONCE DAILY. 90 tablet 0    lisinopril-hydroCHLOROthiazide (PRINZIDE;ZESTORETIC) 20-25 MG per tablet TAKE 1 TABLET BY MOUTH ONCE DAILY  90 tablet 0    furosemide (LASIX) 20 MG tablet TAKE 1 TABLET BY MOUTH ONCE DAILY. 30 tablet 5    levothyroxine (SYNTHROID) 50 MCG tablet TAKE 1 TABLET BY MOUTH ONCE DAILY. 90 tablet 0    gabapentin (NEURONTIN) 300 MG capsule TAKE 1 CAPSULE BY MOUTH THREE TIMES DAILY. 90 capsule 5    omeprazole (PRILOSEC) 20 MG delayed release capsule TAKE 1 CAPSULE BY MOUTH DAILY  30 capsule 11    vitamin D (ERGOCALCIFEROL) 1.25 MG (44864 UT) CAPS capsule Take 1 capsule by mouth once a week for 11 doses 11 capsule 1    atorvastatin (LIPITOR) 40 MG tablet TAKE 1 TABLET BY MOUTH ONCE DAILY. 90 tablet 0     No current facility-administered medications for this visit. No Known Allergies    Past Surgical History:   Procedure Laterality Date    COLONOSCOPY  2009    Dr Bhumi Roque: hyperplastic polyp, resolving diverticulitis    COLONOSCOPY  2012    minimal diverticulosis left side o/w normal postsurgical colon    COLONOSCOPY  2005    diverticulitis    COLONOSCOPY N/A 2016    Dr Forest Chapman bleeding internal hemorrhoids, diverticular disease-5 yr recall    EGD      NE EGD TRANSORAL BIOPSY SINGLE/MULTIPLE N/A 2018    Dr JONATHAN Bentley-Gastritis/gastropathy, esophagitis    SUBTOTAL COLECTOMY      recurrant diverticulitis    UPPER GASTROINTESTINAL ENDOSCOPY  2005    Dr Bhumi Roque: duodenal ulcer, gastritis    UPPP UVULOPALATOPHARYGOPLASTY         Social History     Tobacco Use    Smoking status: Never Smoker    Smokeless tobacco: Never Used   Vaping Use    Vaping Use: Never used   Substance Use Topics    Alcohol use: No    Drug use: No       Family History   Problem Relation Age of Onset    Diabetes Mother     Kidney Disease Mother     Dementia Mother          at 68 - Lewy Body Dementia    Coronary Art Dis Father          at [de-identified]   [facility] Colon Cancer Neg Hx     Colon Polyps Neg Hx     Esophageal Cancer Neg Hx     Liver Cancer Neg Hx     Liver Disease Neg Hx     Rectal Cancer Neg Hx     Stomach Cancer Neg Hx        There were no vitals taken for this visit. Physical Exam  Chest:      Chest wall: No swelling or tenderness. Musculoskeletal:      Left shoulder: Tenderness present. No swelling or laceration. Decreased range of motion.  Normal strength. Neurological:      Mental Status: He is alert and oriented to person, place, and time. Psychiatric:         Mood and Affect: Mood is anxious. Mood is not depressed. Speech: Speech normal.         Behavior: Behavior normal.         Thought Content: Thought content does not include homicidal or suicidal ideation. Judgment: Judgment normal.         Assessment:  No diagnosis found. Plan:   I do believe that unfortunately is likely had a posterior dislocation of the shoulder previously and that it does sublux with the muscle spasms or regular motion of the arm. I have informed him of this. However he likely does have some inflammation as well the joint possibly bursitis and we will proceed with an injection at this time. If it does not improve we will hold off on further injections for the concerned that it may affect tendon strength. He may require an MRI as well. I have reviewed his x-ray with him today. SUBACROMIAL BURSA INJECTION:  Patient was given verbal informed consent and agreed verbally and with signed consent to the risks and benefits of procedure. Risks discussed included bleeding, infection, damage to structures, and potentially other yet unlikely unforeseen consequences of those risks. An impression was made with a pen for injection site on the left posterior shoulder. The area was cleaned w/ betadine and alcohol swab. It was then sprayed w/ ethyl chloride and injected w/ a 22 gauge 1.5\" needle into the the tissue inferior to the acromium and projecting anteriorly and approximately 30 degrees superiorly. It was aspirated and no bloody return into syringe. The medicine was administered w/o issue or resistance. 8.0 cc of 1% lidocaine w/o epinephrine and 1.0 cc of 40 mg/mL kenalog was administered. A bandage was applied directly thereafter. All bleeding stopped. EBL - 0 cc. Pt was instructed on basic cleansing and rest of affected area.   Pt noted some relief prior to leaving the office. No orders of the defined types were placed in this encounter. No orders of the defined types were placed in this encounter. There are no discontinued medications. There are no Patient Instructions on file for this visit. Patient given educational handouts and has had all questions answered. Patient voices understanding and agrees to plans along with risks and benefits of plan. Patient isinstructed to continue prior meds, diet, and exercise plans unless instructed otherwise. Patient agrees to follow up as instructed and sooner if needed. Patient agrees to go to ER if condition becomes emergent. Notesmay be completed with dictation device and spelling errors may occur. Materials may be copied and pasted from a notepad outside of EMR, all of which, I, Dr. Gwynne Buerger, MD, take sole intellectual ownership of and have approved adding to my note. No follow-ups on file.

## 2021-11-11 NOTE — PATIENT INSTRUCTIONS
Take it easy the first 2-3 days. Use ice and elevate the affected joint as needed. Call if you experience any fever or chills, spreading redness and rash from the injected area, or bleeding or large bruise or swelling or other abnormalities. Patient Education        Rotator Cuff: Exercises  Introduction  Here are some examples of exercises for you to try. The exercises may be suggested for a condition or for rehabilitation. Start each exercise slowly. Ease off the exercises if you start to have pain. You will be told when to start these exercises and which ones will work best for you. How to do the exercises  Pendulum swing    If you have pain in your back, do not do this exercise. 1. Hold on to a table or the back of a chair with your good arm. Then bend forward a little and let your sore arm hang straight down. This exercise does not use the arm muscles. Rather, use your legs and your hips to create movement that makes your arm swing freely. 2. Use the movement from your hips and legs to guide the slightly swinging arm back and forth like a pendulum (or elephant trunk). Then guide it in circles that start small (about the size of a dinner plate). Make the circles a bit larger each day, as your pain allows. 3. Do this exercise for 5 minutes, 5 to 7 times each day. 4. As you have less pain, try bending over a little farther to do this exercise. This will increase the amount of movement at your shoulder. Posterior stretching exercise    1. Hold the elbow of your injured arm with your other hand. 2. Use your hand to pull your injured arm gently up and across your body. You will feel a gentle stretch across the back of your injured shoulder. 3. Hold for at least 15 to 30 seconds. Then slowly lower your arm. 4. Repeat 2 to 4 times. Up-the-back stretch    Your doctor or physical therapist may want you to wait to do this stretch until you have regained most of your range of motion and strength.  You can do this stretch in different ways. Hold any of these stretches for at least 15 to 30 seconds. Repeat them 2 to 4 times. 1. Light stretch: Put your hand in your back pocket. Let it rest there to stretch your shoulder. 2. Moderate stretch: With your other hand, hold your injured arm (palm outward) behind your back by the wrist. Pull your arm up gently to stretch your shoulder. 3. Advanced stretch: Put a towel over your other shoulder. Put the hand of your injured arm behind your back. Now hold the back end of the towel. With the other hand, hold the front end of the towel in front of your body. Pull gently on the front end of the towel. This will bring your hand farther up your back to stretch your shoulder. Overhead stretch    1. Standing about an arm's length away, grasp onto a solid surface. You could use a countertop, a doorknob, or the back of a sturdy chair. 2. With your knees slightly bent, bend forward with your arms straight. Lower your upper body, and let your shoulders stretch. 3. As your shoulders are able to stretch farther, you may need to take a step or two backward. 4. Hold for at least 15 to 30 seconds. Then stand up and relax. If you had stepped back during your stretch, step forward so you can keep your hands on the solid surface. 5. Repeat 2 to 4 times. Shoulder flexion (lying down)    To make a wand for this exercise, use a piece of PVC pipe or a broom handle with the broom removed. Make the wand about a foot wider than your shoulders. 1. Lie on your back, holding a wand with both hands. Your palms should face down as you hold the wand. 2. Keeping your elbows straight, slowly raise your arms over your head. Raise them until you feel a stretch in your shoulders, upper back, and chest.  3. Hold for 15 to 30 seconds. 4. Repeat 2 to 4 times. Shoulder rotation (lying down)    To make a wand for this exercise, use a piece of PVC pipe or a broom handle with the broom removed.  Make the wand about a foot wider than your shoulders. 1. Lie on your back. Hold a wand with both hands with your elbows bent and palms up. 2. Keep your elbows close to your body, and move the wand across your body toward the sore arm. 3. Hold for 8 to 12 seconds. 4. Repeat 2 to 4 times. Wall climbing (to the side)    Avoid any movement that is straight to your side, and be careful not to arch your back. Your arm should stay about 30 degrees to the front of your side. 1. Stand with your side to a wall so that your fingers can just touch it at an angle about 30 degrees toward the front of your body. 2. Walk the fingers of your injured arm up the wall as high as pain permits. Try not to shrug your shoulder up toward your ear as you move your arm up. 3. Hold that position for a count of at least 15 to 20.  4. Walk your fingers back down to the starting position. 5. Repeat at least 2 to 4 times. Try to reach higher each time. Wall climbing (to the front)    During this stretching exercise, be careful not to arch your back. 1. Face a wall, and stand so your fingers can just touch it. 2. Keeping your shoulder down, walk the fingers of your injured arm up the wall as high as pain permits. (Don't shrug your shoulder up toward your ear.)  3. Hold your arm in that position for at least 15 to 30 seconds. 4. Slowly walk your fingers back down to where you started. 5. Repeat at least 2 to 4 times. Try to reach higher each time. Shoulder blade squeeze    1. Stand with your arms at your sides, and squeeze your shoulder blades together. Do not raise your shoulders up as you squeeze. 2. Hold 6 seconds. 3. Repeat 8 to 12 times. Scapular exercise: Arm reach    1. Lie flat on your back. This exercise is a very slight motion that starts with your arms raised (elbows straight, arms straight). 2. From this position, reach higher toward the charanjit or ceiling. Keep your elbows straight.  All motion should be from your shoulder blade only.  3. Relax your arms back to where you started. 4. Repeat 8 to 12 times. Arm raise to the side    During this strengthening exercise, your arm should stay about 30 degrees to the front of your side. 1. Slowly raise your injured arm to the side, with your thumb facing up. Raise your arm 60 degrees at the most (shoulder level is 90 degrees). 2. Hold the position for 3 to 5 seconds. Then lower your arm back to your side. If you need to, bring your \"good\" arm across your body and place it under the elbow as you lower your injured arm. Use your good arm to keep your injured arm from dropping down too fast.  3. Repeat 8 to 12 times. 4. When you first start out, don't hold any extra weight in your hand. As you get stronger, you may use a 1-pound to 2-pound dumbbell or a small can of food. Shoulder flexor and extensor exercise    These are isometric exercises. That means you contract your muscles without actually moving. 1. Push forward (flex): Stand facing a wall or doorjamb, about 6 inches or less back. Hold your injured arm against your body. Make a closed fist with your thumb on top. Then gently push your hand forward into the wall with about 25% to 50% of your strength. Don't let your body move backward as you push. Hold for about 6 seconds. Relax for a few seconds. Repeat 8 to 12 times. 2. Push backward (extend): Stand with your back flat against a wall. Your upper arm should be against the wall, with your elbow bent 90 degrees (your hand straight ahead). Push your elbow gently back against the wall with about 25% to 50% of your strength. Don't let your body move forward as you push. Hold for about 6 seconds. Relax for a few seconds. Repeat 8 to 12 times. Scapular exercise: Wall push-ups    This exercise is best done with your fingers somewhat turned out, rather than straight up and down. 1. Stand facing a wall, about 12 inches to 18 inches away.   2. Place your hands on the wall at shoulder height. 3. Slowly bend your elbows and bring your face to the wall. Keep your back and hips straight. 4. Push back to where you started. 5. Repeat 8 to 12 times. 6. When you can do this exercise against a wall comfortably, you can try it against a counter. You can then slowly progress to the end of a couch, then to a sturdy chair, and finally to the floor. Scapular exercise: Retraction    For this exercise, you will need elastic exercise material, such as surgical tubing or Thera-Band. 1. Put the band around a solid object at about waist level. (A bedpost will work well.) Each hand should hold an end of the band. 2. With your elbows at your sides and bent to 90 degrees, pull the band back. Your shoulder blades should move toward each other. Then move your arms back where you started. 3. Repeat 8 to 12 times. 4. If you have good range of motion in your shoulders, try this exercise with your arms lifted out to the sides. Keep your elbows at a 90-degree angle. Raise the elastic band up to about shoulder level. Pull the band back to move your shoulder blades toward each other. Then move your arms back where you started. Internal rotator strengthening exercise    1. Start by tying a piece of elastic exercise material to a doorknob. You can use surgical tubing or Thera-Band. 2. Stand or sit with your shoulder relaxed and your elbow bent 90 degrees. Your upper arm should rest comfortably against your side. Squeeze a rolled towel between your elbow and your body for comfort. This will help keep your arm at your side. 3. Hold one end of the elastic band in the hand of the painful arm. 4. Slowly rotate your forearm toward your body until it touches your belly. Slowly move it back to where you started. 5. Keep your elbow and upper arm firmly tucked against the towel roll or at your side. 6. Repeat 8 to 12 times. External rotator strengthening exercise    1.  Start by tying a piece of elastic exercise material to a doorknob. You can use surgical tubing or Thera-Band. (You may also hold one end of the band in each hand.)  2. Stand or sit with your shoulder relaxed and your elbow bent 90 degrees. Your upper arm should rest comfortably against your side. Squeeze a rolled towel between your elbow and your body for comfort. This will help keep your arm at your side. 3. Hold one end of the elastic band with the hand of the painful arm. 4. Start with your forearm across your belly. Slowly rotate the forearm out away from your body. Keep your elbow and upper arm tucked against the towel roll or the side of your body until you begin to feel tightness in your shoulder. Slowly move your arm back to where you started. 5. Repeat 8 to 12 times. Follow-up care is a key part of your treatment and safety. Be sure to make and go to all appointments, and call your doctor if you are having problems. It's also a good idea to know your test results and keep a list of the medicines you take. Where can you learn more? Go to [email or web address]. org and sign in to your [employer] account. Enter Lucinda Contreras in the [facility] box to learn more about \"Rotator Cuff: Exercises. \"     If you do not have an account, please click on the \"Sign Up Now\" link. Current as of: July 1, 2021               Content Version: 13.0  © 4337-6169 Healthwise, Incorporated. Care instructions adapted under license by [facility] ([facility]). If you have questions about a medical condition or this instruction, always ask your healthcare professional. [address] any warranty or liability for your use of this information.

## 2021-11-12 ENCOUNTER — CARE COORDINATION (OUTPATIENT)
Dept: CARE COORDINATION | Age: 56
End: 2021-11-12

## 2021-11-12 NOTE — CARE COORDINATION
Received an in basket message from Dr Brittany Wright about injuries sustained while a patient at the Emergency Room on 05.13.2021. Dr Brittany Wright also stated patient concerned about medical bills and is behind on his mortgage. Dr Brittany Wright asked me to contact the patient. Submitted by Maria R/DIDI    Reviewed the patient's medical record. Patient was at [facility] Emergency Room on 05.13.2021. Will refer patient to  Chano Curran, Patient Representative for [facility] ([phone]). The patient will need to address his concerns with her. I will contact the patient to share information about \"[employer]", a mortgage program for [employer] who are having issues with making mortgage payments. The number is  [phone]. Dr Brittany Wright mentioned that the patient was concerned about medical bills. I do not find any balances due with [facility] ([facility]). [facility]. I do not anticipate that he has medical balances for this year. His insurance card does not indicate that he has office / ER copayments. Submitted by Maria R/DIDI    [facility]. No answer. Left a voice message introducing myself with my cell number for a return call. Let her know that a patient met with Dr Brittany Wright yesterday and shared concerns about his Emergency Room visit on 05.13.2021. Did not provide patient name or contact. Will give this information to [facility] when she returns my call. Submitted by Maria R/DIDI    Telephoned the patient. No answer. Left a voice message introducing self, reason for call, and provided number for patient to return call. Will give the patient Bobie Romberg name and number at [facility] so he can address his issues pertaining to his ED visit on 05.13.2021 with her. Will give patient information about [employer].  [phone]    Will ask if he has other needs that I may be able to assist with.    Submitted by Maria R/DIDI    Patient has active MyChart. Sending the patient a message asking him to call me. Submitted by Maria R/DIDI     Екатерина John returned my call. Shared patient concerns from his ED visit on 05.13.2021 at [facility]. She stated she will follow-up on it. Told her I will address patient's current financial needs. She verbalized understanding. Submitted by Maria R/DIDI    Follow-up email sent to Екатерина Lopez. Copied message I received from Dr Adenike Donaldson.   (Permission was received from Dr Adenike Donaldson.)    Submitted by Maria R/DIDI

## 2021-11-15 ASSESSMENT — ENCOUNTER SYMPTOMS
WHEEZING: 0
CHEST TIGHTNESS: 0
BACK PAIN: 0
NAUSEA: 0
VOMITING: 0
ABDOMINAL PAIN: 0
SHORTNESS OF BREATH: 0
CONSTIPATION: 0
COUGH: 0
DIARRHEA: 0

## 2021-11-15 NOTE — CARE COORDINATION
Patient returned my call. Collected and discussed the following:    - Informed patient that I notified Cinthya Chery, Patient Representative with [facility], of patient's concern about possible shoulder injury from his Emergency Room visit in May.     - Patient stated injury sustained at [facility] in April. - I reviewed patient's medical record. Found ER visit to [facility] on 04.26.2021. Told the patient I would need to review that documentation then refer it to the appropriate department at [facility]. - I asked the patient what his expectations were. - He would like Sherry to pay his mortgage arrearages and future expenses if he has to have surgery on his shoulder. Patient stated he was on short-term disability for 9 weeks post hospitalization per his doctor's order. He did not make enough money to pay the bills. He said he could not work after that due to his shoulder injury. - I told the patient I could only relay his expectations. He verbalized understanding.   - I asked patient if he had called \"Protect My [address]\", information I sent to the patient on 11.12.2021. He stated he had not. I recommended that he call as soon as possible. He verbalized understanding.   - Patient stated he spoke with \"a keny at [place] [address]" a week or two ago about his claim of injury sustained while under [facility] care. I asked the patient if he spoke with [facility]. He said he did. Told the patient I would notify Capri Mullins. Submitted by Maria R/DIDI    Broke the glass to access documentation from patient's [facility] hospitalization on 04.26.2021. Mitch Espan[place] [id number]    Submitted by Maria R/KESHIAW    Email sent to Cinthya Chery, Patient Representative at [facility], updating her on ER visit. Stated I would let her know of my findings. Submitted by Maria R/KESHIAW    Sent an email to [facility], Patient Advocate for Gilda Case as CC. Asked Capri Mullins to review and follow-up.   Asked him to notify Carilyn Cheadle if she needs to proceed with her investigation. Submitted by Maria R/DIDI    Routing message to Dr Emanuel Hussein about ED DOS.     Submitted by NSMigalen/DIDI

## 2021-12-15 RX ORDER — LEVOTHYROXINE SODIUM 0.05 MG/1
TABLET ORAL
Qty: 90 TABLET | Refills: 0 | Status: SHIPPED | OUTPATIENT
Start: 2021-12-15 | End: 2022-03-22

## 2021-12-15 RX ORDER — ERGOCALCIFEROL 1.25 MG/1
CAPSULE ORAL
Qty: 11 CAPSULE | Refills: 1 | Status: SHIPPED | OUTPATIENT
Start: 2021-12-15 | End: 2022-05-31

## 2021-12-15 RX ORDER — AMLODIPINE BESYLATE 10 MG/1
TABLET ORAL
Qty: 90 TABLET | Refills: 0 | Status: SHIPPED | OUTPATIENT
Start: 2021-12-15 | End: 2022-03-22

## 2021-12-15 RX ORDER — LISINOPRIL AND HYDROCHLOROTHIAZIDE 25; 20 MG/1; MG/1
TABLET ORAL
Qty: 90 TABLET | Refills: 0 | Status: SHIPPED | OUTPATIENT
Start: 2021-12-15 | End: 2022-03-22

## 2021-12-15 NOTE — TELEPHONE ENCOUNTER
Anthony Escobar called to request a refill on his medication. Last office visit : 11/11/2021   Next office visit : 1/6/2022     Requested Prescriptions     Pending Prescriptions Disp Refills    amLODIPine (NORVASC) 10 MG tablet [Pharmacy Med Name: AMLODIPINE BESYLATE 10MG TABLET] 90 tablet 0     Sig: TAKE 1 TABLET BY MOUTH ONCE DAILY.  lisinopril-hydroCHLOROthiazide (PRINZIDE;ZESTORETIC) 20-25 MG per tablet [Pharmacy Med Name: LISINOPRIL/HYDROCHLOROTHIAZIDE 25MG-20MG TABLET] 90 tablet 0     Sig: TAKE 1 TABLET BY MOUTH ONCE DAILY    levothyroxine (SYNTHROID) 50 MCG tablet [Pharmacy Med Name: LEVOTHYROXINE SODIUM 50MCG TABLET] 90 tablet 0     Sig: TAKE 1 TABLET BY MOUTH ONCE DAILY.     vitamin D (ERGOCALCIFEROL) 1.25 MG (05028 UT) CAPS capsule [Pharmacy Med Name: VITAMIN D [id number] CAPSULE] 11 capsule 1     Sig: TAKE 1 CAPSULE BY MOUTH WEEKLY            Elysia Hathaway LPN

## 2021-12-16 DIAGNOSIS — G89.29 CHRONIC LEFT SHOULDER PAIN: Primary | ICD-10-CM

## 2021-12-16 DIAGNOSIS — M25.512 CHRONIC LEFT SHOULDER PAIN: Primary | ICD-10-CM

## 2021-12-30 ENCOUNTER — HOSPITAL ENCOUNTER (OUTPATIENT)
Dept: MRI IMAGING | Age: 56
Discharge: HOME | End: 2021-12-30
Payer: MEDICAID

## 2021-12-30 DIAGNOSIS — M25.512 CHRONIC LEFT SHOULDER PAIN: ICD-10-CM

## 2021-12-30 DIAGNOSIS — G89.29 CHRONIC LEFT SHOULDER PAIN: ICD-10-CM

## 2021-12-30 PROCEDURE — 6360000004 HC RX CONTRAST MEDICATION: Performed by: FAMILY MEDICINE

## 2021-12-30 PROCEDURE — A9585 GADOBUTROL INJECTION: HCPCS | Performed by: FAMILY MEDICINE

## 2021-12-30 PROCEDURE — 73223 MRI JOINT UPR EXTR W/O&W/DYE: CPT

## 2021-12-30 RX ADMIN — GADOBUTROL 2 ML: 604.72 INJECTION INTRAVENOUS at 11:51

## 2021-12-30 RX ADMIN — GADOBUTROL 10 ML: 604.72 INJECTION INTRAVENOUS at 11:51

## 2022-01-04 ENCOUNTER — HOSPITAL ENCOUNTER (OUTPATIENT)
Dept: NON INVASIVE DIAGNOSTICS | Age: 57
Discharge: HOME | End: 2022-01-04
Payer: MEDICAID

## 2022-01-04 ENCOUNTER — OFFICE VISIT (OUTPATIENT)
Age: 57
End: 2022-01-04
Payer: MEDICAID

## 2022-01-04 ENCOUNTER — NURSE TRIAGE (OUTPATIENT)
Dept: CALL CENTER | Facility: HOSPITAL | Age: 57
End: 2022-01-04

## 2022-01-04 VITALS
HEIGHT: 69 IN | SYSTOLIC BLOOD PRESSURE: 124 MMHG | OXYGEN SATURATION: 98 % | BODY MASS INDEX: 41.32 KG/M2 | HEART RATE: 74 BPM | RESPIRATION RATE: 16 BRPM | WEIGHT: 279 LBS | DIASTOLIC BLOOD PRESSURE: 64 MMHG | TEMPERATURE: 98.2 F

## 2022-01-04 DIAGNOSIS — M79.604 RIGHT LEG PAIN: ICD-10-CM

## 2022-01-04 DIAGNOSIS — I82.890 SUPERFICIAL VEIN THROMBOSIS: Primary | ICD-10-CM

## 2022-01-04 PROCEDURE — 93971 EXTREMITY STUDY: CPT

## 2022-01-04 PROCEDURE — 99214 OFFICE O/P EST MOD 30 MIN: CPT | Performed by: NURSE PRACTITIONER

## 2022-01-04 NOTE — PROGRESS NOTES
[address] [facility]  [address] [address] [place] [address]  Dept: [phone]  Dept Fax: [phone]: [phone]    Trevor Hairston is a 61 y.o. male who presents today for his medical conditions/complaintsas noted below. Trevor Hairston is c/o of Leg Pain (R leg, concerned about a blood clot, last nightit felt like something moved up his leg and now theres another spot )        HPI:     HPI  Mr. Abram Lombardi presents today with pain in his R leg. First thought it might be a pulled muscle. It has been ongoing for several weeks. It is getting worse and it concerned him last night as it felt like something moved up his leg and he has an additional area of concern. No history of dvt. No known injury. Pain a 6 or 7/10. Pain with walking. The area is red and slight swelling. No chest pain or SOB.    Past Medical History:   Diagnosis Date    Bipolar disorder ([address]     Colon polyps     Diverticular disease     GERD (gastroesophageal reflux disease)     Hiatal hernia     Hyperlipidemia     Hypertension     Hypothyroidism     Neuropathy     Unspecified sleep apnea      Past Surgical History:   Procedure Laterality Date    COLONOSCOPY  01/06/2009    Dr Ivone Prado: hyperplastic polyp, resolving diverticulitis    COLONOSCOPY  07/2012    minimal diverticulosis left side o/w normal postsurgical colon    COLONOSCOPY  12/2005    diverticulitis    COLONOSCOPY N/A 11/8/2016    Dr Mykel Natarajan bleeding internal hemorrhoids, diverticular disease-5 yr recall    EGD      NM EGD TRANSORAL BIOPSY SINGLE/MULTIPLE N/A 8/24/2018    Dr JONATHAN Bentley-Gastritis/gastropathy, esophagitis    SUBTOTAL COLECTOMY  2010    recurrant diverticulitis    UPPER GASTROINTESTINAL ENDOSCOPY  12/22/2005    Dr Ivone Prado: duodenal ulcer, gastritis    UPPP UVULOPALATOPHARYGOPLASTY         Family History   Problem Relation Age of Onset    Diabetes Mother     Kidney Disease Mother     Dementia Mother  Pneumococcal 0-64 years Vaccine (1 of 2 - PPSV23) Never done    Shingles Vaccine (1 of 2) Never done    Flu vaccine (1) 09/01/2021    Colon cancer screen colonoscopy  11/08/2021    TSH testing  04/26/2022    Potassium monitoring  04/26/2022    Creatinine monitoring  04/26/2022    Lipid screen  04/28/2022    Depression Screen  05/12/2022    DTaP/Tdap/Td vaccine (2 - Td or Tdap) 04/19/2023    Hepatitis C screen  Completed    HIV screen  Completed    Hepatitis A vaccine  Aged Out    Hepatitis B vaccine  Aged Out    Hib vaccine  Aged Out    Meningococcal (ACWY) vaccine  Aged Out       Subjective:     Review of Systems   Musculoskeletal:        Pain, redness, and swelling to right leg around the knee.        :Objective      Physical Exam  Vitals and nursing note reviewed. Constitutional:       General: He is not in acute distress. Appearance: Normal appearance. He is well-developed. He is not ill-appearing or diaphoretic. HENT:      Head: Normocephalic and atraumatic. Eyes:      Pupils: Pupils are equal, round, and reactive to light. Cardiovascular:      Rate and Rhythm: Normal rate and regular rhythm. Pulmonary:      Effort: Pulmonary effort is normal. No respiratory distress. Musculoskeletal:        Legs:    Skin:     General: Skin is warm and dry. Neurological:      Mental Status: He is alert and oriented to person, place, and time. Psychiatric:         Behavior: Behavior normal.       /64   Pulse 74   Temp 98.2 °F (36.8 °C) (Temporal)   Resp 16   Ht 5' 9\" (1.753 m)   Wt 279 lb (126.6 kg)   SpO2 98%   BMI 41.20 kg/m²     :Assessment       Diagnosis Orders   1. Superficial vein thrombosis  rivaroxaban (XARELTO) 10 MG TABS tablet   2.  Right leg pain  US DUP LOWER EXTREMITY RIGHT RUFINO       :Plan    Preliminary report lower extremity venous scan:    SVT noted in RT GSV from zone 4 (above knee) - zone 6 (below knee) with thrombosed varicosities coming off at zone 6 to posterior calf. No DVT noted in RLE. Discussed prelim report with pt. Due to him being symptomatic and the SVT being in the GSV above the knee as well as below the knee to start the pt on anticoagulation. Per up to date xarelto 10mg daily for 45 days. I have advised that he follow up with his PCP. That his PCP may change or add to his treatment plan. He voiced understanding. He states he already has an appointment on Thursday. I advised that he let them know that he wants to add discussing his SVT to that visit. He states he will call them in the morning. Advised to go to ER with any severe or worsening symptoms. Orders Placed This Encounter   Procedures    US DUP LOWER EXTREMITY RIGHT RUFINO     Standing Status:   Future     Standing Expiration Date:   1/4/2023     Order Specific Question:   Reason for exam:     Answer:   area of pain, erythema near the knee       No follow-ups on file. Orders Placed This Encounter   Medications    rivaroxaban (XARELTO) 10 MG TABS tablet     Sig: Take 1 tablet by mouth daily (with breakfast)     Dispense:  45 tablet     Refill:  0       Patient given educational materials- see patient instructions. Discussed use, benefit, and side effects of prescribedmedications. All patient questions answered. Pt voiced understanding. Patient Instructions       Patient Education        Learning About How to Prevent a Deep Vein Thrombosis (DVT) in the Hospital  What is a deep vein thrombosis (DVT)? A deep vein thrombosis (DVT) is a blood clot (thrombus) in a deep vein, usually in the legs. A DVT can be dangerous because it can break loose and travel through the bloodstream to the lungs. There it can block blood flow in the lungs (pulmonary embolism). This can be life-threatening. What increases your risk? As a patient in the hospital, you are at higher risk for having a DVT. This may be due to: Slowed blood flow.   Blood clots can form when your blood flows slowly or pools you may faint.     · You have signs of a blood clot in your leg (called a deep vein thrombosis), such as:  ? Pain in your calf, back of the knee, thigh, or groin. ? Redness and swelling in your leg. Follow-up care is a key part of your treatment and safety. Be sure to make and go to all appointments, and call your doctor if you are having problems. It's also a good idea to know your test results and keep a list of the medicines you take. Where can you learn more? Go to https://[employer]. org and sign in to your [employer] account. Enter D365 in the [employer] box to learn more about \"Learning About How to Prevent a Deep Vein Thrombosis (DVT) in the Hospital.\"     If you do not have an account, please click on the \"Sign Up Now\" link. Current as of: July 6, 2021               Content Version: 13.1  © 2006-2021 Healthwise, Incorporated. Care instructions adapted under license by [facility] ([facility]). If you have questions about a medical condition or this instruction, always ask your healthcare professional. [address] [place] [address] any warranty or liability for your use of this information.                Electronically signed by TRIP Goldsmith on 1/4/2022 at 5:09 PM

## 2022-01-04 NOTE — PATIENT INSTRUCTIONS
Patient Education        Learning About How to Prevent a Deep Vein Thrombosis (DVT) in the Hospital  What is a deep vein thrombosis (DVT)? A deep vein thrombosis (DVT) is a blood clot (thrombus) in a deep vein, usually in the legs. A DVT can be dangerous because it can break loose and travel through the bloodstream to the lungs. There it can block blood flow in the lungs (pulmonary embolism). This can be life-threatening. What increases your risk? As a patient in the hospital, you are at higher risk for having a DVT. This may be due to: Slowed blood flow. Blood clots can form when your blood flows slowly or pools in your leg. This can happen when you have to stay in a hospital bed. Injury to a blood vessel. Blood is more likely to clot in veins soon after they are injured. Injuries include recent surgery that involves the legs, hips, belly, or brain. Clotting may also happen if you have a central venous catheter (CVC) to give you medicines, fluids, or nutrients. Clotting occurs at or near the CVC site. Blood that doesn't clot normally. Some people have blood that clots too easily or too quickly. This can happen if you have cancer or other health conditions or illnesses that makes your blood more likely to clot. How is it prevented? Preventing a DVT in the hospital may include medicines, compression, and movement. Blood thinner medicines. Medicines called blood thinners help reduce the risk of blood clots. These medicines don't actually thin the blood. They work by slowing the time it takes for clots to form. You may be given blood thinners in the form of pills or a shot. Compression. Compressing, or squeezing, your legs helps keeps your blood moving so it doesn't pool and form clots. This can be done with:  · A compression device that has air bags that inflate and deflate at certain times. This forces blood to flow out of your lower leg toward your heart.  The device is sometimes called an intermittent or sequential compression device. · Compression stockings that are tight at the feet with a looser fit on the legs. Movement. Your doctor may urge you to get up and walk for short periods, if you can. Working your leg muscles as you stand and walk helps move blood through your veins. When should you call for help? While you are in the hospital, be sure to let your care team know if you have any new problems or if your symptoms get worse. For example:    · You passed out (lost consciousness).     · You have chest pain, are short of breath, or cough up blood.     · You are dizzy or lightheaded, or you feel like you may faint.     · You have signs of a blood clot in your leg (called a deep vein thrombosis), such as:  ? Pain in your calf, back of the knee, thigh, or groin. ? Redness and swelling in your leg. Follow-up care is a key part of your treatment and safety. Be sure to make and go to all appointments, and call your doctor if you are having problems. It's also a good idea to know your test results and keep a list of the medicines you take. Where can you learn more? Go to https://[employer]. org and sign in to your [employer] account. Enter D365 in the [facility] [facility] box to learn more about \"Learning About How to Prevent a Deep Vein Thrombosis (DVT) in the Hospital.\"     If you do not have an account, please click on the \"Sign Up Now\" link. Current as of: July 6, 2021               Content Version: 13.1  © 2006-2021 Healthwise, Incorporated. Care instructions adapted under license by [facility] ([facility]). If you have questions about a medical condition or this instruction, always ask your healthcare professional. [place] [address] any warranty or liability for your use of this information.

## 2022-01-04 NOTE — TELEPHONE ENCOUNTER
"Reviewed guideline with caller, advises Mr. Velasco be evaluated within 4 hours. Caller states she will let him know.     Reason for Disposition  • [1] Red area or streak [2] large (> 2 in. or 5 cm)    Additional Information  • Negative: SEVERE difficulty breathing (e.g., struggling for each breath, speaks in single words)  • Negative: Looks like a broken bone or dislocated joint (e.g., crooked or deformed)  • Negative: Sounds like a life-threatening emergency to the triager  • Negative: Chest pain  • Negative: Followed a leg injury  • Negative: [1] Small area of swelling AND [2] followed an insect bite to the area  • Negative: Swelling of one ankle joint  • Negative: Swelling of knee is main symptom  • Negative: Pregnant  • Negative: Postpartum (from 0 to 6 weeks after delivery)  • Negative: Difficulty breathing at rest  • Negative: Entire foot is cool or blue in comparison to other side  • Negative: [1] Can't walk or can barely walk AND [2] new-onset  • Negative: [1] Difficulty breathing with exertion (e.g., walking) AND [2] new-onset or worsening  • Negative: [1] Red area or streak AND [2] fever  • Negative: [1] Swelling is painful to touch AND [2] fever  • Negative: [1] Cast on leg or ankle AND [2] now increased pain  • Negative: Patient sounds very sick or weak to the triager  • Negative: SEVERE leg swelling (e.g., swelling extends above knee, entire leg is swollen, weeping fluid)    Answer Assessment - Initial Assessment Questions  1. ONSET: \"When did the swelling start?\" (e.g., minutes, hours, days)      A week ago and had a new place during the night  2. LOCATION: \"What part of the leg is swollen?\"  \"Are both legs swollen or just one leg?\"     In calf and above the knee one leg   3. SEVERITY: \"How bad is the swelling?\" (e.g., localized; mild, moderate, severe)   - Localized - small area of swelling localized to one leg   - MILD pedal edema - swelling limited to foot and ankle, pitting edema < 1/4 inch (6 mm) " "deep, rest and elevation eliminate most or all swelling   - MODERATE edema - swelling of lower leg to knee, pitting edema > 1/4 inch (6 mm) deep, rest and elevation only partially reduce swelling   - SEVERE edema - swelling extends above knee, facial or hand swelling present       Looks like a vein is swollen and tortuous  4. REDNESS: \"Does the swelling look red or infected?\"      red  5. PAIN: \"Is the swelling painful to touch?\" If Yes, ask: \"How painful is it?\"   (Scale 1-10; mild, moderate or severe)      Yes,   6. FEVER: \"Do you have a fever?\" If Yes, ask: \"What is it, how was it measured, and when did it start?\"       no  7. CAUSE: \"What do you think is causing the leg swelling?\"      Blood clot  8. MEDICAL HISTORY: \"Do you have a history of heart failure, kidney disease, liver failure, or cancer?\"      no  9. RECURRENT SYMPTOM: \"Have you had leg swelling before?\" If Yes, ask: \"When was the last time?\" \"What happened that time?\"      no  10. OTHER SYMPTOMS: \"Do you have any other symptoms?\" (e.g., chest pain, difficulty breathing)        no  11. PREGNANCY: \"Is there any chance you are pregnant?\" \"When was your last menstrual period?\"        na    Protocols used: LEG SWELLING AND EDEMA-ADULT-AH      "

## 2022-01-05 ENCOUNTER — TELEPHONE (OUTPATIENT)
Dept: PSYCHIATRY | Age: 57
End: 2022-01-05

## 2022-01-05 NOTE — TELEPHONE ENCOUNTER
Called and lvm reminding pt of appt for [email or web address]      Electronically signed by Dena Beyer on 1/5/2022 at 11:50 AM

## 2022-01-06 ENCOUNTER — OFFICE VISIT (OUTPATIENT)
Dept: PSYCHIATRY | Age: 57
End: 2022-01-06
Payer: MEDICAID

## 2022-01-06 ENCOUNTER — OFFICE VISIT (OUTPATIENT)
Dept: PRIMARY CARE CLINIC | Age: 57
End: 2022-01-06
Payer: MEDICAID

## 2022-01-06 VITALS
HEART RATE: 70 BPM | HEIGHT: 69 IN | SYSTOLIC BLOOD PRESSURE: 138 MMHG | DIASTOLIC BLOOD PRESSURE: 72 MMHG | OXYGEN SATURATION: 94 % | BODY MASS INDEX: 41.34 KG/M2 | WEIGHT: 279.1 LBS | TEMPERATURE: 97.3 F

## 2022-01-06 VITALS
BODY MASS INDEX: 41.32 KG/M2 | TEMPERATURE: 98.6 F | OXYGEN SATURATION: 97 % | SYSTOLIC BLOOD PRESSURE: 138 MMHG | HEIGHT: 69 IN | WEIGHT: 279 LBS | HEART RATE: 68 BPM | DIASTOLIC BLOOD PRESSURE: 72 MMHG

## 2022-01-06 DIAGNOSIS — F31.9 BIPOLAR 1 DISORDER (HCC): Primary | ICD-10-CM

## 2022-01-06 DIAGNOSIS — I83.811 VARICOSE VEINS OF RIGHT LOWER EXTREMITY WITH PAIN: ICD-10-CM

## 2022-01-06 DIAGNOSIS — I80.9 THROMBOPHLEBITIS: ICD-10-CM

## 2022-01-06 DIAGNOSIS — M75.42 IMPINGEMENT SYNDROME OF LEFT SHOULDER: Primary | ICD-10-CM

## 2022-01-06 PROCEDURE — 99212 OFFICE O/P EST SF 10 MIN: CPT | Performed by: NURSE PRACTITIONER

## 2022-01-06 PROCEDURE — 99214 OFFICE O/P EST MOD 30 MIN: CPT | Performed by: FAMILY MEDICINE

## 2022-01-06 NOTE — PROGRESS NOTES
1/6/22            Progress Note    Orpacipriano Ochoa 1965      Chief Complaint   Patient presents with    Medication Check    Follow-up         Subjective:    Patient is a 61 y.o. male diagnosed with bipolar 1 and presents today for follow-up. Last seen in clinic on 10/7/2021  and prior records were reviewed. Last visit: \"Doing pretty good today. \" came into the office today agitated and irritated. He went on to describe his ER experience from several months ago in may. He was visibly distressed while he was recalling events that happened during his ER visit. He reported that he felt mistreated in the ER but whenever he got to the unit the treated him like Kita headley. \"  He reports better mood coverage today since stopping the Latuda. He has had decreased facial movements and reports that his hair is not thinning. He reports not using his sleep apnea machine we discussed the importance of following with treatment he reports that he will begin using it. We are checking a Depakote level in the morning. He denies SI HI AVH he denies side effects of medications at this time. Big concern is that he feels that he does not have the energy to do things that he used to enjoy. We discussed beginning therapy a list of referrals was presented to him at this time. We discussed the importance of therapy that would help him manage his anxiety. We also discussed following up with hospital representative to convey his experience in the ER. Patient verbalized understanding. Today:  Feels like the depakote is working really well. Feels like he had a spiritual breakthrough at [employer] about a month ago and has been feeling really good over all. Mood is doing really well, work is doing really well. He has had more mood stability than before, wife is present with him during visit today and states he is doing really well. Last VPA level was done on 10/8/21 it was 96.8  We will keep an eye on it.   He denies si hi avh at this time. He denies side effects of medications at this time. He is calm and cooperative. He came from a doctors appointment today and has a blood clot in his leg, he is being followed by primary and they are not concerned at this time. Absent  suicidal ideation. Reports compliance with medications as good . Sleep:  sleeping in a chair upright getting 6-7 hours per night. Caffeine use: tea, diet cokes    PREVIOUS MED TRIALS    Abilify  Risperdal   Depakote (current)  Buspirone  Trazodone (current)  Diazepam  Ativan  Gabapentin (current)  Melatonin  latuda - too sedating    Current Substance Use:   Alcohol: none  Illicit drug use: denies   Marijuana: denies   Tobacco: denies   Vape: denies       BP: There were no vitals taken for this visit.       Review of Systems - 14 point review:  Negative     Constitutional: (fevers, chills, night sweats, wt loss/gain, change in appetite, fatigue, somnolence)    HEENT: (ear pain or discharge, hearing loss, ear ringing, sinus pressure, nosebleed, nasal discharge, sore throat, oral sores, tooth pain, bleeding gums, hoarse voice, neck pain)      Cardiovascular: (HTN, chest pain, elevated cholesterol/lipids, palpitations, leg swelling, leg pain with walking)    Respiratory: (cough, wheezing, snoring, SOB with activity (dyspnea), SOB while lying flat (orthopnea), awakening with severe SOB (paroxysmal nocturnal dyspnea))    Gastrointestinal: (NVD, constipation, abdominal pain, bright red stools, black tarry stools, stool incontinence)     Genitourinary:  (pelvic pain, burning or frequency of urination, urinary urgency, blood in urine incomplete bladder emptying, urinary incontinence, STD; MEN: testicular pain or swelling, erectile dysfunction; WOMEN: LMP, heavy menstrual bleeding (menorrhagia), irregular periods, postmenopausal bleeding, menstrual pain (dymenorrhea, vaginal discharge)    Musculoskeletal: (bone pain/fracture, joint pain or swelling, musle pain)    Integumentary: (rashes, acne, non-healing sores, itching, breast lumps, breast pain, nipple discharge, hair loss)    Neurologic: (HA, muscle weakness, paresthesias (numbness, coldness, crawling or prickling), memory loss, seizure, dizziness)    Psychiatric:  (anxiety, sadness, irritability/anger, insomnia, suicidality)    Endocrine: (heat or cold intolerance, excessive thirst (polydipsia), excessive hunger (polyphagia))    Immune/Allergic: (hives, seasonal or environmental allergies, HIV exposure)    Hematologic/Lymphatic: (lymph node enlargement, easy bleeding or bruising)    History obtained via chart review and patient    PCP is Ivone Estes MD       Current Meds:    Prior to Admission medications    Medication Sig Start Date End Date Taking? Authorizing Provider   rivaroxaban (XARELTO) 10 MG TABS tablet Take 1 tablet by mouth daily (with breakfast) 1/4/22 2/18/22  TRIP Meza   amLODIPine (NORVASC) 10 MG tablet TAKE 1 TABLET BY MOUTH ONCE DAILY. 12/15/21   Quentin Buckley MD   lisinopril-hydroCHLOROthiazide (PRINZIDE;ZESTORETIC) 20-25 MG per tablet TAKE 1 TABLET BY MOUTH ONCE DAILY 12/15/21   Quentin Buckley MD   levothyroxine (SYNTHROID) 50 MCG tablet TAKE 1 TABLET BY MOUTH ONCE DAILY. 12/15/21   Quentin Buckley MD   vitamin D (ERGOCALCIFEROL) 1.25 MG (42660 UT) CAPS capsule TAKE 1 CAPSULE BY MOUTH WEEKLY 12/15/21   Quentin Buckley MD   Multiple Vitamin (MULTIVITAMIN ADULT PO) Take by mouth daily    Historical Provider, MD   diclofenac (VOLTAREN) 50 MG EC tablet Take 1 tablet by mouth 3 times daily (with meals) For 2 weeks and then as needed thereafter 10/28/21   Quentin Buckley MD   divalproex (DEPAKOTE ER) 500 MG extended release tablet TAKE 3 TABLETS BY MOUTH EVERY DAY. 10/13/21   Shlomo Long MD   metoprolol tartrate (LOPRESSOR) 25 MG tablet TAKE 2 TABLETS BY MOUTH TWICE DAILY  8/27/21   Quentin Buckley MD   furosemide (LASIX) 20 MG tablet TAKE 1 TABLET BY MOUTH ONCE DAILY.   8/27/21 Tana Cristina MD   gabapentin (NEURONTIN) 300 MG capsule TAKE 1 CAPSULE BY MOUTH THREE TIMES DAILY. 7/12/21 1/6/22  Tana Cristina MD   omeprazole (PRILOSEC) 20 MG delayed release capsule TAKE 1 CAPSULE BY MOUTH DAILY  7/11/21   Tana Cristina MD   atorvastatin (LIPITOR) 40 MG tablet TAKE 1 TABLET BY MOUTH ONCE DAILY. 3/21/21   Tana Cristina MD     Social History     Socioeconomic History    Marital status:      Spouse name: Not on file    Number of children: Not on file    Years of education: Not on file    Highest education level: Not on file   Occupational History    Not on file   Tobacco Use    Smoking status: Never Smoker    Smokeless tobacco: Never Used   Vaping Use    Vaping Use: Never used   Substance and Sexual Activity    Alcohol use: No    Drug use: No    Sexual activity: Yes   Other Topics Concern    Not on file   Social History Narrative    5/6/2021    PRIOR MEDICATION TRIALS            . SLEEP STUDY: yes - [facility]WK, Bipap    .    negative history of seizures. .    negative history of head trauma. Naman Romero PREVIOUS PSYCHIATRIC HISTORY    Had an episode of brief acute psychosis when he was in the [employer] (age 22). About ten years ago he had another episode but he doesn't remember what medicines he was taking - his inpatient intake in April, 2021 states that he was started on something which incapacitated him, then was started on Depakote. (He may have been started on Abilify at this time as he and his wife reports that Abilify did not work well for him.) He stopped having problems some years after that and he asked a doctor to take him off medications. He has been off medications for about 7 years. He has recently been admitted to inpatient with a severe episode of amy, 4/26/21-5/1/21. Naman Romero FAMILY PSYCHIATRIC HISTORY    Father, bipolar    Mother, depression    . SOCIAL HISTORY    Born and Raised - [place], [place], in a 2 parent home with 3 siblings.  He says he had a wonderful childhood.  twice. Has one daughter from the first marriage. Has been  to his second wife for 26 years. They have 3 adopted children and 1 adult adopted child. .    Trauma and/or Abuse - Denies trauma. He was devastated with his divorce from his first wife. .    Legal - denies    Substance Use - see history    Work History - see history    Education - see history     status - [employer] for a few months, honorably discharged after an episode of Acute Psychosis    . PAST SUICIDE ATTEMPTS:    no    .    INPATIENT HOSPITALIZATIONS:    yes - three times, [facility] - diagnosed with Acute Psychosis; 10 years ago - diagnosed with bipolar disorder, and most recently 4/26-4/29/21 with a manic episode    . DRUG REHABILITATION:    no    .     Social Determinants of Health     Financial Resource Strain: Low Risk     Difficulty of Paying Living Expenses: Not hard at all   Food Insecurity: No Food Insecurity    Worried About Running Out of Food in the Last Year: Never true    Patricia of Food in the Last Year: Never true   Transportation Needs: No Transportation Needs    Lack of Transportation (Medical): No    Lack of Transportation (Non-Medical):  No   Physical Activity:     Days of Exercise per Week: Not on file    Minutes of Exercise per Session: Not on file   Stress:     Feeling of Stress : Not on file   Social Connections:     Frequency of Communication with Friends and Family: Not on file    Frequency of Social Gatherings with Friends and Family: Not on file    Attends Sikh Services: Not on file    Active Member of Clubs or Organizations: Not on file    Attends Club or Organization Meetings: Not on file    Marital Status: Not on file   Intimate Partner Violence:     Fear of Current or Ex-Partner: Not on file    Emotionally Abused: Not on file    Physically Abused: Not on file    Sexually Abused: Not on file   Housing Stability:     Unable to Pay for Housing in the Last Year: Not on file    Number of Places Lived in the Last Year: Not on file    Unstable Housing in the Last Year: Not on file       MSE:     Appearance: Appropriately groomed. Made good eye contact. Gait stable. No abnormal movements or tremor. Behavior: Calm, cooperative, and socially appropriate. No psychomotor retardation/agitation appreciated. Speech: Normal in tone, volume, and quality. No slurring, dysarthria or pressured speech noted. Mood: \"really good\"   Affect: Mood congruent. Thought Process: Appears linear, logical and goal oriented. Causality appears intact. Thought Content: Denies active suicidal and homicidal ideations. No overt delusions or paranoia appreciated. Perceptions: Denies auditory or visual hallucinations at present time. Not responding to internal stimuli. Concentration: Intact. Orientation: to person, place, date, and situation. Language: Intact. Fund of information: Intact. Memory: Recent and remote appear intact. Impulsivity: Limited. Neurovegitative: Fair appetite and sleep. Insight: Fair. Judgment: Fair. Cognition: Can spell \"world\" backwards: Yes                    Can do serial 7's:  Yes    Lab Results   Component Value Date     04/26/2021    K 3.5 04/26/2021     04/26/2021    CO2 20 (L) 04/26/2021    BUN 14 04/26/2021    CREATININE 0.8 04/26/2021    GLUCOSE 100 04/26/2021    CALCIUM 9.0 04/26/2021    PROT 7.0 04/26/2021    LABALBU 4.0 04/26/2021    BILITOT 0.6 04/26/2021    ALKPHOS 51 04/26/2021    AST 24 04/26/2021    ALT 17 04/26/2021    LABGLOM >60 04/26/2021    GFRAA >59 04/26/2021    GLOB 2.7 03/31/2017     Lab Results   Component Value Date     04/26/2021    K 3.5 04/26/2021     04/26/2021    CO2 20 04/26/2021    BUN 14 04/26/2021    CREATININE 0.8 04/26/2021    GLUCOSE 100 04/26/2021    CALCIUM 9.0 04/26/2021      Lab Results   Component Value Date    CHOL 101 (L) 04/28/2021     Lab Results Component Value Date    TRIG 90 04/28/2021     Lab Results   Component Value Date    HDL 31 (L) 04/28/2021     Lab Results   Component Value Date    LDLCALC 52 04/28/2021     No results found for: LABVLDL, VLDL  No results found for: [facility]  Lab Results   Component Value Date    LABA1C 5.1 04/28/2021     No results found for: EAG  Lab Results   Component Value Date    TSHFT4 1.61 04/26/2021    TSH 1.690 03/03/2021     Lab Results   Component Value Date    VITD25 19.1 (L) 04/28/2021     Lab Results   Component Value Date    [id number] 584 04/14/2021      No results found for: FOLATE     Assessment:   1. Bipolar 1 disorder (HCC)        No evidence of acute suicidality, homicidality or psychosis observed. Patient is psychiatrically stable    Plan:    1. Continue   depakote ER 1500 mg nightly for mood stability  Vitamin D 50,000 units weekly      Decrease     Start      Discontinue      The risks, benefits, side effects, indications, contraindications, and adverse effects of the medications have been discussed. Yes.  2. The pt has verbalized understanding and has capacity to give informed consent. 3. The Bruno Vallejo report has been reviewed according to [facility] regulations. 4. Supportive therapy offered. 5. Follow up: No follow-ups on file. 6. The patient has been advised to call with any problems. 7. Controlled substance Treatment Plan: NA.  8. The above listed medications have been continued, modifications in meds and other orders/labs as follows: No orders of the defined types were placed in this encounter. No orders of the defined types were placed in this encounter. 9. Additional comments:  discussed sleep hygiene, discussed the use of coping skills and relaxation strategies to manage symptoms. Reinforced appointment compliance      10. Over 50% of the total visit time of  15 minutes was spent on counseling and/or coordination of care of:                        1. Bipolar 1 disorder ([facility] [address] Psychotherapy Topics: mood/medication effectiveness family, financial, health and occupational    Debby Batres, APRN - CNP    This dictation was generated by voice recognition computer software. Although all attempts are made to edit the dictation for accuracy, there may be errors in the transcription that are not intended.

## 2022-01-06 NOTE — PATIENT INSTRUCTIONS

## 2022-01-06 NOTE — PROGRESS NOTES
Dipak Packer is a 61 y.o. male who presents today for   Chief Complaint   Patient presents with    Varicose Veins     patient was seen in  tuesday and has a blood clot in right leg    Shoulder Pain     Left shoulder, pt reports he is still having discomfort        HPI  Patient is here today for a 1 month follow up on left shoulder. He notes he is still having discomfort and limited mobility. Patient also reports he was seen at [facility] and was told he has a blood clot in his right leg. He notes it is very painful and is having difficulty walking. They did prescribe him xarelto. He is not having any issues with the Xarelto that he did start. He notes that the area is getting smaller of the pain. Patient notes that for his shoulder it is slowly improving. He did not notice any improvement with the injection previously though. He did have an MRI of the shoulder. No change in PMH, family, social, or surgical history unless mentioned above. I have reviewed the above chief complaint and HPI details charted by staff and claim ownership of the documentation. Review of Systems   Constitutional: Negative for chills and fever. Respiratory: Negative for cough, chest tightness, shortness of breath and wheezing. Cardiovascular: Positive for leg swelling. Negative for chest pain and palpitations. Gastrointestinal: Negative for abdominal pain, constipation, diarrhea, nausea and vomiting. Genitourinary: Negative for difficulty urinating, dysuria and frequency. Musculoskeletal: Positive for arthralgias and gait problem.        Past Medical History:   Diagnosis Date    Bipolar disorder ([facility] [address]     Colon polyps     Diverticular disease     GERD (gastroesophageal reflux disease)     Hiatal hernia     Hyperlipidemia     Hypertension     Hypothyroidism     Neuropathy     Unspecified sleep apnea        Current Outpatient Medications   Medication Sig Dispense Refill    rivaroxaban (XARELTO) 10 MG TABS tablet Take 1 tablet by mouth daily (with breakfast) 45 tablet 0    amLODIPine (NORVASC) 10 MG tablet TAKE 1 TABLET BY MOUTH ONCE DAILY. 90 tablet 0    lisinopril-hydroCHLOROthiazide (PRINZIDE;ZESTORETIC) 20-25 MG per tablet TAKE 1 TABLET BY MOUTH ONCE DAILY 90 tablet 0    levothyroxine (SYNTHROID) 50 MCG tablet TAKE 1 TABLET BY MOUTH ONCE DAILY. 90 tablet 0    vitamin D (ERGOCALCIFEROL) 1.25 MG (19008 UT) CAPS capsule TAKE 1 CAPSULE BY MOUTH WEEKLY 11 capsule 1    Multiple Vitamin (MULTIVITAMIN ADULT PO) Take by mouth daily      diclofenac (VOLTAREN) 50 MG EC tablet Take 1 tablet by mouth 3 times daily (with meals) For 2 weeks and then as needed thereafter 90 tablet 0    divalproex (DEPAKOTE ER) 500 MG extended release tablet TAKE 3 TABLETS BY MOUTH EVERY DAY. 90 tablet 2    metoprolol tartrate (LOPRESSOR) 25 MG tablet TAKE 2 TABLETS BY MOUTH TWICE DAILY  120 tablet 4    furosemide (LASIX) 20 MG tablet TAKE 1 TABLET BY MOUTH ONCE DAILY. 30 tablet 5    gabapentin (NEURONTIN) 300 MG capsule TAKE 1 CAPSULE BY MOUTH THREE TIMES DAILY. 90 capsule 5    omeprazole (PRILOSEC) 20 MG delayed release capsule TAKE 1 CAPSULE BY MOUTH DAILY  30 capsule 11    atorvastatin (LIPITOR) 40 MG tablet TAKE 1 TABLET BY MOUTH ONCE DAILY. 90 tablet 0     No current facility-administered medications for this visit.        No Known Allergies    Past Surgical History:   Procedure Laterality Date    COLONOSCOPY  01/06/2009    Dr Vigil [place]: hyperplastic polyp, resolving diverticulitis    COLONOSCOPY  07/2012    minimal diverticulosis left side o/w normal postsurgical colon    COLONOSCOPY  12/2005    diverticulitis    COLONOSCOPY N/A 11/8/2016    Dr Martha Bose bleeding internal hemorrhoids, diverticular disease-5 yr recall    EGD      NV EGD TRANSORAL BIOPSY SINGLE/MULTIPLE N/A 8/24/2018    Dr JONATHAN Bentley-Gastritis/gastropathy, esophagitis    SUBTOTAL COLECTOMY  2010    recurrant diverticulitis    UPPER GASTROINTESTINAL ENDOSCOPY  2005    Dr Ahumada Nery: duodenal ulcer, gastritis    UPPP UVULOPALATOPHARYGOPLASTY         Social History     Tobacco Use    Smoking status: Never Smoker    Smokeless tobacco: Never Used   Vaping Use    Vaping Use: Never used   Substance Use Topics    Alcohol use: No    Drug use: No       Family History   Problem Relation Age of Onset    Diabetes Mother     Kidney Disease Mother     Dementia Mother          at 68 - Lewy Body Dementia    Coronary Art Dis Father          at [de-identified]   Carito Quintanilla Colon Cancer Neg Hx     Colon Polyps Neg Hx     Esophageal Cancer Neg Hx     Liver Cancer Neg Hx     Liver Disease Neg Hx     Rectal Cancer Neg Hx     Stomach Cancer Neg Hx        /72 (Site: Left Upper Arm)   Pulse 68   Temp 98.6 °F (37 °C)   Ht 5' 9\" (1.753 m)   Wt 279 lb (126.6 kg)   SpO2 97%   BMI 41.20 kg/m²     Physical Exam  Vitals and nursing note reviewed. Constitutional:       General: He is not in acute distress. Appearance: He is well-developed. He is not toxic-appearing or diaphoretic. HENT:      Head: Normocephalic and atraumatic. Cardiovascular:      Rate and Rhythm: Normal rate and regular rhythm. Heart sounds: Normal heart sounds. No murmur heard. No friction rub. No gallop. Pulmonary:      Effort: Pulmonary effort is normal. No respiratory distress. Breath sounds: Normal breath sounds. No wheezing or rales. Chest:      Chest wall: No tenderness. Abdominal:      General: Bowel sounds are normal. There is no distension. Palpations: Abdomen is soft. There is no mass. Tenderness: There is no abdominal tenderness. There is no guarding or rebound. Musculoskeletal:         General: Swelling (swelling of superficial thromboses R medial anterior mid knee and leg portion) present. No tenderness. Right lower leg: No edema. Left lower leg: No edema. Skin:     General: Skin is warm and dry. Findings: No bruising or lesion. Nails: There is no clubbing. Neurological:      Mental Status: He is alert and oriented to person, place, and time. Cranial Nerves: No cranial nerve deficit. Motor: Weakness (L shoulder at 80 degree abduction has pain going higher and limited but improved abduction and extntion greater than 90) present. Coordination: Coordination normal.      Gait: Gait normal.         Assessment:    ICD-10-CM    1. Impingement syndrome of left shoulder  M75.42    2. Varicose veins of right lower extremity with pain  I83.811    3. Thrombophlebitis  I80.9        Plan:   Reviewed MRI, appears to be impingement syndrome. Exercises provided. Hold off on further injections as he did not have improvement first time. Patient to complete this month of Xarelto and then stop as this was a superficial thromboses but he did have significant discomfort which is understandable. No orders of the defined types were placed in this encounter. No orders of the defined types were placed in this encounter. There are no discontinued medications. Patient Instructions   Patient Education        Rotator Cuff: Exercises  Introduction  Here are some examples of exercises for you to try. The exercises may be suggested for a condition or for rehabilitation. Start each exercise slowly. Ease off the exercises if you start to have pain. You will be told when to start these exercises and which ones will work best for you. How to do the exercises  Pendulum swing    If you have pain in your back, do not do this exercise. 1. Hold on to a table or the back of a chair with your good arm. Then bend forward a little and let your sore arm hang straight down. This exercise does not use the arm muscles. Rather, use your legs and your hips to create movement that makes your arm swing freely. 2. Use the movement from your hips and legs to guide the slightly swinging arm back and forth like a pendulum (or elephant trunk).  Then guide it in circles that start small (about the size of a dinner plate). Make the circles a bit larger each day, as your pain allows. 3. Do this exercise for 5 minutes, 5 to 7 times each day. 4. As you have less pain, try bending over a little farther to do this exercise. This will increase the amount of movement at your shoulder. Posterior stretching exercise    1. Hold the elbow of your injured arm with your other hand. 2. Use your hand to pull your injured arm gently up and across your body. You will feel a gentle stretch across the back of your injured shoulder. 3. Hold for at least 15 to 30 seconds. Then slowly lower your arm. 4. Repeat 2 to 4 times. Up-the-back stretch    Your doctor or physical therapist may want you to wait to do this stretch until you have regained most of your range of motion and strength. You can do this stretch in different ways. Hold any of these stretches for at least 15 to 30 seconds. Repeat them 2 to 4 times. 1. Light stretch: Put your hand in your back pocket. Let it rest there to stretch your shoulder. 2. Moderate stretch: With your other hand, hold your injured arm (palm outward) behind your back by the wrist. Pull your arm up gently to stretch your shoulder. 3. Advanced stretch: Put a towel over your other shoulder. Put the hand of your injured arm behind your back. Now hold the back end of the towel. With the other hand, hold the front end of the towel in front of your body. Pull gently on the front end of the towel. This will bring your hand farther up your back to stretch your shoulder. Overhead stretch    1. Standing about an arm's length away, grasp onto a solid surface. You could use a countertop, a doorknob, or the back of a sturdy chair. 2. With your knees slightly bent, bend forward with your arms straight. Lower your upper body, and let your shoulders stretch. 3. As your shoulders are able to stretch farther, you may need to take a step or two backward.   4. Hold for at least 15 to 30 seconds. Then stand up and relax. If you had stepped back during your stretch, step forward so you can keep your hands on the solid surface. 5. Repeat 2 to 4 times. Shoulder flexion (lying down)    To make a wand for this exercise, use a piece of PVC pipe or a broom handle with the broom removed. Make the wand about a foot wider than your shoulders. 1. Lie on your back, holding a wand with both hands. Your palms should face down as you hold the wand. 2. Keeping your elbows straight, slowly raise your arms over your head. Raise them until you feel a stretch in your shoulders, upper back, and chest.  3. Hold for 15 to 30 seconds. 4. Repeat 2 to 4 times. Shoulder rotation (lying down)    To make a wand for this exercise, use a piece of PVC pipe or a broom handle with the broom removed. Make the wand about a foot wider than your shoulders. 1. Lie on your back. Hold a wand with both hands with your elbows bent and palms up. 2. Keep your elbows close to your body, and move the wand across your body toward the sore arm. 3. Hold for 8 to 12 seconds. 4. Repeat 2 to 4 times. Wall climbing (to the side)    Avoid any movement that is straight to your side, and be careful not to arch your back. Your arm should stay about 30 degrees to the front of your side. 1. Stand with your side to a wall so that your fingers can just touch it at an angle about 30 degrees toward the front of your body. 2. Walk the fingers of your injured arm up the wall as high as pain permits. Try not to shrug your shoulder up toward your ear as you move your arm up. 3. Hold that position for a count of at least 15 to 20.  4. Walk your fingers back down to the starting position. 5. Repeat at least 2 to 4 times. Try to reach higher each time. Wall climbing (to the front)    During this stretching exercise, be careful not to arch your back. 1. Face a wall, and stand so your fingers can just touch it.   2. Keeping your shoulder down, walk the fingers of your injured arm up the wall as high as pain permits. (Don't shrug your shoulder up toward your ear.)  3. Hold your arm in that position for at least 15 to 30 seconds. 4. Slowly walk your fingers back down to where you started. 5. Repeat at least 2 to 4 times. Try to reach higher each time. Shoulder blade squeeze    1. Stand with your arms at your sides, and squeeze your shoulder blades together. Do not raise your shoulders up as you squeeze. 2. Hold 6 seconds. 3. Repeat 8 to 12 times. Scapular exercise: Arm reach    1. Lie flat on your back. This exercise is a very slight motion that starts with your arms raised (elbows straight, arms straight). 2. From this position, reach higher toward the charanjit or ceiling. Keep your elbows straight. All motion should be from your shoulder blade only. 3. Relax your arms back to where you started. 4. Repeat 8 to 12 times. Arm raise to the side    During this strengthening exercise, your arm should stay about 30 degrees to the front of your side. 1. Slowly raise your injured arm to the side, with your thumb facing up. Raise your arm 60 degrees at the most (shoulder level is 90 degrees). 2. Hold the position for 3 to 5 seconds. Then lower your arm back to your side. If you need to, bring your \"good\" arm across your body and place it under the elbow as you lower your injured arm. Use your good arm to keep your injured arm from dropping down too fast.  3. Repeat 8 to 12 times. 4. When you first start out, don't hold any extra weight in your hand. As you get stronger, you may use a 1-pound to 2-pound dumbbell or a small can of food. Shoulder flexor and extensor exercise    These are isometric exercises. That means you contract your muscles without actually moving. 1. Push forward (flex): Stand facing a wall or doorjamb, about 6 inches or less back. Hold your injured arm against your body. Make a closed fist with your thumb on top.  Then gently push your hand forward into the wall with about 25% to 50% of your strength. Don't let your body move backward as you push. Hold for about 6 seconds. Relax for a few seconds. Repeat 8 to 12 times. 2. Push backward (extend): Stand with your back flat against a wall. Your upper arm should be against the wall, with your elbow bent 90 degrees (your hand straight ahead). Push your elbow gently back against the wall with about 25% to 50% of your strength. Don't let your body move forward as you push. Hold for about 6 seconds. Relax for a few seconds. Repeat 8 to 12 times. Scapular exercise: Wall push-ups    This exercise is best done with your fingers somewhat turned out, rather than straight up and down. 1. Stand facing a wall, about 12 inches to 18 inches away. 2. Place your hands on the wall at shoulder height. 3. Slowly bend your elbows and bring your face to the wall. Keep your back and hips straight. 4. Push back to where you started. 5. Repeat 8 to 12 times. 6. When you can do this exercise against a wall comfortably, you can try it against a counter. You can then slowly progress to the end of a couch, then to a sturdy chair, and finally to the floor. Scapular exercise: Retraction    For this exercise, you will need elastic exercise material, such as surgical tubing or Thera-Band. 1. Put the band around a solid object at about waist level. (A bedpost will work well.) Each hand should hold an end of the band. 2. With your elbows at your sides and bent to 90 degrees, pull the band back. Your shoulder blades should move toward each other. Then move your arms back where you started. 3. Repeat 8 to 12 times. 4. If you have good range of motion in your shoulders, try this exercise with your arms lifted out to the sides. Keep your elbows at a 90-degree angle. Raise the elastic band up to about shoulder level. Pull the band back to move your shoulder blades toward each other.  Then move your arms back where you started. Internal rotator strengthening exercise    1. Start by tying a piece of elastic exercise material to a doorknob. You can use surgical tubing or Thera-Band. 2. Stand or sit with your shoulder relaxed and your elbow bent 90 degrees. Your upper arm should rest comfortably against your side. Squeeze a rolled towel between your elbow and your body for comfort. This will help keep your arm at your side. 3. Hold one end of the elastic band in the hand of the painful arm. 4. Slowly rotate your forearm toward your body until it touches your belly. Slowly move it back to where you started. 5. Keep your elbow and upper arm firmly tucked against the towel roll or at your side. 6. Repeat 8 to 12 times. External rotator strengthening exercise    1. Start by tying a piece of elastic exercise material to a doorknob. You can use surgical tubing or Thera-Band. (You may also hold one end of the band in each hand.)  2. Stand or sit with your shoulder relaxed and your elbow bent 90 degrees. Your upper arm should rest comfortably against your side. Squeeze a rolled towel between your elbow and your body for comfort. This will help keep your arm at your side. 3. Hold one end of the elastic band with the hand of the painful arm. 4. Start with your forearm across your belly. Slowly rotate the forearm out away from your body. Keep your elbow and upper arm tucked against the towel roll or the side of your body until you begin to feel tightness in your shoulder. Slowly move your arm back to where you started. 5. Repeat 8 to 12 times. Follow-up care is a key part of your treatment and safety. Be sure to make and go to all appointments, and call your doctor if you are having problems. It's also a good idea to know your test results and keep a list of the medicines you take. Where can you learn more? Go to https://osvaldo.[employer]. org and sign in to your [employer] account.  Enter Ronal Rose in the [facility] box to learn more about \"Rotator Cuff: Exercises. \"     If you do not have an account, please click on the \"Sign Up Now\" link. Current as of: July 1, 2021               Content Version: 13.1  © 8505-1713 [employer]. Care instructions adapted under license by [facility] ([facility]). If you have questions about a medical condition or this instruction, always ask your healthcare professional. [place] [address] any warranty or liability for your use of this information. Patient given educational handouts and has had all questions answered. Patient voices understanding and agrees to plans along with risks and benefits of plan. Patient isinstructed to continue prior meds, diet, and exercise plans unless instructed otherwise. Patient agrees to follow up as instructed and sooner if needed. Patient agrees to go to ER if condition becomes emergent. Notesmay be completed with dictation device and spelling errors may occur. Materials may be copied and pasted from a notepad outside of EMR, all of which, I, Dr. Michael Armstrong MD, take sole intellectual ownership of and have approved adding to my note. Return if symptoms worsen or fail to improve.

## 2022-01-07 ENCOUNTER — TELEPHONE (OUTPATIENT)
Dept: PSYCHIATRY | Age: 57
End: 2022-01-07

## 2022-01-07 ENCOUNTER — TELEPHONE (OUTPATIENT)
Dept: PRIMARY CARE CLINIC | Age: 57
End: 2022-01-07

## 2022-01-07 NOTE — TELEPHONE ENCOUNTER
Called and spoke with patients wife (on hippa). She stated results were reviewed on my chart and discussed during office visit with Dr. Jake Cabrera.

## 2022-01-07 NOTE — TELEPHONE ENCOUNTER
convey his experience in the ER. Patient verbalized understanding.     Today:  Feels like the depakote is working really well. Feels like he had a spiritual breakthrough at [employer] about a month ago and has been feeling really good over all. Mood is doing really well, work is doing really well. He has had more mood stability than before, wife is present with him during visit today and states he is doing really well. Last VPA level was done on 10/8/21 it was 96.8  We will keep an eye on it. He denies si hi avh at this time. He denies side effects of medications at this time. He is calm and cooperative. He came from a doctors appointment today and has a blood clot in his leg, he is being followed by primary and they are not concerned at this time.           Absent  suicidal ideation.     Reports compliance with medications as good .      Sleep:  sleeping in a chair upright getting 6-7 hours per night.     Caffeine use: tea, diet cokes     PREVIOUS MED TRIALS     Abilify  Risperdal   Depakote (current)  Buspirone  Trazodone (current)  Diazepam  Ativan  Gabapentin (current)  Melatonin  latuda - too sedating     Current Substance Use:   Alcohol: none  Illicit drug use: denies   Marijuana: denies   Tobacco: denies   Vape: denies         BP: There were no vitals taken for this visit.        Review of Systems - 14 point review:  Negative      Constitutional: (fevers, chills, night sweats, wt loss/gain, change in appetite, fatigue, somnolence)     HEENT: (ear pain or discharge, hearing loss, ear ringing, sinus pressure, nosebleed, nasal discharge, sore throat, oral sores, tooth pain, bleeding gums, hoarse voice, neck pain)      Cardiovascular: (HTN, chest pain, elevated cholesterol/lipids, palpitations, leg swelling, leg pain with walking)     Respiratory: (cough, wheezing, snoring, SOB with activity (dyspnea), SOB while lying flat (orthopnea), awakening with severe SOB (paroxysmal nocturnal dyspnea))     Gastrointestinal: (NVD, constipation, abdominal pain, bright red stools, black tarry stools, stool incontinence)     Genitourinary:  (pelvic pain, burning or frequency of urination, urinary urgency, blood in urine incomplete bladder emptying, urinary incontinence, STD; MEN: testicular pain or swelling, erectile dysfunction; WOMEN: LMP, heavy menstrual bleeding (menorrhagia), irregular periods, postmenopausal bleeding, menstrual pain (dymenorrhea, vaginal discharge)     Musculoskeletal: (bone pain/fracture, joint pain or swelling, musle pain)     Integumentary: (rashes, acne, non-healing sores, itching, breast lumps, breast pain, nipple discharge, hair loss)     Neurologic: (HA, muscle weakness, paresthesias (numbness, coldness, crawling or prickling), memory loss, seizure, dizziness)     Psychiatric:  (anxiety, sadness, irritability/anger, insomnia, suicidality)     Endocrine: (heat or cold intolerance, excessive thirst (polydipsia), excessive hunger (polyphagia))     Immune/Allergic: (hives, seasonal or environmental allergies, HIV exposure)     Hematologic/Lymphatic: (lymph node enlargement, easy bleeding or bruising)     History obtained via chart review and patient     PCP is Tara Childress MD         Current Meds:     Home Medications           Prior to Admission medications    Medication Sig Start Date End Date Taking? Authorizing Provider   rivaroxaban (XARELTO) 10 MG TABS tablet Take 1 tablet by mouth daily (with breakfast) 1/4/22 2/18/22   TRIP Bradley   amLODIPine (NORVASC) 10 MG tablet TAKE 1 TABLET BY MOUTH ONCE DAILY. 12/15/21     Valentino Capuchin, MD   lisinopril-hydroCHLOROthiazide (PRINZIDE;ZESTORETIC) 20-25 MG per tablet TAKE 1 TABLET BY MOUTH ONCE DAILY 12/15/21     Valentino Capuchin, MD   levothyroxine (SYNTHROID) 50 MCG tablet TAKE 1 TABLET BY MOUTH ONCE DAILY.  12/15/21     Valentino Capuchin, MD   vitamin D (ERGOCALCIFEROL) 1.25 MG (02121 UT) CAPS capsule TAKE 1 CAPSULE BY MOUTH WEEKLY 12/15/21     Allan Isaac MD   Multiple Vitamin (MULTIVITAMIN ADULT PO) Take by mouth daily       Historical Provider, MD   diclofenac (VOLTAREN) 50 MG EC tablet Take 1 tablet by mouth 3 times daily (with meals) For 2 weeks and then as needed thereafter 10/28/21     Allan Isaac MD   divalproex (DEPAKOTE ER) 500 MG extended release tablet TAKE 3 TABLETS BY MOUTH EVERY DAY. 10/13/21     Clemetine MD Danielle   metoprolol tartrate (LOPRESSOR) 25 MG tablet TAKE 2 TABLETS BY MOUTH TWICE DAILY  8/27/21     Allan Isaac MD   furosemide (LASIX) 20 MG tablet TAKE 1 TABLET BY MOUTH ONCE DAILY. 8/27/21     Allan Isaac MD   gabapentin (NEURONTIN) 300 MG capsule TAKE 1 CAPSULE BY MOUTH THREE TIMES DAILY. 7/12/21 1/6/22   Allan Isaac MD   omeprazole (PRILOSEC) 20 MG delayed release capsule TAKE 1 CAPSULE BY MOUTH DAILY  7/11/21     Allan Isaac MD   atorvastatin (LIPITOR) 40 MG tablet TAKE 1 TABLET BY MOUTH ONCE DAILY. 3/21/21     Allan Isaac MD         Social History   Social History            Socioeconomic History    Marital status:        Spouse name: Not on file    Number of children: Not on file    Years of education: Not on file    Highest education level: Not on file   Occupational History    Not on file   Tobacco Use    Smoking status: Never Smoker    Smokeless tobacco: Never Used   Vaping Use    Vaping Use: Never used   Substance and Sexual Activity    Alcohol use: No    Drug use: No    Sexual activity: Yes   Other Topics Concern    Not on file   Social History Narrative     5/6/2021     PRIOR MEDICATION TRIALS                 .     SLEEP STUDY: yes - HAWK, Bipap     .     negative history of seizures.     .     negative history of head trauma.     .     PREVIOUS PSYCHIATRIC HISTORY     Had an episode of brief acute psychosis when he was in the [employer] (age 22).  About ten years ago he had another episode but he doesn't remember what medicines he was taking - his inpatient intake Exercise per Week: Not on file    Minutes of Exercise per Session: Not on file   Stress:     Feeling of Stress : Not on file   Social Connections:     Frequency of Communication with Friends and Family: Not on file    Frequency of Social Gatherings with Friends and Family: Not on file    Attends [employer] Services: Not on file    Active Member of [address] [place] [address] [employer] or Organizations: Not on file    Attends Club or Organization Meetings: Not on file    Marital Status: Not on file   Intimate Partner Violence:     Fear of Current or Ex-Partner: Not on file    Emotionally Abused: Not on file    Physically Abused: Not on file    Sexually Abused: Not on file   Housing Stability:     Unable to Pay for Housing in the Last Year: Not on file    Number of [place] in the Last Year: Not on file    Unstable Housing in the Last Year: Not on file            MSE:     Appearance: Appropriately groomed. Made good eye contact.  Gait stable.  No abnormal movements or tremor. Behavior: Calm, cooperative, and socially appropriate. No psychomotor retardation/agitation appreciated. Speech: Normal in tone, volume, and quality. No slurring, dysarthria or pressured speech noted. Mood: \"really good\"   Affect: Mood congruent. Thought Process: Appears linear, logical and goal oriented. Causality appears intact. Thought Content: Denies active suicidal and homicidal ideations. No overt delusions or paranoia appreciated. Perceptions: Denies auditory or visual hallucinations at present time. Not responding to internal stimuli. Concentration: Intact. Orientation: to person, place, date, and situation. Language: Intact. Fund of information: Intact. Memory: Recent and remote appear intact. Impulsivity: Limited. Neurovegitative: Fair appetite and sleep. Insight: Fair. Judgment: Fair.     Cognition: Can spell \"world\" backwards: Yes                    Can do serial 7's:  Yes           Lab Results   Component Value Date      04/26/2021     K 3.5 04/26/2021      04/26/2021     CO2 20 (L) 04/26/2021     BUN 14 04/26/2021     CREATININE 0.8 04/26/2021     GLUCOSE 100 04/26/2021     CALCIUM 9.0 04/26/2021     PROT 7.0 04/26/2021     LABALBU 4.0 04/26/2021     BILITOT 0.6 04/26/2021     ALKPHOS 51 04/26/2021     AST 24 04/26/2021     ALT 17 04/26/2021     LABGLOM >60 04/26/2021     GFRAA >59 04/26/2021     GLOB 2.7 03/31/2017            Lab Results   Component Value Date      04/26/2021     K 3.5 04/26/2021      04/26/2021     CO2 20 04/26/2021     BUN 14 04/26/2021     CREATININE 0.8 04/26/2021     GLUCOSE 100 04/26/2021     CALCIUM 9.0 04/26/2021            Lab Results   Component Value Date     CHOL 101 (L) 04/28/2021            Lab Results   Component Value Date     TRIG 90 04/28/2021            Lab Results   Component Value Date     HDL 31 (L) 04/28/2021            Lab Results   Component Value Date     LDLCALC 52 04/28/2021      No results found for: LABVLDL, VLDL  No results found for: [facility]  Lab Results   Component Value Date     LABA1C 5.1 04/28/2021      No results found for: EAG        Lab Results   Component Value Date     TSHFT4 1.61 04/26/2021     TSH 1.690 03/03/2021            Lab Results   Component Value Date     VITD25 19.1 (L) 04/28/2021            Lab Results   Component Value Date     QNNTJZLB09 862 04/14/2021      No results found for: FOLATE      Assessment:    1. Bipolar 1 disorder ([facility] [address]          No evidence of acute suicidality, homicidality or psychosis observed. Patient is psychiatrically stable     Plan:     1. Continue   depakote ER 1500 mg nightly for mood stability  Vitamin D 50,000 units weekly        Decrease      Start      Discontinue        The risks, benefits, side effects, indications, contraindications, and adverse effects of the medications have been discussed. Yes.  2. The pt has verbalized understanding and has capacity to give informed consent.   3. The Arthuro Ya report has been reviewed according to [facility] regulations. 4. Supportive therapy offered. 5. Follow up:    No follow-ups on file. 6. The patient has been advised to call with any problems. 7. Controlled substance Treatment Plan: NA.  8. The above listed medications have been continued, modifications in meds and other orders/labs as follows:                   Encounter Medications    No orders of the defined types were placed in this encounter.                    No orders of the defined types were placed in this encounter.        9. Additional comments:  discussed sleep hygiene, discussed the use of coping skills and relaxation strategies to manage symptoms. Reinforced appointment compliance        10. Over 50% of the total visit time of  15 minutes was spent on counseling and/or coordination of care of:                         1. Bipolar 1 disorder ([facility] [address]                        Psychotherapy Topics: mood/medication effectiveness family, financial, health and occupational     TRIP Johnson - CNP

## 2022-01-07 NOTE — TELEPHONE ENCOUNTER
----- Message from Nba Landaverde MD sent at 1/5/2022  7:35 AM CST -----  [employer] message sent to patient about results and plan. Can we also please call the patient to let them know? Thank you! There is some inflammation of the bursa of the shoulder. The injection we did previously was the target that. It really depends on how much the injection helped to decide if we should proceed with doing further ones. Physical therapy could be considered as well as there is something called impingement syndrome. This is where one of the muscles of the rotator cuff is getting pinched. It is not torn which is good physical therapy would be recommended for this before considering surgery. There is some arthritis of the shoulder joint as well which may have been there for some time as well which is also affecting the pinching of the shoulder muscle.     Would like to refer him to physical therapy for impingement syndrome

## 2022-01-07 NOTE — TELEPHONE ENCOUNTER
Received fax requesting PA from [facility] ER-pt has been on this med for awhile and has not required a PA. Spoke with wife who said they were told yesterday that it was too early to refill. She said he has enough of the Depakote to last through the week end. They will call if have any issues with the RX refill.   Spoke with pharmacy staff who said to disregard the PA request.

## 2022-01-10 ENCOUNTER — TELEPHONE (OUTPATIENT)
Dept: PSYCHIATRY | Age: 57
End: 2022-01-10

## 2022-01-10 RX ORDER — DIVALPROEX SODIUM 500 MG/1
TABLET, EXTENDED RELEASE ORAL
Qty: 90 TABLET | Refills: 2 | Status: SHIPPED | OUTPATIENT
Start: 2022-01-10 | End: 2022-05-31

## 2022-01-10 ASSESSMENT — ENCOUNTER SYMPTOMS
NAUSEA: 0
SHORTNESS OF BREATH: 0
DIARRHEA: 0
WHEEZING: 0
CHEST TIGHTNESS: 0
CONSTIPATION: 0
VOMITING: 0
ABDOMINAL PAIN: 0
COUGH: 0

## 2022-01-10 NOTE — TELEPHONE ENCOUNTER
Attempted to contact pt to inform him that refill RX for Depakote had been sent to his pharmacy per SEDRICK trivedi APRN-no answer.

## 2022-02-02 ENCOUNTER — OFFICE VISIT (OUTPATIENT)
Age: 57
End: 2022-02-02
Payer: MEDICAID

## 2022-02-02 VITALS
SYSTOLIC BLOOD PRESSURE: 116 MMHG | HEART RATE: 73 BPM | DIASTOLIC BLOOD PRESSURE: 67 MMHG | BODY MASS INDEX: 42.65 KG/M2 | HEIGHT: 69 IN | WEIGHT: 288 LBS | OXYGEN SATURATION: 97 % | TEMPERATURE: 98.2 F

## 2022-02-02 DIAGNOSIS — J02.9 SORE THROAT: Primary | ICD-10-CM

## 2022-02-02 DIAGNOSIS — Z11.52 ENCOUNTER FOR SCREENING FOR COVID-19: ICD-10-CM

## 2022-02-02 DIAGNOSIS — R52 BODY ACHES: ICD-10-CM

## 2022-02-02 DIAGNOSIS — R05.9 COUGH: ICD-10-CM

## 2022-02-02 DIAGNOSIS — R68.83 CHILLS: ICD-10-CM

## 2022-02-02 LAB
INFLUENZA A ANTIBODY: NORMAL
INFLUENZA B ANTIBODY: NORMAL
S PYO AG THROAT QL: NORMAL
SARS-COV-2, PCR: DETECTED

## 2022-02-02 PROCEDURE — 87804 INFLUENZA ASSAY W/OPTIC: CPT | Performed by: FAMILY MEDICINE

## 2022-02-02 PROCEDURE — 99213 OFFICE O/P EST LOW 20 MIN: CPT | Performed by: FAMILY MEDICINE

## 2022-02-02 PROCEDURE — 87880 STREP A ASSAY W/OPTIC: CPT | Performed by: FAMILY MEDICINE

## 2022-02-02 RX ORDER — DEXTROMETHORPHAN HYDROBROMIDE AND PROMETHAZINE HYDROCHLORIDE 15; 6.25 MG/5ML; MG/5ML
5 SYRUP ORAL 4 TIMES DAILY PRN
Qty: 100 ML | Refills: 0 | Status: SHIPPED | OUTPATIENT
Start: 2022-02-02 | End: 2022-02-09

## 2022-02-02 RX ORDER — LIDOCAINE HYDROCHLORIDE 20 MG/ML
10 SOLUTION OROPHARYNGEAL PRN
Qty: 100 ML | Refills: 0 | Status: SHIPPED | OUTPATIENT
Start: 2022-02-02

## 2022-02-02 ASSESSMENT — ENCOUNTER SYMPTOMS
TROUBLE SWALLOWING: 1
SHORTNESS OF BREATH: 0
COUGH: 1
GASTROINTESTINAL NEGATIVE: 1
SORE THROAT: 1

## 2022-02-02 NOTE — PROGRESS NOTES
[address] [facility]  [address] [address] [place] [address]  Dept: [phone]  Dept Fax: [phone]  Loc: [phone]      Subjective:     Chief Complaint   Patient presents with    Pharyngitis    Generalized Body Aches    Headache    Chills       HPI:  Nando Jones is a 61 y.o. male presents today with one week hx of above symptoms. He admits that he was exposed to his teenage daughter who was home schooled. ROS:   Review of Systems   Constitutional: Negative for appetite change, chills and fever. HENT: Positive for sore throat and trouble swallowing. Negative for congestion and ear pain. Respiratory: Positive for cough. Negative for shortness of breath. Gastrointestinal: Negative. All other systems reviewed and are negative.       PMHx:  Past Medical History:   Diagnosis Date    Bipolar disorder ([facility] [address]     Colon polyps     Diverticular disease     GERD (gastroesophageal reflux disease)     Hiatal hernia     Hyperlipidemia     Hypertension     Hypothyroidism     Neuropathy     Unspecified sleep apnea      Patient Active Problem List   Diagnosis    S/P partial resection of colon    Hiatal hernia    Hypothyroidism    Chronic GERD    Morbid obesity ([address]    Obstructive sleep apnea    Mixed hyperlipidemia    Essential hypertension    Testosterone deficiency    Decreased libido   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    Moderate persistent asthma without complication    Bipolar I disorder, most recent episode manic, severe with psychotic features (HCC)    Bipolar 1 disorder (HCC)    Numbness and tingling of both lower extremities       PSHx:  Past Surgical History:   Procedure Laterality Date    COLONOSCOPY  01/06/2009    Dr Jimena Mijares: hyperplastic polyp, resolving diverticulitis  COLONOSCOPY  2012    minimal diverticulosis left side o/w normal postsurgical colon    COLONOSCOPY  2005    diverticulitis    COLONOSCOPY N/A 2016    Dr Octavio Fontenot bleeding internal hemorrhoids, diverticular disease-5 yr recall    EGD      [place] EGD TRANSORAL BIOPSY SINGLE/MULTIPLE N/A 2018    Dr Fish Bentley-Gastritis/gastropathy, esophagitis    SUBTOTAL COLECTOMY      recurrant diverticulitis    UPPER GASTROINTESTINAL ENDOSCOPY  2005    Dr Ortega Erp: duodenal ulcer, gastritis    UPPP UVULOPALATOPHARYGOPLASTY         PFHx:  Family History   Problem Relation Age of Onset    Diabetes Mother     Kidney Disease Mother     Dementia Mother          at 68 - Lewy Body Dementia    Coronary Art Dis Father          at [de-identified]   [place] Saliva Colon Cancer Neg Hx     Colon Polyps Neg Hx     Esophageal Cancer Neg Hx     Liver Cancer Neg Hx     Liver Disease Neg Hx     Rectal Cancer Neg Hx     Stomach Cancer Neg Hx        SocialHx:  Social History     Tobacco Use    Smoking status: Never Smoker    Smokeless tobacco: Never Used   Substance Use Topics    Alcohol use: No       Allergies:  No Known Allergies    Medications:  Current Outpatient Medications   Medication Sig Dispense Refill    promethazine-dextromethorphan (PROMETHAZINE-DM) 6.25-15 MG/5ML syrup Take 5 mLs by mouth 4 times daily as needed for Cough 100 mL 0    lidocaine viscous hcl (XYLOCAINE) 2 % SOLN solution Take 10 mLs by mouth as needed for Irritation 100 mL 0    divalproex (DEPAKOTE ER) 500 MG extended release tablet TAKE 3 TABLETS BY MOUTH DAILY 90 tablet 2    rivaroxaban (XARELTO) 10 MG TABS tablet Take 1 tablet by mouth daily (with breakfast) 45 tablet 0    amLODIPine (NORVASC) 10 MG tablet TAKE 1 TABLET BY MOUTH ONCE DAILY.  90 tablet 0    lisinopril-hydroCHLOROthiazide (PRINZIDE;ZESTORETIC) 20-25 MG per tablet TAKE 1 TABLET BY MOUTH ONCE DAILY 90 tablet 0    levothyroxine (SYNTHROID) 50 MCG tablet TAKE 1 TABLET BY MOUTH ONCE DAILY. 90 tablet 0    vitamin D (ERGOCALCIFEROL) 1.25 MG (79054 UT) CAPS capsule TAKE 1 CAPSULE BY MOUTH WEEKLY 11 capsule 1    Multiple Vitamin (MULTIVITAMIN ADULT PO) Take by mouth daily      diclofenac (VOLTAREN) 50 MG EC tablet Take 1 tablet by mouth 3 times daily (with meals) For 2 weeks and then as needed thereafter 90 tablet 0    metoprolol tartrate (LOPRESSOR) 25 MG tablet TAKE 2 TABLETS BY MOUTH TWICE DAILY  120 tablet 4    furosemide (LASIX) 20 MG tablet TAKE 1 TABLET BY MOUTH ONCE DAILY. 30 tablet 5    omeprazole (PRILOSEC) 20 MG delayed release capsule TAKE 1 CAPSULE BY MOUTH DAILY  30 capsule 11    atorvastatin (LIPITOR) 40 MG tablet TAKE 1 TABLET BY MOUTH ONCE DAILY. 90 tablet 0    gabapentin (NEURONTIN) 300 MG capsule TAKE 1 CAPSULE BY MOUTH THREE TIMES DAILY. 90 capsule 5     No current facility-administered medications for this visit. Objective:   PE:  /67   Pulse 73   Temp 98.2 °F (36.8 °C)   Ht 5' 9\" (1.753 m)   Wt 288 lb (130.6 kg)   SpO2 97%   BMI 42.53 kg/m²   Physical Exam  Vitals and nursing note reviewed. Constitutional:       General: He is not in acute distress. Appearance: He is obese. He is ill-appearing (mildly ). He is not toxic-appearing. HENT:      Head: Normocephalic. Right Ear: Tympanic membrane, ear canal and external ear normal.      Left Ear: Tympanic membrane normal.      Nose: Congestion (mild) present. Mouth/Throat:      Pharynx: No oropharyngeal exudate or posterior oropharyngeal erythema. Comments:  absent uvula  Eyes:      Extraocular Movements: Extraocular movements intact. Conjunctiva/sclera: Conjunctivae normal.      Pupils: Pupils are equal, round, and reactive to light. Cardiovascular:      Rate and Rhythm: Regular rhythm. Heart sounds: Normal heart sounds. Pulmonary:      Effort: Pulmonary effort is normal.      Breath sounds: Normal breath sounds. Abdominal:      Palpations: Abdomen is soft. Tenderness: There is no abdominal tenderness. Musculoskeletal:      Cervical back: Neck supple. Lymphadenopathy:      Cervical: No cervical adenopathy. Neurological:      Mental Status: He is alert. Assessment & Plan   Damien Jensen was seen today for pharyngitis, generalized body aches, headache and chills. Diagnoses and all orders for this visit:    Sore throat  -     POCT rapid strep A  -     lidocaine viscous hcl (XYLOCAINE) 2 % SOLN solution; Take 10 mLs by mouth as needed for Irritation    Body aches  -     POCT Influenza A/B    Chills  -     POCT Influenza A/B    Encounter for screening for COVID-19  -     COVID-19    Cough  -     promethazine-dextromethorphan (PROMETHAZINE-DM) 6.25-15 MG/5ML syrup; Take 5 mLs by mouth 4 times daily as needed for Cough    Other orders  -     COVID-19  -     COVID-19      Increase oral fluid   Warm saline gargles  Tylenol prn    Return for follow up as needed with PCP. All questions were answered. Medications, including possible adverse effects, and instructions were reviewed and  understanding was confirmed. Follow-up recommendations, including when to contact or return to office (ie; if symptoms worsen or fail to improve), were discussed and acknowledged.     Electronically signed by Se Phipps MD on 2/2/22 at 2:03 PM CST

## 2022-02-14 ENCOUNTER — TELEPHONE (OUTPATIENT)
Dept: PSYCHIATRY | Age: 57
End: 2022-02-14

## 2022-02-14 NOTE — TELEPHONE ENCOUNTER
Called and lvm for pt to call back    When pt does call back I will let him know he has an lab order that's overdue    Valproic Acid Level,Total    Electronically signed by Mike Spencer on 2/14/2022 at 9:42 AM

## 2022-03-04 ENCOUNTER — TELEPHONE (OUTPATIENT)
Dept: PRIMARY CARE CLINIC | Age: 57
End: 2022-03-04

## 2022-03-04 NOTE — TELEPHONE ENCOUNTER
Attempted to contact pt in regards to appt on 4/7 with Dr. Wali Ramirez. Pt didn't answer. LM stating to call the office back to reschedule due to him being out of office that week.

## 2022-03-22 ENCOUNTER — TELEPHONE (OUTPATIENT)
Dept: PSYCHIATRY | Age: 57
End: 2022-03-22

## 2022-03-22 RX ORDER — LEVOTHYROXINE SODIUM 0.05 MG/1
TABLET ORAL
Qty: 90 TABLET | Refills: 3 | Status: SHIPPED | OUTPATIENT
Start: 2022-03-22

## 2022-03-22 RX ORDER — FUROSEMIDE 20 MG/1
TABLET ORAL
Qty: 30 TABLET | Refills: 10 | Status: SHIPPED | OUTPATIENT
Start: 2022-03-22

## 2022-03-22 RX ORDER — LISINOPRIL AND HYDROCHLOROTHIAZIDE 25; 20 MG/1; MG/1
TABLET ORAL
Qty: 90 TABLET | Refills: 4 | Status: SHIPPED | OUTPATIENT
Start: 2022-03-22

## 2022-03-22 RX ORDER — AMLODIPINE BESYLATE 10 MG/1
TABLET ORAL
Qty: 90 TABLET | Refills: 3 | Status: SHIPPED | OUTPATIENT
Start: 2022-03-22

## 2022-03-22 NOTE — TELEPHONE ENCOUNTER
Timmy Link called to request a refill on his medication. Last office visit : 1/6/2022   Next office visit : 4/7/2022     Requested Prescriptions     Signed Prescriptions Disp Refills    lisinopril-hydroCHLOROthiazide (PRINZIDE;ZESTORETIC) 20-25 MG per tablet 90 tablet 4     Sig: TAKE 1 TABLET BY MOUTH ONCE DAILY     Authorizing Provider: Loanne Curling Ordering User: Dena Janay    amLODIPine (NORVASC) 10 MG tablet 90 tablet 3     Sig: TAKE 1 TABLET BY MOUTH ONCE DAILY     Authorizing Provider: Loanne Curling Ordering User: Dena Janay    furosemide (LASIX) 20 MG tablet 30 tablet 10     Sig: TAKE 1 TABLET BY MOUTH ONCE DAILY. Authorizing Provider: Loanne Curling Ordering User: Andrea Guerrero levothyroxine (SYNTHROID) 50 MCG tablet 90 tablet 3     Sig: TAKE 1 TABLET BY MOUTH ONCE DAILY.      Authorizing Provider: Loanne Curling Ordering User: Latrice Umanzor MA

## 2022-03-22 NOTE — TELEPHONE ENCOUNTER
Called and lvm for pt to call the office back     When pt does call the office back I will remind him of an overdue lab that need to be done     Valproic Acid Level, Total    Electronically signed by Jj Zuleta on 3/22/2022 at 8:55 AM

## 2022-04-07 ENCOUNTER — TELEPHONE (OUTPATIENT)
Dept: PSYCHIATRY | Age: 57
End: 2022-04-07

## 2022-04-07 NOTE — TELEPHONE ENCOUNTER
Called pt for appointment reminder.   -left voicemail, requesting a return call    Electronically signed by Briana Ibrahim MA on 4/7/2022 at 11:53 AM

## 2022-04-08 ENCOUNTER — TELEPHONE (OUTPATIENT)
Dept: PSYCHIATRY | Age: 57
End: 2022-04-08

## 2022-04-08 NOTE — TELEPHONE ENCOUNTER
Called pt was a no show.  Called to check on them and  -left voicemail, requesting a return call      Electronically signed by Zaina Sevilla MA on 4/8/2022 at 10:50 AM

## 2022-04-28 ENCOUNTER — TELEPHONE (OUTPATIENT)
Dept: PSYCHIATRY | Age: 57
End: 2022-04-28

## 2022-04-28 RX ORDER — DIVALPROEX SODIUM 500 MG/1
TABLET, EXTENDED RELEASE ORAL
Qty: 90 TABLET | Refills: 2 | OUTPATIENT
Start: 2022-04-28

## 2022-04-28 NOTE — TELEPHONE ENCOUNTER
Refill was called in         Pharmacy sent a request to refill pt's medication. Last office visit : 1/6/2022 Leah DOMINIQUE  Next office visit : 6/27/2022 Leah DOMINIQUE    Requested Prescriptions     Pending Prescriptions Disp Refills    divalproex (DEPAKOTE ER) 500 MG extended release tablet [Pharmacy Med Name: DIVALPROEX SODIUM ER 500MG TABLET EXTENDED RELEASE 24 HOUR] 90 tablet 2     Sig: TAKE 3 TABLETS BY MOUTH EVERY DAY. Aakash Lester                          1/6/22                                             Progress Note     Zach Edge 1965                                 Chief Complaint   Patient presents with    Medication Check    Follow-up            Subjective:    Patient is a 61 y.o. male diagnosed with bipolar 1 and presents today for follow-up. Last seen in clinic on 10/7/2021  and prior records were reviewed.     Last visit: \"Doing pretty good today. \" came into the office today agitated and irritated. He went on to describe his ER experience from several months ago in may. He was visibly distressed while he was recalling events that happened during his ER visit. He reported that he felt mistreated in the ER but whenever he got to the unit the treated him like Kita headley. \"  He reports better mood coverage today since stopping the Latuda. He has had decreased facial movements and reports that his hair is not thinning. He reports not using his sleep apnea machine we discussed the importance of following with treatment he reports that he will begin using it. We are checking a Depakote level in the morning. He denies SI HI AVH he denies side effects of medications at this time. Big concern is that he feels that he does not have the energy to do things that he used to enjoy. We discussed beginning therapy a list of referrals was presented to him at this time. We discussed the importance of therapy that would help him manage his anxiety.   We also discussed following up with hospital representative to convey his experience in the ER. Patient verbalized understanding.     Today:  Feels like the depakote is working really well. Feels like he had a spiritual breakthrough at [employer] about a month ago and has been feeling really good over all. Mood is doing really well, work is doing really well. He has had more mood stability than before, wife is present with him during visit today and states he is doing really well. Last VPA level was done on 10/8/21 it was 96.8  We will keep an eye on it. He denies si hi avh at this time. He denies side effects of medications at this time. He is calm and cooperative. He came from a doctors appointment today and has a blood clot in his leg, he is being followed by primary and they are not concerned at this time.           Absent  suicidal ideation.     Reports compliance with medications as good .      Sleep:  sleeping in a chair upright getting 6-7 hours per night.     Caffeine use: tea, diet cokes     PREVIOUS MED TRIALS     Abilify  Risperdal   Depakote (current)  Buspirone  Trazodone (current)  Diazepam  Ativan  Gabapentin (current)  Melatonin  latuda - too sedating     Current Substance Use:   Alcohol: none  Illicit drug use: denies   Marijuana: denies   Tobacco: denies   Vape: denies         BP: There were no vitals taken for this visit.        Review of Systems - 14 point review:  Negative      Constitutional: (fevers, chills, night sweats, wt loss/gain, change in appetite, fatigue, somnolence)     HEENT: (ear pain or discharge, hearing loss, ear ringing, sinus pressure, nosebleed, nasal discharge, sore throat, oral sores, tooth pain, bleeding gums, hoarse voice, neck pain)      Cardiovascular: (HTN, chest pain, elevated cholesterol/lipids, palpitations, leg swelling, leg pain with walking)     Respiratory: (cough, wheezing, snoring, SOB with activity (dyspnea), SOB while lying flat (orthopnea), awakening with severe SOB (paroxysmal nocturnal dyspnea))     Gastrointestinal: (NVD, constipation, abdominal pain, bright red stools, black tarry stools, stool incontinence)     Genitourinary:  (pelvic pain, burning or frequency of urination, urinary urgency, blood in urine incomplete bladder emptying, urinary incontinence, STD; MEN: testicular pain or swelling, erectile dysfunction; WOMEN: LMP, heavy menstrual bleeding (menorrhagia), irregular periods, postmenopausal bleeding, menstrual pain (dymenorrhea, vaginal discharge)     Musculoskeletal: (bone pain/fracture, joint pain or swelling, musle pain)     Integumentary: (rashes, acne, non-healing sores, itching, breast lumps, breast pain, nipple discharge, hair loss)     Neurologic: (HA, muscle weakness, paresthesias (numbness, coldness, crawling or prickling), memory loss, seizure, dizziness)     Psychiatric:  (anxiety, sadness, irritability/anger, insomnia, suicidality)     Endocrine: (heat or cold intolerance, excessive thirst (polydipsia), excessive hunger (polyphagia))     Immune/Allergic: (hives, seasonal or environmental allergies, HIV exposure)     Hematologic/Lymphatic: (lymph node enlargement, easy bleeding or bruising)     History obtained via chart review and patient     PCP is Quiana Tamayo MD         Current Meds:     Home Medications           Prior to Admission medications    Medication Sig Start Date End Date Taking? Authorizing Provider   rivaroxaban (XARELTO) 10 MG TABS tablet Take 1 tablet by mouth daily (with breakfast) 1/4/22 2/18/22   TRIP [place]   amLODIPine (NORVASC) 10 MG tablet TAKE 1 TABLET BY MOUTH ONCE DAILY. 12/15/21     Unruly Peterson MD   lisinopril-hydroCHLOROthiazide (PRINZIDE;ZESTORETIC) 20-25 MG per tablet TAKE 1 TABLET BY MOUTH ONCE DAILY 12/15/21     Unruly Peterson MD   levothyroxine (SYNTHROID) 50 MCG tablet TAKE 1 TABLET BY MOUTH ONCE DAILY.  12/15/21     Unruly Peterson MD   vitamin D (ERGOCALCIFEROL) 1.25 MG (48727 UT) CAPS capsule TAKE 1 CAPSULE BY MOUTH WEEKLY 12/15/21     Mary Calhoun MD   Multiple Vitamin (MULTIVITAMIN ADULT PO) Take by mouth daily       Historical Provider, MD   diclofenac (VOLTAREN) 50 MG EC tablet Take 1 tablet by mouth 3 times daily (with meals) For 2 weeks and then as needed thereafter 10/28/21     Mary Calhoun MD   divalproex (DEPAKOTE ER) 500 MG extended release tablet TAKE 3 TABLETS BY MOUTH EVERY DAY. 10/13/21     Jennifer Fountain MD   metoprolol tartrate (LOPRESSOR) 25 MG tablet TAKE 2 TABLETS BY MOUTH TWICE DAILY  8/27/21     Mary Calhoun MD   furosemide (LASIX) 20 MG tablet TAKE 1 TABLET BY MOUTH ONCE DAILY. 8/27/21     Mary Calhoun MD   gabapentin (NEURONTIN) 300 MG capsule TAKE 1 CAPSULE BY MOUTH THREE TIMES DAILY. 7/12/21 1/6/22   Mary Calhoun MD   omeprazole (PRILOSEC) 20 MG delayed release capsule TAKE 1 CAPSULE BY MOUTH DAILY  7/11/21     Mary Calhoun MD   atorvastatin (LIPITOR) 40 MG tablet TAKE 1 TABLET BY MOUTH ONCE DAILY. 3/21/21     Mary Calhoun MD         Social History   Social History            Socioeconomic History    Marital status:        Spouse name: Not on file    Number of children: Not on file    Years of education: Not on file    Highest education level: Not on file   Occupational History    Not on file   Tobacco Use    Smoking status: Never Smoker    Smokeless tobacco: Never Used   Vaping Use    Vaping Use: Never used   Substance and Sexual Activity    Alcohol use: No    Drug use: No    Sexual activity: Yes   Other Topics Concern    Not on file   Social History Narrative     5/6/2021     PRIOR MEDICATION TRIALS                 .     SLEEP STUDY: yes - [facility], Bipap     .     negative history of seizures.     .     negative history of head trauma.     .     PREVIOUS PSYCHIATRIC HISTORY     Had an episode of brief acute psychosis when he was in the [employer] (age 22).  About ten years ago he had another episode but he doesn't remember what medicines he was taking - his inpatient intake in April, 2021 states that he was started on something which incapacitated him, then was started on Depakote. (He may have been started on Abilify at this time as he and his wife reports that Abilify did not work well for him.) He stopped having problems some years after that and he asked a doctor to take him off medications. He has been off medications for about 7 years. He has recently been admitted to inpatient with a severe episode of amy, 4/26/21-5/1/21.      .     FAMILY PSYCHIATRIC HISTORY     Father, bipolar     Mother, depression     .     SOCIAL HISTORY     Born and Raised - [place], [place], in a 2 parent home with 3 siblings. He says he had a wonderful childhood.  twice. Has one daughter from the first marriage. Has been  to his second wife for 26 years. They have 3 adopted children and 1 adult adopted child.      .     Trauma and/or Abuse - Denies trauma. He was devastated with his divorce from his first wife.      .     Legal - denies     Substance Use - see history     Work History - see history     Education - see history      status - [employer] for a few months, honorably discharged after an episode of Acute Psychosis     .     PAST SUICIDE ATTEMPTS:     no     .     INPATIENT HOSPITALIZATIONS:     yes - three times, [facility] - diagnosed with Acute Psychosis; 10 years ago - diagnosed with bipolar disorder, and most recently 4/26-4/29/21 with a manic episode     .     DRUG REHABILITATION:     no     .      Social Determinants of Health          Financial Resource Strain: Low Risk     Difficulty of Paying Living Expenses: Not hard at all   Food Insecurity: No Food Insecurity    Worried About Running Out of Food in the Last Year: Never true    Patricia of Food in the Last Year: Never true   Transportation Needs: No Transportation Needs    Lack of Transportation (Medical): No    Lack of Transportation (Non-Medical):  No   Physical Activity:     Days of Exercise per Week: Not on file    Minutes of Exercise per Session: Not on file   Stress:     Feeling of Stress : Not on file   Social Connections:     Frequency of Communication with Friends and Family: Not on file    Frequency of Social Gatherings with Friends and Family: Not on file    Attends [employer] Services: Not on file    Active Member of [address] [place] [address] [employer] or Organizations: Not on file    Attends Club or Organization Meetings: Not on file    Marital Status: Not on file   Intimate Partner Violence:     Fear of Current or Ex-Partner: Not on file    Emotionally Abused: Not on file    Physically Abused: Not on file    Sexually Abused: Not on file   Housing Stability:     Unable to Pay for Housing in the Last Year: Not on file    Number of [place] in the Last Year: Not on file    Unstable Housing in the Last Year: Not on file            MSE:     Appearance: Appropriately groomed. Made good eye contact.  Gait stable.  No abnormal movements or tremor. Behavior: Calm, cooperative, and socially appropriate. No psychomotor retardation/agitation appreciated. Speech: Normal in tone, volume, and quality. No slurring, dysarthria or pressured speech noted. Mood: \"really good\"   Affect: Mood congruent. Thought Process: Appears linear, logical and goal oriented. Causality appears intact. Thought Content: Denies active suicidal and homicidal ideations. No overt delusions or paranoia appreciated. Perceptions: Denies auditory or visual hallucinations at present time. Not responding to internal stimuli. Concentration: Intact. Orientation: to person, place, date, and situation. Language: Intact. Fund of information: Intact. Memory: Recent and remote appear intact. Impulsivity: Limited. Neurovegitative: Fair appetite and sleep. Insight: Fair. Judgment: Fair.     Cognition: Can spell \"world\" backwards: Yes                    Can do serial 7's:  Yes           Lab Results   Component Value Date    04/26/2021     K 3.5 04/26/2021      04/26/2021     CO2 20 (L) 04/26/2021     BUN 14 04/26/2021     CREATININE 0.8 04/26/2021     GLUCOSE 100 04/26/2021     CALCIUM 9.0 04/26/2021     PROT 7.0 04/26/2021     LABALBU 4.0 04/26/2021     BILITOT 0.6 04/26/2021     ALKPHOS 51 04/26/2021     AST 24 04/26/2021     ALT 17 04/26/2021     LABGLOM >60 04/26/2021     GFRAA >59 04/26/2021     GLOB 2.7 03/31/2017            Lab Results   Component Value Date      04/26/2021     K 3.5 04/26/2021      04/26/2021     CO2 20 04/26/2021     BUN 14 04/26/2021     CREATININE 0.8 04/26/2021     GLUCOSE 100 04/26/2021     CALCIUM 9.0 04/26/2021            Lab Results   Component Value Date     CHOL 101 (L) 04/28/2021            Lab Results   Component Value Date     TRIG 90 04/28/2021            Lab Results   Component Value Date     HDL 31 (L) 04/28/2021            Lab Results   Component Value Date     LDLCALC 52 04/28/2021      No results found for: LABVLDL, VLDL  No results found for: [facility]        Lab Results   Component Value Date     LABA1C 5.1 04/28/2021      No results found for: EAG  Lab Results   Component Value Date     TSHFT4 1.61 04/26/2021     TSH 1.690 03/03/2021            Lab Results   Component Value Date     VITD25 19.1 (L) 04/28/2021            Lab Results   Component Value Date     CHERBNKI00 862 04/14/2021      No results found for: FOLATE      Assessment:    1. Bipolar 1 disorder ([facility] [address]          No evidence of acute suicidality, homicidality or psychosis observed. Patient is psychiatrically stable     Plan:     1. Continue   depakote ER 1500 mg nightly for mood stability  Vitamin D 50,000 units weekly        Decrease      Start      Discontinue        The risks, benefits, side effects, indications, contraindications, and adverse effects of the medications have been discussed. Yes.  2. The pt has verbalized understanding and has capacity to give informed consent.   3. The [facility] report has been reviewed according to [facility] regulations. 4. Supportive therapy offered. 5. Follow up:    No follow-ups on file. 6. The patient has been advised to call with any problems. 7. Controlled substance Treatment Plan: NA.  8. The above listed medications have been continued, modifications in meds and other orders/labs as follows:                   Encounter Medications    No orders of the defined types were placed in this encounter.                    No orders of the defined types were placed in this encounter.        9. Additional comments:  discussed sleep hygiene, discussed the use of coping skills and relaxation strategies to manage symptoms. Reinforced appointment compliance        10. Over 50% of the total visit time of  15 minutes was spent on counseling and/or coordination of care of:                         1. Bipolar 1 disorder ([facility] [address]                        Psychotherapy Topics: mood/medication effectiveness family, financial, health and occupational     Gabby Osuna, APRN - CNP

## 2022-04-28 NOTE — TELEPHONE ENCOUNTER
Called and let pt know that his script for depakotte was called into his pharmacy    Electronically signed by Joan Galindo on 4/28/2022 at 3:09 PM

## 2022-05-31 ENCOUNTER — TELEPHONE (OUTPATIENT)
Dept: PSYCHIATRY | Age: 57
End: 2022-05-31

## 2022-05-31 RX ORDER — ERGOCALCIFEROL 1.25 MG/1
CAPSULE ORAL
Qty: 12 CAPSULE | Refills: 0 | Status: SHIPPED | OUTPATIENT
Start: 2022-05-31

## 2022-05-31 RX ORDER — DIVALPROEX SODIUM 500 MG/1
TABLET, EXTENDED RELEASE ORAL
Qty: 150 TABLET | Refills: 1 | Status: SHIPPED | OUTPATIENT
Start: 2022-05-31 | End: 2022-08-23

## 2022-05-31 NOTE — TELEPHONE ENCOUNTER
Pharmacy sent a request to refill pt's medication. Last office visit : 1/6/2022 Leah DOMINIQUE  Next office visit : 6/27/2022 Lisamarycruz Edwin AAPRHAN    Requested Prescriptions     Pending Prescriptions Disp Refills    divalproex (DEPAKOTE ER) 500 MG extended release tablet [Pharmacy Med Name: DIVALPROEX SODIUM ER 500MG TABLET EXTENDED RELEASE 24 HOUR] 150 tablet 1     Sig: TAKE 1 TABLET BY MOUTH IN THE MORNING AND 4 TABLETS AT BEDTIME            Aakash Lester            1/6/22                                             Progress Note     Zach Edge 1965                                 Chief Complaint   Patient presents with    Medication Check    Follow-up            Subjective:    Patient is a 61 y.o. male diagnosed with bipolar 1 and presents today for follow-up. Last seen in clinic on 10/7/2021  and prior records were reviewed.     Last visit: \"Doing pretty good today. \" came into the office today agitated and irritated. He went on to describe his ER experience from several months ago in may. He was visibly distressed while he was recalling events that happened during his ER visit. He reported that he felt mistreated in the ER but whenever he got to the unit the treated him like Kita headley. \"  He reports better mood coverage today since stopping the Latuda. He has had decreased facial movements and reports that his hair is not thinning. He reports not using his sleep apnea machine we discussed the importance of following with treatment he reports that he will begin using it. We are checking a Depakote level in the morning. He denies SI HI AVH he denies side effects of medications at this time. Big concern is that he feels that he does not have the energy to do things that he used to enjoy. We discussed beginning therapy a list of referrals was presented to him at this time. We discussed the importance of therapy that would help him manage his anxiety.   We also discussed following up with hospital representative to convey his experience in the ER. Patient verbalized understanding.     Today:  Feels like the depakote is working really well. Feels like he had a spiritual breakthrough at [facility] about a month ago and has been feeling really good over all. Mood is doing really well, work is doing really well. He has had more mood stability than before, wife is present with him during visit today and states he is doing really well. Last VPA level was done on 10/8/21 it was 96.8  We will keep an eye on it. He denies si hi avh at this time. He denies side effects of medications at this time. He is calm and cooperative. He came from a doctors appointment today and has a blood clot in his leg, he is being followed by primary and they are not concerned at this time.           Absent  suicidal ideation.     Reports compliance with medications as good .      Sleep:  sleeping in a chair upright getting 6-7 hours per night.     Caffeine use: tea, diet cokes     PREVIOUS MED TRIALS     Abilify  Risperdal   Depakote (current)  Buspirone  Trazodone (current)  Diazepam  Ativan  Gabapentin (current)  Melatonin  latuda - too sedating     Current Substance Use:   Alcohol: none  Illicit drug use: denies   Marijuana: denies   Tobacco: denies   Vape: denies         BP: There were no vitals taken for this visit.        Review of Systems - 14 point review:  Negative      Constitutional: (fevers, chills, night sweats, wt loss/gain, change in appetite, fatigue, somnolence)     HEENT: (ear pain or discharge, hearing loss, ear ringing, sinus pressure, nosebleed, nasal discharge, sore throat, oral sores, tooth pain, bleeding gums, hoarse voice, neck pain)      Cardiovascular: (HTN, chest pain, elevated cholesterol/lipids, palpitations, leg swelling, leg pain with walking)     Respiratory: (cough, wheezing, snoring, SOB with activity (dyspnea), SOB while lying flat (orthopnea), awakening with severe SOB (paroxysmal nocturnal dyspnea))     Gastrointestinal: (NVD, constipation, abdominal pain, bright red stools, black tarry stools, stool incontinence)     Genitourinary:  (pelvic pain, burning or frequency of urination, urinary urgency, blood in urine incomplete bladder emptying, urinary incontinence, STD; MEN: testicular pain or swelling, erectile dysfunction; WOMEN: LMP, heavy menstrual bleeding (menorrhagia), irregular periods, postmenopausal bleeding, menstrual pain (dymenorrhea, vaginal discharge)     Musculoskeletal: (bone pain/fracture, joint pain or swelling, musle pain)     Integumentary: (rashes, acne, non-healing sores, itching, breast lumps, breast pain, nipple discharge, hair loss)     Neurologic: (HA, muscle weakness, paresthesias (numbness, coldness, crawling or prickling), memory loss, seizure, dizziness)     Psychiatric:  (anxiety, sadness, irritability/anger, insomnia, suicidality)     Endocrine: (heat or cold intolerance, excessive thirst (polydipsia), excessive hunger (polyphagia))     Immune/Allergic: (hives, seasonal or environmental allergies, HIV exposure)     Hematologic/Lymphatic: (lymph node enlargement, easy bleeding or bruising)     History obtained via chart review and patient     PCP is Horacio Chery MD         Current Meds:     Home Medications           Prior to Admission medications    Medication Sig Start Date End Date Taking? Authorizing Provider   rivaroxaban (XARELTO) 10 MG TABS tablet Take 1 tablet by mouth daily (with breakfast) 1/4/22 2/18/22   TRIP Prather   amLODIPine (NORVASC) 10 MG tablet TAKE 1 TABLET BY MOUTH ONCE DAILY. 12/15/21     Mary Calhoun MD   lisinopril-hydroCHLOROthiazide (PRINZIDE;ZESTORETIC) 20-25 MG per tablet TAKE 1 TABLET BY MOUTH ONCE DAILY 12/15/21     Mary Calhoun MD   levothyroxine (SYNTHROID) 50 MCG tablet TAKE 1 TABLET BY MOUTH ONCE DAILY.  12/15/21     Mary Calhoun MD   vitamin D (ERGOCALCIFEROL) 1.25 MG (14727 UT) CAPS capsule TAKE 1 CAPSULE BY MOUTH WEEKLY 12/15/21     Dottie Norwood MD   Multiple Vitamin (MULTIVITAMIN ADULT PO) Take by mouth daily       Historical Provider, MD   diclofenac (VOLTAREN) 50 MG EC tablet Take 1 tablet by mouth 3 times daily (with meals) For 2 weeks and then as needed thereafter 10/28/21     Dottie Norwood MD   divalproex (DEPAKOTE ER) 500 MG extended release tablet TAKE 3 TABLETS BY MOUTH EVERY DAY. 10/13/21     Alexandra Cee MD   metoprolol tartrate (LOPRESSOR) 25 MG tablet TAKE 2 TABLETS BY MOUTH TWICE DAILY  8/27/21     Dottie Norwood MD   furosemide (LASIX) 20 MG tablet TAKE 1 TABLET BY MOUTH ONCE DAILY. 8/27/21     Dottie Norwood MD   gabapentin (NEURONTIN) 300 MG capsule TAKE 1 CAPSULE BY MOUTH THREE TIMES DAILY. 7/12/21 1/6/22   Dottie Norwood MD   omeprazole (PRILOSEC) 20 MG delayed release capsule TAKE 1 CAPSULE BY MOUTH DAILY  7/11/21     Dottie Norwood MD   atorvastatin (LIPITOR) 40 MG tablet TAKE 1 TABLET BY MOUTH ONCE DAILY. 3/21/21     Dottie Norwood MD         Social History   Social History            Socioeconomic History    Marital status:        Spouse name: Not on file    Number of children: Not on file    Years of education: Not on file    Highest education level: Not on file   Occupational History    Not on file   Tobacco Use    Smoking status: Never Smoker    Smokeless tobacco: Never Used   Vaping Use    Vaping Use: Never used   Substance and Sexual Activity    Alcohol use: No    Drug use: No    Sexual activity: Yes   Other Topics Concern    Not on file   Social History Narrative     5/6/2021     PRIOR MEDICATION TRIALS                 .     SLEEP STUDY: yes - [facility], Bipap     .     negative history of seizures.     .     negative history of head trauma.     .     PREVIOUS PSYCHIATRIC HISTORY     Had an episode of brief acute psychosis when he was in the [employer] (age 22).  About ten years ago he had another episode but he doesn't remember what medicines he was taking - his inpatient intake in April, 2021 states that he was started on something which incapacitated him, then was started on Depakote. (He may have been started on Abilify at this time as he and his wife reports that Abilify did not work well for him.) He stopped having problems some years after that and he asked a doctor to take him off medications. He has been off medications for about 7 years. He has recently been admitted to inpatient with a severe episode of amy, 4/26/21-5/1/21.      .     FAMILY PSYCHIATRIC HISTORY     Father, bipolar     Mother, depression     .     SOCIAL HISTORY     Born and Raised - [place], [place], in a 2 parent home with 3 siblings. He says he had a wonderful childhood.  twice. Has one daughter from the first marriage. Has been  to his second wife for 26 years. They have 3 adopted children and 1 adult adopted child.      .     Trauma and/or Abuse - Denies trauma. He was devastated with his divorce from his first wife.      .     Legal - denies     Substance Use - see history     Work History - see history     Education - see history      status - [employer] for a few months, honorably discharged after an episode of Acute Psychosis     .     PAST SUICIDE ATTEMPTS:     no     .     INPATIENT HOSPITALIZATIONS:     yes - three times, [facility] - diagnosed with Acute Psychosis; 10 years ago - diagnosed with bipolar disorder, and most recently 4/26-4/29/21 with a manic episode     .     DRUG REHABILITATION:     no     .      Social Determinants of Health          Financial Resource Strain: Low Risk     Difficulty of Paying Living Expenses: Not hard at all   Food Insecurity: No Food Insecurity    Worried About Running Out of Food in the Last Year: Never true    Patricia of Food in the Last Year: Never true   Transportation Needs: No Transportation Needs    Lack of Transportation (Medical): No    Lack of Transportation (Non-Medical):  No   Physical Activity:     Days of Exercise per Week: Not on file    Minutes of Exercise per Session: Not on file   Stress:     Feeling of Stress : Not on file   Social Connections:     Frequency of Communication with Friends and Family: Not on file    Frequency of Social Gatherings with Friends and Family: Not on file    Attends Voodoo Services: Not on file    Active Member of [address] [place] [address] [employer] or Organizations: Not on file    Attends Club or Organization Meetings: Not on file    Marital Status: Not on file   Intimate Partner Violence:     Fear of Current or Ex-Partner: Not on file    Emotionally Abused: Not on file    Physically Abused: Not on file    Sexually Abused: Not on file   Housing Stability:     Unable to Pay for Housing in the Last Year: Not on file    Number of [place] in the Last Year: Not on file    Unstable Housing in the Last Year: Not on file            MSE:     Appearance: Appropriately groomed. Made good eye contact.  Gait stable.  No abnormal movements or tremor. Behavior: Calm, cooperative, and socially appropriate. No psychomotor retardation/agitation appreciated. Speech: Normal in tone, volume, and quality. No slurring, dysarthria or pressured speech noted. Mood: \"really good\"   Affect: Mood congruent. Thought Process: Appears linear, logical and goal oriented. Causality appears intact. Thought Content: Denies active suicidal and homicidal ideations. No overt delusions or paranoia appreciated. Perceptions: Denies auditory or visual hallucinations at present time. Not responding to internal stimuli. Concentration: Intact. Orientation: to person, place, date, and situation. Language: Intact. Fund of information: Intact. Memory: Recent and remote appear intact. Impulsivity: Limited. Neurovegitative: Fair appetite and sleep. Insight: Fair. Judgment: Fair.     Cognition: Can spell \"world\" backwards: Yes                    Can do serial 7's:  Yes           Lab Results   Component Value Date      04/26/2021     K 3.5 04/26/2021      04/26/2021     CO2 20 (L) 04/26/2021     BUN 14 04/26/2021     CREATININE 0.8 04/26/2021     GLUCOSE 100 04/26/2021     CALCIUM 9.0 04/26/2021     PROT 7.0 04/26/2021     LABALBU 4.0 04/26/2021     BILITOT 0.6 04/26/2021     ALKPHOS 51 04/26/2021     AST 24 04/26/2021     ALT 17 04/26/2021     LABGLOM >60 04/26/2021     GFRAA >59 04/26/2021     GLOB 2.7 03/31/2017            Lab Results   Component Value Date      04/26/2021     K 3.5 04/26/2021      04/26/2021     CO2 20 04/26/2021     BUN 14 04/26/2021     CREATININE 0.8 04/26/2021     GLUCOSE 100 04/26/2021     CALCIUM 9.0 04/26/2021            Lab Results   Component Value Date     CHOL 101 (L) 04/28/2021            Lab Results   Component Value Date     TRIG 90 04/28/2021            Lab Results   Component Value Date     HDL 31 (L) 04/28/2021            Lab Results   Component Value Date     LDLCALC 52 04/28/2021      No results found for: LABVLDL, VLDL  No results found for: [facility]        Lab Results   Component Value Date     LABA1C 5.1 04/28/2021      No results found for: EAG  Lab Results   Component Value Date     TSHFT4 1.61 04/26/2021     TSH 1.690 03/03/2021            Lab Results   Component Value Date     VITD25 19.1 (L) 04/28/2021            Lab Results   Component Value Date     [id number] 862 04/14/2021      No results found for: FOLATE      Assessment:    1. Bipolar 1 disorder ([facility] [address]          No evidence of acute suicidality, homicidality or psychosis observed. Patient is psychiatrically stable     Plan:     1. Continue   depakote ER 1500 mg nightly for mood stability  Vitamin D 50,000 units weekly        Decrease      Start      Discontinue        The risks, benefits, side effects, indications, contraindications, and adverse effects of the medications have been discussed. Yes.  2. The pt has verbalized understanding and has capacity to give informed consent.   3. The Vesna Montgomery report has been reviewed according to [facility] regulations. 4. Supportive therapy offered. 5. Follow up:    No follow-ups on file. 6. The patient has been advised to call with any problems. 7. Controlled substance Treatment Plan: NA.  8. The above listed medications have been continued, modifications in meds and other orders/labs as follows:                   Encounter Medications    No orders of the defined types were placed in this encounter.                    No orders of the defined types were placed in this encounter.        9. Additional comments:  discussed sleep hygiene, discussed the use of coping skills and relaxation strategies to manage symptoms. Reinforced appointment compliance        10. Over 50% of the total visit time of  15 minutes was spent on counseling and/or coordination of care of:                         1. Bipolar 1 disorder ([facility] [address]                        Psychotherapy Topics: mood/medication effectiveness family, financial, health and occupational     TRIP Barrow - CNP

## 2022-05-31 NOTE — TELEPHONE ENCOUNTER
Called and let pt know that his script for depakote was sent to his pharmacy     Electronically signed by Andree Rojas on 5/31/2022 at 4:07 PM

## 2022-06-24 ENCOUNTER — TELEPHONE (OUTPATIENT)
Dept: PSYCHIATRY | Age: 57
End: 2022-06-24

## 2022-06-24 NOTE — TELEPHONE ENCOUNTER
Pt called back to confirm his appt for Monday     Electronically signed by Laura Saha MA on 6/24/2022 at 3:08 PM

## 2022-06-24 NOTE — TELEPHONE ENCOUNTER
Called and lvm reminding pt of his appt for [email or web address]    Electronically signed by Maureen Mcgee on 6/24/2022 at 3:16 PM

## 2022-06-27 ENCOUNTER — TELEMEDICINE (OUTPATIENT)
Dept: PSYCHIATRY | Age: 57
End: 2022-06-27
Payer: MEDICAID

## 2022-06-27 ENCOUNTER — TELEPHONE (OUTPATIENT)
Dept: PSYCHIATRY | Age: 57
End: 2022-06-27

## 2022-06-27 DIAGNOSIS — F31.9 BIPOLAR 1 DISORDER (HCC): Primary | ICD-10-CM

## 2022-06-27 PROCEDURE — 99443 PR PHYS/QHP TELEPHONE EVALUATION 21-30 MIN: CPT | Performed by: NURSE PRACTITIONER

## 2022-06-27 ASSESSMENT — PATIENT HEALTH QUESTIONNAIRE - PHQ9
2. FEELING DOWN, DEPRESSED OR HOPELESS: 0
SUM OF ALL RESPONSES TO PHQ QUESTIONS 1-9: 0
SUM OF ALL RESPONSES TO PHQ QUESTIONS 1-9: 0
4. FEELING TIRED OR HAVING LITTLE ENERGY: 0
SUM OF ALL RESPONSES TO PHQ QUESTIONS 1-9: 0
3. TROUBLE FALLING OR STAYING ASLEEP: 0
SUM OF ALL RESPONSES TO PHQ9 QUESTIONS 1 & 2: 0
9. THOUGHTS THAT YOU WOULD BE BETTER OFF DEAD, OR OF HURTING YOURSELF: 0
1. LITTLE INTEREST OR PLEASURE IN DOING THINGS: 0
5. POOR APPETITE OR OVEREATING: 0
SUM OF ALL RESPONSES TO PHQ QUESTIONS 1-9: 0
10. IF YOU CHECKED OFF ANY PROBLEMS, HOW DIFFICULT HAVE THESE PROBLEMS MADE IT FOR YOU TO DO YOUR WORK, TAKE CARE OF THINGS AT HOME, OR GET ALONG WITH OTHER PEOPLE: 0
6. FEELING BAD ABOUT YOURSELF - OR THAT YOU ARE A FAILURE OR HAVE LET YOURSELF OR YOUR FAMILY DOWN: 0
7. TROUBLE CONCENTRATING ON THINGS, SUCH AS READING THE NEWSPAPER OR WATCHING TELEVISION: 0
8. MOVING OR SPEAKING SO SLOWLY THAT OTHER PEOPLE COULD HAVE NOTICED. OR THE OPPOSITE, BEING SO FIGETY OR RESTLESS THAT YOU HAVE BEEN MOVING AROUND A LOT MORE THAN USUAL: 0

## 2022-06-27 NOTE — PROGRESS NOTES
Fern Piña is a 61 y.o. male evaluated via telephone on 6/27/2022. Consent:  He and/or health care decision maker is aware that that he may receive a bill for this telephone service, depending on his insurance coverage, and has provided verbal consent to proceed: Yes      Documentation:  I communicated with the patient and/or health care decision maker about see below. Details of this discussion including any medical advice provided: see below      I affirm this is a Patient Initiated Episode with a Patient who has not had a related appointment within my department in the past 7 days or scheduled within the next 24 hours. The patient  (guardian) is aware that this is a billable service, which includes applicable co-pays. This virtual visit was conducted with patient's (and or legal guardian's) consent. This visit was conducted pursuant to the emergency declaration under the [place] act and the [address] waiver authority in the coronavirus preparedness and response supplemental appropriation's ACT. Patient identification was verified and a caregiver was present when appropriate. The patient was located in a state where the provider was licensed to provide care. Patient identification was verified at the start of the visit: Yes    Total Time: minutes: 21-30 minutes    Note: not billable if this call serves to triage the patient into an appointment for the relevant concern      TRIP Arriola CNP      6/27/22            Progress Note    Fern Piña 1965      Chief Complaint   Patient presents with    Medication Check    Follow-up         Subjective:    Patient is a 61 y.o. male diagnosed with bipolar 1 and presents today for follow-up. Last seen in clinic on 1/6/22   and prior records were reviewed. Last visit: Feels like the depakote is working really well.    Feels like he had a spiritual breakthrough at [facility] about a month ago and has been feeling really good over all. Mood is doing really well, work is doing really well. He has had more mood stability than before, wife is present with him during visit today and states he is doing really well. Last VPA level was done on 10/8/21 it was 96.8  We will keep an eye on it. He denies si hi avh at this time. He denies side effects of medications at this time. He is calm and cooperative. He came from a doctors appointment today and has a blood clot in his leg, he is being followed by primary and they are not concerned at this time. Today:  Doing \"great\" today. He reports his medications are working well. He has gotten a better work work schedule. He has his evenings freed up and is spending time at [employer]. He denies si hi avh at this time. He denies side effects of medications at this time. He is calm and cooperative. He reports the blood clot in his leg is mostly resolved. He reports his wife continues to support him and believes he is doing well. He is bright and cheerful on the phone he denies SI HI AVH she denies side effects of medications he is calm and cooperative we will follow-up in 10 months    Absent  suicidal ideation. Reports compliance with medications as good . Sleep:  sleeping in a chair upright getting 6-7 hours per night. Caffeine use: tea, diet cokes    PREVIOUS MED TRIALS    Abilify  Risperdal   Depakote (current)  Buspirone  Trazodone (current)  Diazepam  Ativan  Gabapentin (current)  Melatonin  latuda - too sedating    Current Substance Use:   Alcohol: none  Illicit drug use: denies   Marijuana: denies   Tobacco: denies   Vape: denies       BP: There were no vitals taken for this visit.       Review of Systems - 14 point review:  Negative except for stated    Constitutional: (fevers, chills, night sweats, wt loss/gain, change in appetite, fatigue, somnolence)    HEENT: (ear pain or discharge, hearing loss, ear ringing, sinus pressure, nosebleed, nasal discharge, sore throat, oral sores, tooth pain, bleeding gums, hoarse voice, neck pain)      Cardiovascular: (HTN, chest pain, elevated cholesterol/lipids, palpitations, leg swelling, leg pain with walking)    Respiratory: (cough, wheezing, snoring, SOB with activity (dyspnea), SOB while lying flat (orthopnea), awakening with severe SOB (paroxysmal nocturnal dyspnea))    Gastrointestinal: (NVD, constipation, abdominal pain, bright red stools, black tarry stools, stool incontinence)     Genitourinary:  (pelvic pain, burning or frequency of urination, urinary urgency, blood in urine incomplete bladder emptying, urinary incontinence, STD; MEN: testicular pain or swelling, erectile dysfunction; WOMEN: LMP, heavy menstrual bleeding (menorrhagia), irregular periods, postmenopausal bleeding, menstrual pain (dymenorrhea, vaginal discharge)    Musculoskeletal: (bone pain/fracture, joint pain or swelling, musle pain)    Integumentary: (rashes, acne, non-healing sores, itching, breast lumps, breast pain, nipple discharge, hair loss)    Neurologic: (HA, muscle weakness, paresthesias (numbness, coldness, crawling or prickling), memory loss, seizure, dizziness)    Psychiatric:  (anxiety, sadness, irritability/anger, insomnia, suicidality)    Endocrine: (heat or cold intolerance, excessive thirst (polydipsia), excessive hunger (polyphagia))    Immune/Allergic: (hives, seasonal or environmental allergies, HIV exposure)    Hematologic/Lymphatic: (lymph node enlargement, easy bleeding or bruising)    History obtained via chart review and patient    PCP is Jeffrey Yu MD       Current Meds:    Prior to Admission medications    Medication Sig Start Date End Date Taking?  Authorizing Provider   vitamin D (ERGOCALCIFEROL) 1.25 MG (54683 UT) CAPS capsule TAKE 1 CAPSULE BY MOUTH ONCE A WEEK 5/31/22   Dottie Norwood MD   divalproex (DEPAKOTE ER) 500 MG extended release tablet TAKE 1 TABLET BY MOUTH IN THE MORNING AND 4 TABLETS AT BEDTIME 5/31/22 Dolores Fernandez APRN - CNP   lisinopril-hydroCHLOROthiazide (PRINZIDE;ZESTORETIC) 20-25 MG per tablet TAKE 1 TABLET BY MOUTH ONCE DAILY 3/22/22   Ramesh Martines MD   amLODIPine (NORVASC) 10 MG tablet TAKE 1 TABLET BY MOUTH ONCE DAILY 3/22/22   Ramesh Martines MD   furosemide (LASIX) 20 MG tablet TAKE 1 TABLET BY MOUTH ONCE DAILY. 3/22/22   Ramesh Martines MD   levothyroxine (SYNTHROID) 50 MCG tablet TAKE 1 TABLET BY MOUTH ONCE DAILY. 3/22/22   Ramesh Martines MD   gabapentin (NEURONTIN) 300 MG capsule TAKE 1 CAPSULE BY MOUTH THREE TIMES DAILY. 2/11/22 3/13/22  Ramesh Martines MD   lidocaine viscous hcl (XYLOCAINE) 2 % SOLN solution Take 10 mLs by mouth as needed for Irritation 2/2/22   Jeimy Chopra MD   rivaroxaban (XARELTO) 10 MG TABS tablet Take 1 tablet by mouth daily (with breakfast) 1/4/22 2/18/22  TRIP Celestin   Multiple Vitamin (MULTIVITAMIN ADULT PO) Take by mouth daily    Historical Provider, MD   diclofenac (VOLTAREN) 50 MG EC tablet Take 1 tablet by mouth 3 times daily (with meals) For 2 weeks and then as needed thereafter 10/28/21   Ramesh Martines MD   metoprolol tartrate (LOPRESSOR) 25 MG tablet TAKE 2 TABLETS BY MOUTH TWICE DAILY  8/27/21   Ramesh Martines MD   omeprazole (PRILOSEC) 20 MG delayed release capsule TAKE 1 CAPSULE BY MOUTH DAILY  7/11/21   Ramesh Martines MD   atorvastatin (LIPITOR) 40 MG tablet TAKE 1 TABLET BY MOUTH ONCE DAILY.  3/21/21   Ramesh Martines MD     Social History     Socioeconomic History    Marital status:      Spouse name: Not on file    Number of children: Not on file    Years of education: Not on file    Highest education level: Not on file   Occupational History    Not on file   Tobacco Use    Smoking status: Never Smoker    Smokeless tobacco: Never Used   Vaping Use    Vaping Use: Never used   Substance and Sexual Activity    Alcohol use: No    Drug use: No    Sexual activity: Yes   Other Topics Concern    Not on file   Social History Narrative    5/6/2021    PRIOR MEDICATION TRIALS            . SLEEP STUDY: yes - [facility]WK, Bipap    .    negative history of seizures. .    negative history of head trauma. Waleska Long PREVIOUS PSYCHIATRIC HISTORY    Had an episode of brief acute psychosis when he was in the [employer] (age 22). About ten years ago he had another episode but he doesn't remember what medicines he was taking - his inpatient intake in April, 2021 states that he was started on something which incapacitated him, then was started on Depakote. (He may have been started on Abilify at this time as he and his wife reports that Abilify did not work well for him.) He stopped having problems some years after that and he asked a doctor to take him off medications. He has been off medications for about 7 years. He has recently been admitted to inpatient with a severe episode of amy, 4/26/21-5/1/21. Waleska Long FAMILY PSYCHIATRIC HISTORY    Father, bipolar    Mother, depression    . SOCIAL HISTORY    Born and Raised [place], in a 2 parent home with 3 siblings. He says he had a wonderful childhood.  twice. Has one daughter from the first marriage. Has been  to his second wife for 26 years. They have 3 adopted children and 1 adult adopted child. .    Trauma and/or Abuse - Denies trauma. He was devastated with his divorce from his first wife. .    Legal - denies    Substance Use - see history    Work History - see history    Education - see history     status - [employer] for a few months, honorably discharged after an episode of Acute Psychosis    . PAST SUICIDE ATTEMPTS:    no    .    INPATIENT HOSPITALIZATIONS:    yes - three times, [employer] ([facility] - diagnosed with Acute Psychosis; 10 years ago - diagnosed with bipolar disorder, and most recently 4/26-4/29/21 with a manic episode    .     DRUG REHABILITATION:    no    .     Social Determinants of Health     Financial Resource Strain:     Difficulty of Paying Living Expenses: Not on file   Food Insecurity:     Worried About [address] [employer] in the Last Year: Not on file    Ran Out of Food in the Last Year: Not on file   Transportation Needs:     Lack of Transportation (Medical): Not on file    Lack of Transportation (Non-Medical): Not on file   Physical Activity:     Days of Exercise per Week: Not on file    Minutes of Exercise per Session: Not on file   Stress:     Feeling of Stress : Not on file   Social Connections:     Frequency of Communication with Friends and Family: Not on file    Frequency of Social Gatherings with Friends and Family: Not on file    Attends Y[employer]danism Services: Not on file    Active Member of [address] [place] [address] or Organizations: Not on file    Attends Club or Organization Meetings: Not on file    Marital Status: Not on file   Intimate Partner Violence:     Fear of Current or Ex-Partner: Not on file    Emotionally Abused: Not on file    Physically Abused: Not on file    Sexually Abused: Not on file   Housing Stability:     Unable to Pay for Housing in the Last Year: Not on file    Number of [place] in the Last Year: Not on file    Unstable Housing in the Last Year: Not on file       MSE:  Appearance and gait: UTO due to phone call   Behavior: Calm, cooperative, and socially appropriate. [place] [employer] Speech: Normal in tone, volume, and quality. No slurring, dysarthria or pressured speech noted. Mood: \"great\"   Affect: UTO due to phone call . Thought Process: Appears linear, logical and goal oriented. Causality appears intact. Thought Content: Denies active suicidal and homicidal ideations. No overt delusions or paranoia appreciated. Perceptions: Denies auditory or visual hallucinations at present time. Not responding to internal stimuli. Concentration: Intact. Orientation: to person, place, date, and situation. Language: Intact. Fund of information: Intact. Memory: Recent and remote appear intact.    Impulsivity: Limited. Neurovegitative: Fair appetite and sleep. Insight: Fair. Judgment: Fair. Cognition: Can spell \"world\" backwards: Yes                    Can do serial 7's: Yes    Lab Results   Component Value Date     04/26/2021    K 3.5 04/26/2021     04/26/2021    CO2 20 (L) 04/26/2021    BUN 14 04/26/2021    CREATININE 0.8 04/26/2021    GLUCOSE 100 04/26/2021    CALCIUM 9.0 04/26/2021    PROT 7.0 04/26/2021    LABALBU 4.0 04/26/2021    BILITOT 0.6 04/26/2021    ALKPHOS 51 04/26/2021    AST 24 04/26/2021    ALT 17 04/26/2021    LABGLOM >60 04/26/2021    GFRAA >59 04/26/2021    GLOB 2.7 03/31/2017     Lab Results   Component Value Date     04/26/2021    K 3.5 04/26/2021     04/26/2021    CO2 20 04/26/2021    BUN 14 04/26/2021    CREATININE 0.8 04/26/2021    GLUCOSE 100 04/26/2021    CALCIUM 9.0 04/26/2021      Lab Results   Component Value Date    CHOL 101 (L) 04/28/2021     Lab Results   Component Value Date    TRIG 90 04/28/2021     Lab Results   Component Value Date    HDL 31 (L) 04/28/2021     Lab Results   Component Value Date    LDLCALC 52 04/28/2021     No results found for: LABVLDL, VLDL  No results found for: [facility]  Lab Results   Component Value Date    LABA1C 5.1 04/28/2021     No results found for: EAG  Lab Results   Component Value Date    TSHFT4 1.61 04/26/2021    TSH 1.690 03/03/2021     Lab Results   Component Value Date    VITD25 19.1 (L) 04/28/2021     Lab Results   Component Value Date    BIQXIGDC53 036 04/14/2021      No results found for: FOLATE     Assessment:   1. Bipolar 1 disorder (HCC)        No evidence of acute suicidality, homicidality or psychosis observed. Patient is psychiatrically stable    Plan:    1. Continue   depakote ER 1500 mg nightly for mood stability  Vitamin D 50,000 units weekly    The risks, benefits, side effects, indications, contraindications, and adverse effects of the medications have been discussed.  Yes.  2. The pt has verbalized understanding and has capacity to give informed consent. 3. The Aye Dunlap report has been reviewed according to [facility] regulations. 4. Supportive therapy offered. 5. Follow up: Return in about 10 months (around 4/27/2023). 6. The patient has been advised to call with any problems. 7. Controlled substance Treatment Plan: NA.  8. The above listed medications have been continued, modifications in meds and other orders/labs as follows: No orders of the defined types were placed in this encounter. No orders of the defined types were placed in this encounter. 9. Additional comments:  discussed sleep hygiene, discussed the use of coping skills and relaxation strategies to manage symptoms. Reinforced appointment compliance      10. Over 50% of the total visit time of  30 minutes was spent on counseling and/or coordination of care of:                        1. Bipolar 1 disorder [facility])                      Psychotherapy Topics: mood/medication effectiveness family, financial, health and occupational    Ana Paula Copeland, APRN - CNP    This dictation was generated by voice recognition computer software. Although all attempts are made to edit the dictation for accuracy, there may be errors in the transcription that are not intended.

## 2022-06-27 NOTE — TELEPHONE ENCOUNTER
Pt's wife called and wanted to know if we could switch his appt from an in person appt to a phone appt because they are two vehicles down and they have to be at another appt around that time. Provider said that it was fine to change the appt. Changed the appt to a phone visit.     Electronically signed by Rosanne Winters MA on 6/27/2022 at 8:12 AM

## 2022-06-27 NOTE — TELEPHONE ENCOUNTER
Pt was called for virtual appointment check in.       Electronically signed by Courtney Hyde MA on 6/27/2022 at 2:43 PM

## 2022-07-05 DIAGNOSIS — G62.9 NEUROPATHY: ICD-10-CM

## 2022-07-06 RX ORDER — GABAPENTIN 300 MG/1
CAPSULE ORAL
Qty: 90 CAPSULE | Refills: 2 | OUTPATIENT
Start: 2022-07-06 | End: 2022-08-05

## 2022-07-07 NOTE — TELEPHONE ENCOUNTER
Denied controlled due to needs establish care appt with [facility] was seen but only for a sore throat

## 2022-08-18 ENCOUNTER — TELEPHONE (OUTPATIENT)
Dept: NEUROSURGERY | Age: 57
End: 2022-08-18

## 2022-08-18 DIAGNOSIS — M54.50 CHRONIC MIDLINE LOW BACK PAIN, UNSPECIFIED WHETHER SCIATICA PRESENT: Primary | ICD-10-CM

## 2022-08-18 DIAGNOSIS — G89.29 CHRONIC MIDLINE LOW BACK PAIN, UNSPECIFIED WHETHER SCIATICA PRESENT: Primary | ICD-10-CM

## 2022-08-18 NOTE — TELEPHONE ENCOUNTER
[facility] New Patient Questionnaire    Diagnosis/Reason for Referral?    Radiculopathy, lumbosacral region    2. Who is completing questionnaire? Patient  Caregiver Family      3. Has the patient had any previous spinal/brain surgeries? NO      A. If yes, what is the name of the facility in which the surgery was performed? B. Procedure/Surgery performed? C. Who was the surgeon? D. When was the surgery? MM/YY       E. Did the patient improve after the surgery? 4. Is this a second opinion? If yes, Dr. Sylvester Hager would like to review patient first before making the appointment. NO    5. Have MRI Images been obtain within the last year? Yes SHOULDER  No      XR  CT     If yes, where was the imaging performed? [facility]   If yes, what part of the body? Lumbar  Cervical  Thoracic  Brain  SHOULDER   If yes, when was it obtained? 1/5/22    Note: if the scan was performed at a facility other than [facility], the disc will need to be brought to the appointment or we need to reach out to obtain the disc. A. Was the patient instructed to provide the disc? Yes   No      8. Has the patient had a NCV/EMG within the last year? Yes  No     If yes, where was it performed and date? MM/YY  Location:      9. Has the patient been to Physical Therapy? Yes  No     If yes, what location, how long attended, and last visit? Location:        Therapy Lasted:    Date of Last Visit:      10. Has the patient been to Pain Management? Yes  No     If yes, what location and last visit     Location:   Last Visit:   Is it helping?

## 2022-08-23 ENCOUNTER — TELEPHONE (OUTPATIENT)
Dept: PSYCHIATRY | Age: 57
End: 2022-08-23

## 2022-08-23 RX ORDER — DIVALPROEX SODIUM 500 MG/1
1500 TABLET, EXTENDED RELEASE ORAL NIGHTLY
Qty: 90 TABLET | Refills: 2 | Status: SHIPPED | OUTPATIENT
Start: 2022-08-23

## 2022-08-23 RX ORDER — ERGOCALCIFEROL 1.25 MG/1
CAPSULE ORAL
Qty: 12 CAPSULE | Refills: 0 | OUTPATIENT
Start: 2022-08-23

## 2022-08-23 NOTE — TELEPHONE ENCOUNTER
Pt returned VM left. He stated that he is taking Depakote 500 mg 3 per night and does not need a new RX at this time. On further review pt has an appt on 4/24/23 with Lolly DOMINIQUE. Pt stated he is doing \"really good\" and does not want to move the appt to [facility]. Pt encouraged to call back as needed with any questions or concerns.

## 2022-08-23 NOTE — TELEPHONE ENCOUNTER
Pharmacy sent med refill request below. CURRENT MED LIST MATCHES PHARMACY REQUEST BUT LAST OFFICE VISIT SHOWS DEPAKOTE ER 1500 MG AT NIGHT ONLY. PLEASE EDIT RX IF APPROPRIATE. Last office visit : 6/27/2022 with Jenn Hattie DOMINIQUE  Next office visit : Visit date not found-left VM for pt with request for call back to schedule an appt. Requested Prescriptions     Pending Prescriptions Disp Refills    divalproex (DEPAKOTE ER) 500 MG extended release tablet [Pharmacy Med Name: DIVALPROEX SODIUM ER 500MG TABLET EXTENDED RELEASE 24 HOUR] 150 tablet 1     Sig: TAKE 1 TABLET IN THE MORNING AND 4 TABLETS AT BEDTIME. Lucy Richmond RN         6/27/22                                             Progress Note     Sarah Thomas 1965                                 Chief Complaint   Patient presents with    Medication Check    Follow-up            Subjective:    Patient is a 61 y.o. male diagnosed with bipolar 1 and presents today for follow-up. Last seen in clinic on 1/6/22   and prior records were reviewed. Last visit: Feels like the depakote is working really well. Feels like he had a spiritual breakthrough at [employer] about a month ago and has been feeling really good over all. Mood is doing really well, work is doing really well. He has had more mood stability than before, wife is present with him during visit today and states he is doing really well. Last VPA level was done on 10/8/21 it was 96.8  We will keep an eye on it. He denies si hi avh at this time. He denies side effects of medications at this time. He is calm and cooperative. He came from a doctors appointment today and has a blood clot in his leg, he is being followed by primary and they are not concerned at this time. Today:  Doing \"great\" today. He reports his medications are working well. He has gotten a better work work schedule. He has his evenings freed up and is spending time at [employer].      He denies si hi avh at this time. He denies side effects of medications at this time. He is calm and cooperative. He reports the blood clot in his leg is mostly resolved. He reports his wife continues to support him and believes he is doing well. He is bright and cheerful on the phone he denies SI HI AVH she denies side effects of medications he is calm and cooperative we will follow-up in 10 months     Absent  suicidal ideation. Reports compliance with medications as good . Sleep:  sleeping in a chair upright getting 6-7 hours per night. Caffeine use: tea, diet cokes     PREVIOUS MED TRIALS     Abilify  Risperdal   Depakote (current)  Buspirone  Trazodone (current)  Diazepam  Ativan  Gabapentin (current)  Melatonin  latuda - too sedating     Current Substance Use:   Alcohol: none  Illicit drug use: denies   Marijuana: denies   Tobacco: denies   Vape: denies         BP: There were no vitals taken for this visit.         Review of Systems - 14 point review:  Negative except for stated     Constitutional: (fevers, chills, night sweats, wt loss/gain, change in appetite, fatigue, somnolence)     HEENT: (ear pain or discharge, hearing loss, ear ringing, sinus pressure, nosebleed, nasal discharge, sore throat, oral sores, tooth pain, bleeding gums, hoarse voice, neck pain)      Cardiovascular: (HTN, chest pain, elevated cholesterol/lipids, palpitations, leg swelling, leg pain with walking)     Respiratory: (cough, wheezing, snoring, SOB with activity (dyspnea), SOB while lying flat (orthopnea), awakening with severe SOB (paroxysmal nocturnal dyspnea))     Gastrointestinal: (NVD, constipation, abdominal pain, bright red stools, black tarry stools, stool incontinence)     Genitourinary:  (pelvic pain, burning or frequency of urination, urinary urgency, blood in urine incomplete bladder emptying, urinary incontinence, STD; MEN: testicular pain or swelling, erectile dysfunction; WOMEN: LMP, heavy menstrual bleeding (menorrhagia), irregular periods, postmenopausal bleeding, menstrual pain (dymenorrhea, vaginal discharge)     Musculoskeletal: (bone pain/fracture, joint pain or swelling, musle pain)     Integumentary: (rashes, acne, non-healing sores, itching, breast lumps, breast pain, nipple discharge, hair loss)     Neurologic: (HA, muscle weakness, paresthesias (numbness, coldness, crawling or prickling), memory loss, seizure, dizziness)     Psychiatric:  (anxiety, sadness, irritability/anger, insomnia, suicidality)     Endocrine: (heat or cold intolerance, excessive thirst (polydipsia), excessive hunger (polyphagia))     Immune/Allergic: (hives, seasonal or environmental allergies, HIV exposure)     Hematologic/Lymphatic: (lymph node enlargement, easy bleeding or bruising)     History obtained via chart review and patient     PCP is Ramón Laird MD         Current Meds:     Home Medications           Prior to Admission medications    Medication Sig Start Date End Date Taking? Authorizing Provider   vitamin D (ERGOCALCIFEROL) 1.25 MG (28527 UT) CAPS capsule TAKE 1 CAPSULE BY MOUTH ONCE A WEEK 5/31/22     Beata Mendez MD   divalproex (DEPAKOTE ER) 500 MG extended release tablet TAKE 1 TABLET BY MOUTH IN THE MORNING AND 4 TABLETS AT BEDTIME 5/31/22     TRIP Mujica - SB   lisinopril-hydroCHLOROthiazide (PRINZIDE;ZESTORETIC) 20-25 MG per tablet TAKE 1 TABLET BY MOUTH ONCE DAILY 3/22/22     Beata Mendez MD   amLODIPine (NORVASC) 10 MG tablet TAKE 1 TABLET BY MOUTH ONCE DAILY 3/22/22     Beata Mendez MD   furosemide (LASIX) 20 MG tablet TAKE 1 TABLET BY MOUTH ONCE DAILY. 3/22/22     Beata Mendez MD   levothyroxine (SYNTHROID) 50 MCG tablet TAKE 1 TABLET BY MOUTH ONCE DAILY. 3/22/22     Beata Mendez MD   gabapentin (NEURONTIN) 300 MG capsule TAKE 1 CAPSULE BY MOUTH THREE TIMES DAILY.  2/11/22 3/13/22   Beata Mendez MD   lidocaine viscous hcl (XYLOCAINE) 2 % SOLN solution Take 10 mLs by mouth as needed for Irritation 2/2/22     Ernie Espinoza MD   rivaroxaban (XARELTO) 10 MG TABS tablet Take 1 tablet by mouth daily (with breakfast) 1/4/22 2/18/22   TRIP Noble   Multiple Vitamin (MULTIVITAMIN ADULT PO) Take by mouth daily       Historical Provider, MD   diclofenac (VOLTAREN) 50 MG EC tablet Take 1 tablet by mouth 3 times daily (with meals) For 2 weeks and then as needed thereafter 10/28/21     Delroy Montague MD   metoprolol tartrate (LOPRESSOR) 25 MG tablet TAKE 2 TABLETS BY MOUTH TWICE DAILY  8/27/21     Delroy Montague MD   omeprazole (PRILOSEC) 20 MG delayed release capsule TAKE 1 CAPSULE BY MOUTH DAILY  7/11/21     Delroy Montague MD   atorvastatin (LIPITOR) 40 MG tablet TAKE 1 TABLET BY MOUTH ONCE DAILY. 3/21/21     Delroy Montague MD         Social History   Social History            Socioeconomic History    Marital status:        Spouse name: Not on file    Number of children: Not on file    Years of education: Not on file    Highest education level: Not on file   Occupational History    Not on file   Tobacco Use    Smoking status: Never Smoker    Smokeless tobacco: Never Used   Vaping Use    Vaping Use: Never used   Substance and Sexual Activity    Alcohol use: No    Drug use: No    Sexual activity: Yes   Other Topics Concern    Not on file   Social History Narrative     5/6/2021     PRIOR MEDICATION TRIALS                 . SLEEP STUDY: yes - HAWK, Bipap     .     negative history of seizures. .     negative history of head trauma. Caroline Murphy PREVIOUS PSYCHIATRIC HISTORY     Had an episode of brief acute psychosis when he was in the [employer] (age 22). About ten years ago he had another episode but he doesn't remember what medicines he was taking - his inpatient intake in April, 2021 states that he was started on something which incapacitated him, then was started on Depakote.  (He may have been started on Abilify at this time as he and his wife reports that Abilify did not work well for him.) He stopped having problems some years after that and he asked a doctor to take him off medications. He has been off medications for about 7 years. He has recently been admitted to inpatient with a severe episode of amy, 4/26/21-5/1/21. Jamia Oakley FAMILY PSYCHIATRIC HISTORY     Father, bipolar     Mother, depression     . SOCIAL HISTORY     Born and Raised - [place], [place], in a 2 parent home with 3 siblings. He says he had a wonderful childhood.  twice. Has one daughter from the first marriage. Has been  to his second wife for 26 years. They have 3 adopted children and 1 adult adopted child. .     Trauma and/or Abuse - Denies trauma. He was devastated with his divorce from his first wife. .     Legal - denies     Substance Use - see history     Work History - see history     Education - see history      status - [employer] for a few months, honorably discharged after an episode of Acute Psychosis     . PAST SUICIDE ATTEMPTS:     no     .     INPATIENT HOSPITALIZATIONS:     yes - three times, [facility] - diagnosed with Acute Psychosis; 10 years ago - diagnosed with bipolar disorder, and most recently 4/26-4/29/21 with a manic episode     . DRUG REHABILITATION:     no     .      Social Determinants of Health          Financial Resource Strain:     Difficulty of Paying Living Expenses: Not on file   Food Insecurity:     Worried About [address] in the Last Year: Not on file    Ran Out of Food in the Last Year: Not on file   Transportation Needs:     Lack of Transportation (Medical): Not on file    Lack of Transportation (Non-Medical):  Not on file   Physical Activity:     Days of Exercise per Week: Not on file    Minutes of Exercise per Session: Not on file   Stress:     Feeling of Stress : Not on file   Social Connections:     Frequency of Communication with Friends and Family: Not on file    Frequency of Social Gatherings with Friends and Family: Not on file    Attends Tenriism Services: Not on file    Active Member of Clubs or Organizations: Not on file    Attends Club or Organization Meetings: Not on file    Marital Status: Not on file   Intimate Partner Violence:     Fear of Current or Ex-Partner: Not on file    Emotionally Abused: Not on file    Physically Abused: Not on file    Sexually Abused: Not on file   Housing Stability:     Unable to Pay for Housing in the Last Year: Not on file    Number of Places Lived in the Last Year: Not on file    Unstable Housing in the Last Year: Not on file            MSE:  Appearance and gait: UTO due to phone call   Behavior: Calm, cooperative, and socially appropriate. Heron You Speech: Normal in tone, volume, and quality. No slurring, dysarthria or pressured speech noted. Mood: \"great\"   Affect: UTO due to phone call . Thought Process: Appears linear, logical and goal oriented. Causality appears intact. Thought Content: Denies active suicidal and homicidal ideations. No overt delusions or paranoia appreciated. Perceptions: Denies auditory or visual hallucinations at present time. Not responding to internal stimuli. Concentration: Intact. Orientation: to person, place, date, and situation. Language: Intact. Fund of information: Intact. Memory: Recent and remote appear intact. Impulsivity: Limited. Neurovegitative: Fair appetite and sleep. Insight: Fair. Judgment: Fair. Cognition: Can spell \"world\" backwards: Yes                    Can do serial 7's:  Yes           Lab Results   Component Value Date      04/26/2021     K 3.5 04/26/2021      04/26/2021     CO2 20 (L) 04/26/2021     BUN 14 04/26/2021     CREATININE 0.8 04/26/2021     GLUCOSE 100 04/26/2021     CALCIUM 9.0 04/26/2021     PROT 7.0 04/26/2021     LABALBU 4.0 04/26/2021     BILITOT 0.6 04/26/2021     ALKPHOS 51 04/26/2021     AST 24 04/26/2021     ALT 17 04/26/2021     LABGLOM >60 04/26/2021     GFRAA >59 04/26/2021 GLOB 2.7 03/31/2017            Lab Results   Component Value Date      04/26/2021     K 3.5 04/26/2021      04/26/2021     CO2 20 04/26/2021     BUN 14 04/26/2021     CREATININE 0.8 04/26/2021     GLUCOSE 100 04/26/2021     CALCIUM 9.0 04/26/2021            Lab Results   Component Value Date     CHOL 101 (L) 04/28/2021            Lab Results   Component Value Date     TRIG 90 04/28/2021            Lab Results   Component Value Date     HDL 31 (L) 04/28/2021            Lab Results   Component Value Date     LDLCALC 52 04/28/2021      No results found for: LABVLDL, VLDL  No results found for: [facility]        Lab Results   Component Value Date     LABA1C 5.1 04/28/2021      No results found for: EAG        Lab Results   Component Value Date     TSHFT4 1.61 04/26/2021     TSH 1.690 03/03/2021            Lab Results   Component Value Date     VITD25 19.1 (L) 04/28/2021            Lab Results   Component Value Date     BESRJPXR53 341 04/14/2021      No results found for: FOLATE      Assessment:    1. Bipolar 1 disorder (HCC)          No evidence of acute suicidality, homicidality or psychosis observed. Patient is psychiatrically stable     Plan:     1. Continue   depakote ER 1500 mg nightly for mood stability  Vitamin D 50,000 units weekly     The risks, benefits, side effects, indications, contraindications, and adverse effects of the medications have been discussed. Yes.  2. The pt has verbalized understanding and has capacity to give informed consent. 3. The Magi Mace report has been reviewed according to [facility] regulations. 4. Supportive therapy offered. 5. Follow up:    Return in about 10 months (around 4/27/2023). 6. The patient has been advised to call with any problems.   7. Controlled substance Treatment Plan: NA.  8. The above listed medications have been continued, modifications in meds and other orders/labs as follows:                   Encounter Medications    No orders of the defined types were placed in this encounter. No orders of the defined types were placed in this encounter. 9. Additional comments:  discussed sleep hygiene, discussed the use of coping skills and relaxation strategies to manage symptoms. Reinforced appointment compliance        10. Over 50% of the total visit time of  30 minutes was spent on counseling and/or coordination of care of:                         1. [facility] ([facility]                        Psychotherapy Topics: mood/medication effectiveness family, financial, health and occupational     Billie Gonzalez, TRIP - CNP

## 2022-08-30 ENCOUNTER — HOSPITAL ENCOUNTER (OUTPATIENT)
Dept: GENERAL RADIOLOGY | Age: 57
Discharge: HOME | End: 2022-08-30
Payer: MEDICAID

## 2022-08-30 DIAGNOSIS — M54.50 CHRONIC MIDLINE LOW BACK PAIN, UNSPECIFIED WHETHER SCIATICA PRESENT: ICD-10-CM

## 2022-08-30 DIAGNOSIS — G89.29 CHRONIC MIDLINE LOW BACK PAIN, UNSPECIFIED WHETHER SCIATICA PRESENT: ICD-10-CM

## 2022-08-30 PROCEDURE — 72110 X-RAY EXAM L-2 SPINE 4/>VWS: CPT | Performed by: RADIOLOGY

## 2022-08-30 PROCEDURE — 72110 X-RAY EXAM L-2 SPINE 4/>VWS: CPT

## 2022-08-31 ENCOUNTER — OFFICE VISIT (OUTPATIENT)
Dept: NEUROSURGERY | Age: 57
End: 2022-08-31
Payer: MEDICAID

## 2022-08-31 VITALS
DIASTOLIC BLOOD PRESSURE: 78 MMHG | HEIGHT: 69 IN | RESPIRATION RATE: 18 BRPM | WEIGHT: 294 LBS | HEART RATE: 67 BPM | OXYGEN SATURATION: 97 % | SYSTOLIC BLOOD PRESSURE: 118 MMHG | BODY MASS INDEX: 43.55 KG/M2

## 2022-08-31 DIAGNOSIS — F40.240 CLAUSTROPHOBIA: ICD-10-CM

## 2022-08-31 DIAGNOSIS — G89.29 CHRONIC MIDLINE LOW BACK PAIN WITH BILATERAL SCIATICA: ICD-10-CM

## 2022-08-31 DIAGNOSIS — R26.89 ANTALGIC GAIT: ICD-10-CM

## 2022-08-31 DIAGNOSIS — M79.651 BILATERAL THIGH PAIN: ICD-10-CM

## 2022-08-31 DIAGNOSIS — M25.552 LEFT HIP PAIN: ICD-10-CM

## 2022-08-31 DIAGNOSIS — M51.36 DDD (DEGENERATIVE DISC DISEASE), LUMBAR: Primary | ICD-10-CM

## 2022-08-31 DIAGNOSIS — R29.898 LEG FATIGUE: ICD-10-CM

## 2022-08-31 DIAGNOSIS — M54.41 CHRONIC MIDLINE LOW BACK PAIN WITH BILATERAL SCIATICA: ICD-10-CM

## 2022-08-31 DIAGNOSIS — M54.42 CHRONIC MIDLINE LOW BACK PAIN WITH BILATERAL SCIATICA: ICD-10-CM

## 2022-08-31 DIAGNOSIS — M79.652 BILATERAL THIGH PAIN: ICD-10-CM

## 2022-08-31 DIAGNOSIS — R20.0 LEG NUMBNESS: ICD-10-CM

## 2022-08-31 PROCEDURE — 99214 OFFICE O/P EST MOD 30 MIN: CPT | Performed by: NURSE PRACTITIONER

## 2022-08-31 RX ORDER — DIAZEPAM 5 MG/1
TABLET ORAL
Qty: 2 TABLET | Refills: 0 | Status: SHIPPED | OUTPATIENT
Start: 2022-08-31 | End: 2022-09-07

## 2022-08-31 RX ORDER — APIXABAN 5 MG/1
5 TABLET, FILM COATED ORAL DAILY
COMMUNITY
Start: 2022-08-18

## 2022-08-31 RX ORDER — HYDROCODONE BITARTRATE AND ACETAMINOPHEN 5; 325 MG/1; MG/1
1 TABLET ORAL 2 TIMES DAILY PRN
Qty: 2 TABLET | Refills: 0 | Status: SHIPPED | OUTPATIENT
Start: 2022-08-31 | End: 2022-09-01

## 2022-08-31 ASSESSMENT — ENCOUNTER SYMPTOMS
EYES NEGATIVE: 1
BACK PAIN: 1
RESPIRATORY NEGATIVE: 1
GASTROINTESTINAL NEGATIVE: 1

## 2022-08-31 NOTE — PROGRESS NOTES
[facility] Neurosurgery  Office Visit      Chief Complaint   Patient presents with    New Patient     Establishing care     Results     XR Lumbar Spine (8/30/2022)    Back Pain     Patient states his pain has gradually came on over the years. He states if he stands in one position for awhile then he has a burning sensation in his thighs and across his lower back. He states he isn't taking anything to help manage the pain. He states it isn't constant but hurts him more when he is physically active. Numbness     Patient states from his knees down he has a tingling/burning sensation. He is taking Gabapentin for this. HISTORY OF PRESENT ILLNESS:    Warren Royal is a 61 y.o. male  who presents with low back pain and BLE pain. The pain does radiate into the bilateral anterior thighs that he describes as a burning sensation. His pain is mostly located in the . The patient complains of numbness of the anterior thighs. Walking or standing for prolonged period of time will cause left hip and low back pain. Standing makes his legs feel weak and wobbly. He cannot lie in a bed due to the pain, he has been sleeping in a recliner for several years. His pain is not changed when going from a seated to standing position. His pain is worsened with walking. His pain is worsened when lying flat. Overall, indicative that the patient does have a mechanical nature to their pain. He states that 50% of his pain is located in the back and 50% is leg pain. The patient has underwent a non-operative treatment course that has included:  NSAIDs (diclofenac)  Gabapentin 300 TID        Of note he does not use tobacco and does take blood thinning medications (Eliquis) has a DVT currently of the RLE, was on for 45 days then clot returned and is now on for 6 months.                Past Medical History:   Diagnosis Date    Bipolar disorder ([facility]     Colon polyps     Diverticular disease     GERD (gastroesophageal reflux disease)     Hiatal hernia     Hyperlipidemia     Hypertension     Hypothyroidism     Neuropathy     Unspecified sleep apnea        Past Surgical History:   Procedure Laterality Date    COLONOSCOPY  01/06/2009    Dr Gavin Root: hyperplastic polyp, resolving diverticulitis    COLONOSCOPY  07/2012    minimal diverticulosis left side o/w normal postsurgical colon    COLONOSCOPY  12/2005    diverticulitis    COLONOSCOPY N/A 11/8/2016    Dr Shikha Arredondo bleeding internal hemorrhoids, diverticular disease-5 yr recall    EGD      CO EGD TRANSORAL BIOPSY SINGLE/MULTIPLE N/A 8/24/2018    Dr JONATHAN Bentley-Gastritis/gastropathy, esophagitis    SUBTOTAL COLECTOMY  2010    recurrant diverticulitis    UPPER GASTROINTESTINAL ENDOSCOPY  12/22/2005    Dr Gavin Root: duodenal ulcer, gastritis    UPPP UVULOPALATOPHARYGOPLASTY         Current Outpatient Medications   Medication Sig Dispense Refill    ELIQUIS 5 MG TABS tablet Take 5 mg by mouth daily      diazePAM (VALIUM) 5 MG tablet TAKE ONE TAB 1 HOUR PRIOR AND 1 TAB 30 MIN PRIOR TO MRI 2 tablet 0    HYDROcodone-acetaminophen (NORCO) 5-325 MG per tablet Take 1 tablet by mouth 2 times daily as needed for Pain for up to 1 day. 1-2 tablets prior to MRI 2 tablet 0    divalproex (DEPAKOTE ER) 500 MG extended release tablet Take 3 tablets by mouth nightly 90 tablet 2    metoprolol tartrate (LOPRESSOR) 25 MG tablet TAKE 2 TABLETS BY MOUTH TWICE DAILY 60 tablet 10    vitamin D (ERGOCALCIFEROL) 1.25 MG (72172 UT) CAPS capsule TAKE 1 CAPSULE BY MOUTH ONCE A WEEK 12 capsule 0    lisinopril-hydroCHLOROthiazide (PRINZIDE;ZESTORETIC) 20-25 MG per tablet TAKE 1 TABLET BY MOUTH ONCE DAILY 90 tablet 4    amLODIPine (NORVASC) 10 MG tablet TAKE 1 TABLET BY MOUTH ONCE DAILY 90 tablet 3    furosemide (LASIX) 20 MG tablet TAKE 1 TABLET BY MOUTH ONCE DAILY. 30 tablet 10    levothyroxine (SYNTHROID) 50 MCG tablet TAKE 1 TABLET BY MOUTH ONCE DAILY.  90 tablet 3    lidocaine viscous hcl (XYLOCAINE) 2 % SOLN solution Take 10 mLs by mouth as needed for Irritation 100 mL 0    Multiple Vitamin (MULTIVITAMIN ADULT PO) Take by mouth daily      diclofenac (VOLTAREN) 50 MG EC tablet Take 1 tablet by mouth 3 times daily (with meals) For 2 weeks and then as needed thereafter 90 tablet 0    omeprazole (PRILOSEC) 20 MG delayed release capsule TAKE 1 CAPSULE BY MOUTH DAILY  30 capsule 11    atorvastatin (LIPITOR) 40 MG tablet TAKE 1 TABLET BY MOUTH ONCE DAILY. 90 tablet 0    gabapentin (NEURONTIN) 300 MG capsule TAKE 1 CAPSULE BY MOUTH THREE TIMES DAILY. 90 capsule 2    rivaroxaban (XARELTO) 10 MG TABS tablet Take 1 tablet by mouth daily (with breakfast) 45 tablet 0     No current facility-administered medications for this visit. Allergies:  Patient has no known allergies. Social History:   Social History     Tobacco Use   Smoking Status Never   Smokeless Tobacco Never     Social History     Substance and Sexual Activity   Alcohol Use No         Family History:   Family History   Problem Relation Age of Onset    Diabetes Mother     Kidney Disease Mother     Dementia Mother          at 68 - Lewy Body Dementia    Coronary Art Dis Father          at [de-identified]    Colon Cancer Neg Hx     Colon Polyps Neg Hx     Esophageal Cancer Neg Hx     Liver Cancer Neg Hx     Liver Disease Neg Hx     Rectal Cancer Neg Hx     Stomach Cancer Neg Hx        REVIEW OF SYSTEMS:  Constitutional: Negative. HENT: Negative. Eyes: Negative. Respiratory: Negative. Cardiovascular: Negative. Gastrointestinal: Negative. Genitourinary: Negative. Musculoskeletal:  Positive for back pain, joint pain and myalgias. Skin: Negative. Neurological:  Positive for tingling. Negative for weakness. Endo/Heme/Allergies: Negative. Psychiatric/Behavioral: Negative. PHYSICAL EXAM:  Vitals:    22 0926   BP: 118/78   Pulse: 67   Resp: 18   SpO2: 97%         Constitutional: appears well-developed and well-nourished.    Eyes - conjunctiva normal.  Pupils react to light  Ear, nose, throat - hearing intact to finger rub, No scars, masses, or lesions over external nose or ears, no atrophy oftongue  Neck- symmetric, no masses noted, no jugular vein distension  Respiration- chest wall appears symmetric, good expansion, normal effort without use of accessory muscles  Musculoskeletal - no significant wasting of muscles noted, no bony deformities, gait no gross ataxia  Extremities- no clubbing, cyanosis oredema  Skin - warm, dry, and intact. No rash, erythema, or pallor. Psychiatric - mood, affect, and behavior appear normal.     Neurologic Examination  Awake, Alert and oriented x 4  Normal speech pattern, following commands    Motor:  RIGHT: hand grasp 5/5    finger extension 5/5    bicep 5/5    triceps 5/5    deltoid 5/5      iliopsoas 5/5    knee flexor 5/5    knee extension 5/5    EHL 5/5   dorsiflexion 5/5    plantar flexion 5/5    LEFT:   hand grasp 5/5    finger extension 5/5    bicep 5/5    triceps 5/5    deltoid 5/5      iliopsoas 5/5    knee flexor 5/5    knee extension 5/5    EHL 5/5   dorsiflexion 5/5    plantar flexion 5/5    No deficits to light touch or pinprick sensation  Reflexes are 2+ and symmetric  No myofacial tenderness to palpation  Antalgic Gait pattern        DATA and IMAGING:    Nursing/pcp notes, imaging, labs, and vitals reviewed.      PT,OT and/or speech notes reviewed    Lab Results   Component Value Date    WBC 6.9 04/30/2021    HGB 12.5 (L) 04/30/2021    HCT 38.5 (L) 04/30/2021    MCV 95.8 (H) 04/30/2021     04/30/2021     Lab Results   Component Value Date     04/26/2021    K 3.5 04/26/2021     04/26/2021    CO2 20 (L) 04/26/2021    BUN 14 04/26/2021    CREATININE 0.8 04/26/2021    GLUCOSE 100 04/26/2021    CALCIUM 9.0 04/26/2021    PROT 7.0 04/26/2021    LABALBU 4.0 04/26/2021    BILITOT 0.6 04/26/2021    ALKPHOS 51 04/26/2021    AST 24 04/26/2021    ALT 17 04/26/2021    LABGLOM >60 04/26/2021    GFRAA >59 04/26/2021    GLOB 2.7 03/31/2017     Lab Results   Component Value Date    INR 1.08 11/24/2019    INR 1.05 03/24/2017    INR 1.10 11/06/2015    PROTIME 13.4 11/24/2019    PROTIME 13.7 03/24/2017    PROTIME 13.9 11/06/2015     XR Lumbar Spine (8/30/2022) [facility]  I have personally reviewed these images and my interpretation is:  DDD throughout  Very mild levo curvature       NCS/EMG (6/10/2022) Dr. Rajendra Merritt  Interpretation:  Mild neuropathy       ASSESSMENT:    Terence Helm is a 61 y.o. male with complaints of low pain and anterior thigh burning sensations. ICD-10-CM    1. DDD (degenerative disc disease), lumbar  M51.36 MRI LUMBAR SPINE WO CONTRAST     HYDROcodone-acetaminophen (NORCO) 5-325 MG per tablet      2. Chronic midline low back pain with bilateral sciatica  M54.41 MRI LUMBAR SPINE WO CONTRAST    M54.42 HYDROcodone-acetaminophen (NORCO) 5-325 MG per tablet    G89.29       3. Left hip pain  M25.552 MRI LUMBAR SPINE WO CONTRAST      4. Bilateral thigh pain  M79.651 MRI LUMBAR SPINE WO CONTRAST    M79.652       5. Antalgic gait  R26.89 MRI LUMBAR SPINE WO CONTRAST      6. Leg numbness  R20.0 MRI LUMBAR SPINE WO CONTRAST      7. Leg fatigue  R29.898 MRI LUMBAR SPINE WO CONTRAST      8. Claustrophobia  F40.240 diazePAM (VALIUM) 5 MG tablet          PLAN:  I have discussed and reviewed the results of the XR lumbar spine with Mr. Breanna Henry and his wife Mayi Walden at length. I explained that he brooke shave some DDD throughout  his lumbar spine with a mild levoscoliosis that can cause back pain. At this point, given that PT has not helped in the past, it is very difficult for him to perform his job duties, I will move forward with MRI.     -Obtain MRI lumbar spine OPEN (Valium and Norco)   -Follow up after imaging         Sarah Recio, APRN

## 2022-08-31 NOTE — PROGRESS NOTES
Review of Systems   Constitutional: Negative. HENT: Negative. Eyes: Negative. Respiratory: Negative. Cardiovascular: Negative. Gastrointestinal: Negative. Genitourinary: Negative. Musculoskeletal:  Positive for back pain, joint pain and myalgias. Skin: Negative. Neurological:  Positive for tingling. Negative for weakness. Endo/Heme/Allergies: Negative. Psychiatric/Behavioral: Negative.

## 2022-09-15 ENCOUNTER — HOSPITAL ENCOUNTER (OUTPATIENT)
Dept: MRI IMAGING | Age: 57
Discharge: HOME | End: 2022-09-15
Payer: MEDICAID

## 2022-09-15 DIAGNOSIS — G89.29 CHRONIC MIDLINE LOW BACK PAIN WITH BILATERAL SCIATICA: ICD-10-CM

## 2022-09-15 DIAGNOSIS — R20.0 LEG NUMBNESS: ICD-10-CM

## 2022-09-15 DIAGNOSIS — M54.41 CHRONIC MIDLINE LOW BACK PAIN WITH BILATERAL SCIATICA: ICD-10-CM

## 2022-09-15 DIAGNOSIS — M25.552 LEFT HIP PAIN: ICD-10-CM

## 2022-09-15 DIAGNOSIS — M79.652 BILATERAL THIGH PAIN: ICD-10-CM

## 2022-09-15 DIAGNOSIS — R26.89 ANTALGIC GAIT: ICD-10-CM

## 2022-09-15 DIAGNOSIS — G62.9 NEUROPATHY: ICD-10-CM

## 2022-09-15 DIAGNOSIS — M79.651 BILATERAL THIGH PAIN: ICD-10-CM

## 2022-09-15 DIAGNOSIS — M51.36 DDD (DEGENERATIVE DISC DISEASE), LUMBAR: ICD-10-CM

## 2022-09-15 DIAGNOSIS — M54.42 CHRONIC MIDLINE LOW BACK PAIN WITH BILATERAL SCIATICA: ICD-10-CM

## 2022-09-15 DIAGNOSIS — R29.898 LEG FATIGUE: ICD-10-CM

## 2022-09-15 PROCEDURE — 72148 MRI LUMBAR SPINE W/O DYE: CPT

## 2022-09-15 PROCEDURE — 72148 MRI LUMBAR SPINE W/O DYE: CPT | Performed by: RADIOLOGY

## 2022-09-16 RX ORDER — GABAPENTIN 300 MG/1
CAPSULE ORAL
Qty: 90 CAPSULE | Refills: 2 | OUTPATIENT
Start: 2022-09-16 | End: 2022-10-16

## 2022-09-22 ENCOUNTER — OFFICE VISIT (OUTPATIENT)
Dept: NEUROSURGERY | Age: 57
End: 2022-09-22
Payer: MEDICAID

## 2022-09-22 VITALS
BODY MASS INDEX: 43.55 KG/M2 | OXYGEN SATURATION: 98 % | DIASTOLIC BLOOD PRESSURE: 86 MMHG | RESPIRATION RATE: 18 BRPM | HEART RATE: 64 BPM | HEIGHT: 69 IN | WEIGHT: 294 LBS | SYSTOLIC BLOOD PRESSURE: 118 MMHG

## 2022-09-22 DIAGNOSIS — R26.89 ANTALGIC GAIT: ICD-10-CM

## 2022-09-22 DIAGNOSIS — G89.29 CHRONIC LEFT SHOULDER PAIN: ICD-10-CM

## 2022-09-22 DIAGNOSIS — F40.240 CLAUSTROPHOBIA: ICD-10-CM

## 2022-09-22 DIAGNOSIS — M51.36 DDD (DEGENERATIVE DISC DISEASE), LUMBAR: Primary | ICD-10-CM

## 2022-09-22 DIAGNOSIS — M54.41 CHRONIC MIDLINE LOW BACK PAIN WITH BILATERAL SCIATICA: ICD-10-CM

## 2022-09-22 DIAGNOSIS — M25.512 CHRONIC LEFT SHOULDER PAIN: ICD-10-CM

## 2022-09-22 DIAGNOSIS — M50.30 DDD (DEGENERATIVE DISC DISEASE), CERVICAL: ICD-10-CM

## 2022-09-22 DIAGNOSIS — M79.651 BILATERAL THIGH PAIN: ICD-10-CM

## 2022-09-22 DIAGNOSIS — G89.29 CHRONIC MIDLINE LOW BACK PAIN WITH BILATERAL SCIATICA: ICD-10-CM

## 2022-09-22 DIAGNOSIS — M54.2 NECK PAIN: ICD-10-CM

## 2022-09-22 DIAGNOSIS — M54.42 CHRONIC MIDLINE LOW BACK PAIN WITH BILATERAL SCIATICA: ICD-10-CM

## 2022-09-22 DIAGNOSIS — M48.02 FORAMINAL STENOSIS OF CERVICAL REGION: ICD-10-CM

## 2022-09-22 DIAGNOSIS — M79.652 BILATERAL THIGH PAIN: ICD-10-CM

## 2022-09-22 PROCEDURE — 99214 OFFICE O/P EST MOD 30 MIN: CPT | Performed by: NEUROLOGICAL SURGERY

## 2022-09-22 RX ORDER — DIAZEPAM 5 MG/1
TABLET ORAL
Qty: 2 TABLET | Refills: 0 | Status: SHIPPED | OUTPATIENT
Start: 2022-09-22 | End: 2022-10-06

## 2022-09-22 ASSESSMENT — ENCOUNTER SYMPTOMS
GASTROINTESTINAL NEGATIVE: 1
RESPIRATORY NEGATIVE: 1
EYES NEGATIVE: 1

## 2022-09-22 NOTE — PROGRESS NOTES
Review of Systems   Constitutional: Negative. HENT: Negative. Eyes: Negative. Respiratory: Negative. Cardiovascular: Negative. Gastrointestinal: Negative. Genitourinary: Negative. Skin: Negative. Endo/Heme/Allergies: Negative. Psychiatric/Behavioral: Negative.

## 2022-09-22 NOTE — PROGRESS NOTES
back and 50% is leg pain. The patient has underwent a non-operative treatment course that has included:  NSAIDs (diclofenac)  Gabapentin 300 TID        Of note he does not use tobacco and does take blood thinning medications (Eliquis) has a DVT currently of the RLE, was on for 45 days then clot returned and is now on for 6 months.                Past Medical History:   Diagnosis Date    Bipolar disorder ([address]     Colon polyps     Diverticular disease     GERD (gastroesophageal reflux disease)     Hiatal hernia     Hyperlipidemia     Hypertension     Hypothyroidism     Neuropathy     Unspecified sleep apnea        Past Surgical History:   Procedure Laterality Date    COLONOSCOPY  01/06/2009    Dr Francie Pineda: hyperplastic polyp, resolving diverticulitis    COLONOSCOPY  07/2012    minimal diverticulosis left side o/w normal postsurgical colon    COLONOSCOPY  12/2005    diverticulitis    COLONOSCOPY N/A 11/8/2016    Dr Priscila Schulz bleeding internal hemorrhoids, diverticular disease-5 yr recall    EGD      [place] EGD TRANSORAL BIOPSY SINGLE/MULTIPLE N/A 8/24/2018    Dr JONATHAN Bentley-Gastritis/gastropathy, esophagitis    SUBTOTAL COLECTOMY  2010    recurrant diverticulitis    UPPER GASTROINTESTINAL ENDOSCOPY  12/22/2005    Dr Francie Pineda: duodenal ulcer, gastritis    UPPP UVULOPALATOPHARYGOPLASTY         Current Outpatient Medications   Medication Sig Dispense Refill    diazePAM (VALIUM) 5 MG tablet TAKE 1 TAB ONE HOUR PRIOR AND 1 TAB 30 MIN PRIOR TO MRI 2 tablet 0    ELIQUIS 5 MG TABS tablet Take 5 mg by mouth daily      divalproex (DEPAKOTE ER) 500 MG extended release tablet Take 3 tablets by mouth nightly 90 tablet 2    metoprolol tartrate (LOPRESSOR) 25 MG tablet TAKE 2 TABLETS BY MOUTH TWICE DAILY 60 tablet 10    vitamin D (ERGOCALCIFEROL) 1.25 MG (02474 UT) CAPS capsule TAKE 1 CAPSULE BY MOUTH ONCE A WEEK 12 capsule 0    lisinopril-hydroCHLOROthiazide (PRINZIDE;ZESTORETIC) 20-25 MG per tablet TAKE 1 TABLET BY MOUTH ONCE Psychiatric/Behavioral: Negative      PHYSICAL EXAM:  Vitals:    09/22/22 0902   BP: 118/86   Pulse: 64   Resp: 18   SpO2: 98%         Constitutional: appears well-developed and well-nourished. Eyes - conjunctiva normal.  Pupils react to light  Ear, nose, throat - hearing intact to finger rub, No scars, masses, or lesions over external nose or ears, no atrophy oftongue  Neck- symmetric, no masses noted, no jugular vein distension  Respiration- chest wall appears symmetric, good expansion, normal effort without use of accessory muscles  Musculoskeletal - no significant wasting of muscles noted, no bony deformities, gait no gross ataxia  Extremities- no clubbing, cyanosis oredema  Skin - warm, dry, and intact. No rash, erythema, or pallor. Psychiatric - mood, affect, and behavior appear normal.     Neurologic Examination  Awake, Alert and oriented x 4  Normal speech pattern, following commands    Motor:  RIGHT: hand grasp 5/5    finger extension 5/5    bicep 5/5    triceps 5/5    deltoid 5/5      iliopsoas 5/5    knee flexor 5/5    knee extension 5/5    EHL 5/5   dorsiflexion 5/5    plantar flexion 5/5    LEFT:   hand grasp 5/5    finger extension 5/5    bicep 5/5    triceps 5/5    deltoid 5/5      iliopsoas 5/5    knee flexor 5/5    knee extension 5/5    EHL 5/5   dorsiflexion 5/5    plantar flexion 5/5    No deficits to light touch or pinprick sensation  Reflexes are 2+ and symmetric  No myofacial tenderness to palpation  Antalgic Gait pattern        DATA and IMAGING:    Nursing/pcp notes, imaging, labs, and vitals reviewed.      PT,OT and/or speech notes reviewed    Lab Results   Component Value Date    WBC 6.9 04/30/2021    HGB 12.5 (L) 04/30/2021    HCT 38.5 (L) 04/30/2021    MCV 95.8 (H) 04/30/2021     04/30/2021     Lab Results   Component Value Date     04/26/2021    K 3.5 04/26/2021     04/26/2021    CO2 20 (L) 04/26/2021    BUN 14 04/26/2021    CREATININE 0.8 04/26/2021    GLUCOSE 100 04/26/2021    CALCIUM 9.0 04/26/2021    PROT 7.0 04/26/2021    LABALBU 4.0 04/26/2021    BILITOT 0.6 04/26/2021    ALKPHOS 51 04/26/2021    AST 24 04/26/2021    ALT 17 04/26/2021    LABGLOM >60 04/26/2021    GFRAA >59 04/26/2021    GLOB 2.7 03/31/2017     Lab Results   Component Value Date    INR 1.08 11/24/2019    INR 1.05 03/24/2017    INR 1.10 11/06/2015    PROTIME 13.4 11/24/2019    PROTIME 13.7 03/24/2017    PROTIME 13.9 11/06/2015         XR Lumbar Spine (8/30/2022) Olinda Delgado  I have personally reviewed these images and my interpretation is:  DDD throughout  Very mild levo curvature     NO PRIOR REPORT AVAILABLE   Exam: MRI OF THE LUMBAR SPINE WITHOUT CONTRAST   Clinical data: Degenerative disc disease. Chronic low back pain with bilateral sciatica. Left hip pain, bilateral thigh pain. Antalgic gait. Leg numbness, leg fatigue. Technique: Multiplanar multisequence MRI of the lumbar spine without contrast. Axial imaging was performed through the L1 through S1 disc levels. Prior studies: Radiograph of the lumbar spine dated 08/30/2022. Findings: For the purposes of this examination, spinal levels were labeled assuming five nonrib-bearing, lumbar-type vertebrae with the inferior labeled L5. Normal lordotic curvature. No acute fracture or subluxation. Normal vertebral body and intervertebral disc heights. Normal marrow signal of the vertebrae. Conus medullaris in normal anatomic position. No abnormal epiduralmasses. Soft tissues are unremarkable. Individual spinal levels are described as follows:   T12-L1: No definitive spinal canal or neural foraminal stenosis. L1-L2: There is no abnormally positioned disc material. There is no significant spinal canal, lateral recess, neural foramina compromise, or nerve impingement. L2-L3: There is no abnormally positioned disc material. There is no significant spinal canal, lateral recess, neural foramina compromise, or nerve impingement. L3-L4: 3mm disc bulge. There is no significant spinal canal, lateral recess, neural foramina compromise, or nerve impingement. L4-L5: 3mm disc bulge. There is no significant spinal canal, lateral recess, neural foramina compromise, or nerve impingement. L5-S1: 4mm disc bulge. There is no significant spinal canal, lateral recess, neural foramina compromise, or nerve impingement. Impression   1. Disc bulges   2. L3,4,5 facet arthropathy   3. No acute fracture   Recommendation: Follow up as clinically indicated. Electronically Signed by Jami Sidhu MD at 21-Sep-2022 06:12:46 PM            I have personally reviewed the images and my interpretation is:  No significant neural element compression. Mild degenerative disc disease L5-S1. NCS/EMG (6/10/2022) Dr. Jose Steward  Interpretation:  Mild neuropathy       ASSESSMENT:    Girish Woodward is a 61 y.o. male with complaints of low pain and anterior thigh burning sensations. ICD-10-CM    1. DDD (degenerative disc disease), lumbar  M51.36       2. Chronic midline low back pain with bilateral sciatica  M54.41     M54.42     G89.29       3. Bilateral thigh pain  M79.651     M79.652       4. Antalgic gait  R26.89       5. Neck pain  M54.2 MRI CERVICAL SPINE WO CONTRAST      6. Chronic left shoulder pain  M25.512 MRI CERVICAL SPINE WO CONTRAST    G89.29       7. DDD (degenerative disc disease), cervical  M50.30 MRI CERVICAL SPINE WO CONTRAST      8. Foraminal stenosis of cervical region  M48.02 MRI CERVICAL SPINE WO CONTRAST      9. Claustrophobia  F40.240 diazePAM (VALIUM) 5 MG tablet            PLAN:  -We have discussed and reviewed the results of the MRI lumbar spine with Mr. Emerita Mccartney and his wife Selma Vargas at length. We explained that he does have some DDD that can correlate with his low back pain. There is no major neural element compression that would describe his thigh burning sensations. Certainly no neurosurgical intervention warranted for the lumbar spine.     Regarding his cervical spine, we did quickly review his old MRI cervical spine and he does have bony osteophytes and disc herniations, however, given that it is over a year and half old, we would like to repeat this if we discuss any cervical intervention. He is neurologically intact, he has no radicular pains, and he has not had many non-operative treatments, we would likely not recommend a surgical procedure at this time.   We discussed having him go through PT and he is reluctant.    -Obtain MRI cervical spine; needs valium   -Follow up after imaging       Fernanda Baker DO

## 2022-09-29 ENCOUNTER — HOSPITAL ENCOUNTER (OUTPATIENT)
Dept: MRI IMAGING | Age: 57
Discharge: HOME | End: 2022-09-29
Payer: MEDICAID

## 2022-09-29 DIAGNOSIS — G89.29 CHRONIC LEFT SHOULDER PAIN: ICD-10-CM

## 2022-09-29 DIAGNOSIS — M25.512 CHRONIC LEFT SHOULDER PAIN: ICD-10-CM

## 2022-09-29 DIAGNOSIS — M54.2 NECK PAIN: ICD-10-CM

## 2022-09-29 DIAGNOSIS — M48.02 FORAMINAL STENOSIS OF CERVICAL REGION: ICD-10-CM

## 2022-09-29 DIAGNOSIS — M50.30 DDD (DEGENERATIVE DISC DISEASE), CERVICAL: ICD-10-CM

## 2022-09-29 PROCEDURE — 72141 MRI NECK SPINE W/O DYE: CPT

## 2022-10-13 ENCOUNTER — OFFICE VISIT (OUTPATIENT)
Dept: NEUROSURGERY | Age: 57
End: 2022-10-13
Payer: MEDICAID

## 2022-10-13 VITALS
SYSTOLIC BLOOD PRESSURE: 120 MMHG | WEIGHT: 294 LBS | RESPIRATION RATE: 18 BRPM | BODY MASS INDEX: 43.55 KG/M2 | DIASTOLIC BLOOD PRESSURE: 75 MMHG | HEART RATE: 70 BPM | HEIGHT: 69 IN

## 2022-10-13 DIAGNOSIS — M54.42 CHRONIC MIDLINE LOW BACK PAIN WITH BILATERAL SCIATICA: ICD-10-CM

## 2022-10-13 DIAGNOSIS — M25.512 CHRONIC LEFT SHOULDER PAIN: Primary | ICD-10-CM

## 2022-10-13 DIAGNOSIS — M48.02 FORAMINAL STENOSIS OF CERVICAL REGION: ICD-10-CM

## 2022-10-13 DIAGNOSIS — G89.29 CHRONIC LEFT SHOULDER PAIN: Primary | ICD-10-CM

## 2022-10-13 DIAGNOSIS — M54.41 CHRONIC MIDLINE LOW BACK PAIN WITH BILATERAL SCIATICA: ICD-10-CM

## 2022-10-13 DIAGNOSIS — G89.29 CHRONIC MIDLINE LOW BACK PAIN WITH BILATERAL SCIATICA: ICD-10-CM

## 2022-10-13 DIAGNOSIS — M50.30 DDD (DEGENERATIVE DISC DISEASE), CERVICAL: ICD-10-CM

## 2022-10-13 PROCEDURE — 99214 OFFICE O/P EST MOD 30 MIN: CPT | Performed by: NURSE PRACTITIONER

## 2022-10-13 ASSESSMENT — ENCOUNTER SYMPTOMS
GASTROINTESTINAL NEGATIVE: 1
EYES NEGATIVE: 1
RESPIRATORY NEGATIVE: 1

## 2022-10-13 NOTE — PROGRESS NOTES
[facility] Neurosurgery  Office Visit      Chief Complaint   Patient presents with    Follow-up     Patient is here to follow up after MRI Cervical and states that his neck pain is the same as last visit. He states that the pain is more LT shoulder and LT arm. Results     MRI Cervical 10/4/22 [facility]    Numbness     Patient denies numbness and tingling in his arms. He denies weakness as well but states \"that it hurts to turn on a light switch. \"     10/13/2022: Mr. Darling Welch returns to clinic today to review the MRI cervical spine. He states he continues to have left shoulder pain when he lifts his arm up above shoulder level or when he reaches back behind him. Denies any numbness or paresthesias. 9/22/2022: Mr. Darling Welch returns to clinic today to review the MRI lumbar spine. He continues to have low back and bilateral anterior thigh burning and numbness. He states his anterior thighs are constantly numb. He is on gabapentin. He also mentions today left shoulder pain when raising his arm up above shoulder level. Denies a radicular pain. Has some numbness of the left fingers. He actually had a MRI cervical done last year that showed some bony osteophytes and disc herniations. He also mentions having a \"shoulder injury\" since the previous MRI. HISTORY OF PRESENT ILLNESS:    Herberth Gonzalez is a 61 y.o. male  who presents with low back pain and BLE pain. The pain does radiate into the bilateral anterior thighs that he describes as a burning sensation. The patient complains of numbness of the anterior thighs. Walking or standing for prolonged period of time will cause left hip and low back pain. Standing makes his legs feel weak and wobbly. He cannot lie in a bed due to the pain, he has been sleeping in a recliner for several years. His pain is not changed when going from a seated to standing position. His pain is worsened with walking. His pain is worsened when lying flat.  Overall, indicative that the patient does have a mechanical nature to their pain. He states that 50% of his pain is located in the back and 50% is leg pain. The patient has underwent a non-operative treatment course that has included:  NSAIDs (diclofenac)  Gabapentin 300 TID        Of note he does not use tobacco and does take blood thinning medications (Eliquis) has a DVT currently of the RLE, was on for 45 days then clot returned and is now on for 6 months.                Past Medical History:   Diagnosis Date    Bipolar disorder ([facility] [address]     Colon polyps     Diverticular disease     GERD (gastroesophageal reflux disease)     Hiatal hernia     Hyperlipidemia     Hypertension     Hypothyroidism     Neuropathy     Unspecified sleep apnea        Past Surgical History:   Procedure Laterality Date    COLONOSCOPY  01/06/2009    Dr Andra Borrero: hyperplastic polyp, resolving diverticulitis    COLONOSCOPY  07/2012    minimal diverticulosis left side o/w normal postsurgical colon    COLONOSCOPY  12/2005    diverticulitis    COLONOSCOPY N/A 11/8/2016    Dr Maicol Alanis bleeding internal hemorrhoids, diverticular disease-5 yr recall    EGD      [place] EGD TRANSORAL BIOPSY SINGLE/MULTIPLE N/A 8/24/2018    Dr JONATHAN Bentley-Gastritis/gastropathy, esophagitis    SUBTOTAL COLECTOMY  2010    recurrant diverticulitis    UPPER GASTROINTESTINAL ENDOSCOPY  12/22/2005    Dr Andra Borrero: duodenal ulcer, gastritis    UPPP UVULOPALATOPHARYGOPLASTY         Current Outpatient Medications   Medication Sig Dispense Refill    ELIQUIS 5 MG TABS tablet Take 5 mg by mouth daily      divalproex (DEPAKOTE ER) 500 MG extended release tablet Take 3 tablets by mouth nightly 90 tablet 2    metoprolol tartrate (LOPRESSOR) 25 MG tablet TAKE 2 TABLETS BY MOUTH TWICE DAILY 60 tablet 10    vitamin D (ERGOCALCIFEROL) 1.25 MG (46719 UT) CAPS capsule TAKE 1 CAPSULE BY MOUTH ONCE A WEEK 12 capsule 0    lisinopril-hydroCHLOROthiazide (PRINZIDE;ZESTORETIC) 20-25 MG per tablet TAKE 1 TABLET BY MOUTH ONCE DAILY 90 tablet 4    amLODIPine (NORVASC) 10 MG tablet TAKE 1 TABLET BY MOUTH ONCE DAILY 90 tablet 3    furosemide (LASIX) 20 MG tablet TAKE 1 TABLET BY MOUTH ONCE DAILY. 30 tablet 10    levothyroxine (SYNTHROID) 50 MCG tablet TAKE 1 TABLET BY MOUTH ONCE DAILY. 90 tablet 3    gabapentin (NEURONTIN) 300 MG capsule TAKE 1 CAPSULE BY MOUTH THREE TIMES DAILY. 90 capsule 2    lidocaine viscous hcl (XYLOCAINE) 2 % SOLN solution Take 10 mLs by mouth as needed for Irritation 100 mL 0    rivaroxaban (XARELTO) 10 MG TABS tablet Take 1 tablet by mouth daily (with breakfast) 45 tablet 0    Multiple Vitamin (MULTIVITAMIN ADULT PO) Take by mouth daily      diclofenac (VOLTAREN) 50 MG EC tablet Take 1 tablet by mouth 3 times daily (with meals) For 2 weeks and then as needed thereafter 90 tablet 0    omeprazole (PRILOSEC) 20 MG delayed release capsule TAKE 1 CAPSULE BY MOUTH DAILY  30 capsule 11    atorvastatin (LIPITOR) 40 MG tablet TAKE 1 TABLET BY MOUTH ONCE DAILY. 90 tablet 0     No current facility-administered medications for this visit. Allergies:  Patient has no known allergies. Social History:   Social History     Tobacco Use   Smoking Status Never   Smokeless Tobacco Never     Social History     Substance and Sexual Activity   Alcohol Use No         Family History:   Family History   Problem Relation Age of Onset    Diabetes Mother     Kidney Disease Mother     Dementia Mother          at 68 - Lewy Body Dementia    Coronary Art Dis Father          at [de-identified]    Colon Cancer Neg Hx     Colon Polyps Neg Hx     Esophageal Cancer Neg Hx     Liver Cancer Neg Hx     Liver Disease Neg Hx     Rectal Cancer Neg Hx     Stomach Cancer Neg Hx        REVIEW OF SYSTEMS:  Constitutional: Negative. HENT: Negative. Eyes: Negative. Respiratory: Negative. Cardiovascular: Negative. Gastrointestinal: Negative. Genitourinary: Negative. Skin: Negative. Endo/Heme/Allergies: Negative. Psychiatric/Behavioral: Negative      PHYSICAL EXAM:  Vitals:    10/13/22 0914   BP: 120/75   Pulse: 70   Resp: 18           Constitutional: appears well-developed and well-nourished. Eyes - conjunctiva normal.  Pupils react to light  Ear, nose, throat - hearing intact to finger rub, No scars, masses, or lesions over external nose or ears, no atrophy oftongue  Neck- symmetric, no masses noted, no jugular vein distension  Respiration- chest wall appears symmetric, good expansion, normal effort without use of accessory muscles  Musculoskeletal - no significant wasting of muscles noted, no bony deformities, gait no gross ataxia  Extremities- no clubbing, cyanosis oredema  Skin - warm, dry, and intact. No rash, erythema, or pallor. Psychiatric - mood, affect, and behavior appear normal.     Neurologic Examination  Awake, Alert and oriented x 4  Normal speech pattern, following commands    Motor:  RIGHT: hand grasp 5/5    finger extension 5/5    bicep 5/5    triceps 5/5    deltoid 5/5      iliopsoas 5/5    knee flexor 5/5    knee extension 5/5    EHL 5/5   dorsiflexion 5/5    plantar flexion 5/5    LEFT:   hand grasp 5/5    finger extension 5/5    bicep 5/5    triceps 5/5    deltoid 5/5      iliopsoas 5/5    knee flexor 5/5    knee extension 5/5    EHL 5/5   dorsiflexion 5/5    plantar flexion 5/5    No deficits to light touch or pinprick sensation  Reflexes are 2+ and symmetric  No myofacial tenderness to palpation  Antalgic Gait pattern        DATA and IMAGING:    Nursing/pcp notes, imaging, labs, and vitals reviewed.      PT,OT and/or speech notes reviewed    Lab Results   Component Value Date    WBC 6.9 04/30/2021    HGB 12.5 (L) 04/30/2021    HCT 38.5 (L) 04/30/2021    MCV 95.8 (H) 04/30/2021     04/30/2021     Lab Results   Component Value Date     04/26/2021    K 3.5 04/26/2021     04/26/2021    CO2 20 (L) 04/26/2021    BUN 14 04/26/2021    CREATININE 0.8 04/26/2021    GLUCOSE 100 04/26/2021    CALCIUM 9.0 04/26/2021    PROT 7.0 04/26/2021    LABALBU 4.0 04/26/2021    BILITOT 0.6 04/26/2021    ALKPHOS 51 04/26/2021    AST 24 04/26/2021    ALT 17 04/26/2021    LABGLOM >60 04/26/2021    GFRAA >59 04/26/2021    GLOB 2.7 03/31/2017     Lab Results   Component Value Date    INR 1.08 11/24/2019    INR 1.05 03/24/2017    INR 1.10 11/06/2015    PROTIME 13.4 11/24/2019    PROTIME 13.7 03/24/2017    PROTIME 13.9 11/06/2015         XR Lumbar Spine (8/30/2022) [facility]  I have personally reviewed these images and my interpretation is:  DDD throughout  Very mild levo curvature     NO PRIOR REPORT AVAILABLE   Exam: MRI OF THE LUMBAR SPINE WITHOUT CONTRAST   Clinical data: Degenerative disc disease. Chronic low back pain with bilateral sciatica. Left hip pain, bilateral thigh pain. Antalgic gait. Leg numbness, leg fatigue. Technique: Multiplanar multisequence MRI of the lumbar spine without contrast. Axial imaging was performed through the L1 through S1 disc levels. Prior studies: Radiograph of the lumbar spine dated 08/30/2022. Findings: For the purposes of this examination, spinal levels were labeled assuming five nonrib-bearing, lumbar-type vertebrae with the inferior labeled L5. Normal lordotic curvature. No acute fracture or subluxation. Normal vertebral body and intervertebral disc heights. Normal marrow signal of the vertebrae. Conus medullaris in normal anatomic position. No abnormal epiduralmasses. Soft tissues are unremarkable. Individual spinal levels are described as follows:   T12-L1: No definitive spinal canal or neural foraminal stenosis. L1-L2: There is no abnormally positioned disc material. There is no significant spinal canal, lateral recess, neural foramina compromise, or nerve impingement. L2-L3: There is no abnormally positioned disc material. There is no significant spinal canal, lateral recess, neural foramina compromise, or nerve impingement. L3-L4: 3mm disc bulge. There is no significant spinal canal, lateral recess, neural foramina compromise, or nerve impingement. L4-L5: 3mm disc bulge. There is no significant spinal canal, lateral recess, neural foramina compromise, or nerve impingement. L5-S1: 4mm disc bulge. There is no significant spinal canal, lateral recess, neural foramina compromise, or nerve impingement. Impression   1. Disc bulges   2. L3,4,5 facet arthropathy   3. No acute fracture   Recommendation: Follow up as clinically indicated. Electronically Signed by Roberto Sky MD at 21-Sep-2022 06:12:46 PM            I have personally reviewed the images and my interpretation is:  No significant neural element compression. Mild degenerative disc disease L5-S1. NCS/EMG (6/10/2022) Dr. Vamshi Cunningham  Interpretation:  Mild neuropathy       Narrative   NO PRIOR REPORT AVAILABLE   Exam: MRI OF THE CERVICAL SPINE WITHOUT CONTRAST   Clinical data: Neck pain. Chronic left shoulder pain. Foraminal stenosis of cervical region. No known injury. No history of spinal surgery or cancer. Technique: Multiplanar multisequence MRI of the cervical spine without contrast. Axial imaging is performed throughout the cervical spine. Prior studies: No prior studies submitted. FINDINGS:   Cervical vertebral body heights are maintained and the alignment is intact. There is discogenic endplate change and osteophyte formation from C3-4 through C6-7. The marrow signal intensity within the cervical vertebral bodies and posterior elements is    normal.  There is no osseous expansion, epidural disease or paraspinal abnormality. The cervical cord maintains intact morphology. There is no cervical cord intensity change or intrinsic focal cord lesion identified. The cervicomedullary junction appears intact. At C2-3, the disc is normal in height and signal without herniation, central canal or neural foraminal stenosis.    At C3-4, the disc is normal in height and signal without herniation, central canal or neural foraminal stenosis. At C4-5, there is loss of lung testicle in bulging of the disc without central canal or neural foraminal stenosis. At C5-6, there is loss of long TR signal and height of the disc with a bulge effacing the ventral thecal sac causing bilateral neural foraminal stenosis with uncovertebral and facet joint hypertrophy. There is no central canal stenosis. At C6-7, there is loss of long TR signal and height of the disc with a left paracentral herniation effacing the ventral thecal sac and the cord that creates moderate central canal stenosis and left greater than right neural foraminal stenosis with    uncovertebral and facet joint hypertrophy. At C7-T1, there is loss of long TR signal and height of the disc with a left paracentral herniation effacing the ventral thecal sac and the cord creating moderate central canal stenosis and severe left foraminal stenosis. There is mass effect upon the    exiting C8 nerve root. Impression   Left paracentral herniations at C6-7 and C7-T1. Desiccation and bulging of C5-6 with mild spondylosis. Recommendation:    Follow up as clinically indicated. Electronically Signed by Allison Ware MD at 04-Oct-2022 04:22:40 PM      I have personally reviewed the images and my interpretation is:  DDD throughout, straightening of the cervical spine  C5-6 moderate to severe right foraminal stenosis  C6-7 DOC that results in distortion of the spinal cord with mild canal stenosis 6-7mm, there is CSF dorsal to the spinal cord, moderate left foraminal stenosis  C7-T1 moderate left foraminal stenosis         ASSESSMENT:    Roxy Singh is a 61 y.o. male with complaints of low pain and anterior thigh burning sensations. ICD-10-CM    1. Chronic left shoulder pain  M25.512     G89.29       2. DDD (degenerative disc disease), cervical  M50.30       3. Foraminal stenosis of cervical region  M48.02       4.  Chronic midline low back pain with bilateral sciatica  M54.41     M54.42     G89.29               PLAN:  -We have discussed and reviewed the results of the MRI cervical spine with Mr. Ambrocio Lund and his wife Iam Hanks at length. We explained that he does have some narrowing at C6-7 and C7-T1, however, he has no radicular pain, his pain is very classic for an intrinsic shoulder issue. He has pain with rotation and lift off test.  In our opinion he would likely not dramatically benefit from a cervical or lumbar spine surgery at this time. He states he has not been evaluated by ortho yet.     -Would recommend referral to ortho, however, he states he will check on it   -Follow up as needed for now         TRIP Matthews

## 2022-10-13 NOTE — PROGRESS NOTES
Review of Systems   Constitutional: Negative. HENT: Negative. Eyes: Negative. Respiratory: Negative. Cardiovascular: Negative. Gastrointestinal: Negative. Genitourinary: Negative. Musculoskeletal:  Positive for joint pain, myalgias and neck pain. Skin: Negative. Neurological: Negative. Endo/Heme/Allergies: Negative. Psychiatric/Behavioral: Negative.

## 2022-11-30 NOTE — TELEPHONE ENCOUNTER
Pharmacy sent a request to refill pt's medication       Last office visit : 6/27/2022 Lorna DOMINIQUE  Next office visit : 4/24/2023 Lorna DOMINIQUE    Requested Prescriptions     Pending Prescriptions Disp Refills    divalproex (DEPAKOTE ER) 500 MG extended release tablet [Pharmacy Med Name: DIVALPROEX SODIUM ER 500MG TABLET EXTENDED RELEASE 24 HOUR] 90 tablet 2     Sig: TAKE 3 TABLETS BY MOUTH NIGHTLY            Aakash Lester       6/27/2022  [address] [facility]  TRIP Hilliard CNP  Nurse Practitioner [facility] Bipolar 1 disorder [facility])  Dx Medication Check  Follow-up  Reason for Visit     Progress Notes  TRIP Hilliard CNP (Nurse Practitioner)   Nurse Practitioner [address] [facility] [place] [facility]  Jennifer Mckeon is a 61 y.o. male evaluated via telephone on 6/27/2022. Consent:  He and/or health care decision maker is aware that that he may receive a bill for this telephone service, depending on his insurance coverage, and has provided verbal consent to proceed: Yes        Documentation:  I communicated with the patient and/or health care decision maker about see below. Details of this discussion including any medical advice provided: see below        I affirm this is a Patient Initiated Episode with a Patient who has not had a related appointment within my department in the past 7 days or scheduled within the next 24 hours. The patient  (guardian) is aware that this is a billable service, which includes applicable co-pays. This virtual visit was conducted with patient's (and or legal guardian's) consent. This visit was conducted pursuant to the emergency declaration under the [place] act and the [facility], [address] [place] [address] waiver authority in the coronavirus preparedness and response supplemental appropriation's ACT. Patient identification was verified and a caregiver was present when appropriate.   The patient was located in a state where the provider was licensed to provide care. Patient identification was verified at the start of the visit: Yes     Total Time: minutes: 21-30 minutes     Note: not billable if this call serves to triage the patient into an appointment for the relevant concern        TRIP Wall CNP       6/27/22                                             Progress Note     Saleem Degroot 1965                                 Chief Complaint   Patient presents with    Medication Check    Follow-up            Subjective:    Patient is a 61 y.o. male diagnosed with bipolar 1 and presents today for follow-up. Last seen in clinic on 1/6/22   and prior records were reviewed. Last visit: Feels like the depakote is working really well. Feels like he had a spiritual breakthrough at [employer] about a month ago and has been feeling really good over all. Mood is doing really well, work is doing really well. He has had more mood stability than before, wife is present with him during visit today and states he is doing really well. Last VPA level was done on 10/8/21 it was 96.8  We will keep an eye on it. He denies si hi avh at this time. He denies side effects of medications at this time. He is calm and cooperative. He came from a doctors appointment today and has a blood clot in his leg, he is being followed by primary and they are not concerned at this time. Today:  Doing \"great\" today. He reports his medications are working well. He has gotten a better work work schedule. He has his evenings freed up and is spending time at [employer]. He denies si hi avh at this time. He denies side effects of medications at this time. He is calm and cooperative. He reports the blood clot in his leg is mostly resolved. He reports his wife continues to support him and believes he is doing well.   He is bright and cheerful on the phone he denies SI HI AVH she denies side effects of medications he is calm and cooperative we will follow-up in 10 months     Absent  suicidal ideation. Reports compliance with medications as good . Sleep:  sleeping in a chair upright getting 6-7 hours per night. Caffeine use: tea, diet cokes     PREVIOUS MED TRIALS     Abilify  Risperdal   Depakote (current)  Buspirone  Trazodone (current)  Diazepam  Ativan  Gabapentin (current)  Melatonin  latuda - too sedating     Current Substance Use:   Alcohol: none  Illicit drug use: denies   Marijuana: denies   Tobacco: denies   Vape: denies         BP: There were no vitals taken for this visit.         Review of Systems - 14 point review:  Negative except for stated     Constitutional: (fevers, chills, night sweats, wt loss/gain, change in appetite, fatigue, somnolence)     HEENT: (ear pain or discharge, hearing loss, ear ringing, sinus pressure, nosebleed, nasal discharge, sore throat, oral sores, tooth pain, bleeding gums, hoarse voice, neck pain)      Cardiovascular: (HTN, chest pain, elevated cholesterol/lipids, palpitations, leg swelling, leg pain with walking)     Respiratory: (cough, wheezing, snoring, SOB with activity (dyspnea), SOB while lying flat (orthopnea), awakening with severe SOB (paroxysmal nocturnal dyspnea))     Gastrointestinal: (NVD, constipation, abdominal pain, bright red stools, black tarry stools, stool incontinence)     Genitourinary:  (pelvic pain, burning or frequency of urination, urinary urgency, blood in urine incomplete bladder emptying, urinary incontinence, STD; MEN: testicular pain or swelling, erectile dysfunction; WOMEN: LMP, heavy menstrual bleeding (menorrhagia), irregular periods, postmenopausal bleeding, menstrual pain (dymenorrhea, vaginal discharge)     Musculoskeletal: (bone pain/fracture, joint pain or swelling, musle pain)     Integumentary: (rashes, acne, non-healing sores, itching, breast lumps, breast pain, nipple discharge, hair loss)     Neurologic: (HA, muscle weakness, paresthesias (numbness, coldness, crawling or prickling), memory loss, seizure, dizziness)     Psychiatric:  (anxiety, sadness, irritability/anger, insomnia, suicidality)     Endocrine: (heat or cold intolerance, excessive thirst (polydipsia), excessive hunger (polyphagia))     Immune/Allergic: (hives, seasonal or environmental allergies, HIV exposure)     Hematologic/Lymphatic: (lymph node enlargement, easy bleeding or bruising)     History obtained via chart review and patient     PCP is Janet Babin MD         Current Meds:     Home Medications           Prior to Admission medications    Medication Sig Start Date End Date Taking? Authorizing Provider   vitamin D (ERGOCALCIFEROL) 1.25 MG (97010 UT) CAPS capsule TAKE 1 CAPSULE BY MOUTH ONCE A WEEK 5/31/22     Kathleen Santos MD   divalproex (DEPAKOTE ER) 500 MG extended release tablet TAKE 1 TABLET BY MOUTH IN THE MORNING AND 4 TABLETS AT BEDTIME 5/31/22     TRIP Woods - SB   lisinopril-hydroCHLOROthiazide (PRINZIDE;ZESTORETIC) 20-25 MG per tablet TAKE 1 TABLET BY MOUTH ONCE DAILY 3/22/22     Kathleen Santos MD   amLODIPine (NORVASC) 10 MG tablet TAKE 1 TABLET BY MOUTH ONCE DAILY 3/22/22     Kathleen Santos MD   furosemide (LASIX) 20 MG tablet TAKE 1 TABLET BY MOUTH ONCE DAILY. 3/22/22     Kathleen Santos MD   levothyroxine (SYNTHROID) 50 MCG tablet TAKE 1 TABLET BY MOUTH ONCE DAILY. 3/22/22     Kathleen Santos MD   gabapentin (NEURONTIN) 300 MG capsule TAKE 1 CAPSULE BY MOUTH THREE TIMES DAILY.  2/11/22 3/13/22   Kathleen Santos MD   lidocaine viscous hcl (XYLOCAINE) 2 % SOLN solution Take 10 mLs by mouth as needed for Irritation 2/2/22     Jeimy Chopra MD   rivaroxaban (XARELTO) 10 MG TABS tablet Take 1 tablet by mouth daily (with breakfast) 1/4/22 2/18/22   TRIP Lockett   Multiple Vitamin (MULTIVITAMIN ADULT PO) Take by mouth daily       Historical Provider, MD   diclofenac (VOLTAREN) 50 MG EC tablet Take 1 tablet by mouth 3 times daily (with meals) For 2 weeks and then as needed thereafter 10/28/21     Lee Mcdaniels MD   metoprolol tartrate (LOPRESSOR) 25 MG tablet TAKE 2 TABLETS BY MOUTH TWICE DAILY  8/27/21     Lee Mcdaniels MD   omeprazole (PRILOSEC) 20 MG delayed release capsule TAKE 1 CAPSULE BY MOUTH DAILY  7/11/21     Lee Mcdaniels MD   atorvastatin (LIPITOR) 40 MG tablet TAKE 1 TABLET BY MOUTH ONCE DAILY. 3/21/21     Lee Mcdaniels MD         Social History   Social History            Socioeconomic History    Marital status:        Spouse name: Not on file    Number of children: Not on file    Years of education: Not on file    Highest education level: Not on file   Occupational History    Not on file   Tobacco Use    Smoking status: Never Smoker    Smokeless tobacco: Never Used   Vaping Use    Vaping Use: Never used   Substance and Sexual Activity    Alcohol use: No    Drug use: No    Sexual activity: Yes   Other Topics Concern    Not on file   Social History Narrative     5/6/2021     PRIOR MEDICATION TRIALS                 . SLEEP STUDY: yes - HAWK, Bipap     .     negative history of seizures. .     negative history of head trauma. Won Suarez PREVIOUS PSYCHIATRIC HISTORY     Had an episode of brief acute psychosis when he was in the [employer] (age 22). About ten years ago he had another episode but he doesn't remember what medicines he was taking - his inpatient intake in April, 2021 states that he was started on something which incapacitated him, then was started on Depakote. (He may have been started on Abilify at this time as he and his wife reports that Abilify did not work well for him.) He stopped having problems some years after that and he asked a doctor to take him off medications. He has been off medications for about 7 years. He has recently been admitted to inpatient with a severe episode of amy, 4/26/21-5/1/21. Won Suarez FAMILY PSYCHIATRIC HISTORY     Father, bipolar     Mother, depression     .      SOCIAL HISTORY Born and Raised - [place], [place], in a 2 parent home with 3 siblings. He says he had a wonderful childhood.  twice. Has one daughter from the first marriage. Has been  to his second wife for 26 years. They have 3 adopted children and 1 adult adopted child. .     Trauma and/or Abuse - Denies trauma. He was devastated with his divorce from his first wife. .     Legal - denies     Substance Use - see history     Work History - see history     Education - see history      status - [employer] for a few months, honorably discharged after an episode of Acute Psychosis     . PAST SUICIDE ATTEMPTS:     no     .     INPATIENT HOSPITALIZATIONS:     yes - three times, [facility] - diagnosed with Acute Psychosis; 10 years ago - diagnosed with bipolar disorder, and most recently 4/26-4/29/21 with a manic episode     . DRUG REHABILITATION:     no     .      Social Determinants of Health          Financial Resource Strain:     Difficulty of Paying Living Expenses: Not on file   Food Insecurity:     Worried About [address] in the Last Year: Not on file    Ran Out of Food in the Last Year: Not on file   Transportation Needs:     Lack of Transportation (Medical): Not on file    Lack of Transportation (Non-Medical):  Not on file   Physical Activity:     Days of Exercise per Week: Not on file    Minutes of Exercise per Session: Not on file   Stress:     Feeling of Stress : Not on file   Social Connections:     Frequency of Communication with Friends and Family: Not on file    Frequency of Social Gatherings with Friends and Family: Not on file    Attends [employer] Services: Not on file    Active Member of Clubs or Organizations: Not on file    Attends Club or Organization Meetings: Not on file    Marital Status: Not on file   Intimate Partner Violence:     Fear of Current or Ex-Partner: Not on file    Emotionally Abused: Not on file    Physically Abused: Not on file Sexually Abused: Not on file   Housing Stability:     Unable to Pay for Housing in the Last Year: Not on file    Number of Places Lived in the Last Year: Not on file    Unstable Housing in the Last Year: Not on file            MSE:  Appearance and gait: UTO due to phone call   Behavior: Calm, cooperative, and socially appropriate. Ben Mering Speech: Normal in tone, volume, and quality. No slurring, dysarthria or pressured speech noted. Mood: \"great\"   Affect: UTO due to phone call . Thought Process: Appears linear, logical and goal oriented. Causality appears intact. Thought Content: Denies active suicidal and homicidal ideations. No overt delusions or paranoia appreciated. Perceptions: Denies auditory or visual hallucinations at present time. Not responding to internal stimuli. Concentration: Intact. Orientation: to person, place, date, and situation. Language: Intact. Fund of information: Intact. Memory: Recent and remote appear intact. Impulsivity: Limited. Neurovegitative: Fair appetite and sleep. Insight: Fair. Judgment: Fair. Cognition: Can spell \"world\" backwards: Yes                    Can do serial 7's:  Yes           Lab Results   Component Value Date      04/26/2021     K 3.5 04/26/2021      04/26/2021     CO2 20 (L) 04/26/2021     BUN 14 04/26/2021     CREATININE 0.8 04/26/2021     GLUCOSE 100 04/26/2021     CALCIUM 9.0 04/26/2021     PROT 7.0 04/26/2021     LABALBU 4.0 04/26/2021     BILITOT 0.6 04/26/2021     ALKPHOS 51 04/26/2021     AST 24 04/26/2021     ALT 17 04/26/2021     LABGLOM >60 04/26/2021     GFRAA >59 04/26/2021     GLOB 2.7 03/31/2017            Lab Results   Component Value Date      04/26/2021     K 3.5 04/26/2021      04/26/2021     CO2 20 04/26/2021     BUN 14 04/26/2021     CREATININE 0.8 04/26/2021     GLUCOSE 100 04/26/2021     CALCIUM 9.0 04/26/2021            Lab Results   Component Value Date     CHOL 101 (L) 04/28/2021            Lab spent on counseling and/or coordination of care of:                         1. Bipolar 1 disorder ([facility] [address]                        Psychotherapy Topics: mood/medication effectiveness family, financial, health and occupational     Crystal Garcias, APRN - CNP

## 2022-12-01 ENCOUNTER — TELEPHONE (OUTPATIENT)
Dept: PSYCHIATRY | Age: 57
End: 2022-12-01

## 2022-12-01 RX ORDER — DIVALPROEX SODIUM 500 MG/1
1500 TABLET, EXTENDED RELEASE ORAL NIGHTLY
Qty: 90 TABLET | Refills: 2 | Status: SHIPPED | OUTPATIENT
Start: 2022-12-01

## 2022-12-01 NOTE — TELEPHONE ENCOUNTER
Called and lvm asking pt to return phone call      When pt calls back I will let him know that his script for depakote was sent to his pharmacy     Electronically signed by Sonali Harp on 12/1/2022 at 11:17 AM
[facility]

## 2023-02-15 ENCOUNTER — TELEPHONE (OUTPATIENT)
Dept: PSYCHIATRY | Age: 58
End: 2023-02-15

## 2023-02-15 NOTE — TELEPHONE ENCOUNTER
Called pt to cancel/reschedule appt for 04/24/23 with Aidan Issa because the provider will not be in the office that day.     No answer and vm is not setup    Electronically signed by Holden Huffman MA on 2/15/2023 at 2:23 PM

## 2023-03-13 ENCOUNTER — TELEPHONE (OUTPATIENT)
Dept: PSYCHIATRY | Age: 58
End: 2023-03-13

## 2023-03-13 RX ORDER — DIVALPROEX SODIUM 500 MG/1
1500 TABLET, EXTENDED RELEASE ORAL NIGHTLY
Qty: 90 TABLET | Refills: 2 | Status: SHIPPED | OUTPATIENT
Start: 2023-03-13

## 2023-03-13 NOTE — TELEPHONE ENCOUNTER
Called to let pt know that his script for depakote was sent to his pharmacy     Was unable to lvm     Electronically signed by Jono Adler on 3/13/2023 at 4:24 PM DISPLAY PLAN FREE TEXT

## 2023-03-13 NOTE — TELEPHONE ENCOUNTER
Pharmacy sent a request to refill pt's medication       Last office visit : 6/27/2022 Nicholas DOMINIQUE  Next office visit : 4/24/2023 Nicholas DOMINIQUE    Requested Prescriptions     Pending Prescriptions Disp Refills    divalproex (DEPAKOTE ER) 500 MG extended release tablet [Pharmacy Med Name: DIVALPROEX SODIUM ER 500MG TABLET EXTENDED RELEASE 24 HOUR] 90 tablet 2     Sig: TAKE 3 TABLETS BY MOUTH NIGHTLY            Aakash Cases     6/27/2022  [address] [facility]  TRIP Nolan - CNP  Nurse Practitioner [facility] Bipolar 1 disorder [facility])  Dx Medication Check  Follow-up  Reason for Visit     Progress Notes  TRIP Nolan CNP (Nurse Practitioner)   Nurse Practitioner [address] All Collapse Gonzalez Villafuerte is a 61 y.o. male evaluated via telephone on 6/27/2022. Consent:  He and/or health care decision maker is aware that that he may receive a bill for this telephone service, depending on his insurance coverage, and has provided verbal consent to proceed: Yes        Documentation:  I communicated with the patient and/or health care decision maker about see below. Details of this discussion including any medical advice provided: see below        I affirm this is a Patient Initiated Episode with a Patient who has not had a related appointment within my department in the past 7 days or scheduled within the next 24 hours. The patient  (guardian) is aware that this is a billable service, which includes applicable co-pays. This virtual visit was conducted with patient's (and or legal guardian's) consent. This visit was conducted pursuant to the emergency declaration under the [place] act and the [address] waiver authority in the coronavirus preparedness and response supplemental appropriation's ACT. Patient identification was verified and a caregiver was present when appropriate.   The patient was located in a [facility] where the provider was licensed to provide care. Patient identification was verified at the start of the visit: Yes     Total Time: minutes: 21-30 minutes     Note: not billable if this call serves to triage the patient into an appointment for the relevant concern        TRIP Simon CNP       6/27/22                                             Progress Note     Sahil Clancy 1965                                 Chief Complaint   Patient presents with    Medication Check    Follow-up            Subjective:    Patient is a 61 y.o. male diagnosed with bipolar 1 and presents today for follow-up. Last seen in clinic on 1/6/22   and prior records were reviewed. Last visit: Feels like the depakote is working really well. Feels like he had a spiritual breakthrough at [employer] about a month ago and has been feeling really good over all. Mood is doing really well, work is doing really well. He has had more mood stability than before, wife is present with him during visit today and states he is doing really well. Last VPA level was done on 10/8/21 it was 96.8  We will keep an eye on it. He denies si hi avh at this time. He denies side effects of medications at this time. He is calm and cooperative. He came from a doctors appointment today and has a blood clot in his leg, he is being followed by primary and they are not concerned at this time. Today:  Doing \"great\" today. He reports his medications are working well. He has gotten a better work work schedule. He has his evenings freed up and is spending time at [employer]. He denies si hi avh at this time. He denies side effects of medications at this time. He is calm and cooperative. He reports the blood clot in his leg is mostly resolved. He reports his wife continues to support him and believes he is doing well.   He is bright and cheerful on the phone he denies SI HI AVH she denies side effects of medications he is calm and cooperative we will follow-up in 10 months     Absent  suicidal ideation. Reports compliance with medications as good . Sleep:  sleeping in a chair upright getting 6-7 hours per night. Caffeine use: tea, diet cokes     PREVIOUS MED TRIALS     Abilify  Risperdal   Depakote (current)  Buspirone  Trazodone (current)  Diazepam  Ativan  Gabapentin (current)  Melatonin  latuda - too sedating     Current Substance Use:   Alcohol: none  Illicit drug use: denies   Marijuana: denies   Tobacco: denies   Vape: denies         BP: There were no vitals taken for this visit.         Review of Systems - 14 point review:  Negative except for stated     Constitutional: (fevers, chills, night sweats, wt loss/gain, change in appetite, fatigue, somnolence)     HEENT: (ear pain or discharge, hearing loss, ear ringing, sinus pressure, nosebleed, nasal discharge, sore throat, oral sores, tooth pain, bleeding gums, hoarse voice, neck pain)      Cardiovascular: (HTN, chest pain, elevated cholesterol/lipids, palpitations, leg swelling, leg pain with walking)     Respiratory: (cough, wheezing, snoring, SOB with activity (dyspnea), SOB while lying flat (orthopnea), awakening with severe SOB (paroxysmal nocturnal dyspnea))     Gastrointestinal: (NVD, constipation, abdominal pain, bright red stools, black tarry stools, stool incontinence)     Genitourinary:  (pelvic pain, burning or frequency of urination, urinary urgency, blood in urine incomplete bladder emptying, urinary incontinence, STD; MEN: testicular pain or swelling, erectile dysfunction; WOMEN: LMP, heavy menstrual bleeding (menorrhagia), irregular periods, postmenopausal bleeding, menstrual pain (dymenorrhea, vaginal discharge)     Musculoskeletal: (bone pain/fracture, joint pain or swelling, musle pain)     Integumentary: (rashes, acne, non-healing sores, itching, breast lumps, breast pain, nipple discharge, hair loss)     Neurologic: (HA, muscle weakness, paresthesias (numbness, coldness, crawling or prickling), memory loss, seizure, dizziness)     Psychiatric:  (anxiety, sadness, irritability/anger, insomnia, suicidality)     Endocrine: (heat or cold intolerance, excessive thirst (polydipsia), excessive hunger (polyphagia))     Immune/Allergic: (hives, seasonal or environmental allergies, HIV exposure)     Hematologic/Lymphatic: (lymph node enlargement, easy bleeding or bruising)     History obtained via chart review and patient     PCP is Giovanna Gonzalez MD         Current Meds:     Home Medications           Prior to Admission medications    Medication Sig Start Date End Date Taking? Authorizing Provider   vitamin D (ERGOCALCIFEROL) 1.25 MG (73381 UT) CAPS capsule TAKE 1 CAPSULE BY MOUTH ONCE A WEEK 5/31/22     Lee Mcdaniels MD   divalproex (DEPAKOTE ER) 500 MG extended release tablet TAKE 1 TABLET BY MOUTH IN THE MORNING AND 4 TABLETS AT BEDTIME 5/31/22     TRIP Cuellar - CNP   lisinopril-hydroCHLOROthiazide (PRINZIDE;ZESTORETIC) 20-25 MG per tablet TAKE 1 TABLET BY MOUTH ONCE DAILY 3/22/22     Lee Mcdaniels MD   amLODIPine (NORVASC) 10 MG tablet TAKE 1 TABLET BY MOUTH ONCE DAILY 3/22/22     Lee Mcdaniels MD   furosemide (LASIX) 20 MG tablet TAKE 1 TABLET BY MOUTH ONCE DAILY. 3/22/22     Lee Mcdaniels MD   levothyroxine (SYNTHROID) 50 MCG tablet TAKE 1 TABLET BY MOUTH ONCE DAILY. 3/22/22     Lee Mcdaniels MD   gabapentin (NEURONTIN) 300 MG capsule TAKE 1 CAPSULE BY MOUTH THREE TIMES DAILY.  2/11/22 3/13/22   Lee Mcdaniels MD   lidocaine viscous hcl (XYLOCAINE) 2 % SOLN solution Take 10 mLs by mouth as needed for Irritation 2/2/22     Jeimy Chopra MD   rivaroxaban (XARELTO) 10 MG TABS tablet Take 1 tablet by mouth daily (with breakfast) 1/4/22 2/18/22   TRIP Tsai   Multiple Vitamin (MULTIVITAMIN ADULT PO) Take by mouth daily       Historical Provider, MD   diclofenac (VOLTAREN) 50 MG EC tablet Take 1 tablet by mouth 3 times daily (with meals) For 2 weeks and then as needed thereafter 10/28/21     Dieudonne Medina MD   metoprolol tartrate (LOPRESSOR) 25 MG tablet TAKE 2 TABLETS BY MOUTH TWICE DAILY  8/27/21     Dieudonne Medina MD   omeprazole (PRILOSEC) 20 MG delayed release capsule TAKE 1 CAPSULE BY MOUTH DAILY  7/11/21     Dieudonne Medina MD   atorvastatin (LIPITOR) 40 MG tablet TAKE 1 TABLET BY MOUTH ONCE DAILY. 3/21/21     Dieudonne Medina MD         Social History   Social History            Socioeconomic History    Marital status:        Spouse name: Not on file    Number of children: Not on file    Years of education: Not on file    Highest education level: Not on file   Occupational History    Not on file   Tobacco Use    Smoking status: Never Smoker    Smokeless tobacco: Never Used   Vaping Use    Vaping Use: Never used   Substance and Sexual Activity    Alcohol use: No    Drug use: No    Sexual activity: Yes   Other Topics Concern    Not on file   Social History Narrative     5/6/2021     PRIOR MEDICATION TRIALS                 . SLEEP STUDY: yes - HAWK, Bipap     .     negative history of seizures. .     negative history of head trauma. Heron You PREVIOUS PSYCHIATRIC HISTORY     Had an episode of brief acute psychosis when he was in the [employer] (age 22). About ten years ago he had another episode but he doesn't remember what medicines he was taking - his inpatient intake in April, 2021 states that he was started on something which incapacitated him, then was started on Depakote. (He may have been started on Abilify at this time as he and his wife reports that Abilify did not work well for him.) He stopped having problems some years after that and he asked a doctor to take him off medications. He has been off medications for about 7 years. He has recently been admitted to inpatient with a severe episode of amy, 4/26/21-5/1/21. Heron You FAMILY PSYCHIATRIC HISTORY     Father, bipolar     Mother, depression     .      SOCIAL HISTORY Born and Raised - [place], [place], in a 2 parent home with 3 siblings. He says he had a wonderful childhood.  twice. Has one daughter from the first marriage. Has been  to his second wife for 26 years. They have 3 adopted children and 1 adult adopted child. .     Trauma and/or Abuse - Denies trauma. He was devastated with his divorce from his first wife. .     Legal - denies     Substance Use - see history     Work History - see history     Education - see history      status - [employer] for a few months, honorably discharged after an episode of Acute Psychosis     . PAST SUICIDE ATTEMPTS:     no     .     INPATIENT HOSPITALIZATIONS:     yes - three times, [facility] - diagnosed with Acute Psychosis; 10 years ago - diagnosed with bipolar disorder, and most recently 4/26-4/29/21 with a manic episode     . DRUG REHABILITATION:     no     .      Social Determinants of Health          Financial Resource Strain:     Difficulty of Paying Living Expenses: Not on file   Food Insecurity:     Worried About [address] in the Last Year: Not on file    Ran Out of Food in the Last Year: Not on file   Transportation Needs:     Lack of Transportation (Medical): Not on file    Lack of Transportation (Non-Medical):  Not on file   Physical Activity:     Days of Exercise per Week: Not on file    Minutes of Exercise per Session: Not on file   Stress:     Feeling of Stress : Not on file   Social Connections:     Frequency of Communication with Friends and Family: Not on file    Frequency of Social Gatherings with Friends and Family: Not on file    Attends Jainism Services: Not on file    Active Member of Clubs or Organizations: Not on file    Attends Club or Organization Meetings: Not on file    Marital Status: Not on file   Intimate Partner Violence:     Fear of Current or Ex-Partner: Not on file    Emotionally Abused: Not on file    Physically Abused: Not on file Sexually Abused: Not on file   Housing Stability:     Unable to Pay for Housing in the Last Year: Not on file    Number of Places Lived in the Last Year: Not on file    Unstable Housing in the Last Year: Not on file            MSE:  Appearance and gait: UTO due to phone call   Behavior: Calm, cooperative, and socially appropriate. Edwina Lynne Speech: Normal in tone, volume, and quality. No slurring, dysarthria or pressured speech noted. Mood: \"great\"   Affect: UTO due to phone call . Thought Process: Appears linear, logical and goal oriented. Causality appears intact. Thought Content: Denies active suicidal and homicidal ideations. No overt delusions or paranoia appreciated. Perceptions: Denies auditory or visual hallucinations at present time. Not responding to internal stimuli. Concentration: Intact. Orientation: to person, place, date, and situation. Language: Intact. Fund of information: Intact. Memory: Recent and remote appear intact. Impulsivity: Limited. Neurovegitative: Fair appetite and sleep. Insight: Fair. Judgment: Fair. Cognition: Can spell \"world\" backwards: Yes                    Can do serial 7's:  Yes           Lab Results   Component Value Date      04/26/2021     K 3.5 04/26/2021      04/26/2021     CO2 20 (L) 04/26/2021     BUN 14 04/26/2021     CREATININE 0.8 04/26/2021     GLUCOSE 100 04/26/2021     CALCIUM 9.0 04/26/2021     PROT 7.0 04/26/2021     LABALBU 4.0 04/26/2021     BILITOT 0.6 04/26/2021     ALKPHOS 51 04/26/2021     AST 24 04/26/2021     ALT 17 04/26/2021     LABGLOM >60 04/26/2021     GFRAA >59 04/26/2021     GLOB 2.7 03/31/2017            Lab Results   Component Value Date      04/26/2021     K 3.5 04/26/2021      04/26/2021     CO2 20 04/26/2021     BUN 14 04/26/2021     CREATININE 0.8 04/26/2021     GLUCOSE 100 04/26/2021     CALCIUM 9.0 04/26/2021            Lab Results   Component Value Date     CHOL 101 (L) 04/28/2021            Lab Results   Component Value Date     TRIG 90 04/28/2021            Lab Results   Component Value Date     HDL 31 (L) 04/28/2021            Lab Results   Component Value Date     LDLCALC 52 04/28/2021      No results found for: LABVLDL, VLDL  No results found for: CHOLHDLRATIO        Lab Results   Component Value Date     LABA1C 5.1 04/28/2021      No results found for: EAG        Lab Results   Component Value Date     TSHFT4 1.61 04/26/2021     TSH 1.690 03/03/2021            Lab Results   Component Value Date     VITD25 19.1 (L) 04/28/2021            Lab Results   Component Value Date     NKJMRESD53 862 04/14/2021      No results found for: FOLATE      Assessment:    1. Bipolar 1 disorder (HCC)          No evidence of acute suicidality, homicidality or psychosis observed.  Patient is psychiatrically stable     Plan:     1.   Continue   depakote ER 1500 mg nightly for mood stability  Vitamin D 50,000 units weekly     The risks, benefits, side effects, indications, contraindications, and adverse effects of the medications have been discussed. Yes.  2. The pt has verbalized understanding and has capacity to give informed consent.  3. The [place] report has been reviewed according to HB1 regulations.  4. Supportive therapy offered.  5. Follow up:    Return in about 10 months (around 4/27/2023).  6. The patient has been advised to call with any problems.  7. Controlled substance Treatment Plan: NA.  8. The above listed medications have been continued, modifications in meds and other orders/labs as follows:                   Encounter Medications    No orders of the defined types were placed in this encounter.                    No orders of the defined types were placed in this encounter.        9. Additional comments:  discussed sleep hygiene, discussed the use of coping skills and relaxation strategies to manage symptoms.   Reinforced appointment compliance        10.Over 50% of the total visit time of  30 minutes was  spent on counseling and/or coordination of care of:                         1. [facility] ([facility]                        Psychotherapy Topics: mood/medication effectiveness family, financial, health and occupational     Kosta Crook, TRIP - CNP

## 2023-03-22 ENCOUNTER — TELEPHONE (OUTPATIENT)
Dept: PSYCHIATRY | Age: 58
End: 2023-03-22

## 2023-03-22 NOTE — TELEPHONE ENCOUNTER
Called pt to cancel/reschedule appt for 04/24/23 with Cary Olmstead because the provider will not be in the office that day    No answer and the VM has not being setup     Electronically signed by Yisel Rowland MA on 3/22/2023 at 12:02 PM

## 2023-04-20 ENCOUNTER — TELEPHONE (OUTPATIENT)
Dept: PSYCHIATRY | Age: 58
End: 2023-04-20

## 2023-04-20 NOTE — TELEPHONE ENCOUNTER
Pt called to cancel/reschedule his appt for 04/24/23 because he won't be able to make it. Rescheduled for 05/11/23 @ 10.     Electronically signed by Fadumo Osorio MA on 4/20/2023 at 10:03 AM

## 2023-05-11 ENCOUNTER — TELEPHONE (OUTPATIENT)
Dept: PSYCHIATRY | Age: 58
End: 2023-05-11

## 2023-05-11 ENCOUNTER — OFFICE VISIT (OUTPATIENT)
Dept: PSYCHIATRY | Age: 58
End: 2023-05-11

## 2023-05-11 VITALS
TEMPERATURE: 97.6 F | RESPIRATION RATE: 18 BRPM | WEIGHT: 298 LBS | OXYGEN SATURATION: 96 % | DIASTOLIC BLOOD PRESSURE: 86 MMHG | BODY MASS INDEX: 44.01 KG/M2 | HEART RATE: 72 BPM | SYSTOLIC BLOOD PRESSURE: 151 MMHG

## 2023-05-11 DIAGNOSIS — F31.9 BIPOLAR 1 DISORDER (HCC): Primary | ICD-10-CM

## 2023-05-11 ASSESSMENT — PATIENT HEALTH QUESTIONNAIRE - PHQ9
10. IF YOU CHECKED OFF ANY PROBLEMS, HOW DIFFICULT HAVE THESE PROBLEMS MADE IT FOR YOU TO DO YOUR WORK, TAKE CARE OF THINGS AT HOME, OR GET ALONG WITH OTHER PEOPLE: 0
8. MOVING OR SPEAKING SO SLOWLY THAT OTHER PEOPLE COULD HAVE NOTICED. OR THE OPPOSITE, BEING SO FIGETY OR RESTLESS THAT YOU HAVE BEEN MOVING AROUND A LOT MORE THAN USUAL: 0
1. LITTLE INTEREST OR PLEASURE IN DOING THINGS: 0
4. FEELING TIRED OR HAVING LITTLE ENERGY: 0
5. POOR APPETITE OR OVEREATING: 0
SUM OF ALL RESPONSES TO PHQ QUESTIONS 1-9: 0
2. FEELING DOWN, DEPRESSED OR HOPELESS: 0
SUM OF ALL RESPONSES TO PHQ QUESTIONS 1-9: 0
3. TROUBLE FALLING OR STAYING ASLEEP: 0
SUM OF ALL RESPONSES TO PHQ QUESTIONS 1-9: 0
SUM OF ALL RESPONSES TO PHQ9 QUESTIONS 1 & 2: 0
9. THOUGHTS THAT YOU WOULD BE BETTER OFF DEAD, OR OF HURTING YOURSELF: 0
7. TROUBLE CONCENTRATING ON THINGS, SUCH AS READING THE NEWSPAPER OR WATCHING TELEVISION: 0
SUM OF ALL RESPONSES TO PHQ QUESTIONS 1-9: 0
6. FEELING BAD ABOUT YOURSELF - OR THAT YOU ARE A FAILURE OR HAVE LET YOURSELF OR YOUR FAMILY DOWN: 0

## 2023-05-11 NOTE — PROGRESS NOTES
5/11/23             Progress Note    Amy Murillo 1965      Chief Complaint   Patient presents with    Medication Check    Follow-up         Subjective:    Patient is a 60 y.o. male diagnosed with bipolar 1 and presents today for follow-up. Last seen in clinic on 6/27/22  and prior records were reviewed. Last visit: Doing \"great\" today. He reports his medications are working well. He has gotten a better work work schedule. He has his evenings freed up and is spending time at [employer]. He denies si hi avh at this time. He denies side effects of medications at this time. He is calm and cooperative. He reports the blood clot in his leg is mostly resolved. He reports his wife continues to support him and believes he is doing well. He is bright and cheerful on the phone he denies SI HI AVH she denies side effects of medications he is calm and cooperative we will follow-up in 10 months    Today:  he states he has been doing really well lately. He has a good work schedule. He has been spending time at [employer] and has been doing really well. He reports his wife continues to support him and believes he is doing well. He is bright and cheerful on the phone he denies SI HI AVH she denies side effects of medications he is calm and cooperative we will follow-up in 1 year    Absent  suicidal ideation. Reports compliance with medications as good . Sleep:  sleeping in a chair upright getting 6-7 hours per night.     Caffeine use: tea, diet cokes    PREVIOUS MED TRIALS    Abilify  Risperdal   Depakote (current)  Buspirone  Trazodone   Diazepam  Ativan  Gabapentin   Melatonin  latuda - too sedating    Current Substance Use:   Alcohol: none  Illicit drug use: denies   Marijuana: denies   Tobacco: denies   Vape: denies       BP: BP (!) 151/86 (Site: Right Lower Arm, Position: Sitting, Cuff Size: Large Adult)   Pulse 72   Temp 97.6 °F (36.4 °C) (Temporal)   Resp 18   Wt 298 lb (135.2 kg)   SpO2 96%

## 2023-05-11 NOTE — TELEPHONE ENCOUNTER
Called pt for appointment reminder.     -unable to leave Vm  -vm is full      Electronically signed by Zahraa Anderson MA on 5/11/2023 at 9:15 AM

## 2023-06-09 ENCOUNTER — TELEPHONE (OUTPATIENT)
Dept: PSYCHIATRY | Age: 58
End: 2023-06-09

## 2023-06-09 RX ORDER — DIVALPROEX SODIUM 500 MG/1
1500 TABLET, EXTENDED RELEASE ORAL NIGHTLY
Qty: 90 TABLET | Refills: 2 | Status: SHIPPED | OUTPATIENT
Start: 2023-06-09

## 2023-06-09 NOTE — TELEPHONE ENCOUNTER
Called and lvm asking pt to return phone call when pt calls back I will let him know that his script for depakote was sent to her pharmacy    Electronically signed by Libra Luther on 6/9/2023 at 3:58 PM

## 2023-06-09 NOTE — TELEPHONE ENCOUNTER
5/11/2024). 6. The patient has been advised to call with any problems. 7. Controlled substance Treatment Plan: NA.  8. The above listed medications have been continued, modifications in meds and other orders/labs as follows:                   Encounter Medications    No orders of the defined types were placed in this encounter. Orders Placed This Encounter   Procedures    Valproic Acid Level, Total       Standing Status:   Future       Standing Expiration Date:   5/11/2024       Order Specific Question:   Dose Schedule & Time of Last Dose? Answer:   night before         9. Additional comments:  discussed sleep hygiene, discussed the use of coping skills and relaxation strategies to manage symptoms. Reinforced appointment compliance        10. Over 50% of the total visit time of  30 minutes was spent on counseling and/or coordination of care of:                         1. [facility] ([facility]                           Psychotherapy Topics: mood/medication effectiveness family, financial, health and occupational     TRIP Hilliard - CNP

## 2023-07-15 ENCOUNTER — OFFICE VISIT (OUTPATIENT)
Age: 58
End: 2023-07-15
Payer: MEDICAID

## 2023-07-15 VITALS
TEMPERATURE: 97.7 F | SYSTOLIC BLOOD PRESSURE: 136 MMHG | WEIGHT: 306 LBS | BODY MASS INDEX: 45.19 KG/M2 | OXYGEN SATURATION: 97 % | RESPIRATION RATE: 22 BRPM | DIASTOLIC BLOOD PRESSURE: 80 MMHG | HEART RATE: 64 BPM

## 2023-07-15 DIAGNOSIS — L73.9 FOLLICULITIS: Primary | ICD-10-CM

## 2023-07-15 PROCEDURE — 99213 OFFICE O/P EST LOW 20 MIN: CPT | Performed by: NURSE PRACTITIONER

## 2023-07-15 PROCEDURE — 3078F DIAST BP <80 MM HG: CPT | Performed by: NURSE PRACTITIONER

## 2023-07-15 PROCEDURE — 3074F SYST BP LT 130 MM HG: CPT | Performed by: NURSE PRACTITIONER

## 2023-07-15 RX ORDER — CEPHALEXIN 500 MG/1
500 CAPSULE ORAL 2 TIMES DAILY
Qty: 20 CAPSULE | Refills: 0 | Status: SHIPPED | OUTPATIENT
Start: 2023-07-15 | End: 2023-07-25

## 2023-07-15 NOTE — PROGRESS NOTES
[address]  [address] [place] [address]  Dept: [phone]  Dept Fax: [phone]  Loc: [phone]    Mariaelena Omalley is a 60 y.o. male who presents today for his medical conditions/complaints as noted below. Mariaelena Omalley is c/o of Rash (Both legs below knee)        HPI:     HPI   Chief Complaint   Patient presents with    Rash     Both legs below knee     Patient presents today for evaluation of \"bumps on lower legs\". He states these have been present for several days. He frequently gets them. He denies any drainage or bleeding but states they look like pimples. No fever or swelling. He states he wears pants outside and uses bug spray.        Past Medical History:   Diagnosis Date    Bipolar disorder ([address])     Colon polyps     Diverticular disease     GERD (gastroesophageal reflux disease)     Hiatal hernia     Hyperlipidemia     Hypertension     Hypothyroidism     Neuropathy     Unspecified sleep apnea       Past Surgical History:   Procedure Laterality Date    COLONOSCOPY  01/06/2009    Dr Taniya Marino: hyperplastic polyp, resolving diverticulitis    COLONOSCOPY  07/2012    minimal diverticulosis left side o/w normal postsurgical colon    COLONOSCOPY  12/2005    diverticulitis    COLONOSCOPY N/A 11/8/2016    Dr [place] Avelar Tomas Leisure bleeding internal hemorrhoids, diverticular disease-5 yr recall    EGD      [place] EGD TRANSORAL BIOPSY SINGLE/MULTIPLE N/A 8/24/2018    Dr JONATHAN Bentley-Gastritis/gastropathy, esophagitis    SUBTOTAL COLECTOMY  2010    recurrant diverticulitis    UPPER GASTROINTESTINAL ENDOSCOPY  12/22/2005    Dr Taniya Marino: duodenal ulcer, gastritis    UPPP UVULOPALATOPHARYGOPLASTY         Vitals 7/15/2023 5/11/2023 10/13/2022 9/22/2022 8/31/2022 6/5/4197   SYSTOLIC 166 464 221 288 660 469   DIASTOLIC 80 86 75 86 78 67   Site - Right Lower Arm - - - -   Position - Sitting - - - -   Cuff Size - Large Adult - - - -   Pulse 64 72 70 64 67 73   Temp

## 2023-07-16 ASSESSMENT — ENCOUNTER SYMPTOMS
SHORTNESS OF BREATH: 0
VOICE CHANGE: 0
RHINORRHEA: 0
VOMITING: 0
COUGH: 0
COLOR CHANGE: 0
PHOTOPHOBIA: 0
NAUSEA: 0
BACK PAIN: 0

## 2023-09-14 ENCOUNTER — TELEPHONE (OUTPATIENT)
Dept: PSYCHIATRY | Age: 58
End: 2023-09-14

## 2023-09-14 RX ORDER — DIVALPROEX SODIUM 500 MG/1
1500 TABLET, EXTENDED RELEASE ORAL NIGHTLY
Qty: 90 TABLET | Refills: 2 | Status: SHIPPED | OUTPATIENT
Start: 2023-09-14

## 2023-09-14 NOTE — TELEPHONE ENCOUNTER
Pt's wife made aware that RX for Depakote sent to pt's pharmacy per Luke DOMINIQUE as she requested.

## 2023-09-14 NOTE — TELEPHONE ENCOUNTER
Historical Provider, MD   diclofenac (VOLTAREN) 50 MG EC tablet Take 1 tablet by mouth 3 times daily (with meals) For 2 weeks and then as needed thereafter 10/28/21     Shante Valladares MD   omeprazole (PRILOSEC) 20 MG delayed release capsule TAKE 1 CAPSULE BY MOUTH DAILY  7/11/21     Shante Valladares MD   atorvastatin (LIPITOR) 40 MG tablet TAKE 1 TABLET BY MOUTH ONCE DAILY. 3/21/21     Shante Valladares MD         Social History   Social History           Socioeconomic History    Marital status:    Tobacco Use    Smoking status: Never    Smokeless tobacco: Never   Vaping Use    Vaping Use: Never used   Substance and Sexual Activity    Alcohol use: No    Drug use: No    Sexual activity: Yes   Social History Narrative     5/6/2021     PRIOR MEDICATION TRIALS                 . SLEEP STUDY: yes - HAWK, Bipap     .     negative history of seizures. .     negative history of head trauma. Piero Donnelly PREVIOUS PSYCHIATRIC HISTORY     Had an episode of brief acute psychosis when he was in the [employer] (age 22). About ten years ago he had another episode but he doesn't remember what medicines he was taking - his inpatient intake in April, 2021 states that he was started on something which incapacitated him, then was started on Depakote. (He may have been started on Abilify at this time as he and his wife reports that Abilify did not work well for him.) He stopped having problems some years after that and he asked a doctor to take him off medications. He has been off medications for about 7 years. He has recently been admitted to inpatient with a severe episode of amy, 4/26/21-5/1/21. Piero Donnelly FAMILY PSYCHIATRIC HISTORY     Father, bipolar     Mother, depression     . SOCIAL HISTORY     Born and Raised - [place], [place], in a 2 parent home with 3 siblings. He says he had a wonderful childhood.  twice. Has one daughter from the first marriage. Has been  to his second wife for 26 years.  They have 3

## 2023-10-19 ENCOUNTER — HOSPITAL ENCOUNTER (EMERGENCY)
Facility: HOSPITAL | Age: 58
Discharge: HOME | End: 2023-10-19
Attending: EMERGENCY MEDICINE
Payer: MEDICAID

## 2023-10-19 ENCOUNTER — APPOINTMENT (OUTPATIENT)
Dept: GENERAL RADIOLOGY | Facility: HOSPITAL | Age: 58
End: 2023-10-19
Payer: MEDICAID

## 2023-10-19 VITALS
DIASTOLIC BLOOD PRESSURE: 72 MMHG | WEIGHT: 260 LBS | BODY MASS INDEX: 39.4 KG/M2 | RESPIRATION RATE: 16 BRPM | TEMPERATURE: 97.1 F | SYSTOLIC BLOOD PRESSURE: 138 MMHG | HEIGHT: 68 IN | OXYGEN SATURATION: 98 % | HEART RATE: 50 BPM

## 2023-10-19 DIAGNOSIS — R06.00 DYSPNEA, UNSPECIFIED TYPE: Primary | ICD-10-CM

## 2023-10-19 DIAGNOSIS — R60.9 FLUID RETENTION: ICD-10-CM

## 2023-10-19 DIAGNOSIS — I49.3 PVC (PREMATURE VENTRICULAR CONTRACTION): ICD-10-CM

## 2023-10-19 DIAGNOSIS — E66.2 OBESITY HYPOVENTILATION SYNDROME: ICD-10-CM

## 2023-10-19 LAB
ALBUMIN SERPL-MCNC: 4.3 G/DL (ref 3.5–5.2)
ALBUMIN/GLOB SERPL: 1.5 G/DL
ALP SERPL-CCNC: 46 U/L (ref 39–117)
ALT SERPL W P-5'-P-CCNC: 16 U/L (ref 1–41)
ANION GAP SERPL CALCULATED.3IONS-SCNC: 10 MMOL/L (ref 5–15)
AST SERPL-CCNC: 20 U/L (ref 1–40)
BASOPHILS # BLD AUTO: 0.07 10*3/MM3 (ref 0–0.2)
BASOPHILS NFR BLD AUTO: 0.8 % (ref 0–1.5)
BILIRUB SERPL-MCNC: 0.4 MG/DL (ref 0–1.2)
BUN SERPL-MCNC: 17 MG/DL (ref 6–20)
BUN/CREAT SERPL: 21.5 (ref 7–25)
CALCIUM SPEC-SCNC: 9.2 MG/DL (ref 8.6–10.5)
CHLORIDE SERPL-SCNC: 103 MMOL/L (ref 98–107)
CO2 SERPL-SCNC: 30 MMOL/L (ref 22–29)
CREAT SERPL-MCNC: 0.79 MG/DL (ref 0.76–1.27)
CRP SERPL-MCNC: 0.45 MG/DL (ref 0–0.5)
D DIMER PPP FEU-MCNC: <0.27 MCGFEU/ML (ref 0–0.59)
DEPRECATED RDW RBC AUTO: 43.2 FL (ref 37–54)
EGFRCR SERPLBLD CKD-EPI 2021: 102.3 ML/MIN/1.73
EOSINOPHIL # BLD AUTO: 0.05 10*3/MM3 (ref 0–0.4)
EOSINOPHIL NFR BLD AUTO: 0.6 % (ref 0.3–6.2)
ERYTHROCYTE [DISTWIDTH] IN BLOOD BY AUTOMATED COUNT: 12.8 % (ref 12.3–15.4)
GEN 5 2HR TROPONIN T REFLEX: 8 NG/L
GLOBULIN UR ELPH-MCNC: 2.9 GM/DL
GLUCOSE SERPL-MCNC: 130 MG/DL (ref 65–99)
HCT VFR BLD AUTO: 41.2 % (ref 37.5–51)
HGB BLD-MCNC: 13.6 G/DL (ref 13–17.7)
HOLD SPECIMEN: NORMAL
HOLD SPECIMEN: NORMAL
IMM GRANULOCYTES # BLD AUTO: 0.09 10*3/MM3 (ref 0–0.05)
IMM GRANULOCYTES NFR BLD AUTO: 1.1 % (ref 0–0.5)
INR PPP: 1.07 (ref 0.91–1.09)
LYMPHOCYTES # BLD AUTO: 2.61 10*3/MM3 (ref 0.7–3.1)
LYMPHOCYTES NFR BLD AUTO: 31.1 % (ref 19.6–45.3)
MAGNESIUM SERPL-MCNC: 2 MG/DL (ref 1.6–2.6)
MCH RBC QN AUTO: 30.4 PG (ref 26.6–33)
MCHC RBC AUTO-ENTMCNC: 33 G/DL (ref 31.5–35.7)
MCV RBC AUTO: 92.2 FL (ref 79–97)
MONOCYTES # BLD AUTO: 0.78 10*3/MM3 (ref 0.1–0.9)
MONOCYTES NFR BLD AUTO: 9.3 % (ref 5–12)
NEUTROPHILS NFR BLD AUTO: 4.78 10*3/MM3 (ref 1.7–7)
NEUTROPHILS NFR BLD AUTO: 57.1 % (ref 42.7–76)
NRBC BLD AUTO-RTO: 0 /100 WBC (ref 0–0.2)
NT-PROBNP SERPL-MCNC: 123.8 PG/ML (ref 0–900)
PLATELET # BLD AUTO: 200 10*3/MM3 (ref 140–450)
PMV BLD AUTO: 11.6 FL (ref 6–12)
POTASSIUM SERPL-SCNC: 3.4 MMOL/L (ref 3.5–5.2)
PROT SERPL-MCNC: 7.2 G/DL (ref 6–8.5)
PROTHROMBIN TIME: 14.1 SECONDS (ref 11.8–14.8)
RBC # BLD AUTO: 4.47 10*6/MM3 (ref 4.14–5.8)
SODIUM SERPL-SCNC: 143 MMOL/L (ref 136–145)
T4 SERPL-MCNC: 8.29 MCG/DL (ref 4.5–11.7)
TROPONIN T DELTA: -3 NG/L
TROPONIN T SERPL HS-MCNC: 11 NG/L
TSH SERPL DL<=0.05 MIU/L-ACNC: 3.62 UIU/ML (ref 0.27–4.2)
WBC NRBC COR # BLD: 8.38 10*3/MM3 (ref 3.4–10.8)
WHOLE BLOOD HOLD COAG: NORMAL
WHOLE BLOOD HOLD SPECIMEN: NORMAL

## 2023-10-19 PROCEDURE — 83880 ASSAY OF NATRIURETIC PEPTIDE: CPT | Performed by: EMERGENCY MEDICINE

## 2023-10-19 PROCEDURE — 93010 ELECTROCARDIOGRAM REPORT: CPT | Performed by: INTERNAL MEDICINE

## 2023-10-19 PROCEDURE — 84484 ASSAY OF TROPONIN QUANT: CPT | Performed by: EMERGENCY MEDICINE

## 2023-10-19 PROCEDURE — 99284 EMERGENCY DEPT VISIT MOD MDM: CPT

## 2023-10-19 PROCEDURE — 85379 FIBRIN DEGRADATION QUANT: CPT | Performed by: EMERGENCY MEDICINE

## 2023-10-19 PROCEDURE — 93005 ELECTROCARDIOGRAM TRACING: CPT

## 2023-10-19 PROCEDURE — 86140 C-REACTIVE PROTEIN: CPT | Performed by: EMERGENCY MEDICINE

## 2023-10-19 PROCEDURE — 84443 ASSAY THYROID STIM HORMONE: CPT | Performed by: EMERGENCY MEDICINE

## 2023-10-19 PROCEDURE — 83735 ASSAY OF MAGNESIUM: CPT | Performed by: EMERGENCY MEDICINE

## 2023-10-19 PROCEDURE — 84436 ASSAY OF TOTAL THYROXINE: CPT | Performed by: EMERGENCY MEDICINE

## 2023-10-19 PROCEDURE — 93005 ELECTROCARDIOGRAM TRACING: CPT | Performed by: EMERGENCY MEDICINE

## 2023-10-19 PROCEDURE — 85610 PROTHROMBIN TIME: CPT | Performed by: EMERGENCY MEDICINE

## 2023-10-19 PROCEDURE — 80053 COMPREHEN METABOLIC PANEL: CPT | Performed by: EMERGENCY MEDICINE

## 2023-10-19 PROCEDURE — 85025 COMPLETE CBC W/AUTO DIFF WBC: CPT | Performed by: EMERGENCY MEDICINE

## 2023-10-19 PROCEDURE — 36415 COLL VENOUS BLD VENIPUNCTURE: CPT

## 2023-10-19 PROCEDURE — 71045 X-RAY EXAM CHEST 1 VIEW: CPT

## 2023-10-19 RX ORDER — ASPIRIN 81 MG/1
324 TABLET, CHEWABLE ORAL ONCE
Status: COMPLETED | OUTPATIENT
Start: 2023-10-19 | End: 2023-10-19

## 2023-10-19 RX ORDER — SODIUM CHLORIDE 0.9 % (FLUSH) 0.9 %
10 SYRINGE (ML) INJECTION AS NEEDED
Status: DISCONTINUED | OUTPATIENT
Start: 2023-10-19 | End: 2023-10-19 | Stop reason: HOSPADM

## 2023-10-19 RX ADMIN — ASPIRIN 324 MG: 81 TABLET, CHEWABLE ORAL at 11:56

## 2023-10-19 NOTE — ED PROVIDER NOTES
Subjective   History of Present Illness  Patient is a morbidly obese gentleman who came the ER complaining shortness of breath and fluid retention he is got frequent PVCs.  And not feeling well.  And fluid retention denies any chest pain.    Shortness of Breath  Severity:  Moderate  Onset quality:  Gradual  Duration:  3 days  Timing:  Intermittent  Progression:  Waxing and waning  Chronicity:  New  Context: activity    Context: not animal exposure, not emotional upset, not occupational exposure, not pollens, not strong odors and not URI    Relieved by:  Rest  Worsened by:  Exertion  Ineffective treatments:  None tried  Associated symptoms: cough and PND    Associated symptoms: no abdominal pain, no diaphoresis, no headaches, no hemoptysis, no neck pain, no sputum production, no syncope, no vomiting and no wheezing    Risk factors: obesity    Risk factors: no recent alcohol use, no hx of PE/DVT and no prolonged immobilization        Review of Systems   Constitutional: Negative.  Negative for diaphoresis.   HENT: Negative.     Respiratory:  Positive for cough and shortness of breath. Negative for hemoptysis, sputum production and wheezing.    Cardiovascular:  Positive for PND. Negative for syncope.   Gastrointestinal: Negative.  Negative for abdominal distention, abdominal pain, nausea and vomiting.   Endocrine: Negative.    Genitourinary: Negative.    Musculoskeletal: Negative.  Negative for back pain and neck pain.   Skin:  Negative for color change and pallor.   Neurological: Negative.  Negative for syncope, weakness, light-headedness, numbness and headaches.   Hematological: Negative.  Does not bruise/bleed easily.   All other systems reviewed and are negative.      Past Medical History:   Diagnosis Date    Acid reflux     Arthritis     Bell palsy     Chronic pain disorder     Disease of thyroid gland     Diverticulitis of colon     Hyperlipidemia     Hypertension     Low back pain     Neck pain     Sleep apnea      Stomach ulcer        No Known Allergies    Past Surgical History:   Procedure Laterality Date    ADENOIDECTOMY      COLON RESECTION      COLONOSCOPY  11/08/2016    large amount of diverticular disease in sigmoid colon, internal hemorrhoids    ENDOSCOPY  08/27/2018    meld inflammation and focal lamina propria fibrosis (-) h-Pylori    TONSILLECTOMY         Family History   Problem Relation Age of Onset    Heart disease Mother     Diabetes Mother     Heart disease Father     Lung disease Father     Colon cancer Neg Hx     Colon polyps Neg Hx        Social History     Socioeconomic History    Marital status:    Tobacco Use    Smoking status: Never    Smokeless tobacco: Never   Vaping Use    Vaping Use: Never used   Substance and Sexual Activity    Alcohol use: No    Drug use: No    Sexual activity: Defer           Objective   Physical Exam  Vitals and nursing note reviewed. Exam conducted with a chaperone present.   Constitutional:       General: He is not in acute distress.     Appearance: Normal appearance. He is well-developed. He is obese. He is not toxic-appearing.   HENT:      Head: Normocephalic and atraumatic.      Nose: Nose normal.      Mouth/Throat:      Mouth: Mucous membranes are moist.      Pharynx: Uvula midline.   Eyes:      General: Lids are normal. Lids are everted, no foreign bodies appreciated.      Conjunctiva/sclera: Conjunctivae normal.      Pupils: Pupils are equal, round, and reactive to light.   Neck:      Vascular: Normal carotid pulses. No carotid bruit or JVD.      Trachea: Trachea and phonation normal. No tracheal deviation.   Cardiovascular:      Rate and Rhythm: Normal rate and regular rhythm.      Chest Wall: PMI is not displaced.      Pulses: Normal pulses.      Heart sounds: Normal heart sounds.      No gallop.   Pulmonary:      Effort: Pulmonary effort is normal. No tachypnea, accessory muscle usage or respiratory distress.      Breath sounds: No stridor. No decreased  breath sounds, wheezing, rhonchi or rales.   Chest:      Chest wall: No mass or tenderness.   Abdominal:      General: Bowel sounds are normal. There is no distension.      Palpations: Abdomen is soft.      Tenderness: There is no abdominal tenderness.   Musculoskeletal:         General: No swelling. Normal range of motion.      Cervical back: Full passive range of motion without pain, normal range of motion and neck supple. No rigidity.      Right lower leg: Edema present.      Left lower leg: Edema present.      Comments:   Homans sign is negative   Skin:     General: Skin is warm and dry.      Capillary Refill: Capillary refill takes less than 2 seconds.      Coloration: Skin is not cyanotic, jaundiced or pale.      Nails: There is no clubbing.   Neurological:      General: No focal deficit present.      Mental Status: He is alert and oriented to person, place, and time.      GCS: GCS eye subscore is 4. GCS verbal subscore is 5. GCS motor subscore is 6.      Cranial Nerves: No cranial nerve deficit.      Motor: Motor function is intact.      Gait: Gait normal.      Deep Tendon Reflexes: Reflexes are normal and symmetric. Reflexes normal.   Psychiatric:         Speech: Speech normal.         Behavior: Behavior normal.         Procedures           ED Course  ED Course as of 10/19/23 1603   Thu Oct 19, 2023   1051 Sinus rhythm [TS]   1255 Frequent PVCs [TS]   1431 bradycardia [TS]   1541 Patient's Wells score will be 0.  He is having frequent PVCs which are unifocal.  Delta tropes is negative lab work-up essentially unremarkable.  There is no electrolyte abnormality magnesium is normal.  White succumbs normal limits and chest x-ray is negative [TS]   1541 Discussed negative lab work-up at length with the patient and I have offered him admission to the hospital I even talked with the hospitalist about it.  The patient after discussing the pros and cons has decided that he wants to go home and follow-up with the  primary MD as an outpatient.  Patient be advised return there for any worsening symptoms. [TS]   1556 Patient wants to go home.  I have discussed this case with Dr. Dasilva the on-call cardiologist reviewed the the EKGs.  I agree with the plan of care patient to be discharged home with a Holter and 2D echo and a follow-up with him in 2 weeks time. [TS]      ED Course User Index  [TS] Klever Fermin MD                                           Medical Decision Making  Differential Diagnosis:  I considered pulmonary etiology, asthma, chronic obstructive pulmonary disease, pneumonia, pulmonary embolism, adult respiratory distress syndrome, pneumothorax, pleural effusion, pulmonary fibrosis, cardiac etiology, congestive heart failure, myocardial infarction, metabolic etiology, diabetic ketoacidosis, uremia, acidosis, sepsis, anemia, drug related etiology, hyperventilation and CNS disease as a possible cause of dyspnea in this patient. This is a partial list of diagnoses considered.           Problems Addressed:  Dyspnea, unspecified type: complicated acute illness or injury     Details: Nonspecific dyspnea EKG shows PVCs chest x-ray is negative dimer and BNP are negative.  Fluid retention: complicated acute illness or injury     Details: I have diastolic dysfunction there is no evidence of clinically of pulmonary edema JVD does have extremity edema.  Obesity hypoventilation syndrome: complicated acute illness or injury     Details: Most definitely has obesity hypoventilation syndrome  PVC (premature ventricular contraction): complicated acute illness or injury     Details: Episodes of unifocal PVCs this have been discussed with cardiology.  Patient already is on beta-blockers.    Amount and/or Complexity of Data Reviewed  Labs: ordered.     Details: Labs reviewed  Radiology: ordered.     Details: X-rays reviewed  ECG/medicine tests: ordered.     Details: EKGs were reviewed  Discussion of management or test  interpretation with external provider(s): Discussed with Dr. Ovalle  Discussed this case with Dr. Dasilva.    Risk  OTC drugs.  Prescription drug management.  Risk Details: Patient came to the ER with shortness of breathThis patient presents with shortness of breath patient arrived hemodynamically stable was placed on the monitor and IV access obtained EKG was obtained and did not reveal any malignant/unstable dysrhythmias any acute ST elevation, no evidence of Brugada, or significantly prolonged QT .  Presentation not consistent with other acute, emergent cause of shortness of breath at this time.  Low suspicion for acute PE is low risk per Wells and Years criteria and no evidence of DVT such as calf swelling, tenderness, palpable tortuous lower extremity vein, or Homans' sign.  Low suspicion for pneumothorax as the patient is saturating well and has no radiographic evidence of a pneumothorax.  Low suspicion for dissection there is no widened mediastinum, hypotension, pulses deficit, and no tearing back/abdominal pain.  Low suspicion for tamponade as there is no JVD, muffled heart sounds, electrical alternans on EKG, and no hypotension.  Low suspicion for pericarditis as there is no diffuse ST elevation or HI depression and the patient is afebrile.  No evidence of GI bleed per patient's history.  Low suspicion for CHF exacerbation as there is no evidence of volume overload and no evidence of severely elevated BNP.  Low suspicion for pneumonia since the patient has no fever no productive cough no chest x-ray findings of pneumonia and no leukocytosis.    Patient does not want to stay in the hospital wants to go home will get outpatient echo.  Well score 0  Heart score 3  Delta Trope negative  Years Algorithm for Pulmonary Embolus  To be used in hemodynamically stable patient >18 years old    No signs of DVT are present, there is no hemoptysis, PE is not the most likely diagnosis and the D-dimer is not greater or  equal to 1000 ng/mL. Therefore PE is excluded . The Years algorithm rules out PE (0.43 % with symptomatic VTE during 3-month follow-up)        Final diagnoses:   Dyspnea, unspecified type   Fluid retention   Obesity hypoventilation syndrome   PVC (premature ventricular contraction)       ED Disposition  ED Disposition       ED Disposition   Discharge    Condition   Stable    Comment   --               Emma Estrada, DO  [address] [facility] [address]  [phone]          Guero Dasilva MD  [address]  [address]  [facility] [address]  [phone]    Schedule an appointment as soon as possible for a visit            Medication List      No changes were made to your prescriptions during this visit.            Klever Fermin MD  10/19/23 1356       Klever Fermin MD  10/19/23 1603       Klever Fermin MD  10/19/23 1600

## 2023-10-19 NOTE — DISCHARGE INSTRUCTIONS
Case was discussed with patient at length patient wants to go home will discharge home return the ER for any worsening symptoms.

## 2023-10-20 LAB
QT INTERVAL: 394 MS
QT INTERVAL: 420 MS
QT INTERVAL: 442 MS
QTC INTERVAL: 430 MS
QTC INTERVAL: 431 MS
QTC INTERVAL: 462 MS

## 2023-10-26 ENCOUNTER — HOSPITAL ENCOUNTER (OUTPATIENT)
Dept: CARDIOLOGY | Facility: HOSPITAL | Age: 58
Discharge: HOME | End: 2023-10-26
Admitting: EMERGENCY MEDICINE
Payer: MEDICAID

## 2023-10-26 ENCOUNTER — TRANSCRIBE ORDERS (OUTPATIENT)
Dept: ADMINISTRATIVE | Age: 58
End: 2023-10-26

## 2023-10-26 DIAGNOSIS — I49.3 PVC (PREMATURE VENTRICULAR CONTRACTION): ICD-10-CM

## 2023-10-26 DIAGNOSIS — R06.00 DYSPNEA, UNSPECIFIED TYPE: ICD-10-CM

## 2023-10-26 DIAGNOSIS — R06.00 DYSPNEA, UNSPECIFIED TYPE: Primary | ICD-10-CM

## 2023-10-26 PROCEDURE — 93246 EXT ECG>7D<15D RECORDING: CPT

## 2023-10-27 ENCOUNTER — TRANSCRIBE ORDERS (OUTPATIENT)
Dept: ADMINISTRATIVE | Facility: HOSPITAL | Age: 58
End: 2023-10-27
Payer: MEDICAID

## 2023-10-27 DIAGNOSIS — R06.00 DYSPNEA, UNSPECIFIED TYPE: Primary | ICD-10-CM

## 2023-11-02 ENCOUNTER — HOSPITAL ENCOUNTER (OUTPATIENT)
Dept: CARDIOLOGY | Facility: HOSPITAL | Age: 58
Discharge: HOME | End: 2023-11-02
Payer: MEDICAID

## 2023-11-02 VITALS
SYSTOLIC BLOOD PRESSURE: 138 MMHG | DIASTOLIC BLOOD PRESSURE: 72 MMHG | WEIGHT: 260 LBS | HEIGHT: 68 IN | BODY MASS INDEX: 39.4 KG/M2

## 2023-11-02 DIAGNOSIS — R06.00 DYSPNEA, UNSPECIFIED TYPE: ICD-10-CM

## 2023-11-02 DIAGNOSIS — I49.3 PVC (PREMATURE VENTRICULAR CONTRACTION): ICD-10-CM

## 2023-11-02 LAB
BH CV ECHO MEAS - AO MAX PG: 13.8 MMHG
BH CV ECHO MEAS - AO MEAN PG: 7 MMHG
BH CV ECHO MEAS - AO ROOT DIAM: 3.5 CM
BH CV ECHO MEAS - AO V2 MAX: 186 CM/SEC
BH CV ECHO MEAS - AO V2 VTI: 36.9 CM
BH CV ECHO MEAS - AVA(I,D): 2.03 CM2
BH CV ECHO MEAS - EDV(CUBED): 154.9 ML
BH CV ECHO MEAS - EDV(MOD-SP2): 149 ML
BH CV ECHO MEAS - EDV(MOD-SP4): 186 ML
BH CV ECHO MEAS - EF(MOD-BP): 57.4 %
BH CV ECHO MEAS - EF(MOD-SP2): 52.6 %
BH CV ECHO MEAS - EF(MOD-SP4): 61 %
BH CV ECHO MEAS - ESV(CUBED): 50.7 ML
BH CV ECHO MEAS - ESV(MOD-SP2): 70.7 ML
BH CV ECHO MEAS - ESV(MOD-SP4): 72.5 ML
BH CV ECHO MEAS - FS: 31.1 %
BH CV ECHO MEAS - IVS/LVPW: 0.96 CM
BH CV ECHO MEAS - IVSD: 1.11 CM
BH CV ECHO MEAS - LA DIMENSION: 4.3 CM
BH CV ECHO MEAS - LAT PEAK E' VEL: 9.7 CM/SEC
BH CV ECHO MEAS - LV DIASTOLIC VOL/BSA (35-75): 81.4 CM2
BH CV ECHO MEAS - LV MASS(C)D: 242.8 GRAMS
BH CV ECHO MEAS - LV MAX PG: 4.4 MMHG
BH CV ECHO MEAS - LV MEAN PG: 2 MMHG
BH CV ECHO MEAS - LV SYSTOLIC VOL/BSA (12-30): 31.7 CM2
BH CV ECHO MEAS - LV V1 MAX: 105 CM/SEC
BH CV ECHO MEAS - LV V1 VTI: 23.8 CM
BH CV ECHO MEAS - LVIDD: 5.4 CM
BH CV ECHO MEAS - LVIDS: 3.7 CM
BH CV ECHO MEAS - LVOT AREA: 3.1 CM2
BH CV ECHO MEAS - LVOT DIAM: 2 CM
BH CV ECHO MEAS - LVPWD: 1.16 CM
BH CV ECHO MEAS - MED PEAK E' VEL: 6.6 CM/SEC
BH CV ECHO MEAS - MV A MAX VEL: 96.3 CM/SEC
BH CV ECHO MEAS - MV DEC TIME: 0.24 SEC
BH CV ECHO MEAS - MV E MAX VEL: 118 CM/SEC
BH CV ECHO MEAS - MV E/A: 1.23
BH CV ECHO MEAS - MV MAX PG: 5.9 MMHG
BH CV ECHO MEAS - MV MEAN PG: 3 MMHG
BH CV ECHO MEAS - MV V2 VTI: 43.1 CM
BH CV ECHO MEAS - MVA(VTI): 1.73 CM2
BH CV ECHO MEAS - PA V2 MAX: 122 CM/SEC
BH CV ECHO MEAS - RAP SYSTOLE: 5 MMHG
BH CV ECHO MEAS - RV MAX PG: 2.7 MMHG
BH CV ECHO MEAS - RV V1 MAX: 81.7 CM/SEC
BH CV ECHO MEAS - RV V1 VTI: 18.1 CM
BH CV ECHO MEAS - RVSP: 22.3 MMHG
BH CV ECHO MEAS - SI(MOD-SP2): 34.3 ML/M2
BH CV ECHO MEAS - SI(MOD-SP4): 49.7 ML/M2
BH CV ECHO MEAS - SV(LVOT): 74.8 ML
BH CV ECHO MEAS - SV(MOD-SP2): 78.3 ML
BH CV ECHO MEAS - SV(MOD-SP4): 113.5 ML
BH CV ECHO MEAS - TAPSE (>1.6): 2.6 CM
BH CV ECHO MEAS - TR MAX PG: 17.3 MMHG
BH CV ECHO MEAS - TR MAX VEL: 208 CM/SEC
BH CV ECHO MEASUREMENTS AVERAGE E/E' RATIO: 14.48
BH CV XLRA - TDI S': 15.7 CM/SEC
LEFT ATRIUM VOLUME INDEX: 37.6 ML/M2
LEFT ATRIUM VOLUME: 108 ML

## 2023-11-02 PROCEDURE — 25510000001 PERFLUTREN 6.52 MG/ML SUSPENSION: Performed by: EMERGENCY MEDICINE

## 2023-11-02 PROCEDURE — 93306 TTE W/DOPPLER COMPLETE: CPT

## 2023-11-02 RX ADMIN — PERFLUTREN 13.04 MG: 6.52 INJECTION, SUSPENSION INTRAVENOUS at 16:11

## 2023-12-04 ENCOUNTER — HOSPITAL ENCOUNTER (OUTPATIENT)
Dept: CARDIOLOGY | Facility: HOSPITAL | Age: 58
Discharge: HOME | End: 2023-12-04
Payer: MEDICAID

## 2023-12-04 VITALS
HEART RATE: 71 BPM | WEIGHT: 315 LBS | SYSTOLIC BLOOD PRESSURE: 99 MMHG | DIASTOLIC BLOOD PRESSURE: 76 MMHG | HEIGHT: 69 IN | BODY MASS INDEX: 46.65 KG/M2

## 2023-12-04 DIAGNOSIS — R06.00 DYSPNEA, UNSPECIFIED TYPE: ICD-10-CM

## 2023-12-04 PROCEDURE — A9502 TC99M TETROFOSMIN: HCPCS | Performed by: FAMILY MEDICINE

## 2023-12-04 PROCEDURE — 0 TECHNETIUM TETROFOSMIN KIT: Performed by: FAMILY MEDICINE

## 2023-12-04 PROCEDURE — 25010000002 REGADENOSON 0.4 MG/5ML SOLUTION: Performed by: INTERNAL MEDICINE

## 2023-12-04 PROCEDURE — 93017 CV STRESS TEST TRACING ONLY: CPT

## 2023-12-04 PROCEDURE — 78452 HT MUSCLE IMAGE SPECT MULT: CPT

## 2023-12-04 RX ORDER — REGADENOSON 0.08 MG/ML
0.4 INJECTION, SOLUTION INTRAVENOUS ONCE
Status: COMPLETED | OUTPATIENT
Start: 2023-12-04 | End: 2023-12-04

## 2023-12-04 RX ADMIN — TETROFOSMIN 1 DOSE: 1.38 INJECTION, POWDER, LYOPHILIZED, FOR SOLUTION INTRAVENOUS at 09:10

## 2023-12-04 RX ADMIN — TETROFOSMIN 1 DOSE: 1.38 INJECTION, POWDER, LYOPHILIZED, FOR SOLUTION INTRAVENOUS at 07:35

## 2023-12-04 RX ADMIN — REGADENOSON 0.4 MG: 0.08 INJECTION, SOLUTION INTRAVENOUS at 09:05

## 2023-12-05 LAB
BH CV NUCLEAR PRIOR STUDY: 3
BH CV REST NUCLEAR ISOTOPE DOSE: 11.1 MCI
BH CV STRESS BP STAGE 1: NORMAL
BH CV STRESS COMMENTS STAGE 1: NORMAL
BH CV STRESS DOSE REGADENOSON STAGE 1: 0.4
BH CV STRESS DURATION MIN STAGE 1: 0
BH CV STRESS DURATION SEC STAGE 1: 10
BH CV STRESS HR STAGE 1: 93
BH CV STRESS NUCLEAR ISOTOPE DOSE: 36 MCI
BH CV STRESS PROTOCOL 1: NORMAL
BH CV STRESS RECOVERY BP: NORMAL MMHG
BH CV STRESS RECOVERY HR: 85 BPM
BH CV STRESS STAGE 1: 1
LV EF NUC BP: 58 %
MAXIMAL PREDICTED HEART RATE: 161 BPM
PERCENT MAX PREDICTED HR: 57.76 %
STRESS BASELINE BP: NORMAL MMHG
STRESS BASELINE HR: 71 BPM
STRESS PERCENT HR: 68 %
STRESS POST EXERCISE DUR MIN: 0 MIN
STRESS POST EXERCISE DUR SEC: 10 SEC
STRESS POST PEAK BP: NORMAL MMHG
STRESS POST PEAK HR: 93 BPM
STRESS TARGET HR: 137 BPM

## 2023-12-12 ENCOUNTER — OFFICE VISIT (OUTPATIENT)
Dept: CARDIOLOGY | Facility: CLINIC | Age: 58
End: 2023-12-12
Payer: MEDICAID

## 2023-12-12 VITALS
OXYGEN SATURATION: 97 % | DIASTOLIC BLOOD PRESSURE: 81 MMHG | HEART RATE: 38 BPM | SYSTOLIC BLOOD PRESSURE: 150 MMHG | HEIGHT: 69 IN | WEIGHT: 314 LBS | BODY MASS INDEX: 46.51 KG/M2

## 2023-12-12 DIAGNOSIS — E78.2 MIXED HYPERLIPIDEMIA: ICD-10-CM

## 2023-12-12 DIAGNOSIS — I10 PRIMARY HYPERTENSION: ICD-10-CM

## 2023-12-12 DIAGNOSIS — I49.3 FREQUENT UNIFOCAL PVCS: Primary | ICD-10-CM

## 2023-12-12 DIAGNOSIS — E66.01 CLASS 3 SEVERE OBESITY DUE TO EXCESS CALORIES WITHOUT SERIOUS COMORBIDITY WITH BODY MASS INDEX (BMI) OF 40.0 TO 44.9 IN ADULT: ICD-10-CM

## 2023-12-12 PROCEDURE — 1159F MED LIST DOCD IN RCRD: CPT | Performed by: INTERNAL MEDICINE

## 2023-12-12 PROCEDURE — 99204 OFFICE O/P NEW MOD 45 MIN: CPT | Performed by: INTERNAL MEDICINE

## 2023-12-12 PROCEDURE — 1160F RVW MEDS BY RX/DR IN RCRD: CPT | Performed by: INTERNAL MEDICINE

## 2023-12-12 RX ORDER — DIVALPROEX SODIUM 500 MG/1
1500 TABLET, EXTENDED RELEASE ORAL NIGHTLY
Qty: 90 TABLET | Refills: 2 | Status: SHIPPED | OUTPATIENT
Start: 2023-12-12

## 2023-12-12 RX ORDER — PREGABALIN 75 MG/1
1 CAPSULE ORAL 3 TIMES DAILY
COMMUNITY
Start: 2023-10-19

## 2023-12-12 RX ORDER — APIXABAN 2.5 MG/1
2.5 TABLET, FILM COATED ORAL 2 TIMES DAILY
COMMUNITY

## 2023-12-12 RX ORDER — DIVALPROEX SODIUM 500 MG/1
500 TABLET, EXTENDED RELEASE ORAL DAILY
COMMUNITY

## 2023-12-12 NOTE — PROGRESS NOTES
Chief Complaint  Establish Care (NEW PT: SELF REF FOR BRADYCARDIA-RECENT LEXISCAN 12/04/23, ECHO 11/02/23 AND EKG 10/19/23)    Subjective      Shankar Velasco presents to [facility] CARDIOLOGY  History of Present Illness    Shankar Velasco is a 58-year-old white male referred from the [facility] emergency room for cardiology evaluation because of frequent ventricular ectopy.  The patient was at Dr. Emma Estrada's office on 10/19/2023 and found to have a low pulse rate.  He was sent to the emergency room where he was noted to have frequent ventricular ectopy.  I was called from the emergency room and advised that the patient have various cardiac studies including Holter monitor and an appointment to see me on an outpatient basis.  It was felt that the patient had frequent ventricular ectopy which caused the pulse rate to be slower than the actual heart rate because the ventricular ectopy is not felt in the pulse.    Unfortunately, the extended cardiac monitor was technically difficult and unable to be interpreted.  An echocardiogram revealed normal left ventricular systolic function with borderline dilatation of the left ventricle and mild concentric left ventricular hypertrophy.  No other abnormalities were noted.  A Lexiscan nuclear stress test was low risk for myocardial ischemia.    The patient has exertional dyspnea and that he becomes short of breath walking very short distances.  His BMI is essentially 46.  He has no PND or orthopnea.  He does not feel palpitations and was unaware that he was having frequent ectopy.  He has had absolutely no syncope or presyncope.  He has chronic pretibial edema.  He has had no anginal type chest discomfort    The patient is .  His wife is accompanying him at this visit.  The patient works for [employer], which as I understand , is a company that replaces [employer] for "[employer]  He is able to work from home.  He is a non-smoker.   "Family history is significant for coronary artery disease in the patient's father.        Objective   Vital Signs:  /81 (BP Location: Left arm, Patient Position: Sitting, Cuff Size: Adult)   Pulse (!) 38   Ht 176 cm (69.29\")   Wt (!) 142 kg (314 lb)   SpO2 97%   BMI 45.98 kg/m²   Estimated body mass index is 45.98 kg/m² as calculated from the following:    Height as of this encounter: 176 cm (69.29\").    Weight as of this encounter: 142 kg (314 lb).          Physical Exam    A 58-year-old male who is in no apparent distress.  He is awake, alert and oriented x 3.  HEENT: Normocephalic.  No scleral icterus  Neck: No noted jugular venous distention or carotid bruits or thyromegaly.  Carotid upstroke is normal.  Lungs: Clear to auscultation.  Heart: Regular rhythm with trigeminy at present.  S1 and S2 are normal.  There are no audible murmurs or gallops sounds.  Abdomen is obese without tenderness masses or organomegaly.  Extremities: 2+ pretibial edema.  Pedal pulses intact.  Neurologic: Cranial nerves II through XII appear to be intact.  There are no focal abnormalities noted.      Result Review :                   Assessment and Plan   Diagnoses and all orders for this visit:    1. Frequent unifocal PVCs (Primary)  -     Holter Monitor - 72 Hour Up To 15 Days; Future    2. Primary hypertension  -     Holter Monitor - 72 Hour Up To 15 Days; Future    3. Mixed hyperlipidemia  -     Holter Monitor - 72 Hour Up To 15 Days; Future    4. Class 3 severe obesity due to excess calories without serious comorbidity with body mass index (BMI) of 40.0 to 44.9 in adult    1.  Frequent ventricular ectopy.  As noted above, I believe that the ventricular ectopy obscures the actual heart rate when his pulse is taken.  This likely is what caused his initial presentation to the [facility] emergency room.  Unfortunately, the initial Holter monitor was not able to be interpreted due to technical difficulties.  " Therefore we are placing a 10-day extended cardiac monitor today.  The ventricular ectopy is likely to be benign as the patient's ischemic study and echocardiogram are relatively normal.  We will reassess this in 6 weeks when the patient returns.    2.  Hypertension.  The patient's blood pressure is somewhat elevated today, likely due to anxiety at the first visit to the cardiologist office.  We will continue to keep track of this.  Meanwhile, he will continue amlodipine 10 mg daily and Prinzide 20-25 daily.  It appears that he was formerly on metoprolol but is no longer taking this medicine.    3.  Hyperlipidemia.  The patient was started on atorvastatin 40 mg daily in 2018 but apparently this was discontinued in approximately 2021 for unknown reasons.  The patient's wife has the results of the lipid panel taken recently which show an LDL of just over 120.  It would probably be prudent for the patient to take a statin due to his other multiple cardiac risk factors but I would like to have the formal lab report in front of me before we start a statin.    4.  Class III severe obesity.  The patient is not diabetic.  He was counseled regarding a heart healthy, low calorie, weight reduction diet.  He expressed a profound desire to lose weight and he wishes that his appetite was not quite as hearty as it currently is.  His wife is hoping that insurance will approve him for one of the new GLP-1 antagonists.    All questions are answered.  The patient and his wife were advised to contact us for any further concerns.  An extended cardiac monitor is placed at today's visit.  A return visit is scheduled for 6 weeks.    As always, I appreciate the opportunity to participate in the care of your patients.      There are no Patient Instructions on file for this visit.       Follow Up   Return in about 6 weeks (around 1/23/2024).  Patient was given instructions and counseling regarding his condition or for health maintenance  advice. Please see specific information pulled into the AVS if appropriate.

## 2023-12-12 NOTE — TELEPHONE ENCOUNTER
Pharmacy sent a request to refill pt's medication       Last office visit : 5/11/2023 Sherice Gabriel DOMINIQUE  Next office visit : 5/9/2024 Sherice Gabriel DOMINIQUE    Requested Prescriptions     Pending Prescriptions Disp Refills    divalproex (DEPAKOTE ER) 500 MG extended release tablet [Pharmacy Med Name: DIVALPROEX SODIUM ER 500MG TABLET EXTENDED RELEASE 24 HOUR] 90 tablet 2     Sig: TAKE 3 TABLETS BY MOUTH NIGHTLY            Aakash Lester       5/11/23                                              Progress Note     Haylie Miller 1965                                 Chief Complaint   Patient presents with    Medication Check    Follow-up            Subjective:    Patient is a 60 y.o. male diagnosed with bipolar 1 and presents today for follow-up. Last seen in clinic on 6/27/22  and prior records were reviewed. Last visit: Doing \"great\" today. He reports his medications are working well. He has gotten a better work work schedule. He has his evenings freed up and is spending time at [employer]. He denies si hi avh at this time. He denies side effects of medications at this time. He is calm and cooperative. He reports the blood clot in his leg is mostly resolved. He reports his wife continues to support him and believes he is doing well. He is bright and cheerful on the phone he denies SI HI AVH she denies side effects of medications he is calm and cooperative we will follow-up in 10 months     Today:  he states he has been doing really well lately. He has a good work schedule. He has been spending time at [employer] and has been doing really well. He reports his wife continues to support him and believes he is doing well. He is bright and cheerful on the phone he denies SI HI AVH she denies side effects of medications he is calm and cooperative we will follow-up in 1 year     Absent  suicidal ideation. Reports compliance with medications as good .       Sleep:  sleeping in a chair upright getting 6-7 hours per

## 2023-12-13 ENCOUNTER — TELEPHONE (OUTPATIENT)
Dept: PSYCHIATRY | Age: 58
End: 2023-12-13

## 2023-12-13 NOTE — TELEPHONE ENCOUNTER
Called and lvm asking pt to return phone call    When pt calls I will let him know that his script for depakote was sent to her pharmacy     Electronically signed by Hunter Cintron on 12/13/2023 at 12:06 PM

## 2023-12-15 ENCOUNTER — TELEPHONE (OUTPATIENT)
Dept: PSYCHIATRY | Age: 58
End: 2023-12-15

## 2023-12-28 ENCOUNTER — HOSPITAL ENCOUNTER (OUTPATIENT)
Dept: GENERAL RADIOLOGY | Age: 58
Discharge: HOME | End: 2023-12-28
Payer: MEDICAID

## 2023-12-28 ENCOUNTER — OFFICE VISIT (OUTPATIENT)
Age: 58
End: 2023-12-28
Payer: MEDICAID

## 2023-12-28 VITALS
WEIGHT: 315 LBS | DIASTOLIC BLOOD PRESSURE: 70 MMHG | HEIGHT: 69 IN | BODY MASS INDEX: 46.65 KG/M2 | OXYGEN SATURATION: 97 % | HEART RATE: 43 BPM | TEMPERATURE: 97.7 F | SYSTOLIC BLOOD PRESSURE: 130 MMHG | RESPIRATION RATE: 20 BRPM
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
## 2023-12-28 DIAGNOSIS — M25.551 RIGHT HIP PAIN: ICD-10-CM

## 2023-12-28 DIAGNOSIS — S79.911A HIP INJURY, RIGHT, INITIAL ENCOUNTER: ICD-10-CM

## 2023-12-28 DIAGNOSIS — L89.312 PRESSURE INJURY OF RIGHT BUTTOCK, STAGE 2 (HCC): ICD-10-CM

## 2023-12-28 DIAGNOSIS — M25.551 RIGHT HIP PAIN: Primary | ICD-10-CM

## 2023-12-28 PROCEDURE — 73502 X-RAY EXAM HIP UNI 2-3 VIEWS: CPT

## 2023-12-28 PROCEDURE — 3078F DIAST BP <80 MM HG: CPT

## 2023-12-28 PROCEDURE — 99214 OFFICE O/P EST MOD 30 MIN: CPT

## 2023-12-28 PROCEDURE — 3075F SYST BP GE 130 - 139MM HG: CPT

## 2023-12-28 NOTE — PATIENT INSTRUCTIONS
Hip Pain  Xray of right hip ordered; will call with results. Referral to ortho initiated    Rotate ice/heat as needed - use only 15 minutes at a time    Encouraged adequate rest    Patient may use ibuprofen or tylenol for pain    The patient is to follow up with PCP or return to clinic if symptoms worsen/fail to improve. Pressure ulcer  Silvadene prescribed.  Use as directed    Discussed with patient to sit on a soft pillow and to reduce pressure to the area by performing frequent position changes    Follow up with PCP in 1-2 weeks or sooner if worsening

## 2023-12-28 NOTE — PROGRESS NOTES
[address]  [address] [address]  Dept: [phone]  Dept Fax: [phone]  Loc: [phone]    Norbert Craft is a 60 y.o. male who presents today for his medical conditions/complaints as noted below. Norbert Craft is complaining of Hip Injury (Right side fell )        HPI:   1) patient presents to the clinic today with complaints of right hip pain following an injury that occurred 2 to 3 months ago. Patient reports that he tripped over a garden hose and fell on his right side, causing the hip injury. Pain is currently 3/10, dull, \"feels like a bruise. \"  Pain is better with rest, sitting. Worse with walking, movement, tenderness is noted to the area. No other alleviating factors reported for this. Patient says that he has not seen a healthcare provider for this prior to today. Admits history of peripheral neuropathy but denies new onset numbness, tingling, paresthesias. Denies radiation of pain to the lower extremities. Symptoms are moderate. Patient is stable    2) also complains of a sore to the right buttock that has been present for several days. Patient reports the sore is tender, painful, scabbed over. He says that he thinks the sore has been caused by him sitting with pressure on the area to compensate for hip pain. No medications or alleviating factors are reported for this. Symptoms are moderate. Patient is stable    Patient reports that he has a PCP in [facility] but cannot remember what her name is.         Past Medical History:   Diagnosis Date    Bipolar disorder ([address])     Colon polyps     Diverticular disease     GERD (gastroesophageal reflux disease)     Hiatal hernia     Hyperlipidemia     Hypertension     Hypothyroidism     Neuropathy     Unspecified sleep apnea        Past Surgical History:   Procedure Laterality Date    COLONOSCOPY  01/06/2009    Dr Ryann Yeager: hyperplastic polyp, resolving diverticulitis

## 2024-01-17 ENCOUNTER — OFFICE VISIT (OUTPATIENT)
Dept: CARDIOLOGY | Facility: CLINIC | Age: 59
End: 2024-01-17
Payer: MEDICAID

## 2024-01-17 VITALS
BODY MASS INDEX: 46.36 KG/M2 | HEART RATE: 96 BPM | OXYGEN SATURATION: 96 % | DIASTOLIC BLOOD PRESSURE: 100 MMHG | HEIGHT: 69 IN | SYSTOLIC BLOOD PRESSURE: 150 MMHG | WEIGHT: 313 LBS

## 2024-01-17 DIAGNOSIS — I49.3 FREQUENT UNIFOCAL PVCS: Primary | ICD-10-CM

## 2024-01-17 DIAGNOSIS — E78.2 MIXED HYPERLIPIDEMIA: ICD-10-CM

## 2024-01-17 DIAGNOSIS — I10 PRIMARY HYPERTENSION: ICD-10-CM

## 2024-01-17 DIAGNOSIS — E66.01 CLASS 3 SEVERE OBESITY DUE TO EXCESS CALORIES WITHOUT SERIOUS COMORBIDITY WITH BODY MASS INDEX (BMI) OF 40.0 TO 44.9 IN ADULT: ICD-10-CM

## 2024-01-17 PROCEDURE — 1160F RVW MEDS BY RX/DR IN RCRD: CPT | Performed by: INTERNAL MEDICINE

## 2024-01-17 PROCEDURE — 1159F MED LIST DOCD IN RCRD: CPT | Performed by: INTERNAL MEDICINE

## 2024-01-17 PROCEDURE — 99214 OFFICE O/P EST MOD 30 MIN: CPT | Performed by: INTERNAL MEDICINE

## 2024-01-17 NOTE — PROGRESS NOTES
"Chief Complaint  Frequent unifocal PVCs (1 MO FU-DISCUSS RECENT ZIO RESULTS)    Subjective      Shankar Velasco presents to [facility] CARDIOLOGY  History of Present Illness    Shankar is here to follow-up because of frequent ventricular ectopy.  A recent extended cardiac monitor revealed a PVC burden of 29%.  These are unifocal PVCs.  He says that he is doing okay.  His wife who is present, disagrees.  She notes that he becomes dyspneic when he walks across the room.  He also admits to becoming severely dyspneic walking into this office from the parking garage.  He has had no PND or orthopnea.  There is no anginal chest discomfort.  He has had no presyncope or syncope.  He notes a feeling of palpitations only rarely.    Objective   Vital Signs:  /100 (BP Location: Left arm, Patient Position: Sitting, Cuff Size: Adult)   Pulse 96   Ht 176 cm (69.29\")   Wt (!) 142 kg (313 lb)   SpO2 96%   BMI 45.83 kg/m²   Estimated body mass index is 45.83 kg/m² as calculated from the following:    Height as of this encounter: 176 cm (69.29\").    Weight as of this encounter: 142 kg (313 lb).          Physical Exam    A 58-year-old male who is awake, alert, oriented x 3.  His BMP is recorded as 45.83 kg/m²  HEENT: No scleral icterus.  Neck: No noted jugular venous distention.  Lungs: Clear to auscultation.  Heart: Regular rhythm with trigeminy and quadrigeminy.  Normal S1 and S2.  No audible murmurs or gallops sounds.  Extremities: 1+ pretibial edema.  Neurologic: No obvious focal abnormalities.    Result Review :                   Assessment and Plan   Diagnoses and all orders for this visit:    1. Frequent unifocal PVCs (Primary)  -     Ambulatory Referral to Cardiac Electrophysiology    2. Primary hypertension  -     Ambulatory Referral to Cardiac Electrophysiology    3. Mixed hyperlipidemia  -     Ambulatory Referral to Cardiac Electrophysiology    4. Class 3 severe obesity due to excess calories " without serious comorbidity with body mass index (BMI) of 40.0 to 44.9 in adult  -     Ambulatory Referral to Cardiac Electrophysiology    1.  Ventricular ectopy.  He has frequent unifocal PVCs.  Recent echocardiogram shows a normal left ventricular ejection fraction.  There was mild left ventricular hypertrophy.  Nuclear stress test was low risk for myocardial ischemia.  He has exertional dyspnea but it is unclear to what extent this can be attributed to the ventricular ectopy as he also has obesity and deconditioning.  I discussed referring him to electrophysiology for an opinion regarding PVC ablation.  He and his wife both agree.  Therefore I am referring him to  for consideration of PVC ablation or additional medical therapy.  It does not appear that the low-dose of metoprolol is very effective.    2.  Hypertension.  Blood pressure is 150/100 in the office today.  He has just walked into the office from the parking garage and was winded.  I suggested that they obtain a blood pressure cuff and check his blood pressure 2-3 times weekly at a minimum and give us a call if it remains elevated to over approximately 130/80.  Meanwhile, continue amlodipine 10 mg daily, Toprol- mg daily, Zestoretic 20-25 daily and Lasix 20 mg as needed.    3.  Hyperlipidemia.  Continue atorvastatin 40 mg daily..    4.  Obesity.  I discussed the health risks of severe obesity and he expresses a desire to lose weight and is going to ask his primary care provider about the new weight loss injections (GLP-1 and GIP receptor inhibitors).    All questions were answered.  He is encouraged to contact us for any further concerns.  Return visit is scheduled for 3 months.  He is referred to electrophysiology.      There are no Patient Instructions on file for this visit.       Follow Up   Return in about 3 months (around 4/17/2024) for Next scheduled follow up.  Patient was given instructions and counseling regarding his condition or  for health maintenance advice. Please see specific information pulled into the AVS if appropriate.

## 2024-01-19 ENCOUNTER — TELEPHONE (OUTPATIENT)
Dept: NEUROSURGERY | Age: 59
End: 2024-01-19

## 2024-01-19 NOTE — TELEPHONE ENCOUNTER
Received new patient referral from [facility]. Patient is already established with our office. Attempted to contact patient to schedule follow up. No answer, left VM stating to call the office back at [phone].

## 2024-03-04 ENCOUNTER — TELEPHONE (OUTPATIENT)
Dept: PSYCHIATRY | Age: 59
End: 2024-03-04

## 2024-03-04 DIAGNOSIS — F31.9 BIPOLAR 1 DISORDER (HCC): ICD-10-CM

## 2024-03-04 LAB — VALPROATE SERPL-MCNC: 95.1 UG/ML (ref 50–100)

## 2024-03-04 NOTE — TELEPHONE ENCOUNTER
Patient's wife (Elysia Torres) called wanting to verify his labs were still enter. Patient plans to come to [facility] outpatient lab to get his Valproic Acid level drawn today. Lab is still entered - Expires 5/11/2024.

## 2024-03-04 NOTE — TELEPHONE ENCOUNTER
Attempted to contact pt several times today-left VM this am-no return call-unable to reach him at this time.  Spoke with his wife who reported that he was at work.  She was informed that SEDRICK DOMINIQUE read her My chart message that she sent today and wanted to know if Gennaro wanted to come in and see him for eval. She was agreeable and he now has an appt on 3/6/24 at 11:00 am.

## 2024-03-05 ENCOUNTER — TELEPHONE (OUTPATIENT)
Dept: PSYCHIATRY | Age: 59
End: 2024-03-05

## 2024-03-05 NOTE — TELEPHONE ENCOUNTER
Called and lvm reminding pt of her appt for 3/6 @ 11 AM    Electronically signed by Aakash Lester on 3/5/2024 at 10:58 AM

## 2024-03-06 ENCOUNTER — OFFICE VISIT (OUTPATIENT)
Dept: PSYCHIATRY | Age: 59
End: 2024-03-06
Payer: MEDICAID

## 2024-03-06 VITALS
TEMPERATURE: 98.6 F | HEIGHT: 69 IN | HEART RATE: 51 BPM | DIASTOLIC BLOOD PRESSURE: 77 MMHG | SYSTOLIC BLOOD PRESSURE: 136 MMHG | OXYGEN SATURATION: 95 % | BODY MASS INDEX: 46.39 KG/M2 | WEIGHT: 313.2 LBS

## 2024-03-06 DIAGNOSIS — F31.9 BIPOLAR 1 DISORDER (HCC): Primary | ICD-10-CM

## 2024-03-06 PROCEDURE — 3078F DIAST BP <80 MM HG: CPT | Performed by: NURSE PRACTITIONER

## 2024-03-06 PROCEDURE — 99214 OFFICE O/P EST MOD 30 MIN: CPT | Performed by: NURSE PRACTITIONER

## 2024-03-06 PROCEDURE — 3075F SYST BP GE 130 - 139MM HG: CPT | Performed by: NURSE PRACTITIONER

## 2024-03-06 ASSESSMENT — PATIENT HEALTH QUESTIONNAIRE - PHQ9
SUM OF ALL RESPONSES TO PHQ QUESTIONS 1-9: 0
3. TROUBLE FALLING OR STAYING ASLEEP: 0
SUM OF ALL RESPONSES TO PHQ QUESTIONS 1-9: 0
10. IF YOU CHECKED OFF ANY PROBLEMS, HOW DIFFICULT HAVE THESE PROBLEMS MADE IT FOR YOU TO DO YOUR WORK, TAKE CARE OF THINGS AT HOME, OR GET ALONG WITH OTHER PEOPLE: 0
SUM OF ALL RESPONSES TO PHQ9 QUESTIONS 1 & 2: 0
SUM OF ALL RESPONSES TO PHQ QUESTIONS 1-9: 0
1. LITTLE INTEREST OR PLEASURE IN DOING THINGS: 0
5. POOR APPETITE OR OVEREATING: 0
9. THOUGHTS THAT YOU WOULD BE BETTER OFF DEAD, OR OF HURTING YOURSELF: 0
7. TROUBLE CONCENTRATING ON THINGS, SUCH AS READING THE NEWSPAPER OR WATCHING TELEVISION: 0
4. FEELING TIRED OR HAVING LITTLE ENERGY: 0
SUM OF ALL RESPONSES TO PHQ QUESTIONS 1-9: 0
6. FEELING BAD ABOUT YOURSELF - OR THAT YOU ARE A FAILURE OR HAVE LET YOURSELF OR YOUR FAMILY DOWN: 0
2. FEELING DOWN, DEPRESSED OR HOPELESS: 0
8. MOVING OR SPEAKING SO SLOWLY THAT OTHER PEOPLE COULD HAVE NOTICED. OR THE OPPOSITE, BEING SO FIGETY OR RESTLESS THAT YOU HAVE BEEN MOVING AROUND A LOT MORE THAN USUAL: 0

## 2024-03-06 NOTE — PROGRESS NOTES
3/6/24              Progress Note    Gennaro Torres 1965      Chief Complaint   Patient presents with    Medication Check    Follow-up         Subjective:    Patient is a 59 y.o. male diagnosed with bipolar 1 and presents today for follow-up.  Last seen in clinic on 5/11/23  and prior records were reviewed.    Last visit: he states he has been doing really well lately.  He has a good work schedule.  He has been spending time at [employer] and has been doing really well.   He reports his wife continues to support him and believes he is doing well.  He is bright and cheerful on the phone he denies SI HI AVH she denies side effects of medications he is calm and cooperative we will follow-up in 1 year    Today:  pts wife called in between visits and stated that pt has been more depressed lately and requested a follow up appointment at an earlier time.  He states today he is doing very well.  He had some concerns with his elevated blood pressure and some physical complaints.  He was encouraged to follow up with PCP and cardiologist for his concerns.  He had some issues with discouragement especially regarding inability to do [place] trips.  Supportive therapy provided at this time.  He is bright calm and cooperative today, he denies si hi avh, he denies side effects of medications will follow up in 1 year.    Absent  suicidal ideation.    Reports compliance with medications as good .     Sleep:  sleeping in a chair upright getting 6-7 hours per night.    Caffeine use: tea, diet cokes    PREVIOUS MED TRIALS    Abilify  Risperdal   Depakote (current)  Buspirone  Trazodone   Diazepam  Ativan  Gabapentin   Melatonin  latuda - too sedating    Current Substance Use:   Alcohol: none  Illicit drug use: denies   Marijuana: denies   Tobacco: denies   Vape: denies       BP: /77   Pulse 51   Temp 98.6 °F (37 °C)   Ht 1.753 m (5' 9\")   Wt (!) 142.1 kg (313 lb 3.2 oz)   SpO2 95%   BMI 46.25 kg/m²       Review of

## 2024-03-15 ENCOUNTER — TELEPHONE (OUTPATIENT)
Dept: PSYCHIATRY | Age: 59
End: 2024-03-15

## 2024-03-15 RX ORDER — DIVALPROEX SODIUM 500 MG/1
1500 TABLET, EXTENDED RELEASE ORAL NIGHTLY
Qty: 90 TABLET | Refills: 2 | Status: SHIPPED | OUTPATIENT
Start: 2024-03-15

## 2024-03-15 NOTE — TELEPHONE ENCOUNTER
Called and lvm asking pt to return phone call     When pt calls back I let him know that his script for depakote was sent to his pharmacy     Electronically signed by Aakash Lester on 3/15/2024 at 4:26 PM\

## 2024-03-15 NOTE — TELEPHONE ENCOUNTER
Pharmacy sent a request to refill pt's medication       Last office visit : 3/6/2024 SEDRICK DOMINIQUE  Next office visit :3/5/2025 SEDRICK DOMINIQUE    Requested Prescriptions     Pending Prescriptions Disp Refills    divalproex (DEPAKOTE ER) 500 MG extended release tablet [Pharmacy Med Name: DIVALPROEX SODIUM ER 500MG TABLET EXTENDED RELEASE 24 HOUR] 90 tablet 2     Sig: TAKE 3 TABLETS BY MOUTH NIGHTLY            Aakash Lester      3/6/24                                               Progress Note     Gennaro Brian 1965                                 Chief Complaint   Patient presents with    Medication Check    Follow-up            Subjective:    Patient is a 59 y.o. male diagnosed with bipolar 1 and presents today for follow-up.  Last seen in clinic on 5/11/23  and prior records were reviewed.     Last visit: he states he has been doing really well lately.  He has a good work schedule.  He has been spending time at [employer] and has been doing really well.   He reports his wife continues to support him and believes he is doing well.  He is bright and cheerful on the phone he denies SI HI AVH she denies side effects of medications he is calm and cooperative we will follow-up in 1 year     Today:  pts wife called in between visits and stated that pt has been more depressed lately and requested a follow up appointment at an earlier time.  He states today he is doing very well.  He had some concerns with his elevated blood pressure and some physical complaints.  He was encouraged to follow up with PCP and cardiologist for his concerns.  He had some issues with discouragement especially regarding inability to do oversea mission trips.  Supportive therapy provided at this time.  He is bright calm and cooperative today, he denies si hi avh, he denies side effects of medications will follow up in 1 year.     Absent  suicidal ideation.    Reports compliance with medications as good .      Sleep:  sleeping in a chair upright

## 2024-03-22 ENCOUNTER — TRANSCRIBE ORDERS (OUTPATIENT)
Dept: ADMINISTRATIVE | Facility: HOSPITAL | Age: 59
End: 2024-03-22
Payer: MEDICAID

## 2024-03-22 ENCOUNTER — HOSPITAL ENCOUNTER (OUTPATIENT)
Dept: ULTRASOUND IMAGING | Facility: HOSPITAL | Age: 59
Discharge: HOME | End: 2024-03-22
Payer: MEDICAID

## 2024-03-22 DIAGNOSIS — I82.501 CHRONIC VENOUS EMBOLISM AND THROMBOSIS OF DEEP VESSELS OF RIGHT LOWER EXTREMITY: Primary | ICD-10-CM

## 2024-03-22 DIAGNOSIS — I82.501 CHRONIC VENOUS EMBOLISM AND THROMBOSIS OF DEEP VESSELS OF RIGHT LOWER EXTREMITY: ICD-10-CM

## 2024-03-22 PROCEDURE — 93971 EXTREMITY STUDY: CPT

## 2024-03-22 RX ORDER — LEVOTHYROXINE SODIUM 0.05 MG/1
TABLET ORAL
Qty: 90 TABLET | Refills: 3 | OUTPATIENT
Start: 2024-03-22

## 2024-04-09 ENCOUNTER — PREP FOR SURGERY (OUTPATIENT)
Dept: OTHER | Facility: HOSPITAL | Age: 59
End: 2024-04-09
Payer: MEDICAID

## 2024-04-09 ENCOUNTER — OFFICE VISIT (OUTPATIENT)
Dept: CARDIOLOGY | Facility: CLINIC | Age: 59
End: 2024-04-09
Payer: MEDICAID

## 2024-04-09 VITALS
OXYGEN SATURATION: 96 % | SYSTOLIC BLOOD PRESSURE: 130 MMHG | BODY MASS INDEX: 46.06 KG/M2 | WEIGHT: 311 LBS | DIASTOLIC BLOOD PRESSURE: 68 MMHG | HEIGHT: 69 IN | HEART RATE: 73 BPM

## 2024-04-09 DIAGNOSIS — I49.3 FREQUENT UNIFOCAL PVCS: Primary | ICD-10-CM

## 2024-04-09 RX ORDER — SODIUM CHLORIDE 0.9 % (FLUSH) 0.9 %
10 SYRINGE (ML) INJECTION AS NEEDED
OUTPATIENT
Start: 2024-04-09

## 2024-04-09 RX ORDER — SODIUM CHLORIDE 9 MG/ML
40 INJECTION, SOLUTION INTRAVENOUS AS NEEDED
OUTPATIENT
Start: 2024-04-09

## 2024-04-09 RX ORDER — SODIUM CHLORIDE 0.9 % (FLUSH) 0.9 %
10 SYRINGE (ML) INJECTION EVERY 12 HOURS SCHEDULED
OUTPATIENT
Start: 2024-04-09

## 2024-04-09 NOTE — PROGRESS NOTES
"EP NEW PATIENT VISIT    Chief Complaint  PVC (NEW PATIENT )    Subjective        History of Present Illness    EP Problems:  1.  Frequent PVCs  -12/2023: 29% unifocal PVC burden    Cardiology Problems:  1.  Hypertension  2.  Chronic diastolic heart failure    Medical Problems:  1.  Morbid obesity  2.  Chronic back pain  3.  Anxiety    Shankar Velasco is a 59 y.o. male with problem list as above who presents to the clinic for evaluation of frequent PVCs.  He states that he started having marked worsening shortness of breath and difficulty with exertion approximately 1 to 2 years ago.  He has been chronically obese but states that his weight has not significantly changed around the time that the symptoms came on.  He has been found to have frequent PVCs and wore a Holter monitor with a 29% unifocal PVC burden.  He has been chronically treated with metoprolol already.  Given these findings, he was referred to EP for further evaluation.    Objective   Vital Signs:  /68 (BP Location: Right arm, Patient Position: Sitting)   Pulse 73   Ht 175.3 cm (69\")   Wt (!) 141 kg (311 lb)   SpO2 96%   BMI 45.93 kg/m²   Estimated body mass index is 45.93 kg/m² as calculated from the following:    Height as of this encounter: 175.3 cm (69\").    Weight as of this encounter: 141 kg (311 lb).      Physical Exam  Vitals reviewed.   Constitutional:       Appearance: He is obese.   Cardiovascular:      Rate and Rhythm: Normal rate and regular rhythm.      Pulses: Normal pulses.      Heart sounds: Normal heart sounds.   Pulmonary:      Effort: Pulmonary effort is normal.      Breath sounds: Normal breath sounds.   Musculoskeletal:         General: Swelling present.   Neurological:      Mental Status: He is alert and oriented to person, place, and time.   Psychiatric:         Mood and Affect: Mood normal.         Judgment: Judgment normal.        Result Review :  The following data was reviewed by: Devante Paredes MD on " 04/09/2024:  CMP          10/19/2023    11:14   CMP   Glucose 130    BUN 17    Creatinine 0.79    EGFR 102.3    Sodium 143    Potassium 3.4    Chloride 103    Calcium 9.2    Total Protein 7.2    Albumin 4.3    Globulin 2.9    Total Bilirubin 0.4    Alkaline Phosphatase 46    AST (SGOT) 20    ALT (SGPT) 16    Albumin/Globulin Ratio 1.5    BUN/Creatinine Ratio 21.5    Anion Gap 10.0      CBC          10/19/2023    11:14   CBC   WBC 8.38    RBC 4.47    Hemoglobin 13.6    Hematocrit 41.2    MCV 92.2    MCH 30.4    MCHC 33.0    RDW 12.8    Platelets 200      TSH          10/19/2023    11:14   TSH   TSH 3.620          ECG 12 Lead    Date/Time: 4/9/2024 9:52 AM  Performed by: Devante Paredes MD    Authorized by: Devante Paredes MD  Comparison: compared with previous ECG from 10/19/2023  Comparison to previous ECG: Frequent PVCs are now present  Rhythm: sinus rhythm  Ectopy: unifocal PVCs and trigeminy  Rate: normal  Conduction: conduction normal  QRS axis: normal  Other findings: non-specific ST-T wave changes    Clinical impression: abnormal EKG              Assessment and Plan   Diagnoses and all orders for this visit:    1. Frequent unifocal PVCs (Primary)    Other orders  -     ECG 12 Lead        Shankar Velasco is a 59 y.o. male with problem list as above who presents to the clinic for evaluation of frequent unifocal PVCs.  He has symptoms of fatigue and shortness of breath which are life-limiting.  It is unclear exactly how much of this is secondary to his PVCs versus other causes, however given his 29% PVC burden, I do think that it is fair to attribute at least some of the symptoms to the frequent PVCs.  We discussed available treatment options including increased medications versus ablation.  After risk-benefit discussion as below, he would like to proceed with PVC ablation.    I have discussed risks, benefits, and alternatives of an electrophysiology study and ablation for premature ventricular contractions with the  patient.  Alternatives discussed include continued observation and medical management.  An electrophysiology study with ablation is an inherently high risk procedure with possible complications that include but are not limited to vascular access complications, internal bleeding, tamponade requiring pericardiocentesis or cardiac surgery, stroke, MI, embolism, myocardial injury, injury to the normal conduction system requiring a pacemaker, and ultimately death.  We also discussed that given the nature of the procedure, therapeutic efficacy can be variable.  Possibilities of recurrent arrhythmias and the possible need for additional procedures and/or medical therapy was discussed. Questions asked were appropriately answered.  No guarantees were made or implied.   Despite this, they would still like to proceed.    We have also discussed lifestyle risk factor modification for the prevention of additional cardiac problems.  His main risk factor is his morbid obesity.  We discussed calorie counting at length today.  He is working on obtaining insurance coverage for the GLP-1 inhibitors to aid him in his weight loss.    Plan:  -Schedule for PVC ablation  -Work on weight loss as above  -No additional medication changes for now           Follow Up   Return in about 3 months (around 7/9/2024).  Patient was given instructions and counseling regarding his condition or for health maintenance advice. Please see specific information pulled into the AVS if appropriate.     Part of this note may be an electronic transcription/translation of spoken language to printed text using the Dragon Dictation System.

## 2024-04-10 ENCOUNTER — HOSPITAL ENCOUNTER (OUTPATIENT)
Dept: MRI IMAGING | Age: 59
Discharge: HOME | End: 2024-04-10
Payer: MEDICAID

## 2024-04-10 DIAGNOSIS — M51.37 OTHER INTERVERTEBRAL DISC DEGENERATION, LUMBOSACRAL REGION: ICD-10-CM

## 2024-04-10 PROCEDURE — 72148 MRI LUMBAR SPINE W/O DYE: CPT

## 2024-04-24 ENCOUNTER — OFFICE VISIT (OUTPATIENT)
Dept: CARDIOLOGY | Facility: CLINIC | Age: 59
End: 2024-04-24
Payer: MEDICAID

## 2024-04-24 VITALS
DIASTOLIC BLOOD PRESSURE: 72 MMHG | BODY MASS INDEX: 45.18 KG/M2 | WEIGHT: 305 LBS | HEIGHT: 69 IN | HEART RATE: 73 BPM | SYSTOLIC BLOOD PRESSURE: 114 MMHG

## 2024-04-24 DIAGNOSIS — E78.2 MIXED HYPERLIPIDEMIA: ICD-10-CM

## 2024-04-24 DIAGNOSIS — E66.01 CLASS 3 SEVERE OBESITY DUE TO EXCESS CALORIES WITHOUT SERIOUS COMORBIDITY WITH BODY MASS INDEX (BMI) OF 40.0 TO 44.9 IN ADULT: ICD-10-CM

## 2024-04-24 DIAGNOSIS — I49.3 FREQUENT UNIFOCAL PVCS: Primary | ICD-10-CM

## 2024-04-24 DIAGNOSIS — I10 PRIMARY HYPERTENSION: ICD-10-CM

## 2024-04-24 PROCEDURE — 99213 OFFICE O/P EST LOW 20 MIN: CPT | Performed by: INTERNAL MEDICINE

## 2024-04-24 NOTE — PROGRESS NOTES
"Chief Complaint  Frequent unifocal PVC (1 month follow up - recent EKG 03/15/2024 )    Subjective      Shankar Velasco presents to [facility] CARDIOLOGY  History of Present Illness    Shankar has seen  and has a PVC ablation scheduled for June 5 of this year.  Shankar continues with exertional dyspnea.  He has had no angina, PND, orthopnea, syncope or near syncope.  He continues to have occasional palpitations.  He is on a diet and has lost 7 pounds recently.  He has not been able to obtain his GLP-1 inhibitor due to lack of coverage by insurance.      Objective   Vital Signs:  /72   Pulse 73   Ht 175.3 cm (69\")   Wt (!) 138 kg (305 lb)   BMI 45.04 kg/m²   Estimated body mass index is 45.04 kg/m² as calculated from the following:    Height as of this encounter: 175.3 cm (69\").    Weight as of this encounter: 138 kg (305 lb).          Physical Exam    This is a 59-year-old male in no apparent distress who is awake, alert and oriented x 3.  His BMI is just over 45 kg/m².  HEENT: No scleral icterus.  Neck: No noted jugular venous distention.  No carotid bruits.  Chest: Normal respiratory effort.  Lungs clear to auscultation.  Heart: Regular rhythm with normal S1 and S2.  Occasional extrasystole.  No audible murmur or gallop sound.  Extremities: 1+ pretibial edema.  No cyanosis.  Pedal pulses intact.  Neurologic: No obvious focal abnormalities noted.      Result Review :                   Assessment and Plan   Diagnoses and all orders for this visit:    1. Frequent unifocal PVCs (Primary)    2. Primary hypertension    3. Mixed hyperlipidemia    4. Class 3 severe obesity due to excess calories without serious comorbidity with body mass index (BMI) of 40.0 to 44.9 in adult    1.  Ventricular ectopy.  He is scheduled for PVC ablation in early June.  For now,continue metoprolol tartrate 25 mg twice daily.    2.  Hypertension.  Current blood pressure is 114/72.  Continue metoprolol tartrate 25 " mg twice daily and Zestoretic 20-25 daily as well as furosemide 20 mg daily and amlodipine 10 mg daily.    3.  Hyperlipidemia.  Continue heart healthy, low-cholesterol diet.  He had previously been on atorvastatin 40 mg daily which was discontinued for unknown reasons in 2021.  Will readdress this issue following his PVC ablation.    4.  Class III severe obesity.  He has gone on a diet and is losing weight.  Increasing aerobic exercise is also recommended and hopefully will be more feasible after his PVC ablation.    All questions were answered.  A return visit is scheduled and in 3 months.  He is encouraged to contact me for any further concerns.    There are no Patient Instructions on file for this visit.       Follow Up   Return in about 3 months (around 7/24/2024) for Next scheduled follow up.  Patient was given instructions and counseling regarding his condition or for health maintenance advice. Please see specific information pulled into the AVS if appropriate.

## 2024-04-26 ENCOUNTER — HOSPITAL ENCOUNTER (OUTPATIENT)
Dept: MRI IMAGING | Age: 59
Discharge: HOME | End: 2024-04-26

## 2024-04-26 DIAGNOSIS — M25.561 RIGHT KNEE PAIN, UNSPECIFIED CHRONICITY: ICD-10-CM

## 2024-04-30 ENCOUNTER — HOSPITAL ENCOUNTER (OUTPATIENT)
Dept: MRI IMAGING | Age: 59
Discharge: HOME | End: 2024-04-30
Payer: MEDICAID

## 2024-04-30 PROCEDURE — 73721 MRI JNT OF LWR EXTRE W/O DYE: CPT

## 2024-05-08 ENCOUNTER — HOSPITAL ENCOUNTER (OUTPATIENT)
Dept: GENERAL RADIOLOGY | Facility: HOSPITAL | Age: 59
Discharge: HOME | End: 2024-05-08
Payer: MEDICAID

## 2024-05-08 ENCOUNTER — OFFICE VISIT (OUTPATIENT)
Dept: NEUROSURGERY | Facility: CLINIC | Age: 59
End: 2024-05-08
Payer: MEDICAID

## 2024-05-08 VITALS — WEIGHT: 296 LBS | HEIGHT: 69 IN | BODY MASS INDEX: 43.84 KG/M2

## 2024-05-08 DIAGNOSIS — M54.42 CHRONIC BILATERAL LOW BACK PAIN WITH BILATERAL SCIATICA: ICD-10-CM

## 2024-05-08 DIAGNOSIS — R20.0 NUMBNESS IN BOTH LEGS: ICD-10-CM

## 2024-05-08 DIAGNOSIS — M54.42 CHRONIC BILATERAL LOW BACK PAIN WITH BILATERAL SCIATICA: Primary | ICD-10-CM

## 2024-05-08 DIAGNOSIS — G89.29 CHRONIC BILATERAL LOW BACK PAIN WITH BILATERAL SCIATICA: ICD-10-CM

## 2024-05-08 DIAGNOSIS — G89.29 CHRONIC BILATERAL LOW BACK PAIN WITH BILATERAL SCIATICA: Primary | ICD-10-CM

## 2024-05-08 DIAGNOSIS — E66.01 CLASS 3 SEVERE OBESITY DUE TO EXCESS CALORIES WITHOUT SERIOUS COMORBIDITY WITH BODY MASS INDEX (BMI) OF 40.0 TO 44.9 IN ADULT: ICD-10-CM

## 2024-05-08 DIAGNOSIS — M54.41 CHRONIC BILATERAL LOW BACK PAIN WITH BILATERAL SCIATICA: ICD-10-CM

## 2024-05-08 DIAGNOSIS — M54.41 CHRONIC BILATERAL LOW BACK PAIN WITH BILATERAL SCIATICA: Primary | ICD-10-CM

## 2024-05-08 DIAGNOSIS — Z78.9 NONSMOKER: ICD-10-CM

## 2024-05-08 PROCEDURE — 72114 X-RAY EXAM L-S SPINE BENDING: CPT

## 2024-05-08 PROCEDURE — 72082 X-RAY EXAM ENTIRE SPI 2/3 VW: CPT

## 2024-05-21 ENCOUNTER — TELEPHONE (OUTPATIENT)
Dept: PSYCHIATRY | Age: 59
End: 2024-05-21

## 2024-05-21 NOTE — TELEPHONE ENCOUNTER
Pt called to schedule an appt with Carlos Anaya before he leaves.    Scheduled for 06/19/24 @ 11:30.    Electronically signed by Yasmin Murillo MA on 5/21/2024 at 3:40 PM

## 2024-06-03 ENCOUNTER — TELEPHONE (OUTPATIENT)
Dept: CARDIOLOGY | Facility: CLINIC | Age: 59
End: 2024-06-03

## 2024-06-03 NOTE — TELEPHONE ENCOUNTER
Hub staff attempted to follow warm transfer process and was unsuccessful     Caller: HANNAH    Relationship to patient: WIFE    Best call back number: [phone]    Patient is needing: PATIENTS WIFE CALLING IN WANTING TO KNOW WHAT TIME PATIENT IS TO BE AT THE HOSPITAL FOR AN ABLATION ON 6.5.24. PLEASE CALL WIFE WITH INFORMATION. THANK YOU!

## 2024-06-04 ENCOUNTER — TELEPHONE (OUTPATIENT)
Dept: CARDIOLOGY | Facility: CLINIC | Age: 59
End: 2024-06-04
Payer: MEDICAID

## 2024-06-04 NOTE — DISCHARGE INSTRUCTIONS
Post-PVC Ablation Instructions    ACTIVITY    Avoid driving, operating machinery, or alcohol consumption for 24 hours after receiving sedation. In addition, avoid signing legal documents or participating in legal proceedings.    You may resume normal activities after 24 hours except for the following:    Avoid heavy lifting (no more than 10 pounds) and vigorous activities for a minimum of 7 days. This includes activities such as jogging, exercise classes, sports activities, etc.    You may resume walking and other normal activities.    Avoid sitting more than 2 consecutive hours during waking hours for the first 3 days. If you are traveling, stop for brief (5 minutes) walks every two hours.    MEDICATIONS  You will need to take a blood thinner for 30 days following your ablation.  You should take a full dose aspirin (325mg) for this purpose.   Please pick this medication up from your local pharmacy and start taking it tonight.  You do not need a prescription for this medication.    MEDICAL FOLLOW UP   EP clinic follow up with Juju Nielsen on 7/10/24..    PLEASE NOTIFY US IF YOU DEVELOP    Fever of 101 or greater during your first few days at home    The occurrence of a new, persistent cough or unexplained shortness of breath.    A groin bruise may be expected. Initial soreness and discomfort should improve over the first few days. If you continue to have discomfort or if bruising is excessive, please call us.    Patients often experience palpitations during the healing period.    Please call if you have an episode of palpitations accompanied by fast heart rate greater than 120 beats per minute for extended period that last longer than 1-2 days.    Mild chest soreness is common and may be treated with Tylenol, Advil, or Motrin.  This soreness typically worsens when taking deep breaths.  If you notice these symptoms, start one of these medications according to the package directions and continue for 2-3 days. If your  symptoms are not relieved or become severe, please call us.      REASONS TO GO TO THE EMERGENCY ROOM FOR EVALUATION:   Severe chest pain  Significant shortness of breath at rest  Near passing out or passing out episodes soon after your ablation  Symptoms of chest pain or shortness of breath associated with low blood pressure (reading less than 90 for the top number / systolic blood pressure)  Brisk bleeding or significant swelling from the groin where IVs were placed  Any concerns that an emergent or life threatening complication is occurring    If you have a clinical questions between the hours of 8am and 4pm, Monday - Friday please call the office and let us know your concern. Please leave a message if your call is not an emergency.    For emergencies, please go for evaluation at your nearest emergency department.    If you have a Mtime account, this an excellent way to communicate.  Mtime messages are checked Monday through Friday. Please allow 24-36 hours for your message to be answered.

## 2024-06-04 NOTE — TELEPHONE ENCOUNTER
I spoke with Mr. Velasco about his upcoming ablation.  He was well informed about the procedure from prior discussion with Dr. Paredes.  We discussed the procedure at length including risks, anesthesia, intra-op procedures, recovery, bedrest, sheath removal, discharge criteria, and normal post-procedure expectations.  I answered a few remaining questions. Mr. Velasco verbalized understanding and he is ready to proceed.       Kaylyn Palmer RN

## 2024-06-05 ENCOUNTER — ANESTHESIA (OUTPATIENT)
Dept: CARDIOLOGY | Facility: HOSPITAL | Age: 59
End: 2024-06-05
Payer: MEDICAID

## 2024-06-05 ENCOUNTER — ANESTHESIA EVENT (OUTPATIENT)
Dept: CARDIOLOGY | Facility: HOSPITAL | Age: 59
End: 2024-06-05
Payer: MEDICAID

## 2024-06-05 ENCOUNTER — HOSPITAL ENCOUNTER (OUTPATIENT)
Facility: HOSPITAL | Age: 59
Discharge: HOME | End: 2024-06-06
Attending: STUDENT IN AN ORGANIZED HEALTH CARE EDUCATION/TRAINING PROGRAM | Admitting: STUDENT IN AN ORGANIZED HEALTH CARE EDUCATION/TRAINING PROGRAM
Payer: MEDICAID

## 2024-06-05 DIAGNOSIS — I49.3 FREQUENT UNIFOCAL PVCS: ICD-10-CM

## 2024-06-05 PROBLEM — Q24.9 CARDIAC ABNORMALITY: Status: ACTIVE | Noted: 2024-06-05

## 2024-06-05 LAB
ACT BLD: 223 SECONDS (ref 82–152)
ACT BLD: 282 SECONDS (ref 82–152)
ACT BLD: 293 SECONDS (ref 82–152)
ACT BLD: 298 SECONDS (ref 82–152)
ACT BLD: 363 SECONDS (ref 82–152)
ANION GAP SERPL CALCULATED.3IONS-SCNC: 12 MMOL/L (ref 5–15)
BASOPHILS # BLD AUTO: 0.05 10*3/MM3 (ref 0–0.2)
BASOPHILS NFR BLD AUTO: 0.8 % (ref 0–1.5)
BUN SERPL-MCNC: 11 MG/DL (ref 8–23)
BUN/CREAT SERPL: 15.9 (ref 7–25)
CALCIUM SPEC-SCNC: 9 MG/DL (ref 8.6–10.5)
CHLORIDE SERPL-SCNC: 100 MMOL/L (ref 98–107)
CO2 SERPL-SCNC: 27 MMOL/L (ref 22–29)
CREAT SERPL-MCNC: 0.69 MG/DL (ref 0.76–1.27)
DEPRECATED RDW RBC AUTO: 42.7 FL (ref 37–54)
EGFRCR SERPLBLD CKD-EPI 2021: 105.9 ML/MIN/1.73
EOSINOPHIL # BLD AUTO: 0.04 10*3/MM3 (ref 0–0.4)
EOSINOPHIL NFR BLD AUTO: 0.7 % (ref 0.3–6.2)
ERYTHROCYTE [DISTWIDTH] IN BLOOD BY AUTOMATED COUNT: 12.8 % (ref 12.3–15.4)
GLUCOSE SERPL-MCNC: 102 MG/DL (ref 65–99)
HCT VFR BLD AUTO: 40.8 % (ref 37.5–51)
HGB BLD-MCNC: 13.9 G/DL (ref 13–17.7)
IMM GRANULOCYTES # BLD AUTO: 0.04 10*3/MM3 (ref 0–0.05)
IMM GRANULOCYTES NFR BLD AUTO: 0.7 % (ref 0–0.5)
INR PPP: 0.96 (ref 0.91–1.09)
LYMPHOCYTES # BLD AUTO: 1.8 10*3/MM3 (ref 0.7–3.1)
LYMPHOCYTES NFR BLD AUTO: 30.6 % (ref 19.6–45.3)
MCH RBC QN AUTO: 31.2 PG (ref 26.6–33)
MCHC RBC AUTO-ENTMCNC: 34.1 G/DL (ref 31.5–35.7)
MCV RBC AUTO: 91.7 FL (ref 79–97)
MONOCYTES # BLD AUTO: 0.49 10*3/MM3 (ref 0.1–0.9)
MONOCYTES NFR BLD AUTO: 8.3 % (ref 5–12)
NEUTROPHILS NFR BLD AUTO: 3.47 10*3/MM3 (ref 1.7–7)
NEUTROPHILS NFR BLD AUTO: 58.9 % (ref 42.7–76)
NRBC BLD AUTO-RTO: 0 /100 WBC (ref 0–0.2)
PLATELET # BLD AUTO: 183 10*3/MM3 (ref 140–450)
PMV BLD AUTO: 11.3 FL (ref 6–12)
POTASSIUM SERPL-SCNC: 3.6 MMOL/L (ref 3.5–5.2)
PROTHROMBIN TIME: 13.2 SECONDS (ref 11.8–14.8)
RBC # BLD AUTO: 4.45 10*6/MM3 (ref 4.14–5.8)
SODIUM SERPL-SCNC: 139 MMOL/L (ref 136–145)
WBC NRBC COR # BLD AUTO: 5.89 10*3/MM3 (ref 3.4–10.8)

## 2024-06-05 PROCEDURE — 93662 INTRACARDIAC ECG (ICE): CPT | Performed by: STUDENT IN AN ORGANIZED HEALTH CARE EDUCATION/TRAINING PROGRAM

## 2024-06-05 PROCEDURE — 25010000002 PROTAMINE SULFATE PER 10 MG: Performed by: NURSE ANESTHETIST, CERTIFIED REGISTERED

## 2024-06-05 PROCEDURE — C1760 CLOSURE DEV, VASC: HCPCS | Performed by: STUDENT IN AN ORGANIZED HEALTH CARE EDUCATION/TRAINING PROGRAM

## 2024-06-05 PROCEDURE — 85347 COAGULATION TIME ACTIVATED: CPT

## 2024-06-05 PROCEDURE — 25010000002 HEPARIN (PORCINE) PER 1000 UNITS: Performed by: NURSE ANESTHETIST, CERTIFIED REGISTERED

## 2024-06-05 PROCEDURE — 93005 ELECTROCARDIOGRAM TRACING: CPT | Performed by: STUDENT IN AN ORGANIZED HEALTH CARE EDUCATION/TRAINING PROGRAM

## 2024-06-05 PROCEDURE — 85025 COMPLETE CBC W/AUTO DIFF WBC: CPT | Performed by: STUDENT IN AN ORGANIZED HEALTH CARE EDUCATION/TRAINING PROGRAM

## 2024-06-05 PROCEDURE — 25810000003 SODIUM CHLORIDE 0.9 % SOLUTION: Performed by: NURSE ANESTHETIST, CERTIFIED REGISTERED

## 2024-06-05 PROCEDURE — C1894 INTRO/SHEATH, NON-LASER: HCPCS | Performed by: STUDENT IN AN ORGANIZED HEALTH CARE EDUCATION/TRAINING PROGRAM

## 2024-06-05 PROCEDURE — 93010 ELECTROCARDIOGRAM REPORT: CPT | Performed by: EMERGENCY MEDICINE

## 2024-06-05 PROCEDURE — S0260 H&P FOR SURGERY: HCPCS | Performed by: STUDENT IN AN ORGANIZED HEALTH CARE EDUCATION/TRAINING PROGRAM

## 2024-06-05 PROCEDURE — 25010000002 PROPOFOL 1000 MG/100ML EMULSION: Performed by: NURSE ANESTHETIST, CERTIFIED REGISTERED

## 2024-06-05 PROCEDURE — C1759 CATH, INTRA ECHOCARDIOGRAPHY: HCPCS | Performed by: STUDENT IN AN ORGANIZED HEALTH CARE EDUCATION/TRAINING PROGRAM

## 2024-06-05 PROCEDURE — 85610 PROTHROMBIN TIME: CPT | Performed by: STUDENT IN AN ORGANIZED HEALTH CARE EDUCATION/TRAINING PROGRAM

## 2024-06-05 PROCEDURE — 93654 COMPRE EP EVAL TX VT: CPT | Performed by: STUDENT IN AN ORGANIZED HEALTH CARE EDUCATION/TRAINING PROGRAM

## 2024-06-05 PROCEDURE — 25010000002 FENTANYL CITRATE (PF) 100 MCG/2ML SOLUTION: Performed by: NURSE ANESTHETIST, CERTIFIED REGISTERED

## 2024-06-05 PROCEDURE — C1730 CATH, EP, 19 OR FEW ELECT: HCPCS | Performed by: STUDENT IN AN ORGANIZED HEALTH CARE EDUCATION/TRAINING PROGRAM

## 2024-06-05 PROCEDURE — C1766 INTRO/SHEATH,STRBLE,NON-PEEL: HCPCS | Performed by: STUDENT IN AN ORGANIZED HEALTH CARE EDUCATION/TRAINING PROGRAM

## 2024-06-05 PROCEDURE — 80048 BASIC METABOLIC PNL TOTAL CA: CPT | Performed by: STUDENT IN AN ORGANIZED HEALTH CARE EDUCATION/TRAINING PROGRAM

## 2024-06-05 PROCEDURE — C1732 CATH, EP, DIAG/ABL, 3D/VECT: HCPCS | Performed by: STUDENT IN AN ORGANIZED HEALTH CARE EDUCATION/TRAINING PROGRAM

## 2024-06-05 RX ORDER — HEPARIN SODIUM 1000 [USP'U]/ML
INJECTION, SOLUTION INTRAVENOUS; SUBCUTANEOUS AS NEEDED
Status: DISCONTINUED | OUTPATIENT
Start: 2024-06-05 | End: 2024-06-05 | Stop reason: SURG

## 2024-06-05 RX ORDER — PROTAMINE SULFATE 10 MG/ML
INJECTION, SOLUTION INTRAVENOUS AS NEEDED
Status: DISCONTINUED | OUTPATIENT
Start: 2024-06-05 | End: 2024-06-05 | Stop reason: SURG

## 2024-06-05 RX ORDER — HYDROCHLOROTHIAZIDE 25 MG/1
25 TABLET ORAL
Status: DISCONTINUED | OUTPATIENT
Start: 2024-06-06 | End: 2024-06-06 | Stop reason: HOSPADM

## 2024-06-05 RX ORDER — PREGABALIN 75 MG/1
75 CAPSULE ORAL 2 TIMES DAILY
Status: DISCONTINUED | OUTPATIENT
Start: 2024-06-05 | End: 2024-06-06 | Stop reason: HOSPADM

## 2024-06-05 RX ORDER — LISINOPRIL 20 MG/1
20 TABLET ORAL
Status: DISCONTINUED | OUTPATIENT
Start: 2024-06-06 | End: 2024-06-06 | Stop reason: HOSPADM

## 2024-06-05 RX ORDER — SODIUM CHLORIDE 9 MG/ML
INJECTION, SOLUTION INTRAVENOUS CONTINUOUS PRN
Status: DISCONTINUED | OUTPATIENT
Start: 2024-06-05 | End: 2024-06-05 | Stop reason: SURG

## 2024-06-05 RX ORDER — SODIUM CHLORIDE 0.9 % (FLUSH) 0.9 %
10 SYRINGE (ML) INJECTION AS NEEDED
Status: DISCONTINUED | OUTPATIENT
Start: 2024-06-05 | End: 2024-06-05 | Stop reason: HOSPADM

## 2024-06-05 RX ORDER — ASPIRIN 325 MG
325 TABLET, DELAYED RELEASE (ENTERIC COATED) ORAL DAILY
Status: DISCONTINUED | OUTPATIENT
Start: 2024-06-05 | End: 2024-06-06 | Stop reason: HOSPADM

## 2024-06-05 RX ORDER — PROPOFOL 10 MG/ML
INJECTION, EMULSION INTRAVENOUS AS NEEDED
Status: DISCONTINUED | OUTPATIENT
Start: 2024-06-05 | End: 2024-06-05 | Stop reason: SURG

## 2024-06-05 RX ORDER — AMLODIPINE BESYLATE 10 MG/1
10 TABLET ORAL
Status: DISCONTINUED | OUTPATIENT
Start: 2024-06-06 | End: 2024-06-06 | Stop reason: HOSPADM

## 2024-06-05 RX ORDER — LEVOTHYROXINE SODIUM 0.05 MG/1
50 TABLET ORAL
Status: DISCONTINUED | OUTPATIENT
Start: 2024-06-06 | End: 2024-06-06 | Stop reason: HOSPADM

## 2024-06-05 RX ORDER — HYDROCODONE BITARTRATE AND ACETAMINOPHEN 10; 325 MG/1; MG/1
1 TABLET ORAL ONCE
Status: COMPLETED | OUTPATIENT
Start: 2024-06-05 | End: 2024-06-05

## 2024-06-05 RX ORDER — DIVALPROEX SODIUM 500 MG/1
1500 TABLET, EXTENDED RELEASE ORAL NIGHTLY
Status: DISCONTINUED | OUTPATIENT
Start: 2024-06-06 | End: 2024-06-06 | Stop reason: HOSPADM

## 2024-06-05 RX ORDER — FENTANYL CITRATE 50 UG/ML
INJECTION, SOLUTION INTRAMUSCULAR; INTRAVENOUS AS NEEDED
Status: DISCONTINUED | OUTPATIENT
Start: 2024-06-05 | End: 2024-06-05 | Stop reason: SURG

## 2024-06-05 RX ORDER — HYDROCODONE BITARTRATE AND ACETAMINOPHEN 10; 325 MG/1; MG/1
1 TABLET ORAL EVERY 6 HOURS PRN
Status: DISCONTINUED | OUTPATIENT
Start: 2024-06-05 | End: 2024-06-06 | Stop reason: HOSPADM

## 2024-06-05 RX ORDER — SODIUM CHLORIDE 9 MG/ML
40 INJECTION, SOLUTION INTRAVENOUS AS NEEDED
Status: DISCONTINUED | OUTPATIENT
Start: 2024-06-05 | End: 2024-06-05 | Stop reason: HOSPADM

## 2024-06-05 RX ORDER — PANTOPRAZOLE SODIUM 40 MG/1
40 TABLET, DELAYED RELEASE ORAL
Status: DISCONTINUED | OUTPATIENT
Start: 2024-06-06 | End: 2024-06-06 | Stop reason: HOSPADM

## 2024-06-05 RX ORDER — FUROSEMIDE 20 MG/1
20 TABLET ORAL DAILY
Status: DISCONTINUED | OUTPATIENT
Start: 2024-06-05 | End: 2024-06-06 | Stop reason: HOSPADM

## 2024-06-05 RX ORDER — HYDROCODONE BITARTRATE AND ACETAMINOPHEN 10; 325 MG/1; MG/1
TABLET ORAL
Status: COMPLETED
Start: 2024-06-05 | End: 2024-06-05

## 2024-06-05 RX ORDER — SODIUM CHLORIDE 0.9 % (FLUSH) 0.9 %
10 SYRINGE (ML) INJECTION EVERY 12 HOURS SCHEDULED
Status: DISCONTINUED | OUTPATIENT
Start: 2024-06-05 | End: 2024-06-05 | Stop reason: HOSPADM

## 2024-06-05 RX ORDER — LIDOCAINE HYDROCHLORIDE 20 MG/ML
INJECTION, SOLUTION INFILTRATION; PERINEURAL
Status: DISCONTINUED | OUTPATIENT
Start: 2024-06-05 | End: 2024-06-05 | Stop reason: HOSPADM

## 2024-06-05 RX ADMIN — HEPARIN SODIUM 10000 UNITS: 1000 INJECTION, SOLUTION INTRAVENOUS; SUBCUTANEOUS at 15:15

## 2024-06-05 RX ADMIN — FENTANYL CITRATE 50 MCG: 50 INJECTION INTRAMUSCULAR; INTRAVENOUS at 17:16

## 2024-06-05 RX ADMIN — PROPOFOL 100 MG: 10 INJECTION, EMULSION INTRAVENOUS at 13:44

## 2024-06-05 RX ADMIN — HYDROCODONE BITARTRATE AND ACETAMINOPHEN 1 TABLET: 10; 325 TABLET ORAL at 17:45

## 2024-06-05 RX ADMIN — DICLOFENAC SODIUM 4 G: 10 GEL TOPICAL at 20:04

## 2024-06-05 RX ADMIN — HEPARIN SODIUM 10000 UNITS: 1000 INJECTION, SOLUTION INTRAVENOUS; SUBCUTANEOUS at 13:50

## 2024-06-05 RX ADMIN — HEPARIN SODIUM 10000 UNITS: 1000 INJECTION, SOLUTION INTRAVENOUS; SUBCUTANEOUS at 15:32

## 2024-06-05 RX ADMIN — PROPOFOL 125 MCG/KG/MIN: 10 INJECTION, EMULSION INTRAVENOUS at 15:15

## 2024-06-05 RX ADMIN — FENTANYL CITRATE 50 MCG: 50 INJECTION INTRAMUSCULAR; INTRAVENOUS at 17:19

## 2024-06-05 RX ADMIN — HEPARIN SODIUM 10000 UNITS: 1000 INJECTION, SOLUTION INTRAVENOUS; SUBCUTANEOUS at 14:06

## 2024-06-05 RX ADMIN — PREGABALIN 75 MG: 75 CAPSULE ORAL at 20:04

## 2024-06-05 RX ADMIN — SODIUM CHLORIDE: 9 INJECTION, SOLUTION INTRAVENOUS at 13:17

## 2024-06-05 RX ADMIN — PROPOFOL 125 MCG/KG/MIN: 10 INJECTION, EMULSION INTRAVENOUS at 14:36

## 2024-06-05 RX ADMIN — ASPIRIN 325 MG: 325 TABLET, COATED ORAL at 20:04

## 2024-06-05 RX ADMIN — SODIUM CHLORIDE: 9 INJECTION, SOLUTION INTRAVENOUS at 17:00

## 2024-06-05 RX ADMIN — FUROSEMIDE 20 MG: 20 TABLET ORAL at 20:04

## 2024-06-05 RX ADMIN — FENTANYL CITRATE 100 MCG: 50 INJECTION INTRAMUSCULAR; INTRAVENOUS at 13:38

## 2024-06-05 RX ADMIN — PROTAMINE SULFATE 80 MG: 10 INJECTION, SOLUTION INTRAVENOUS at 16:10

## 2024-06-05 RX ADMIN — PROPOFOL 100 MG: 10 INJECTION, EMULSION INTRAVENOUS at 14:35

## 2024-06-05 RX ADMIN — HEPARIN SODIUM 10000 UNITS: 1000 INJECTION, SOLUTION INTRAVENOUS; SUBCUTANEOUS at 14:19

## 2024-06-05 NOTE — Clinical Note
Perclose was used in achieving hemostasis. Closure device deployed in the vessel. Hemostasis achieved successfully.

## 2024-06-05 NOTE — ANESTHESIA PREPROCEDURE EVALUATION
Anesthesia Evaluation     no history of anesthetic complications:   NPO Solid Status: > 8 hours  NPO Liquid Status: > 8 hours           Airway   Dental      Pulmonary    (+) ,sleep apnea  (-) not a smoker  Cardiovascular     (+) hypertension, hyperlipidemia      Neuro/Psych  (+) psychiatric history Anxiety  GI/Hepatic/Renal/Endo    (+) morbid obesity, GERD, PUD, thyroid problem hypothyroidism    Musculoskeletal     (+) neck pain  Abdominal    Substance History      OB/GYN          Other   arthritis,                 Anesthesia Plan    ASA 3     MAC     intravenous induction     Anesthetic plan, risks, benefits, and alternatives have been provided, discussed and informed consent has been obtained with: patient.    CODE STATUS:

## 2024-06-06 ENCOUNTER — TELEPHONE (OUTPATIENT)
Dept: CARDIOLOGY | Facility: CLINIC | Age: 59
End: 2024-06-06
Payer: MEDICAID

## 2024-06-06 VITALS
WEIGHT: 290.5 LBS | DIASTOLIC BLOOD PRESSURE: 62 MMHG | OXYGEN SATURATION: 96 % | SYSTOLIC BLOOD PRESSURE: 110 MMHG | RESPIRATION RATE: 18 BRPM | HEART RATE: 73 BPM | HEIGHT: 69 IN | TEMPERATURE: 98.3 F | BODY MASS INDEX: 43.03 KG/M2

## 2024-06-06 LAB
QT INTERVAL: 424 MS
QT INTERVAL: 448 MS
QTC INTERVAL: 428 MS
QTC INTERVAL: 464 MS

## 2024-06-06 PROCEDURE — 99214 OFFICE O/P EST MOD 30 MIN: CPT | Performed by: STUDENT IN AN ORGANIZED HEALTH CARE EDUCATION/TRAINING PROGRAM

## 2024-06-06 RX ORDER — ASPIRIN 325 MG
325 TABLET, DELAYED RELEASE (ENTERIC COATED) ORAL DAILY
Qty: 30 TABLET | Refills: 0 | Status: SHIPPED | OUTPATIENT
Start: 2024-06-06 | End: 2024-07-06

## 2024-06-06 RX ADMIN — AMLODIPINE BESYLATE 10 MG: 10 TABLET ORAL at 09:53

## 2024-06-06 RX ADMIN — HYDROCHLOROTHIAZIDE 25 MG: 25 TABLET ORAL at 09:53

## 2024-06-06 RX ADMIN — LEVOTHYROXINE SODIUM 50 MCG: 50 TABLET ORAL at 05:40

## 2024-06-06 RX ADMIN — ASPIRIN 325 MG: 325 TABLET, COATED ORAL at 09:53

## 2024-06-06 RX ADMIN — HYDROCODONE BITARTRATE AND ACETAMINOPHEN 1 TABLET: 10; 325 TABLET ORAL at 00:37

## 2024-06-06 RX ADMIN — FUROSEMIDE 20 MG: 20 TABLET ORAL at 09:53

## 2024-06-06 RX ADMIN — PANTOPRAZOLE SODIUM 40 MG: 40 TABLET, DELAYED RELEASE ORAL at 05:40

## 2024-06-06 RX ADMIN — PREGABALIN 75 MG: 75 CAPSULE ORAL at 09:53

## 2024-06-06 RX ADMIN — DICLOFENAC SODIUM 4 G: 10 GEL TOPICAL at 09:53

## 2024-06-06 RX ADMIN — LISINOPRIL 20 MG: 20 TABLET ORAL at 09:53

## 2024-06-06 NOTE — H&P
"Chief Complaint  Frequent PVCs    Subjective        History of Present Illness    EP Problems:  1.  Frequent PVCs  -12/2023: 29% unifocal PVC burden     Cardiology Problems:  1.  Hypertension  2.  Chronic diastolic heart failure     Medical Problems:  1.  Morbid obesity  2.  Chronic back pain  3.  Anxiety    Shankar Velasco is a 59 y.o. male with past medical history as above who presents to the hospital for outpatient EP study and ablation for treatment of frequent PVCs.  He presented to the clinic for evaluation of PVCs and worsening fatigue and shortness of breath.  He previously wore a holter with a 29% burden.  Given these findings we discussed risks and benefits of ablation and he chose to proceed.    Since the time of the last clinic visit, denies significant change in symptoms.  Denies missing any doses of his medications.  No new ER visits or hospitalizations.    Family History:  family history includes Diabetes in his mother; Heart disease in his father and mother; Lung disease in his father.    Social History:  Social History     Tobacco Use    Smoking status: Never     Passive exposure: Never    Smokeless tobacco: Never   Vaping Use    Vaping status: Never Used   Substance Use Topics    Alcohol use: No    Drug use: No       Allergies:  Patient has no known allergies.      Objective   Vital Signs:  /61 (BP Location: Left arm, Patient Position: Lying)   Pulse 73   Temp 98 °F (36.7 °C) (Oral)   Resp 20   Ht 175.3 cm (69.02\")   Wt 132 kg (290 lb 8 oz)   SpO2 97%   BMI 42.88 kg/m²   Estimated body mass index is 42.88 kg/m² as calculated from the following:    Height as of this encounter: 175.3 cm (69.02\").    Weight as of this encounter: 132 kg (290 lb 8 oz).      Physical Exam  Vitals reviewed.   Constitutional:       Appearance: He is obese.   Cardiovascular:      Rate and Rhythm: Normal rate. Rhythm irregular.      Pulses: Normal pulses.      Heart sounds: Normal heart sounds.   Pulmonary:     "  Effort: Pulmonary effort is normal.      Breath sounds: Normal breath sounds.   Musculoskeletal:         General: No swelling.   Neurological:      Mental Status: He is alert and oriented to person, place, and time.   Psychiatric:         Mood and Affect: Mood normal.         Judgment: Judgment normal.          Result Review :  Labs were reviewed with relevant findings as follows: No relevant findings               Assessment and Plan   Assessment/plan:  Shanakr Velasco is a 59 y.o. male who presents to the hospital for outpatient EP study and ablation for treatment of frequent PVCs.  There are no apparent contraindications to proceeding and he is medically appropriate for outpatient management with this procedure.      I have discussed risks, benefits, and alternatives of an electrophysiology study and ablation for premature ventricular contractions with the patient.  Alternatives discussed include continued observation and medical management.  An electrophysiology study with ablation is an inherently high risk procedure with possible complications that include but are not limited to vascular access complications, internal bleeding, tamponade requiring pericardiocentesis or cardiac surgery, stroke, MI, embolism, myocardial injury, injury to the normal conduction system requiring a pacemaker, and ultimately death.  We also discussed that given the nature of the procedure, therapeutic efficacy can be variable.  Possibilities of recurrent arrhythmias and the possible need for additional procedures and/or medical therapy was discussed. Questions asked were appropriately answered.  No guarantees were made or implied.     Despite this, they would still like to proceed.    Plan:   - Proceed with EP study and ablation for treatment of frequent PVCs           Part of this note may be an electronic transcription/translation of spoken language to printed text using the Dragon Dictation System.

## 2024-06-06 NOTE — DISCHARGE SUMMARY
DISCHARGE NOTE    Patient Name: Shankar Velasco  Age/Sex: 59 y.o. male  : 1965  MRN: [id number]    Date of Discharge:  2024   Date of Admit: 2024  Encounter Provider: Devante Paredes MD  Place of Service: [facility]  Patient Care Team:  Emma Estrada DO as PCP - General (Family Medicine)  RENARD Omer MD as Consulting Physician (Orthopedic Surgery)  Edil Goff APRN as Nurse Practitioner (Nurse Practitioner)    Subjective:     Discharge Diagnosis:    Frequent unifocal PVCs    Cardiac abnormality        History of present illness:  EP Problems:  1.  Frequent PVCs  -2023: 29% unifocal PVC burden     Cardiology Problems:  1.  Hypertension  2.  Chronic diastolic heart failure     Medical Problems:  1.  Morbid obesity  2.  Chronic back pain  3.  Anxiety     Shankar Velasco is a 59 y.o. male with past medical history as above who presents to the hospital for outpatient EP study and ablation for treatment of frequent PVCs.  He presented to the clinic for evaluation of PVCs and worsening fatigue and shortness of breath.  He previously wore a holter with a 29% burden.  Given these findings we discussed risks and benefits of ablation and he chose to proceed.     Since the time of the last clinic visit, denies significant change in symptoms.  Denies missing any doses of his medications.  No new ER visits or hospitalizations.     Hospital Course:   Mr. Velasco was admitted to the hospital for planned PVC ablation.  He was found to have a likely epicardial PVC at the time of the EP study from the cardiac summit.  The PVC was extensively ablated from the RVOT with transient suppression however was unable to be durably terminated from this location.  Additional ablation from the left-right coronary cusp junction was necessary to achieve successful PVC termination.  His vascular  access was unable to be successfully performed due to his habitus and manual pressure was necessary to achieve hemostasis.  This was complicated by a large hematoma which appeared to be venous in nature.  On telemetry, he had evidence of extremely infrequent PVCs (approximately 10 PVCs total during the overnight observation).  On postoperative day 1, the hematoma was found to be stable he was felt to be stable for discharge home.    Vital Signs  Temp:  [97.1 °F (36.2 °C)-98.4 °F (36.9 °C)] 98.3 °F (36.8 °C)  Heart Rate:  [55-73] 73  Resp:  [12-21] 18  BP: ()/(59-78) 110/62    Intake/Output Summary (Last 24 hours) at 6/6/2024 0840  Last data filed at 6/6/2024 0323  Gross per 24 hour   Intake 1040 ml   Output 300 ml   Net 740 ml       Physical Exam:  Physical Exam  Vitals reviewed.   Constitutional:       Appearance: He is obese.   Cardiovascular:      Rate and Rhythm: Normal rate and regular rhythm.      Pulses: Normal pulses.      Heart sounds: Normal heart sounds.      Comments: Large right sided hematoma, soft, good distal perfusion  Pulmonary:      Effort: Pulmonary effort is normal.      Breath sounds: Normal breath sounds.   Musculoskeletal:         General: No swelling.   Neurological:      Mental Status: He is alert and oriented to person, place, and time.   Psychiatric:         Mood and Affect: Mood normal.         Judgment: Judgment normal.          Discharge Diet:    Dietary Orders (From admission, onward)       Start     Ordered    06/05/24 1850  Diet: Cardiac; Healthy Heart (2-3 Na+); Fluid Consistency: Thin (IDDSI 0)  Diet Effective Now        References:    Diet Order Crosswalk   Question Answer Comment   Diets: Cardiac    Cardiac Diet: Healthy Heart (2-3 Na+)    Fluid Consistency: Thin (IDDSI 0)        06/05/24 1849                       Activity Restrictions at Discharge:    No lifting more than 10lbs for 1 week.  No strenuous exercise for 1 week.        Medication changes:   Start aspirin  325mg daily for 30 days  Stop metoprolol     Discharge Medications     Discharge Medications        New Medications        Instructions Start Date   aspirin 325 MG EC tablet   325 mg, Oral, Daily             Continue These Medications        Instructions Start Date   amLODIPine 10 MG tablet  Commonly known as: NORVASC   10 mg, Oral      Diclofenac Sodium 1 % gel gel  Commonly known as: VOLTAREN   APPLY 2 GRAMS TO AFFECTED AREA(S) 4 TIMES DAILY      divalproex 500 MG 24 hr tablet  Commonly known as: DEPAKOTE ER   1,500 mg, Oral, Nightly, 3 tablets at night      furosemide 20 MG tablet  Commonly known as: LASIX   TAKE 1 TABLET BY MOUTH ONCE DAILY.      levothyroxine 50 MCG tablet  Commonly known as: SYNTHROID, LEVOTHROID   1 tablet, Oral, Daily      lisinopril-hydrochlorothiazide 20-25 MG per tablet  Commonly known as: PRINZIDE,ZESTORETIC   Oral      omeprazole 20 MG capsule  Commonly known as: priLOSEC   1 capsule, Oral, Daily      pregabalin 75 MG capsule  Commonly known as: LYRICA   1 capsule, Oral, 2 Times Daily      silver sulfadiazine 1 % cream  Commonly known as: SILVADENE, SSD   1 application , Topical, Daily, APPLY TO AFFECTED AREA ONCE DAILY             Stop These Medications      metoprolol tartrate 25 MG tablet  Commonly known as: LOPRESSOR              Discharge disposition: home    Follow-up Appointments   Follow-up Information       Emma Estrada DO .    Specialty: Family Medicine  Contact information:  [address] [place] [address] [id number] [phone]                           Future Appointments   Date Time Provider Department Center   7/3/2024 10:00 AM  [facility] EMG (OP) NEURO RM 2 (DIAL)  [facility]   7/11/2024  9:00 AM Juju Nielsen PA [facility] CD [facility]   7/18/2024  8:45 AM Hola Mccann MD [facility] NS [facility]   7/24/2024  8:45 AM Guero Dasilva MD [facility] CD  [facility]         Devante Paredes MD  06/06/24  08:40 CDT

## 2024-06-06 NOTE — ANESTHESIA POSTPROCEDURE EVALUATION
"Patient: Shankar Velasco    Procedure Summary       Date: 06/05/24 Room / Location: [facility] CATH LAB 4 /  [facility] CATH INVASIVE LOCATION    Anesthesia Start: 1317 Anesthesia Stop: 1730    Procedure: Ablation PVC Diagnosis:       Frequent unifocal PVCs      (Premature ventricular contractions (03002))    Providers: Devante Paredes MD Provider: Sb Ruiz CRNA    Anesthesia Type: MAC ASA Status: 3            Anesthesia Type: MAC    Vitals  Vitals Value Taken Time   /69 06/05/24 1958   Temp 97.4 °F (36.3 °C) 06/05/24 1958   Pulse 64 06/05/24 1958   Resp 20 06/05/24 1958   SpO2 97 % 06/05/24 1958           Post Anesthesia Care and Evaluation    Patient location during evaluation: PACU  Patient participation: complete - patient participated  Level of consciousness: awake  Pain management: adequate    Airway patency: patent  Anesthetic complications: No anesthetic complications  PONV Status: none  Cardiovascular status: acceptable  Respiratory status: acceptable  Hydration status: acceptable    Comments: /69 (BP Location: Left arm, Patient Position: Lying)   Pulse 64   Temp 97.4 °F (36.3 °C) (Oral)   Resp 20   Ht 175.3 cm (69.02\")   Wt 132 kg (290 lb 8 oz)   SpO2 97%   BMI 42.88 kg/m²   Patient discharged from PACU based on Luis Angel score. See RN notes for further details.    "

## 2024-06-06 NOTE — PLAN OF CARE
Goal Outcome Evaluation:              Outcome Evaluation: while patient was on bedrest, pt c/o of lower back pain (stated chronic/arthritic pain). adjusted position while keeping groin site straight to relieve pain--applied warm blanket and patient was able to get some relief. No change in groin site through the night. After bedrest was over and patient was able to ambulate, no c/o pain and patient was able to rest. VSS. Safety maintained.

## 2024-06-06 NOTE — TELEPHONE ENCOUNTER
"Patient wife Savana called in stating that patient had ablation yesterday and since being home today he is having issues with urinating. Wife states \" He cannot protrude his penis to pee\" Patient and spouse worried it is due from the swelling in his groin, and wondering if they should come back in for a site check.   "

## 2024-06-07 ENCOUNTER — HOSPITAL ENCOUNTER (EMERGENCY)
Facility: HOSPITAL | Age: 59
Discharge: HOME | End: 2024-06-07
Attending: EMERGENCY MEDICINE
Payer: MEDICAID

## 2024-06-07 ENCOUNTER — APPOINTMENT (OUTPATIENT)
Dept: CT IMAGING | Facility: HOSPITAL | Age: 59
End: 2024-06-07
Payer: MEDICAID

## 2024-06-07 VITALS
WEIGHT: 290 LBS | HEART RATE: 99 BPM | SYSTOLIC BLOOD PRESSURE: 126 MMHG | OXYGEN SATURATION: 98 % | TEMPERATURE: 98 F | HEIGHT: 69 IN | DIASTOLIC BLOOD PRESSURE: 95 MMHG | RESPIRATION RATE: 20 BRPM | BODY MASS INDEX: 42.95 KG/M2

## 2024-06-07 DIAGNOSIS — E87.6 HYPOKALEMIA: ICD-10-CM

## 2024-06-07 DIAGNOSIS — T14.8XXA HEMATOMA: Primary | ICD-10-CM

## 2024-06-07 LAB
ALBUMIN SERPL-MCNC: 4 G/DL (ref 3.5–5.2)
ALBUMIN/GLOB SERPL: 1.3 G/DL
ALP SERPL-CCNC: 45 U/L (ref 39–117)
ALT SERPL W P-5'-P-CCNC: 20 U/L (ref 1–41)
ANION GAP SERPL CALCULATED.3IONS-SCNC: 10 MMOL/L (ref 5–15)
APTT PPP: 30.2 SECONDS (ref 24.5–36)
AST SERPL-CCNC: 18 U/L (ref 1–40)
BASOPHILS # BLD AUTO: 0.08 10*3/MM3 (ref 0–0.2)
BASOPHILS NFR BLD AUTO: 0.5 % (ref 0–1.5)
BILIRUB SERPL-MCNC: 0.8 MG/DL (ref 0–1.2)
BUN SERPL-MCNC: 15 MG/DL (ref 8–23)
BUN/CREAT SERPL: 19.7 (ref 7–25)
CALCIUM SPEC-SCNC: 9 MG/DL (ref 8.6–10.5)
CHLORIDE SERPL-SCNC: 100 MMOL/L (ref 98–107)
CO2 SERPL-SCNC: 28 MMOL/L (ref 22–29)
CREAT SERPL-MCNC: 0.76 MG/DL (ref 0.76–1.27)
DEPRECATED RDW RBC AUTO: 43.6 FL (ref 37–54)
EGFRCR SERPLBLD CKD-EPI 2021: 102.9 ML/MIN/1.73
EOSINOPHIL # BLD AUTO: 0.01 10*3/MM3 (ref 0–0.4)
EOSINOPHIL NFR BLD AUTO: 0.1 % (ref 0.3–6.2)
ERYTHROCYTE [DISTWIDTH] IN BLOOD BY AUTOMATED COUNT: 13.2 % (ref 12.3–15.4)
GLOBULIN UR ELPH-MCNC: 3.1 GM/DL
GLUCOSE SERPL-MCNC: 128 MG/DL (ref 65–99)
HCT VFR BLD AUTO: 27.6 % (ref 37.5–51)
HGB BLD-MCNC: 9.3 G/DL (ref 13–17.7)
HOLD SPECIMEN: NORMAL
IMM GRANULOCYTES # BLD AUTO: 0.15 10*3/MM3 (ref 0–0.05)
IMM GRANULOCYTES NFR BLD AUTO: 1 % (ref 0–0.5)
INR PPP: 1.07 (ref 0.91–1.09)
LYMPHOCYTES # BLD AUTO: 3.02 10*3/MM3 (ref 0.7–3.1)
LYMPHOCYTES NFR BLD AUTO: 20.7 % (ref 19.6–45.3)
MCH RBC QN AUTO: 31 PG (ref 26.6–33)
MCHC RBC AUTO-ENTMCNC: 33.7 G/DL (ref 31.5–35.7)
MCV RBC AUTO: 92 FL (ref 79–97)
MONOCYTES # BLD AUTO: 1.93 10*3/MM3 (ref 0.1–0.9)
MONOCYTES NFR BLD AUTO: 13.2 % (ref 5–12)
NEUTROPHILS NFR BLD AUTO: 64.5 % (ref 42.7–76)
NEUTROPHILS NFR BLD AUTO: 9.42 10*3/MM3 (ref 1.7–7)
NRBC BLD AUTO-RTO: 0 /100 WBC (ref 0–0.2)
PLATELET # BLD AUTO: 164 10*3/MM3 (ref 140–450)
PMV BLD AUTO: 11.2 FL (ref 6–12)
POTASSIUM SERPL-SCNC: 3.3 MMOL/L (ref 3.5–5.2)
PROT SERPL-MCNC: 7.1 G/DL (ref 6–8.5)
PROTHROMBIN TIME: 14.4 SECONDS (ref 11.8–14.8)
RBC # BLD AUTO: 3 10*6/MM3 (ref 4.14–5.8)
SODIUM SERPL-SCNC: 138 MMOL/L (ref 136–145)
WBC NRBC COR # BLD AUTO: 14.61 10*3/MM3 (ref 3.4–10.8)
WHOLE BLOOD HOLD COAG: NORMAL
WHOLE BLOOD HOLD SPECIMEN: NORMAL

## 2024-06-07 PROCEDURE — 85025 COMPLETE CBC W/AUTO DIFF WBC: CPT | Performed by: EMERGENCY MEDICINE

## 2024-06-07 PROCEDURE — 74174 CTA ABD&PLVS W/CONTRAST: CPT

## 2024-06-07 PROCEDURE — 85610 PROTHROMBIN TIME: CPT | Performed by: EMERGENCY MEDICINE

## 2024-06-07 PROCEDURE — 80053 COMPREHEN METABOLIC PANEL: CPT | Performed by: EMERGENCY MEDICINE

## 2024-06-07 PROCEDURE — 25810000003 SODIUM CHLORIDE 0.9 % SOLUTION: Performed by: EMERGENCY MEDICINE

## 2024-06-07 PROCEDURE — 85730 THROMBOPLASTIN TIME PARTIAL: CPT | Performed by: EMERGENCY MEDICINE

## 2024-06-07 PROCEDURE — 99285 EMERGENCY DEPT VISIT HI MDM: CPT

## 2024-06-07 PROCEDURE — 25510000001 IOPAMIDOL PER 1 ML: Performed by: EMERGENCY MEDICINE

## 2024-06-07 RX ORDER — HYDROCODONE BITARTRATE AND ACETAMINOPHEN 7.5; 325 MG/1; MG/1
1 TABLET ORAL EVERY 6 HOURS PRN
Qty: 12 TABLET | Refills: 0 | Status: SHIPPED | OUTPATIENT
Start: 2024-06-07

## 2024-06-07 RX ORDER — HYDROCODONE BITARTRATE AND ACETAMINOPHEN 7.5; 325 MG/1; MG/1
1 TABLET ORAL ONCE
Status: COMPLETED | OUTPATIENT
Start: 2024-06-07 | End: 2024-06-07

## 2024-06-07 RX ORDER — POTASSIUM CHLORIDE 20 MEQ/1
40 TABLET, EXTENDED RELEASE ORAL ONCE
Status: DISCONTINUED | OUTPATIENT
Start: 2024-06-07 | End: 2024-06-08 | Stop reason: HOSPADM

## 2024-06-07 RX ADMIN — IOPAMIDOL 100 ML: 755 INJECTION, SOLUTION INTRAVENOUS at 21:16

## 2024-06-07 RX ADMIN — HYDROCODONE BITARTRATE AND ACETAMINOPHEN 1 TABLET: 7.5; 325 TABLET ORAL at 22:30

## 2024-06-07 RX ADMIN — SODIUM CHLORIDE 1000 ML: 9 INJECTION, SOLUTION INTRAVENOUS at 20:25

## 2024-06-08 NOTE — ED PROVIDER NOTES
Subjective   History of Present Illness  Shankar is a 59-year-old male who presents to the ED for chief complaint of right groin pain and swelling in the setting of a cardiac ablation that was performed 2days ago.  Patient's wife states that there was an issue with possible contamination and therefore did they did not place the plug to seal the access site.  Patient presents today with fatigue, weakness and bruising and swelling to his right groin right suprapubic area as well as his scrotum and penis.  Is able to urinate he is not on any blood thinners he is taking a full dose aspirin at the recommendation of his cardiologist.        Review of Systems   All other systems reviewed and are negative.      Past Medical History:   Diagnosis Date    Acid reflux     Arthritis     Bell palsy     Chronic pain disorder     Disease of thyroid gland     Diverticulitis of colon     Hyperlipidemia     Hypertension     Low back pain     Neck pain     Sleep apnea     Stomach ulcer        No Known Allergies    Past Surgical History:   Procedure Laterality Date    ADENOIDECTOMY      CARDIAC ELECTROPHYSIOLOGY PROCEDURE N/A 6/5/2024    Procedure: Ablation PVC;  Surgeon: Devante Paredes MD;  Location: [facility] CATH INVASIVE LOC[facility];  Service: Cardiovascular;  Laterality: N/A;    COLON RESECTION      COLONOSCOPY  11/08/2016    large amount of diverticular disease in sigmoid colon, internal hemorrhoids    ENDOSCOPY  08/27/2018    meld inflammation and focal lamina propria fibrosis (-) h-Pylori    TONSILLECTOMY         Family History   Problem Relation Age of Onset    Heart disease Mother     Diabetes Mother     Heart disease Father     Lung disease Father     Colon cancer Neg Hx     Colon polyps Neg Hx        Social History     Socioeconomic History    Marital status:    Tobacco Use    Smoking status: Never     Passive exposure: Never    Smokeless tobacco: Never   Vaping Use    Vaping status: Never Used   Substance and Sexual Activity     Alcohol use: No    Drug use: No    Sexual activity: Defer           Objective   Physical Exam  Vitals reviewed.   Constitutional:       Appearance: Normal appearance. He is normal weight.   HENT:      Head: Normocephalic and atraumatic.      Right Ear: External ear normal.      Left Ear: External ear normal.      Nose: Nose normal.      Mouth/Throat:      Mouth: Mucous membranes are moist. Mucous membranes are dry.      Pharynx: Oropharynx is clear.   Eyes:      Extraocular Movements: Extraocular movements intact.      Conjunctiva/sclera: Conjunctivae normal.      Pupils: Pupils are equal, round, and reactive to light.   Cardiovascular:      Rate and Rhythm: Normal rate and regular rhythm.      Pulses: Normal pulses.      Heart sounds: Normal heart sounds.   Pulmonary:      Effort: Pulmonary effort is normal.      Breath sounds: Normal breath sounds.   Abdominal:      General: Bowel sounds are normal.      Palpations: Abdomen is soft.   Musculoskeletal:         General: Swelling present. Normal range of motion.      Cervical back: Normal range of motion and neck supple. No rigidity.   Skin:     General: Skin is warm and dry.      Capillary Refill: Capillary refill takes less than 2 seconds.      Findings: Bruising present.          Neurological:      General: No focal deficit present.      Mental Status: He is alert and oriented to person, place, and time.   Psychiatric:         Mood and Affect: Mood normal.         Procedures           ED Course                                             Medical Decision Making  Shankar is a 59-year-old male who presents to the ED with concern of right groin swelling secondary to an ablation that occurred 2 days ago.  Patient will have basic labs to check hemoglobin trend coags and will get a CTA of his abdomen and pelvis to evaluate for a bleeding site.  Secondary to complication with closure, also: Discussed case with the cardiologist Dr. Paredes.  He is in agreement with my  evaluation and workup.    Labs returned with plenty of blood  Plenty of platelets  Patient's potassium is low at 3.3 this was placed be on the department with 40 mill equivalents    Patient's CTA of his chest abdomen pelvis returned without acute extravasation, had a 9 x 7 cm hematoma  I discussed this with Dr. Paredes, who states the patient can follow-up with her comfortable with that.  I discussed the case with the patient and his wife at bedside they are comfortable following up and going home.    Given standard discharge instructions regarding hematoma and hematoma care.    All questions were answered and reassurance was provided and the patient was discharged stable condition.    Problems Addressed:  Hematoma: complicated acute illness or injury    Amount and/or Complexity of Data Reviewed  Labs: ordered.  Radiology: ordered.    Risk  Prescription drug management.        Final diagnoses:   Hematoma   Hypokalemia       ED Disposition  ED Disposition       ED Disposition   Discharge    Condition   Stable    Comment   --               Emma Estrada, DO  [address] [facility] [address]  [phone]               Medication List        New Prescriptions      HYDROcodone-acetaminophen 7.5-325 MG per tablet  Commonly known as: NORCO  Take 1 tablet by mouth Every 6 (Six) Hours As Needed for Moderate Pain.               Where to Get Your Medications        You can get these medications from any pharmacy    Bring a paper prescription for each of these medications  HYDROcodone-acetaminophen 7.5-325 MG per tablet            Romeo Mcmillan MD  06/07/24 1272

## 2024-06-11 RX ORDER — POTASSIUM CHLORIDE 750 MG/1
10 TABLET, FILM COATED, EXTENDED RELEASE ORAL DAILY
Qty: 90 TABLET | Refills: 3 | Status: SHIPPED | OUTPATIENT
Start: 2024-06-11

## 2024-06-12 ENCOUNTER — HOSPITAL ENCOUNTER (OUTPATIENT)
Dept: CARDIOLOGY | Facility: HOSPITAL | Age: 59
Discharge: HOME | End: 2024-06-12
Payer: MEDICAID

## 2024-06-12 ENCOUNTER — TELEPHONE (OUTPATIENT)
Dept: CARDIOLOGY | Facility: CLINIC | Age: 59
End: 2024-06-12
Payer: MEDICAID

## 2024-06-12 ENCOUNTER — LAB (OUTPATIENT)
Dept: LAB | Facility: HOSPITAL | Age: 59
End: 2024-06-12
Payer: MEDICAID

## 2024-06-12 DIAGNOSIS — I49.3 FREQUENT UNIFOCAL PVCS: Primary | ICD-10-CM

## 2024-06-12 DIAGNOSIS — D62 ACUTE BLOOD LOSS ANEMIA: Primary | ICD-10-CM

## 2024-06-12 DIAGNOSIS — I49.3 FREQUENT UNIFOCAL PVCS: ICD-10-CM

## 2024-06-12 LAB
DEPRECATED RDW RBC AUTO: 46.5 FL (ref 37–54)
ERYTHROCYTE [DISTWIDTH] IN BLOOD BY AUTOMATED COUNT: 13.9 % (ref 12.3–15.4)
HCT VFR BLD AUTO: 24.9 % (ref 37.5–51)
HGB BLD-MCNC: 8.3 G/DL (ref 13–17.7)
MCH RBC QN AUTO: 31.4 PG (ref 26.6–33)
MCHC RBC AUTO-ENTMCNC: 33.3 G/DL (ref 31.5–35.7)
MCV RBC AUTO: 94.3 FL (ref 79–97)
PLATELET # BLD AUTO: 318 10*3/MM3 (ref 140–450)
PMV BLD AUTO: 9.6 FL (ref 6–12)
RBC # BLD AUTO: 2.64 10*6/MM3 (ref 4.14–5.8)
WBC NRBC COR # BLD AUTO: 9.42 10*3/MM3 (ref 3.4–10.8)

## 2024-06-12 PROCEDURE — 36415 COLL VENOUS BLD VENIPUNCTURE: CPT

## 2024-06-12 PROCEDURE — 85027 COMPLETE CBC AUTOMATED: CPT

## 2024-06-12 NOTE — NURSING NOTE
Dr lackey in to assess the right groin related to pain weakness,. Right groin with swelling and bruising to right groin and around right flank and hip. Dr lackey states that area of bruising will resolve itself over time and patient to come back to outpt lab to recheck hemoglobin. Patient to be discharged home and will follow up as scheduled for July 11th.

## 2024-06-12 NOTE — TELEPHONE ENCOUNTER
Call received from Mr. Velasco voicing concern for increased bruising around the back of right hip since ablation on 6/5/24. After his ER visit on 6/7/24 d/t hematoma at access site his son has noticed an increase in bruising. The pain remains about the same, both LE are warm and pink, and no other complaints. Spoke with Dr. Paredes and asked patient to come in for labs and a site check on 6/12/24. Verbalized understanding.     Kaylyn Palmer RN

## 2024-06-12 NOTE — PROGRESS NOTES
Site assessed.  There is a large right femoral hematoma which is firm, no erythema, no drainage.  Mild-moderate tenderness.  There is evidence of groin site candidiasis but no evidence of david infection otherwise.  There is an area of ecchymosis spreading to the right flank without evidence of firmness or tenderness.    The patient was connected to the monitoring system.  He was in sinus rhythm at a rate of approximately 100 bpm without evidence of PVCs.    CT 6/7/24 reviewed:  1. 7 x 9 cm RIGHT groin hematoma.  No pseudoaneurysm.  No extravasation of contrast from the RIGHT femoral artery.  2. Gallstones. No sign of cholecystitis and no bile duct dilation.       Assessment:  1.  Femoral access hematomata evidence of further complication    Plan:  1.  Continue conservative therapy for femoral access hematoma  2.  Advised to call us should the site continue to evolve or enlarge as an ultrasound would be appropriate at that time  3.  No antibiotics at this time given lack of evidence of infection  4.  Can stop aspirin given that it has been 1 week since time of the ablation  5.  CBC check today

## 2024-06-17 RX ORDER — DIVALPROEX SODIUM 500 MG/1
1500 TABLET, EXTENDED RELEASE ORAL NIGHTLY
Qty: 90 TABLET | Refills: 0 | Status: SHIPPED | OUTPATIENT
Start: 2024-06-17

## 2024-06-17 NOTE — TELEPHONE ENCOUNTER
Pharmacy sent a request to refill pt's medication       Last office visit : 3/6/2024 SEDRICK DOMINIQUE  Next office visit : 6/19/2024 SEDRICK DOMINIQUE    Requested Prescriptions     Pending Prescriptions Disp Refills    divalproex (DEPAKOTE ER) 500 MG extended release tablet [Pharmacy Med Name: DIVALPROEX SODIUM ER 500MG TABLET EXTENDED RELEASE 24 HOUR] 90 tablet 2     Sig: TAKE 3 TABLETS BY MOUTH NIGHTLY            Aakash Lester      3/6/24                                               Progress Note     Gennaro Brian 1965                                 Chief Complaint   Patient presents with    Medication Check    Follow-up            Subjective:    Patient is a 59 y.o. male diagnosed with bipolar 1 and presents today for follow-up.  Last seen in clinic on 5/11/23  and prior records were reviewed.     Last visit: he states he has been doing really well lately.  He has a good work schedule.  He has been spending time at [employer] and has been doing really well.   He reports his wife continues to support him and believes he is doing well.  He is bright and cheerful on the phone he denies SI HI AVH she denies side effects of medications he is calm and cooperative we will follow-up in 1 year     Today:  pts wife called in between visits and stated that pt has been more depressed lately and requested a follow up appointment at an earlier time.  He states today he is doing very well.  He had some concerns with his elevated blood pressure and some physical complaints.  He was encouraged to follow up with PCP and cardiologist for his concerns.  He had some issues with discouragement especially regarding inability to do [place] trips.  Supportive therapy provided at this time.  He is bright calm and cooperative today, he denies si hi avh, he denies side effects of medications will follow up in 1 year.     Absent  suicidal ideation.    Reports compliance with medications as good .      Sleep:  sleeping in a chair upright

## 2024-06-18 ENCOUNTER — TELEPHONE (OUTPATIENT)
Dept: CARDIOLOGY | Facility: CLINIC | Age: 59
End: 2024-06-18
Payer: MEDICAID

## 2024-06-18 ENCOUNTER — TELEPHONE (OUTPATIENT)
Dept: PSYCHIATRY | Age: 59
End: 2024-06-18

## 2024-06-18 NOTE — TELEPHONE ENCOUNTER
Called and lvm reminding pt of his appt for 6/19 @ 11:30 AM    Electronically signed by Aakash Lester on 6/18/2024 at 11:41 AM.3

## 2024-06-18 NOTE — TELEPHONE ENCOUNTER
Patient spouse calling in wondering if we could send a letter to patients employer for him to have permission to use a wheelchair at work, and to be located closest to the bathroom due to his extreme swelling and SOA. Patient has typically worked from home, and they are now making him go into the office to work.

## 2024-06-19 ENCOUNTER — LAB (OUTPATIENT)
Dept: LAB | Facility: HOSPITAL | Age: 59
End: 2024-06-19
Payer: MEDICAID

## 2024-06-19 ENCOUNTER — OFFICE VISIT (OUTPATIENT)
Dept: PSYCHIATRY | Age: 59
End: 2024-06-19
Payer: MEDICAID

## 2024-06-19 VITALS
HEART RATE: 75 BPM | OXYGEN SATURATION: 97 % | DIASTOLIC BLOOD PRESSURE: 76 MMHG | TEMPERATURE: 97 F | SYSTOLIC BLOOD PRESSURE: 126 MMHG | WEIGHT: 315 LBS | HEIGHT: 69 IN | BODY MASS INDEX: 46.65 KG/M2

## 2024-06-19 DIAGNOSIS — F31.9 BIPOLAR 1 DISORDER (HCC): Primary | ICD-10-CM

## 2024-06-19 DIAGNOSIS — D62 ACUTE BLOOD LOSS ANEMIA: ICD-10-CM

## 2024-06-19 LAB
DEPRECATED RDW RBC AUTO: 50.6 FL (ref 37–54)
ERYTHROCYTE [DISTWIDTH] IN BLOOD BY AUTOMATED COUNT: 14.6 % (ref 12.3–15.4)
HCT VFR BLD AUTO: 28.9 % (ref 37.5–51)
HGB BLD-MCNC: 9 G/DL (ref 13–17.7)
MCH RBC QN AUTO: 30.4 PG (ref 26.6–33)
MCHC RBC AUTO-ENTMCNC: 31.1 G/DL (ref 31.5–35.7)
MCV RBC AUTO: 97.6 FL (ref 79–97)
PLATELET # BLD AUTO: 356 10*3/MM3 (ref 140–450)
PMV BLD AUTO: 9.4 FL (ref 6–12)
RBC # BLD AUTO: 2.96 10*6/MM3 (ref 4.14–5.8)
WBC NRBC COR # BLD AUTO: 6.6 10*3/MM3 (ref 3.4–10.8)

## 2024-06-19 PROCEDURE — 3074F SYST BP LT 130 MM HG: CPT | Performed by: NURSE PRACTITIONER

## 2024-06-19 PROCEDURE — 85027 COMPLETE CBC AUTOMATED: CPT

## 2024-06-19 PROCEDURE — 99214 OFFICE O/P EST MOD 30 MIN: CPT | Performed by: NURSE PRACTITIONER

## 2024-06-19 PROCEDURE — 36415 COLL VENOUS BLD VENIPUNCTURE: CPT

## 2024-06-19 PROCEDURE — 3078F DIAST BP <80 MM HG: CPT | Performed by: NURSE PRACTITIONER

## 2024-06-19 ASSESSMENT — PATIENT HEALTH QUESTIONNAIRE - PHQ9
8. MOVING OR SPEAKING SO SLOWLY THAT OTHER PEOPLE COULD HAVE NOTICED. OR THE OPPOSITE, BEING SO FIGETY OR RESTLESS THAT YOU HAVE BEEN MOVING AROUND A LOT MORE THAN USUAL: NOT AT ALL
2. FEELING DOWN, DEPRESSED OR HOPELESS: NOT AT ALL
4. FEELING TIRED OR HAVING LITTLE ENERGY: NOT AT ALL
SUM OF ALL RESPONSES TO PHQ QUESTIONS 1-9: 0
6. FEELING BAD ABOUT YOURSELF - OR THAT YOU ARE A FAILURE OR HAVE LET YOURSELF OR YOUR FAMILY DOWN: NOT AT ALL
5. POOR APPETITE OR OVEREATING: NOT AT ALL
SUM OF ALL RESPONSES TO PHQ QUESTIONS 1-9: 0
SUM OF ALL RESPONSES TO PHQ QUESTIONS 1-9: 0
3. TROUBLE FALLING OR STAYING ASLEEP: NOT AT ALL
9. THOUGHTS THAT YOU WOULD BE BETTER OFF DEAD, OR OF HURTING YOURSELF: NOT AT ALL
10. IF YOU CHECKED OFF ANY PROBLEMS, HOW DIFFICULT HAVE THESE PROBLEMS MADE IT FOR YOU TO DO YOUR WORK, TAKE CARE OF THINGS AT HOME, OR GET ALONG WITH OTHER PEOPLE: NOT DIFFICULT AT ALL
SUM OF ALL RESPONSES TO PHQ9 QUESTIONS 1 & 2: 0
SUM OF ALL RESPONSES TO PHQ QUESTIONS 1-9: 0
7. TROUBLE CONCENTRATING ON THINGS, SUCH AS READING THE NEWSPAPER OR WATCHING TELEVISION: NOT AT ALL
1. LITTLE INTEREST OR PLEASURE IN DOING THINGS: NOT AT ALL

## 2024-06-19 NOTE — PROGRESS NOTES
6/19/24               Progress Note    Gennaro Torres 1965      Chief Complaint   Patient presents with    Medication Check    Follow-up         Subjective:    Patient is a 59 y.o. male diagnosed with bipolar 1 and presents today for follow-up.  Last seen in clinic on 3/6/24  and prior records were reviewed.    Last visit: pts wife called in between visits and stated that pt has been more depressed lately and requested a follow up appointment at an earlier time.  He states today he is doing very well.  He had some concerns with his elevated blood pressure and some physical complaints.  He was encouraged to follow up with PCP and cardiologist for his concerns.  He had some issues with discouragement especially regarding inability to do [employer] [employer] trips.  Supportive therapy provided at this time.  He is bright calm and cooperative today, he denies si hi avh, he denies side effects of medications will follow up in 1 year.    Today:  pt received a letter informing him that this provider was leaving the office and he made an appointment before July 12th.  He is here today stating he is doing really well.  He is in a wheelchair due to a heart ablation that he has not recovered well.  He is still working and has had some difficulty with mobility.  Overall he states he has been doing really well, he is bright calm and cooperative.  He feels like his medications are working and does not require any medication adjustments at this time. Will follow up with another provider in office in about 1 year.      Absent  suicidal ideation.    Reports compliance with medications as good .     Sleep:  sleeping in a chair upright getting 6-7 hours per night.    Caffeine use: tea, diet cokes    PREVIOUS MED TRIALS    Abilify  Risperdal   Depakote (current)  Buspirone  Trazodone   Diazepam  Ativan  Gabapentin   Melatonin  latuda - too sedating    Current Substance Use:   Alcohol: none  Illicit drug use: denies   Marijuana: denies

## 2024-06-24 ENCOUNTER — TELEPHONE (OUTPATIENT)
Dept: CARDIOLOGY | Facility: CLINIC | Age: 59
End: 2024-06-24
Payer: MEDICAID

## 2024-06-24 DIAGNOSIS — T14.8XXA HEMATOMA: Primary | ICD-10-CM

## 2024-06-24 NOTE — TELEPHONE ENCOUNTER
Patient spouse called in stating patient is still having swelling in his groin and is now possibly worried about a blood clot in the area and is wondering if he should start back taking his aspirin?

## 2024-06-25 DIAGNOSIS — T14.8XXA HEMATOMA: Primary | ICD-10-CM

## 2024-06-25 NOTE — TELEPHONE ENCOUNTER
Patient notified. Will go to [facility] to try to have US done tomorrow while he is off work and will have results sent to us.

## 2024-06-26 ENCOUNTER — HOSPITAL ENCOUNTER (EMERGENCY)
Facility: HOSPITAL | Age: 59
Discharge: HOME | End: 2024-06-27
Attending: EMERGENCY MEDICINE
Payer: MEDICAID

## 2024-06-26 ENCOUNTER — APPOINTMENT (OUTPATIENT)
Dept: ULTRASOUND IMAGING | Facility: HOSPITAL | Age: 59
End: 2024-06-26
Payer: MEDICAID

## 2024-06-26 ENCOUNTER — APPOINTMENT (OUTPATIENT)
Dept: CT IMAGING | Facility: HOSPITAL | Age: 59
End: 2024-06-26
Payer: MEDICAID

## 2024-06-26 DIAGNOSIS — I97.89 OTHER POSTOPERATIVE COMPLICATION INVOLVING CIRCULATORY SYSTEM: Primary | ICD-10-CM

## 2024-06-26 LAB
ALBUMIN SERPL-MCNC: 4.1 G/DL (ref 3.5–5.2)
ALBUMIN/GLOB SERPL: 1.3 G/DL
ALP SERPL-CCNC: 108 U/L (ref 39–117)
ALT SERPL W P-5'-P-CCNC: 12 U/L (ref 1–41)
ANION GAP SERPL CALCULATED.3IONS-SCNC: 10 MMOL/L (ref 5–15)
APTT PPP: 28.1 SECONDS (ref 24.5–36)
AST SERPL-CCNC: 16 U/L (ref 1–40)
BASOPHILS # BLD AUTO: 0.04 10*3/MM3 (ref 0–0.2)
BASOPHILS NFR BLD AUTO: 0.6 % (ref 0–1.5)
BILIRUB SERPL-MCNC: 0.5 MG/DL (ref 0–1.2)
BUN SERPL-MCNC: 13 MG/DL (ref 8–23)
BUN/CREAT SERPL: 17.6 (ref 7–25)
CALCIUM SPEC-SCNC: 9.1 MG/DL (ref 8.6–10.5)
CHLORIDE SERPL-SCNC: 102 MMOL/L (ref 98–107)
CO2 SERPL-SCNC: 28 MMOL/L (ref 22–29)
CREAT SERPL-MCNC: 0.74 MG/DL (ref 0.76–1.27)
DEPRECATED RDW RBC AUTO: 49.4 FL (ref 37–54)
EGFRCR SERPLBLD CKD-EPI 2021: 103.7 ML/MIN/1.73
EOSINOPHIL # BLD AUTO: 0.07 10*3/MM3 (ref 0–0.4)
EOSINOPHIL NFR BLD AUTO: 1.1 % (ref 0.3–6.2)
ERYTHROCYTE [DISTWIDTH] IN BLOOD BY AUTOMATED COUNT: 14.1 % (ref 12.3–15.4)
GLOBULIN UR ELPH-MCNC: 3.2 GM/DL
GLUCOSE SERPL-MCNC: 113 MG/DL (ref 65–99)
HCT VFR BLD AUTO: 33 % (ref 37.5–51)
HGB BLD-MCNC: 10.2 G/DL (ref 13–17.7)
IMM GRANULOCYTES # BLD AUTO: 0.05 10*3/MM3 (ref 0–0.05)
IMM GRANULOCYTES NFR BLD AUTO: 0.8 % (ref 0–0.5)
INR PPP: 0.98 (ref 0.91–1.09)
LYMPHOCYTES # BLD AUTO: 1.58 10*3/MM3 (ref 0.7–3.1)
LYMPHOCYTES NFR BLD AUTO: 23.9 % (ref 19.6–45.3)
MCH RBC QN AUTO: 29.9 PG (ref 26.6–33)
MCHC RBC AUTO-ENTMCNC: 30.9 G/DL (ref 31.5–35.7)
MCV RBC AUTO: 96.8 FL (ref 79–97)
MONOCYTES # BLD AUTO: 0.56 10*3/MM3 (ref 0.1–0.9)
MONOCYTES NFR BLD AUTO: 8.5 % (ref 5–12)
NEUTROPHILS NFR BLD AUTO: 4.3 10*3/MM3 (ref 1.7–7)
NEUTROPHILS NFR BLD AUTO: 65.1 % (ref 42.7–76)
NRBC BLD AUTO-RTO: 0 /100 WBC (ref 0–0.2)
NT-PROBNP SERPL-MCNC: 140.1 PG/ML (ref 0–900)
PLATELET # BLD AUTO: 308 10*3/MM3 (ref 140–450)
PMV BLD AUTO: 9.8 FL (ref 6–12)
POTASSIUM SERPL-SCNC: 3.9 MMOL/L (ref 3.5–5.2)
PROT SERPL-MCNC: 7.3 G/DL (ref 6–8.5)
PROTHROMBIN TIME: 13.4 SECONDS (ref 11.8–14.8)
RBC # BLD AUTO: 3.41 10*6/MM3 (ref 4.14–5.8)
SODIUM SERPL-SCNC: 140 MMOL/L (ref 136–145)
WBC NRBC COR # BLD AUTO: 6.6 10*3/MM3 (ref 3.4–10.8)

## 2024-06-26 PROCEDURE — 85610 PROTHROMBIN TIME: CPT | Performed by: NURSE PRACTITIONER

## 2024-06-26 PROCEDURE — 25510000001 IOPAMIDOL PER 1 ML: Performed by: PHYSICIAN ASSISTANT

## 2024-06-26 PROCEDURE — 93971 EXTREMITY STUDY: CPT

## 2024-06-26 PROCEDURE — 83880 ASSAY OF NATRIURETIC PEPTIDE: CPT | Performed by: NURSE PRACTITIONER

## 2024-06-26 PROCEDURE — 99285 EMERGENCY DEPT VISIT HI MDM: CPT

## 2024-06-26 PROCEDURE — 85730 THROMBOPLASTIN TIME PARTIAL: CPT | Performed by: NURSE PRACTITIONER

## 2024-06-26 PROCEDURE — 80053 COMPREHEN METABOLIC PANEL: CPT | Performed by: NURSE PRACTITIONER

## 2024-06-26 PROCEDURE — 75635 CT ANGIO ABDOMINAL ARTERIES: CPT

## 2024-06-26 PROCEDURE — 85025 COMPLETE CBC W/AUTO DIFF WBC: CPT | Performed by: NURSE PRACTITIONER

## 2024-06-26 PROCEDURE — 93971 EXTREMITY STUDY: CPT | Performed by: SURGERY

## 2024-06-26 RX ORDER — SODIUM CHLORIDE 0.9 % (FLUSH) 0.9 %
10 SYRINGE (ML) INJECTION AS NEEDED
Status: DISCONTINUED | OUTPATIENT
Start: 2024-06-26 | End: 2024-06-27 | Stop reason: HOSPADM

## 2024-06-26 RX ADMIN — IOPAMIDOL 100 ML: 755 INJECTION, SOLUTION INTRAVENOUS at 22:05

## 2024-06-26 NOTE — ED PROVIDER NOTES
Subjective   History of Present Illness  Patient is a 59-year-old male who presents to the ER with complaints of right lower extremity pain and swelling.  Patient was admitted to this facility June 5, 2024 to June 6, 2024 for an elected PVC ablation per Dr. Paredes.  He developed what appeared to be a large venous hematoma however this appeared to be stable and was discharged home in stable condition.  He was evaluated in this ER on June 7, 2024 with complaints of pain to his groin region.  He had a CTA of the abdomen pelvis which revealed a large right femoral hematoma measuring approximately 7 x 9 cm, no pseudoaneurysm, no extravasation from the femoral artery.  Gallstones were also noted on CT scan.  Patient had follow-up with Dr. Paredes on June 12, 2024 with continued pain to the groin however this appeared to be improving.  Labs were stable.  He was instructed if he started having worsening leg pain or swelling he would need outpatient ultrasound.  He apparently spoke again with cardiology who ordered outpatient venous duplex Doppler ultrasound of the right lower extremity.  Patient states he was concerned about worsening pain and swelling and felt he could not wait until Monday therefore he came to the ER for evaluation and treatment.  Patient states he has had fatigue and chronic shortness of breath since the procedure due to anemia.  Past medical history significant for hypertension, chronic diastolic heart failure, morbid obesity, anxiety, chronic back pain, acid reflux, arthritis, chronic pain disorder, thyroid disease, diverticulitis, hyperlipidemia, sleep apnea        Review of Systems   Constitutional:  Positive for fatigue. Negative for fever.   HENT: Negative.  Negative for congestion.    Respiratory:  Positive for shortness of breath.    Cardiovascular:  Positive for leg swelling. Negative for chest pain.   Gastrointestinal:  Negative for abdominal pain, constipation, diarrhea, nausea and vomiting.    Genitourinary: Negative.  Negative for dysuria.   Musculoskeletal:  Positive for back pain.        Positive for chronic back pain   Skin: Negative.    All other systems reviewed and are negative.      Past Medical History:   Diagnosis Date    Acid reflux     Arthritis     Bell palsy     Chronic pain disorder     Disease of thyroid gland     Diverticulitis of colon     Hyperlipidemia     Hypertension     Low back pain     Neck pain     Sleep apnea     Stomach ulcer        No Known Allergies    Past Surgical History:   Procedure Laterality Date    ADENOIDECTOMY      CARDIAC ELECTROPHYSIOLOGY PROCEDURE N/A 6/5/2024    Procedure: Ablation PVC;  Surgeon: Devante Paredes MD;  Location:  [facility];  Service: Cardiovascular;  Laterality: N/A;    COLON RESECTION      COLONOSCOPY  11/08/2016    large amount of diverticular disease in sigmoid colon, internal hemorrhoids    ENDOSCOPY  08/27/2018    meld inflammation and focal lamina propria fibrosis (-) h-Pylori    TONSILLECTOMY         Family History   Problem Relation Age of Onset    Heart disease Mother     Diabetes Mother     Heart disease Father     Lung disease Father     Colon cancer Neg Hx     Colon polyps Neg Hx        Social History     Socioeconomic History    Marital status:    Tobacco Use    Smoking status: Never     Passive exposure: Never    Smokeless tobacco: Never   Vaping Use    Vaping status: Never Used   Substance and Sexual Activity    Alcohol use: No    Drug use: No    Sexual activity: Defer           Objective   Physical Exam  Vitals and nursing note reviewed.   Constitutional:       General: He is not in acute distress.     Appearance: He is well-developed. He is not diaphoretic.   HENT:      Head: Normocephalic and atraumatic.      Right Ear: External ear normal.      Left Ear: External ear normal.      Nose: Nose normal.      Mouth/Throat:      Pharynx: Oropharynx is clear.   Eyes:      General: No scleral icterus.      Extraocular Movements: Extraocular movements intact.      Conjunctiva/sclera: Conjunctivae normal.   Neck:      Thyroid: No thyromegaly.      Vascular: No JVD.   Cardiovascular:      Rate and Rhythm: Normal rate and regular rhythm.      Heart sounds: Normal heart sounds. No murmur heard.  Pulmonary:      Effort: Pulmonary effort is normal. No respiratory distress.      Breath sounds: Normal breath sounds. No wheezing or rales.   Chest:      Chest wall: No tenderness.   Abdominal:      General: Bowel sounds are normal. There is no distension.      Palpations: Abdomen is soft. There is no mass.      Tenderness: There is no abdominal tenderness. There is no guarding or rebound.   Musculoskeletal:         General: Swelling present. Normal range of motion.      Cervical back: Normal range of motion and neck supple.      Comments: Bruising noted to the right thigh, swelling noted to the right lower extremity however he does have swelling noted to legs bilaterally, reports swelling to the scrotum as well   Lymphadenopathy:      Cervical: No cervical adenopathy.   Skin:     General: Skin is warm and dry.      Coloration: Skin is not pale.      Findings: No erythema or rash.   Neurological:      Mental Status: He is alert and oriented to person, place, and time.      Cranial Nerves: No cranial nerve deficit.      Coordination: Coordination normal.      Deep Tendon Reflexes: Reflexes are normal and symmetric.   Psychiatric:         Mood and Affect: Mood normal.         Behavior: Behavior normal.         Thought Content: Thought content normal.         Judgment: Judgment normal.         Procedures           ED Course  ED Course as of 06/27/24 1201   Wed Jun 26, 2024 1933 Work up remains pending. This will be a turn over for Bentley MCDONNELL [TW]   1942 Assumed care of patient at this time from Ms. Madison MCBRIDE. Pending results of US will reevaluate and disposition accordingly. Please see Ms. Olea note above for  HPI, ROS, and physical examination findings.    [JS]   2125 Discussion with vascular tech.  Reports difficult to perform study due to edema and hematoma noted within patient's right lower extremity however multiple pseudoaneurysm seen.  Will add on CTA imaging. [JS]   u Jun 27, 2024   0034 Phone discussion with stat rad for CTA with runoff.  States that there is no visualized pseudoaneurysm however large hematoma and active bleeding at this time. [JS]   0040 CTA with runoff showed: Contrast material into calculating throughout the subcutaneous fat of the right groin, appearance more suggestive of active bleeding from common femoral artery then contained pseudoaneurysm, hematoma measuring up to 14 cm. [JS]   0120 Vascular surgery has been paged for the fourth time with no returned consult. Pressure continues to be held on right femoral region.  [JS]   0205 Vascular surgery consult continues to be pending. Pressure continues to be held on right femoral region.  [JS]   0209 Phone discussion with Dr. Meza, vascular surgeon.  States that he will review images and call back with further recommendations. [JS]   0248 Case discussed at this time with Dr. Romeo Coronel will be assuming care of patient.  Pending recommendations of vascular surgeon Dr. Coronel will reevaluate and disposition accordingly.  Please see Dr. Coronel note below for further ED course as well as final diagnosis.    Working diagnosis: Postoperative bleeding, femoral artery complication. [JS]   0528 I received signout from SATHYA Che.  Patient is a 59-year-old male who presents to the ED with complaint of worsening right groin pain and swelling over the past few weeks after PVC ablation performed by Dr. Paredes.  Helped hematoma at the time of the surgery.  Attempted to order ultrasound to evaluate for pseudoaneurysm, due to the patient's body habitus this was not possible.  CTA with runoff was ordered, it appeared the patient had some  active bleeding from the femoral artery.  Dr. Meza with vascular surgery was consulted.    Dr. Meza was in an emergency surgery overnight, once available stated he would like to take the patient to the vascular lab for some injection of what he thought were likely two small pseudoaneurysms and felt the rest of the bleeding would likely resolve.  Plan is to keep the patient in the ED until Dr. Meza has time between cases to take the patient to the operating room. [AW]   0620 Awaiting pseudoaneurysm injection with Dr. Meza.  Signing over care to Dr. Fermin [AW]   1049 Dr. Meza has performed the procedure he is going be washed in the ED for couple hours then discharged home [TS]   1200 Patient was HemeTab a baking procedure performed patient has been on bedrest and is given discharge home. [TS]   1201 Patient has been given to show insertion by Dr. Meza [TS]      ED Course User Index  [AW] Romeo Coronel MD  [JS] Bentley Palacio PA-C  [TS] Klever Fermin MD  [TW] Madison Olea APRN                                             Medical Decision Making  Patient is a 59-year-old male who presents to the ER with complaints of right lower extremity pain and swelling.  Patient was admitted to this facility June 5, 2024 to June 6, 2024 for an elected PVC ablation per Dr. Paredes.  He developed what appeared to be a large venous hematoma however this appeared to be stable and was discharged home in stable condition.  He was evaluated in this ER on June 7, 2024 with complaints of pain to his groin region.  He had a CTA of the abdomen pelvis which revealed a large right femoral hematoma measuring approximately 7 x 9 cm, no pseudoaneurysm, no extravasation from the femoral artery.  Gallstones were also noted on CT scan.  Patient had follow-up with Dr. Paredes on June 12, 2024 with continued pain to the groin however this appeared to be improving.  Labs were stable.  He was instructed if he started having  worsening leg pain or swelling he would need outpatient ultrasound.  He apparently spoke again with cardiology who ordered outpatient venous duplex Doppler ultrasound of the right lower extremity.  Patient states he was concerned about worsening pain and swelling and felt he could not wait until Monday therefore he came to the ER for evaluation and treatment. Patient states he has had fatigue and chronic shortness of breath since the procedure due to anemia. Past medical history significant for hypertension, chronic diastolic heart failure, morbid obesity, anxiety, chronic back pain, acid reflux, arthritis, chronic pain disorder, thyroid disease, diverticulitis, hyperlipidemia, sleep apnea    Differential diagnosis: Hematoma, DVT, CHF, and other    Problems Addressed:  Other postoperative complication involving circulatory system: complicated acute illness or injury    Amount and/or Complexity of Data Reviewed  Labs: ordered.  Radiology: ordered.    Risk  Prescription drug management.        Final diagnoses:   Other postoperative complication involving circulatory system       ED Disposition  ED Disposition       ED Disposition   Discharge    Condition   Stable    Comment   --               Emma Estrada, DO  [address] [facility] [address]  [phone]               Medication List      No changes were made to your prescriptions during this visit.            Klever Fermin MD  06/27/24 6667

## 2024-06-27 ENCOUNTER — APPOINTMENT (OUTPATIENT)
Dept: ULTRASOUND IMAGING | Facility: HOSPITAL | Age: 59
End: 2024-06-27
Payer: MEDICAID

## 2024-06-27 VITALS
BODY MASS INDEX: 43.17 KG/M2 | RESPIRATION RATE: 20 BRPM | WEIGHT: 291.5 LBS | DIASTOLIC BLOOD PRESSURE: 73 MMHG | HEART RATE: 62 BPM | SYSTOLIC BLOOD PRESSURE: 144 MMHG | HEIGHT: 69 IN | TEMPERATURE: 98 F | OXYGEN SATURATION: 96 %

## 2024-06-27 DIAGNOSIS — Z09 FOLLOW-UP EXAMINATION, FOLLOWING OTHER SURGERY: Primary | ICD-10-CM

## 2024-06-27 PROCEDURE — 36002 PSEUDOANEURYSM INJECTION TRT: CPT

## 2024-06-27 PROCEDURE — 25010000002 HYDROMORPHONE PER 4 MG: Performed by: EMERGENCY MEDICINE

## 2024-06-27 PROCEDURE — 76942 ECHO GUIDE FOR BIOPSY: CPT

## 2024-06-27 PROCEDURE — 36002 PSEUDOANEURYSM INJECTION TRT: CPT | Performed by: SURGERY

## 2024-06-27 PROCEDURE — 76942 ECHO GUIDE FOR BIOPSY: CPT | Performed by: SURGERY

## 2024-06-27 PROCEDURE — 96374 THER/PROPH/DIAG INJ IV PUSH: CPT

## 2024-06-27 PROCEDURE — 96376 TX/PRO/DX INJ SAME DRUG ADON: CPT

## 2024-06-27 RX ORDER — HYDROMORPHONE HYDROCHLORIDE 1 MG/ML
0.5 INJECTION, SOLUTION INTRAMUSCULAR; INTRAVENOUS; SUBCUTANEOUS ONCE
Status: COMPLETED | OUTPATIENT
Start: 2024-06-27 | End: 2024-06-27

## 2024-06-27 RX ADMIN — HYDROMORPHONE HYDROCHLORIDE 0.5 MG: 1 INJECTION, SOLUTION INTRAMUSCULAR; INTRAVENOUS; SUBCUTANEOUS at 06:27

## 2024-06-27 RX ADMIN — HYDROMORPHONE HYDROCHLORIDE 0.5 MG: 1 INJECTION, SOLUTION INTRAMUSCULAR; INTRAVENOUS; SUBCUTANEOUS at 08:52

## 2024-06-27 NOTE — ED PROVIDER NOTES
Subjective   History of Present Illness    Review of Systems    Past Medical History:   Diagnosis Date    Acid reflux     Arthritis     Bell palsy     Chronic pain disorder     Disease of thyroid gland     Diverticulitis of colon     Hyperlipidemia     Hypertension     Low back pain     Neck pain     Sleep apnea     Stomach ulcer        No Known Allergies    Past Surgical History:   Procedure Laterality Date    ADENOIDECTOMY      CARDIAC ELECTROPHYSIOLOGY PROCEDURE N/A 6/5/2024    Procedure: Ablation PVC;  Surgeon: Devante Paredes MD;  Location:  [facility];  Service: Cardiovascular;  Laterality: N/A;    COLON RESECTION      COLONOSCOPY  11/08/2016    large amount of diverticular disease in sigmoid colon, internal hemorrhoids    ENDOSCOPY  08/27/2018    meld inflammation and focal lamina propria fibrosis (-) h-Pylori    TONSILLECTOMY         Family History   Problem Relation Age of Onset    Heart disease Mother     Diabetes Mother     Heart disease Father     Lung disease Father     Colon cancer Neg Hx     Colon polyps Neg Hx        Social History     Socioeconomic History    Marital status:    Tobacco Use    Smoking status: Never     Passive exposure: Never    Smokeless tobacco: Never   Vaping Use    Vaping status: Never Used   Substance and Sexual Activity    Alcohol use: No    Drug use: No    Sexual activity: Defer           Objective   Physical Exam    Procedures           ED Course  ED Course as of 06/27/24 1403   Wed Jun 26, 2024 1933 Work up remains pending. This will be a turn over for Bentley MCDONNELL [TW]   1942 Assumed care of patient at this time from Ms. Madison MCBRIDE. Pending results of US will reevaluate and disposition accordingly. Please see MsShaquille Lefty note above for HPI, ROS, and physical examination findings.    [JS]   2125 Discussion with vascular tech.  Reports difficult to perform study due to edema and hematoma noted within patient's right lower extremity  however multiple pseudoaneurysm seen.  Will add on CTA imaging. [JS]   Thu Jun 27, 2024   0034 Phone discussion with stat rad for CTA with runoff.  States that there is no visualized pseudoaneurysm however large hematoma and active bleeding at this time. [JS]   0040 CTA with runoff showed: Contrast material into calculating throughout the subcutaneous fat of the right groin, appearance more suggestive of active bleeding from common femoral artery then contained pseudoaneurysm, hematoma measuring up to 14 cm. [JS]   0120 Vascular surgery has been paged for the fourth time with no returned consult. Pressure continues to be held on right femoral region.  [JS]   0205 Vascular surgery consult continues to be pending. Pressure continues to be held on right femoral region.  [JS]   0209 Phone discussion with Dr. Meza, vascular surgeon.  States that he will review images and call back with further recommendations. [JS]   0248 Case discussed at this time with Dr. Romeo Coronel will be assuming care of patient.  Pending recommendations of vascular surgeon Dr. Coronel will reevaluate and disposition accordingly.  Please see Dr. Coronel note below for further ED course as well as final diagnosis.    Working diagnosis: Postoperative bleeding, femoral artery complication. [JS]   0551 I received signout from SATHYA Che.  Patient is a 59-year-old male who presents to the ED with complaint of worsening right groin pain and swelling over the past few weeks after PVC ablation performed by Dr. Paredes.  Helped hematoma at the time of the surgery.  Attempted to order ultrasound to evaluate for pseudoaneurysm, due to the patient's body habitus this was not possible.  CTA with runoff was ordered, it appeared the patient had some active bleeding from the femoral artery.  Dr. Meza with vascular surgery was consulted.    Dr. Meza was in an emergency surgery overnight, once available stated he would like to take the patient  to the vascular lab for some injection of what he thought were likely two small pseudoaneurysms and felt the rest of the bleeding would likely resolve.  Plan is to keep the patient in the ED until Dr. Meza has time between cases to take the patient to the operating room. [AW]   0620 Awaiting pseudoaneurysm injection with Dr. Meza.  Signing over care to Dr. Fermin [AW]   1049 Dr. Meza has performed the procedure he is going be washed in the ED for couple hours then discharged home [TS]   1200 Patient was HemeTab a baking procedure performed patient has been on bedrest and is given discharge home. [TS]   1201 Patient has been given to show insertion by Dr. Meza [TS]      ED Course User Index  [AW] Romeo Coronel MD  [JS] Bentley Palacio PA-C  [TS] Klever Fermin MD  [TW] Madison Olea APRN                                             Medical Decision Making  Problems Addressed:  Other postoperative complication involving circulatory system: complicated acute illness or injury    Amount and/or Complexity of Data Reviewed  Labs: ordered.  Radiology: ordered.  Discussion of management or test interpretation with external provider(s): Ms. Madison Coronel (attending)  Dr. Meza (vascular)    Risk  Prescription drug management.        Final diagnoses:   Other postoperative complication involving circulatory system       ED Disposition  ED Disposition       ED Disposition   Discharge    Condition   Stable    Comment   --               Sean Emma Simental, DO  [address] [facility] [address]  [phone]               Medication List      No changes were made to your prescriptions during this visit.            Bentley Palacio PA-C  06/27/24 0233       Bentley Palacio PA-C  06/27/24 0249       Bentley Palacio PA-C  06/27/24 1038       Bentley Palacio PA-C  06/27/24 1403

## 2024-06-27 NOTE — ED NOTES
Patient informed and aware that we are awaiting vascular surgery to see patient. Patient is understanding but frustrated at this time stating he has been here since 4pm yesterday afternoon. Hospital bed provided for patient and recliner given to spouse. Pressure still being applied to right groin with sand bag.

## 2024-06-27 NOTE — PROGRESS NOTES
Shankar Velasco       PREOPERATIVE DIAGNOSIS: Right common femoral artery pseudoaneurysm     POSTOPERATIVE DIAGNOSIS: Same     PROCEDURE PERFORMED:   1.  Ultrasound-guided thrombin injection of the right common femoral artery pseudoaneurysm     SURGEON: Kennedy Meza DO      ANESTHESIA: None    PREPARATION: Routine.    STAFF: Mikayla Pacheco    Estimated Blood Loss: None    SPECIMENS: None    COMPLICATIONS: None    INDICATIONS: Shankar Velasco is a 59 y.o. male who underwent cardiac ablation with Dr. Paredes 3 weeks ago.  Patient developed pain and swelling in the right groin and was found to have a right common femoral artery pseudoaneurysm along with a large hematoma.  Therefore, the decision was made to proceed with thrombin injection under ultrasound guidance. The indications, risks, and possible complications of the procedure were explained to the patient, who voiced understanding and wished to proceed with surgery.     PROCEDURE IN DETAIL: The patient was taken to the vascular lab and placed in the supine position.  The right groin was properly prepped and draped in standard sterile fashion.  Under ultrasound guidance, and using a micropuncture technique, the pseudoaneurysm was directly cannulated and injected with 2 cc of thrombin.  The pseudoaneurysm immediately had cessation of flow.  A duplex of the leg was performed to ensure adequate antegrade flow down the leg.  The ultrasound again confirmed complete cessation of flow in the pseudoaneurysm. Sterile dressings were applied. The patient tolerated the procedure well.  The patient was transferred back to the emergency room in good condition.    Kennedy Meza DO  Date: 6/27/2024 Time: 09:35 CDT

## 2024-06-27 NOTE — PROGRESS NOTES
Seen in the ER.  Large right leg firm hematoma.      Appreciate vascular surgery assistance.    Will set follow-up for EP clinic.

## 2024-07-03 ENCOUNTER — TELEPHONE (OUTPATIENT)
Dept: VASCULAR SURGERY | Facility: CLINIC | Age: 59
End: 2024-07-03
Payer: MEDICAID

## 2024-07-03 ENCOUNTER — HOSPITAL ENCOUNTER (OUTPATIENT)
Dept: NEUROLOGY | Facility: HOSPITAL | Age: 59
Discharge: HOME | End: 2024-07-03
Admitting: NURSE PRACTITIONER
Payer: MEDICAID

## 2024-07-03 DIAGNOSIS — R20.0 NUMBNESS IN BOTH LEGS: ICD-10-CM

## 2024-07-03 DIAGNOSIS — M54.41 CHRONIC BILATERAL LOW BACK PAIN WITH BILATERAL SCIATICA: ICD-10-CM

## 2024-07-03 DIAGNOSIS — M54.42 CHRONIC BILATERAL LOW BACK PAIN WITH BILATERAL SCIATICA: ICD-10-CM

## 2024-07-03 DIAGNOSIS — G89.29 CHRONIC BILATERAL LOW BACK PAIN WITH BILATERAL SCIATICA: ICD-10-CM

## 2024-07-03 PROCEDURE — 95909 NRV CNDJ TST 5-6 STUDIES: CPT

## 2024-07-03 PROCEDURE — 95885 MUSC TST DONE W/NERV TST LIM: CPT | Performed by: PSYCHIATRY & NEUROLOGY

## 2024-07-03 PROCEDURE — 95909 NRV CNDJ TST 5-6 STUDIES: CPT | Performed by: PSYCHIATRY & NEUROLOGY

## 2024-07-03 PROCEDURE — 95885 MUSC TST DONE W/NERV TST LIM: CPT

## 2024-07-05 ENCOUNTER — HOSPITAL ENCOUNTER (OUTPATIENT)
Dept: ULTRASOUND IMAGING | Facility: HOSPITAL | Age: 59
Discharge: HOME | End: 2024-07-05
Payer: MEDICAID

## 2024-07-05 ENCOUNTER — OFFICE VISIT (OUTPATIENT)
Dept: VASCULAR SURGERY | Facility: CLINIC | Age: 59
End: 2024-07-05
Payer: MEDICAID

## 2024-07-05 VITALS
SYSTOLIC BLOOD PRESSURE: 138 MMHG | WEIGHT: 291 LBS | HEIGHT: 69 IN | OXYGEN SATURATION: 96 % | DIASTOLIC BLOOD PRESSURE: 68 MMHG | BODY MASS INDEX: 43.1 KG/M2 | HEART RATE: 71 BPM

## 2024-07-05 DIAGNOSIS — I10 ESSENTIAL HYPERTENSION: ICD-10-CM

## 2024-07-05 DIAGNOSIS — E78.5 HYPERLIPIDEMIA, UNSPECIFIED HYPERLIPIDEMIA TYPE: ICD-10-CM

## 2024-07-05 DIAGNOSIS — I65.23 BILATERAL CAROTID ARTERY STENOSIS: ICD-10-CM

## 2024-07-05 DIAGNOSIS — I72.4 PSEUDOANEURYSM OF FEMORAL ARTERY: Primary | ICD-10-CM

## 2024-07-05 DIAGNOSIS — I89.0 LYMPHEDEMA: ICD-10-CM

## 2024-07-05 DIAGNOSIS — I73.9 PAD (PERIPHERAL ARTERY DISEASE): ICD-10-CM

## 2024-07-05 DIAGNOSIS — Z09 FOLLOW-UP EXAMINATION, FOLLOWING OTHER SURGERY: ICD-10-CM

## 2024-07-05 PROCEDURE — 93926 LOWER EXTREMITY STUDY: CPT

## 2024-07-05 RX ORDER — POTASSIUM CHLORIDE 750 MG/1
10 TABLET, EXTENDED RELEASE ORAL DAILY
COMMUNITY

## 2024-07-05 NOTE — PROGRESS NOTES
"07/05/2024      Emma Estrada, DO  [address] [facility] [address]      Shankar Velasco  1965    Chief Complaint   Patient presents with    Post-op     2 week post op. Pseudoaneurysm repair done 6/27/24. Patient denies any issues.        Dear Dr. Emma Estrada:      HPI  I had the pleasure of seeing your patient Shankar Velasco in the office today.  Thank you kindly for this consultation.  As you recall, Shankar Velasco is a 59 y.o.  male who you are currently following for routine health maintenance.  He is here for 2-week postop after undergoing right common femoral artery pseudoaneurysm repair on 6/27/2024.  He is doing well after surgery hematoma is still present but manageable.  He denies symptoms of claudication or ischemia however he does not walk much at this time he is in a wheelchair for long distances.  He uses a cane for short distances.  He denies any strokelike symptoms.  He denies family history of aneurysms.  He is maintained on aspirin.  He did have noninvasive testing performed today, which I did review in office.      Review of Systems   Constitutional: Negative.  Negative for diaphoresis and fever.   HENT: Negative.     Eyes: Negative.    Respiratory: Negative.  Negative for shortness of breath and wheezing.    Cardiovascular:  Positive for leg swelling. Negative for chest pain.   Gastrointestinal: Negative.  Negative for abdominal pain.   Endocrine: Negative.    Genitourinary: Negative.    Musculoskeletal: Negative.    Skin: Negative.    Allergic/Immunologic: Negative.    Neurological: Negative.  Negative for dizziness and weakness.   Hematological: Negative.    Psychiatric/Behavioral: Negative.         /68   Pulse 71   Ht 175.3 cm (69\")   Wt 132 kg (291 lb)   SpO2 96%   BMI 42.97 kg/m²       Physical Exam  Vitals and nursing note reviewed.   Constitutional:       General: He is not in acute distress.     Appearance: Normal appearance. He is morbidly obese. He is not " diaphoretic.   HENT:      Head: Normocephalic. No right periorbital erythema or left periorbital erythema.      Nose: Nose normal.   Eyes:      General: No scleral icterus.     Pupils: Pupils are equal.   Cardiovascular:      Rate and Rhythm: Normal rate and regular rhythm.      Pulses: Normal pulses.           Dorsalis pedis pulses are 2+ on the right side and 2+ on the left side.        Posterior tibial pulses are 2+ on the right side and 2+ on the left side.      Heart sounds: Normal heart sounds. No murmur heard.     Comments: Right groin healed.  Firmness noted, no bruising.  Pulmonary:      Effort: Pulmonary effort is normal. No respiratory distress.      Breath sounds: Normal breath sounds.   Abdominal:      General: Bowel sounds are normal. There is no distension.      Palpations: Abdomen is soft.      Tenderness: There is no abdominal tenderness. There is no guarding.   Musculoskeletal:         General: No swelling or tenderness. Normal range of motion.      Cervical back: Normal range of motion and neck supple.      Right lower leg: Edema present.      Left lower leg: Edema present.   Feet:      Right foot:      Skin integrity: Skin integrity normal.      Left foot:      Skin integrity: Skin integrity normal.   Skin:     General: Skin is warm and dry.      Findings: No erythema or rash.   Neurological:      General: No focal deficit present.      Mental Status: He is alert and oriented to person, place, and time. Mental status is at baseline.      Cranial Nerves: No cranial nerve deficit.      Gait: Gait normal.   Psychiatric:         Attention and Perception: Attention normal.         Mood and Affect: Mood normal.     DIAGNOSTIC DATA  Mika Pathak on 7/5/2024  7:34 AM CDT   RLE s/p pseudo inject. Large hematoma remains, no evidence of pseudo. Normal arterial flow.       Patient Active Problem List   Diagnosis    Paresthesia of both lower extremities    Anxiety    Memory changes    Morbidly obese     Demyelinating changes in brain    Obstructive sleep apnea syndrome    Cervical disc herniation    Class 3 severe obesity due to excess calories with body mass index (BMI) of 40.0 to 44.9 in adult    Non-smoker    Frequent unifocal PVCs    Cardiac abnormality         ICD-10-CM ICD-9-CM   1. Pseudoaneurysm of femoral artery  I72.4 442.3   2. Bilateral carotid artery stenosis  I65.23 433.10     433.30   3. PAD (peripheral artery disease)  I73.9 443.9   4. Lymphedema  I89.0 457.1   5. Essential hypertension  I10 401.9   6. Hyperlipidemia, unspecified hyperlipidemia type  E78.5 272.4           Plan: After thoroughly evaluating Shankar Velasco, I believe the best course of action is to main conservative from vascular surgery standpoint.  Overall he is doing well, I did review his testing and the hematoma is still present but no pseudoaneurysm is visible.  Pulses are palpable and feet are warm.  He does have the appearance of lymphedema.  I would like him to take this prescription and  compression stockings placing them on in the morning taking them off in the evening.  Elevating his legs when not on them.  Utilizing good moisturization to keep his skin healthy.  We discussed home lymphedema pumps, and that our wraps are here on Wednesdays from 12-4PM.  He is going to consider this and call the office to schedule an appointment if interested.  I also mentioned our lymphedema clinic which she does work so that would be a little more trying for him.  He is maintained on aspirin 325 mg daily.  I did discuss vascular risk factors as they pertain to the progression of vascular disease including controlling hypertension, and hyperlipidemia.  His blood pressure stable today in office.  His last lipid panel on 8/10/2023 showed total cholesterol 208, HDL 30, triglycerides 260, .  He is not on any statin medication for cholesterol.  The patient can continue taking their current medication regimen as previously planned.   This was all discussed in full with complete understanding.    Thank you for allowing me to participate in the care of your patient.  Please do not hesitate with any questions or concerns.  I will keep you aware of any further encounters with Shankar Velasco.        Sincerely yours,         REED Thakkar

## 2024-07-08 ENCOUNTER — OFFICE VISIT (OUTPATIENT)
Dept: INTERNAL MEDICINE | Facility: CLINIC | Age: 59
End: 2024-07-08
Payer: MEDICAID

## 2024-07-08 ENCOUNTER — TELEPHONE (OUTPATIENT)
Dept: INTERNAL MEDICINE | Facility: CLINIC | Age: 59
End: 2024-07-08
Payer: MEDICAID

## 2024-07-08 VITALS
BODY MASS INDEX: 42.8 KG/M2 | TEMPERATURE: 96.9 F | DIASTOLIC BLOOD PRESSURE: 70 MMHG | HEART RATE: 75 BPM | SYSTOLIC BLOOD PRESSURE: 130 MMHG | WEIGHT: 289 LBS | OXYGEN SATURATION: 98 % | HEIGHT: 69 IN

## 2024-07-08 DIAGNOSIS — Z00.00 WELLNESS EXAMINATION: ICD-10-CM

## 2024-07-08 DIAGNOSIS — Z76.89 ENCOUNTER TO ESTABLISH CARE WITH NEW DOCTOR: Primary | ICD-10-CM

## 2024-07-08 DIAGNOSIS — Z12.5 PROSTATE CANCER SCREENING: ICD-10-CM

## 2024-07-08 DIAGNOSIS — E66.01 CLASS 3 SEVERE OBESITY DUE TO EXCESS CALORIES WITH SERIOUS COMORBIDITY AND BODY MASS INDEX (BMI) OF 40.0 TO 44.9 IN ADULT: ICD-10-CM

## 2024-07-08 DIAGNOSIS — D62 ACUTE BLOOD LOSS ANEMIA: ICD-10-CM

## 2024-07-08 DIAGNOSIS — G89.29 CHRONIC PAIN OF RIGHT KNEE: ICD-10-CM

## 2024-07-08 DIAGNOSIS — I87.2 CHRONIC VENOUS INSUFFICIENCY OF LOWER EXTREMITY: ICD-10-CM

## 2024-07-08 DIAGNOSIS — I50.32 CHRONIC DIASTOLIC (CONGESTIVE) HEART FAILURE: ICD-10-CM

## 2024-07-08 DIAGNOSIS — I72.4 PSEUDOANEURYSM OF RIGHT FEMORAL ARTERY: ICD-10-CM

## 2024-07-08 DIAGNOSIS — G89.29 CHRONIC MIDLINE LOW BACK PAIN WITH BILATERAL SCIATICA: ICD-10-CM

## 2024-07-08 DIAGNOSIS — Z98.890 HISTORY OF CARDIAC RADIOFREQUENCY ABLATION: ICD-10-CM

## 2024-07-08 DIAGNOSIS — M25.561 CHRONIC PAIN OF RIGHT KNEE: ICD-10-CM

## 2024-07-08 DIAGNOSIS — M54.41 CHRONIC MIDLINE LOW BACK PAIN WITH BILATERAL SCIATICA: ICD-10-CM

## 2024-07-08 DIAGNOSIS — E03.9 ACQUIRED HYPOTHYROIDISM: ICD-10-CM

## 2024-07-08 DIAGNOSIS — I10 ESSENTIAL HYPERTENSION: ICD-10-CM

## 2024-07-08 DIAGNOSIS — G62.9 PERIPHERAL POLYNEUROPATHY: ICD-10-CM

## 2024-07-08 DIAGNOSIS — M54.42 CHRONIC MIDLINE LOW BACK PAIN WITH BILATERAL SCIATICA: ICD-10-CM

## 2024-07-08 PROCEDURE — 1160F RVW MEDS BY RX/DR IN RCRD: CPT | Performed by: INTERNAL MEDICINE

## 2024-07-08 PROCEDURE — 3078F DIAST BP <80 MM HG: CPT | Performed by: INTERNAL MEDICINE

## 2024-07-08 PROCEDURE — 1126F AMNT PAIN NOTED NONE PRSNT: CPT | Performed by: INTERNAL MEDICINE

## 2024-07-08 PROCEDURE — 1159F MED LIST DOCD IN RCRD: CPT | Performed by: INTERNAL MEDICINE

## 2024-07-08 PROCEDURE — 3075F SYST BP GE 130 - 139MM HG: CPT | Performed by: INTERNAL MEDICINE

## 2024-07-08 PROCEDURE — 99204 OFFICE O/P NEW MOD 45 MIN: CPT | Performed by: INTERNAL MEDICINE

## 2024-07-08 RX ORDER — TAMSULOSIN HYDROCHLORIDE 0.4 MG/1
1 CAPSULE ORAL NIGHTLY
Qty: 90 CAPSULE | Refills: 1 | Status: SHIPPED | OUTPATIENT
Start: 2024-07-08

## 2024-07-08 RX ORDER — PREGABALIN 75 MG/1
75 CAPSULE ORAL 3 TIMES DAILY PRN
Qty: 90 CAPSULE | Refills: 5 | Status: SHIPPED | OUTPATIENT
Start: 2024-07-08

## 2024-07-08 NOTE — TELEPHONE ENCOUNTER
[facility] [employer] paperwork faxed to [phone] and original copy mailed to patient per his request.

## 2024-07-08 NOTE — PROGRESS NOTES
"    Chief Complaint  Establish Care (States he stopped taking Lasix due to work not allowing him enough time to go o the restroom), Knee Pain (X 3 months; right knee), Urinary Frequency (Burning; urgency; requesting PSA; ), and Leg Pain (Fell last fall; Right leg pain)    Subjective        Shankar Velasco presents to [facility]  History of Present Illness  See below.     Objective   Vital Signs:  /70 (BP Location: Left arm, Patient Position: Sitting, Cuff Size: Large Adult)   Pulse 75   Temp 96.9 °F (36.1 °C) (Temporal)   Ht 175.3 cm (69\")   Wt 131 kg (289 lb)   SpO2 98%   BMI 42.68 kg/m²   Estimated body mass index is 42.68 kg/m² as calculated from the following:    Height as of this encounter: 175.3 cm (69\").    Weight as of this encounter: 131 kg (289 lb).         Physical Exam  Constitutional:       Appearance: He is obese. He is not ill-appearing.      Comments: Seen and discussed with his wife.   HENT:      Head: Normocephalic and atraumatic.   Eyes:      Conjunctiva/sclera: Conjunctivae normal.      Pupils: Pupils are equal, round, and reactive to light.   Cardiovascular:      Rate and Rhythm: Normal rate and regular rhythm.      Heart sounds: Normal heart sounds.   Pulmonary:      Effort: Pulmonary effort is normal. No respiratory distress.      Breath sounds: Normal breath sounds.   Musculoskeletal:         General: Swelling present.      Cervical back: Neck supple.   Skin:     General: Skin is warm and dry.      Findings: No rash.   Neurological:      General: No focal deficit present.      Mental Status: He is alert and oriented to person, place, and time.   Psychiatric:         Mood and Affect: Mood normal.         Behavior: Behavior normal.         Thought Content: Thought content normal.         Judgment: Judgment normal.        Result Review :  He just had an arterial duplex of the right lower extremity on 7/5 that has not been read yet.    Results for orders " placed during the hospital encounter of 11/02/23    Adult Transthoracic Echo Complete W/ Cont if Necessary Per Protocol    Interpretation Summary    Left ventricular systolic function is normal. Left ventricular ejection fraction appears to be 56 - 60%.    The left ventricular cavity is borderline dilated. Left ventricular wall thickness is consistent with mild concentric hypertrophy    Normal right ventricular cavity size and systolic function noted.    No hemodynamically significant valvular disease.    Reviewed his recent EP study.    CMP on 6/26 was unremarkable.  Creatinine 0.74.  CBC on 6/26 showed a hemoglobin of 10.2.  His hemoglobin was down to 8.3 on 6/12.  His hemoglobin was 13.9 prior to the EP procedure.    Most recent TSH and total T4 were appropriate in October 2023.  No lipids on file since April 2021.  Last hemoglobin A1c was normal at 5.1 in April 2021.    Last PSA on file was in January 2021, normal.    Last colonoscopy on file was in November 2016 with Dr. Scar Craig.  Large amount of diverticular disease throughout the sigmoid colon.  Nonbleeding internal hemorrhoids.  He recommended to repeat the exam in 5 years for screening.         Assessment and Plan   Diagnoses and all orders for this visit:    1. Encounter to establish care with new doctor (Primary)    2. History of cardiac radiofrequency ablation    3. Pseudoaneurysm of right femoral artery    4. Acute blood loss anemia    5. Chronic diastolic (congestive) heart failure    6. Essential hypertension    7. Acquired hypothyroidism  -     TSH Rfx On Abnormal To Free T4    8. Class 3 severe obesity due to excess calories with serious comorbidity and body mass index (BMI) of 40.0 to 44.9 in adult    9. Prostate cancer screening  -     PSA SCREENING    10. Wellness examination  -     Lipid Panel    11. Peripheral polyneuropathy  -     pregabalin (LYRICA) 75 MG capsule; Take 1 capsule by mouth 3 (Three) Times a Day As Needed (pain).  Dispense:  90 capsule; Refill: 5  -     Hemoglobin A1c  -     Vitamin B12    Other orders  -     tamsulosin (FLOMAX) 0.4 MG capsule 24 hr capsule; Take 1 capsule by mouth Every Night.  Dispense: 90 capsule; Refill: 1       Presents today to establish care.  His prior PCP is REED Goodwin at [facility].  He desired transitioning to a [facility] primary care provider given his multiple specialists with [facility].    He has a history of persistent PVCs.  He underwent PVC ablation with Dr. Paredes on 6/5.  He ended up having difficulty with a postprocedural hematoma and pseudoaneurysm of the right femoral artery.  He underwent thrombin injection with Dr. Meza on 6/27.  He had a follow-up arterial study on 7/5 that has not been read yet.  He sees EP again on 7/11.    He is in the process of obtaining compression stockings for his venous insufficiency.       He has chronic diastolic heart failure followed by Dr. Dasilva and REED Peck with cardiology.  Most recent echocardiogram was in November 2023 and showed preserved EF.  He is on lisinopril and furosemide.  He is not presently on a beta-blocker.    Blood pressure 130/70 today.  Continue lisinopril-HCTZ, amlodipine.  Encouraged ambulatory monitoring.    He has hypothyroidism.  He is on levothyroxine 50 mcg daily.  TSH was acceptable in October 2023.  Repeat today.    His weight likely contributes to a myriad of his issues.  He is trying to work on watching diet and increasing activity.  He has previously lost 35 pounds.    He has been following with neurosurgery at [facility].  He has previously seen REED Rodriguez.  He sees Dr. Mccann on 7/18.  He recently had an EMG/NCV on 7/3.  He had an MRI of the lumbar spine without contrast on 4/10 that showed multilevel degenerative changes with moderate left and mild to moderate right neural foraminal stenosis at L4-L5 and moderate right neuroforaminal stenosis at L3-L4.  He sees Dr. Marshall on 7/17 to consider injections.    He  has a longstanding history of neuropathy.  He is on Lyrica.  I cannot see where B12 is ever been checked.  He has not been diabetic in the past reassess hemoglobin A1c.  He worked in pest control for many years and wonders if perhaps some of the chemicals could have contributed to the problem.  Pest control?    Right knee pain is chronic.  He has a tear of the medial meniscus with tricompartmental osteoarthritis and a moderate joint effusion on MRI done at [facility] at the end of April.  Physical therapy for this is to start again soon.  He sees Dr. Omer.    He has had some difficulty with incomplete bladder emptying and hesitancy.  He had a CT scan recently that spoke of prostate being moderately prominent with intrinsic calcification.  We will place him on Flomax nightly for now.  He is overdue for PSA testing; obtain.    Overdue for colonoscopy per Dr. Craig's recommendations in November 2016.    Plan to have him back in 4 months for his annual physical.  He knows that he can reach out sooner if problems.      Follow Up   Return in about 4 months (around 11/8/2024) for Annual physical.  Patient was given instructions and counseling regarding his condition or for health maintenance advice. Please see specific information pulled into the AVS if appropriate.      SRAVAN Dave DO       Electronically signed by BRYAN Dave DO, 07/08/24, 10:34 AM CDT.

## 2024-07-09 LAB
CHOLEST SERPL-MCNC: 182 MG/DL (ref 100–199)
HBA1C MFR BLD: 4.5 % (ref 4.8–5.6)
HDLC SERPL-MCNC: 27 MG/DL
LDLC SERPL CALC-MCNC: 110 MG/DL (ref 0–99)
PSA SERPL-MCNC: 0.7 NG/ML (ref 0–4)
TRIGL SERPL-MCNC: 256 MG/DL (ref 0–149)
TSH SERPL DL<=0.005 MIU/L-ACNC: 2.14 UIU/ML (ref 0.45–4.5)
VIT B12 SERPL-MCNC: 861 PG/ML (ref 232–1245)
VLDLC SERPL CALC-MCNC: 45 MG/DL (ref 5–40)

## 2024-07-09 RX ORDER — ATORVASTATIN CALCIUM 10 MG/1
10 TABLET, FILM COATED ORAL NIGHTLY
Qty: 90 TABLET | Refills: 1 | Status: SHIPPED | OUTPATIENT
Start: 2024-07-09

## 2024-07-10 ENCOUNTER — TELEPHONE (OUTPATIENT)
Dept: CARDIOLOGY | Facility: CLINIC | Age: 59
End: 2024-07-10
Payer: MEDICAID

## 2024-07-10 NOTE — TELEPHONE ENCOUNTER
Shankar has been scheduled with Dr.C John Dasilva on 07/24/24 at 08:45 am. Patient has been advised.    Thanks    WF

## 2024-07-10 NOTE — TELEPHONE ENCOUNTER
Caller: Shankar Velasco    Relationship to patient: Self    Best call back number: [phone]    Chief complaint: PVC POSSIBLY     Type of visit: HOSPITAL FOLLOW UP     Requested date: ANY DAYS ON WEDNESDAY     If rescheduling, when is the original appointment: 07.11.2024     Additional notes:CAN NOT DO ANY OTHER DAY DUE TO WORK CONFLICTS

## 2024-07-11 ENCOUNTER — TELEPHONE (OUTPATIENT)
Dept: NEUROSURGERY | Facility: CLINIC | Age: 59
End: 2024-07-11
Payer: MEDICAID

## 2024-07-11 DIAGNOSIS — G89.29 CHRONIC BILATERAL LOW BACK PAIN WITH BILATERAL SCIATICA: Primary | ICD-10-CM

## 2024-07-11 DIAGNOSIS — R20.0 NUMBNESS IN BOTH LEGS: ICD-10-CM

## 2024-07-11 DIAGNOSIS — M54.42 CHRONIC BILATERAL LOW BACK PAIN WITH BILATERAL SCIATICA: Primary | ICD-10-CM

## 2024-07-11 DIAGNOSIS — M54.41 CHRONIC BILATERAL LOW BACK PAIN WITH BILATERAL SCIATICA: Primary | ICD-10-CM

## 2024-07-11 NOTE — TELEPHONE ENCOUNTER
Dr Lopez doesn't see patients on Wednesday.  He is only in the clinic on Tues/Thurs.    ASHELY RODRIGUEZ [facility]  PHYSICIAN LEAD  DR SRINIVASA LOPEZ  [facility]    IT IS OKAY FOR THE HUB TO DELIVER THIS INFORMATION TO THE PATIENT IF THEY RECEIVE THIS CALL BACK

## 2024-07-11 NOTE — TELEPHONE ENCOUNTER
Caller: HANNAH MANDUJANO    Relationship to patient: WIFE    Best call back number: [phone]    Chief complaint: RESCHEDULE APPT    Type of visit: FOLLOW UP    If rescheduling, when is the original appointment: 7-18-24     Additional notes:PT HAS HAD A RECENT MEDICAL PROCEDURE AND HAD TO MISS TIME-HE CANNOT MISS ANYMORE TIME AT WORK OR HE WILL LOSE HIS JOB.  HE NEEDS TO MOVE HIS APPT TO A WEDNESDAY, IF POSSIBLE, AS THAT IS HIS DAY OFF.  HE WORKS 7:30-5

## 2024-07-11 NOTE — TELEPHONE ENCOUNTER
I called the patient's wife back to let her know but she states he cannot come on any day but Wednesday is the only day he can come due to a work issue.  He had to be off a lot recently for a cardiac ablation and almost lost his job.  He hasn't seen pain mgmt yet - appt on 7/23/24.  He has only completed 4 visits of therapy but again had to put this on hold due to his heart issues.    The patient's wife is requesting the appt with Dr Lopez be moved to Edil's schedule because he cannot miss work and at this time he couldn't take off to have more surgery.    I told her I would have to check with Edil to see if he is okay with this & will call her back later today.  She expressed understanding.    ASHELY RODRIGUEZ [facility]  PHYSICIAN LEAD  DR SRINIVASA LOPEZ  [facility] [place]

## 2024-07-12 NOTE — TELEPHONE ENCOUNTER
Placed a call to pt wife who stated pt has not had any physical therapy or seen pain mgmt for his back. Informed pt jose maria we can put an order in for physical therapy and pt can get R/S for an appt with Edil. Ended call with pt wife understanding and acknowledgement.

## 2024-07-17 NOTE — TELEPHONE ENCOUNTER
homicidal ideations. No overt delusions or paranoia appreciated.   Perceptions: Denies auditory or visual hallucinations at present time. Not responding to internal stimuli.   Concentration: Intact.   Orientation: to person, place, date, and situation.   Language: Intact.   Fund of information: Intact.   Memory: Recent and remote appear intact.   Impulsivity: Limited.   Neurovegitative: Fair appetite and sleep.   Insight: Fair.   Judgment: Fair.     Cognition: Can spell \"world\" backwards: Yes                    Can do serial 7's: Yes           Lab Results   Component Value Date      04/26/2021     K 3.5 04/26/2021      04/26/2021     CO2 20 (L) 04/26/2021     BUN 14 04/26/2021     CREATININE 0.8 04/26/2021     GLUCOSE 100 04/26/2021     CALCIUM 9.0 04/26/2021     BILITOT 0.6 04/26/2021     ALKPHOS 51 04/26/2021     AST 24 04/26/2021     ALT 17 04/26/2021     LABGLOM >60 04/26/2021     GFRAA >59 04/26/2021     GLOB 2.7 03/31/2017            Lab Results   Component Value Date/Time      04/26/2021 08:00 PM     K 3.5 04/26/2021 08:00 PM      04/26/2021 08:00 PM     CO2 20 04/26/2021 08:00 PM     BUN 14 04/26/2021 08:00 PM     CREATININE 0.8 04/26/2021 08:00 PM     GLUCOSE 100 04/26/2021 08:00 PM     CALCIUM 9.0 04/26/2021 08:00 PM            Lab Results   Component Value Date     CHOL 101 (L) 04/28/2021            Lab Results   Component Value Date     TRIG 90 04/28/2021            Lab Results   Component Value Date     HDL 31 (L) 04/28/2021      No components found for: \"LDLCHOLESTEROL\", \"LDLCALC\"     No results found for: \"VLDL\"  No results found for: \"CHOLHDLRATIO\"        Lab Results   Component Value Date     LABA1C 5.1 04/28/2021      No results found for: \"EAG\"        Lab Results   Component Value Date     TSHFT4 1.61 04/26/2021     TSH 1.690 03/03/2021            Lab Results   Component Value Date     VITD25 19.1 (L) 04/28/2021            Lab Results   Component Value Date     [id number]

## 2024-07-18 ENCOUNTER — TELEPHONE (OUTPATIENT)
Dept: PSYCHIATRY | Age: 59
End: 2024-07-18

## 2024-07-18 RX ORDER — DIVALPROEX SODIUM 500 MG/1
1500 TABLET, EXTENDED RELEASE ORAL NIGHTLY
Qty: 90 TABLET | Refills: 0 | Status: SHIPPED | OUTPATIENT
Start: 2024-07-18

## 2024-07-18 NOTE — TELEPHONE ENCOUNTER
Called and lvm asking pt to return phone call    When pt calls back MA will let him know that his script for depakote was sent to his pharmacy     Electronically signed by Aakash Lester on 7/18/2024 at 11:07 AM

## 2024-07-24 ENCOUNTER — OFFICE VISIT (OUTPATIENT)
Dept: CARDIOLOGY | Facility: CLINIC | Age: 59
End: 2024-07-24
Payer: MEDICAID

## 2024-07-24 VITALS
DIASTOLIC BLOOD PRESSURE: 75 MMHG | WEIGHT: 280 LBS | SYSTOLIC BLOOD PRESSURE: 129 MMHG | HEART RATE: 67 BPM | HEIGHT: 69 IN | BODY MASS INDEX: 41.47 KG/M2 | OXYGEN SATURATION: 97 %

## 2024-07-24 DIAGNOSIS — E78.2 MIXED HYPERLIPIDEMIA: ICD-10-CM

## 2024-07-24 DIAGNOSIS — I49.3 FREQUENT UNIFOCAL PVCS: Primary | ICD-10-CM

## 2024-07-24 DIAGNOSIS — E66.01 CLASS 3 SEVERE OBESITY DUE TO EXCESS CALORIES WITHOUT SERIOUS COMORBIDITY WITH BODY MASS INDEX (BMI) OF 40.0 TO 44.9 IN ADULT: ICD-10-CM

## 2024-07-24 DIAGNOSIS — I10 PRIMARY HYPERTENSION: ICD-10-CM

## 2024-07-24 PROCEDURE — 93000 ELECTROCARDIOGRAM COMPLETE: CPT | Performed by: INTERNAL MEDICINE

## 2024-07-24 PROCEDURE — 1160F RVW MEDS BY RX/DR IN RCRD: CPT | Performed by: INTERNAL MEDICINE

## 2024-07-24 PROCEDURE — 1159F MED LIST DOCD IN RCRD: CPT | Performed by: INTERNAL MEDICINE

## 2024-07-24 PROCEDURE — 99213 OFFICE O/P EST LOW 20 MIN: CPT | Performed by: INTERNAL MEDICINE

## 2024-07-24 PROCEDURE — 3074F SYST BP LT 130 MM HG: CPT | Performed by: INTERNAL MEDICINE

## 2024-07-24 PROCEDURE — 3078F DIAST BP <80 MM HG: CPT | Performed by: INTERNAL MEDICINE

## 2024-07-24 NOTE — PROGRESS NOTES
"Chief Complaint  Frequent unifocal PVCs (4 week ER follow up-s/p cardiac ablation 06/05/24 w/)    Subjective      Shankar Velasco presents to [facility]  History of Present Illness  Shankar underwent his PVC ablation by Dr. Paredes in early June.  This was complicated by the development of a pseudoaneurysm for which he required vascular surgery intervention.  Currently, he is doing quite well.  He does not notice any ectopy and he is not having any unusual shortness of breath.  He has no chest pain, palpitations, PND, orthopnea, syncope or near syncope.    He is changing his lifestyle and losing weight.  He has lost 9 pounds since July 8 of this year.  His goal is 200 pounds.    Objective   Vital Signs:  /75 (BP Location: Left arm, Patient Position: Sitting, Cuff Size: Adult)   Pulse 67   Ht 175.3 cm (69.02\")   Wt 127 kg (280 lb)   SpO2 97%   BMI 41.33 kg/m²   Estimated body mass index is 41.33 kg/m² as calculated from the following:    Height as of this encounter: 175.3 cm (69.02\").    Weight as of this encounter: 127 kg (280 lb).        Physical Exam  A 59-year-old male in no apparent distress.  He is awake, alert and oriented x 3.  HEENT: No scleral icterus.  Normocephalic.  Neck: No bruits or jugular venous distention.  Lungs: Normal respiratory effort.  Clear to auscultation.  Heart: Regular rhythm with normal S1 and S2.  No audible murmurs or gallops sounds.  No ectopy heard over a period of at least 60 seconds.  Extremities: Trace pretibial edema.  Pedal pulses intact.  Neurologic: No focal abnormalities noted.      Result Review :              ECG 12 Lead    Date/Time: 7/24/2024 4:00 PM  Performed by: Guero Dasilva MD    Authorized by: Guero Dasilva MD  Comparison: compared with previous ECG from 6/6/2024  Comparison to previous ECG: T wave abnormality no longer present when compared to previous EKG.  Rhythm: sinus rhythm  Rate: normal  ST " Segments: ST segments normal  T Waves: T waves normal  QRS axis: normal    Clinical impression: normal ECG            Assessment and Plan   Diagnoses and all orders for this visit:    1. Frequent unifocal PVCs (Primary)  -     ECG 12 Lead    2. Primary hypertension  -     ECG 12 Lead    3. Mixed hyperlipidemia  -     ECG 12 Lead    4. Class 3 severe obesity due to excess calories without serious comorbidity with body mass index (BMI) of 40.0 to 44.9 in adult  -     ECG 12 Lead    1.  Ventricular ectopy.  He is status post PVC ablation and has experienced symptomatic improvement in his exercise tolerance.  He has no palpitations, syncope or near syncope.    2.  Hypertension.  Current blood pressure is well-controlled at 129/75.  Continue current antihypertensive therapy consisting of amlodipine 10 mg daily furosemide 20 mg twice daily, lisinopril-HCTZ 20-25 daily.    3.  Hyperlipidemia.  Continue a atorvastatin 10 mg daily.  Lipid panel on July 8 of this year showed total cholesterol 182, triglycerides 256, HDL 27, VLDL 45, .  The patient is on dietary therapy as well and I think we can give him some time before rechecking lipid panel to see if we need to make any further adjustments.    4.  Obesity.  The patient is taking positive steps to lose weight and thus far has been fairly successful.  I congratulated him.Continue to follow a heart healthy, portion reduction, low-cholesterol diet.    All questions were answered.  A return visit is scheduled in 6 months.  He is encouraged to contact us for any further questions or concerns.    As always, I appreciate the opportunity to participate in the care of your patients.      There are no Patient Instructions on file for this visit.       Follow Up   Return in about 6 months (around 1/24/2025) for Next scheduled follow up.  Patient was given instructions and counseling regarding his condition or for health maintenance advice. Please see specific information pulled  into the AVS if appropriate.

## 2024-08-19 RX ORDER — DIVALPROEX SODIUM 500 MG/1
1500 TABLET, EXTENDED RELEASE ORAL NIGHTLY
Qty: 90 TABLET | Refills: 0 | Status: SHIPPED | OUTPATIENT
Start: 2024-08-19

## 2024-08-19 NOTE — TELEPHONE ENCOUNTER
Pharmacy sent a request to refill pt's medication       Last office visit : 6/19/2024 SEDRICK DOMINIQUE  Next office visit : 6/18/2025 CHAVA DOMINIQUE    Requested Prescriptions     Pending Prescriptions Disp Refills    divalproex (DEPAKOTE ER) 500 MG extended release tablet [Pharmacy Med Name: DIVALPROEX SODIUM ER 500MG TABLET EXTENDED RELEASE 24 HOUR] 90 tablet 0     Sig: TAKE 3 TABLETS BY MOUTH NIGHTLY            Aakash Lester        6/19/24                                                Progress Note     Gennaro Brian 1965                                 Chief Complaint   Patient presents with    Medication Check    Follow-up            Subjective:    Patient is a 59 y.o. male diagnosed with bipolar 1 and presents today for follow-up.  Last seen in clinic on 3/6/24  and prior records were reviewed.     Last visit: pts wife called in between visits and stated that pt has been more depressed lately and requested a follow up appointment at an earlier time.  He states today he is doing very well.  He had some concerns with his elevated blood pressure and some physical complaints.  He was encouraged to follow up with PCP and cardiologist for his concerns.  He had some issues with discouragement especially regarding inability to do [place] trips.  Supportive therapy provided at this time.  He is bright calm and cooperative today, he denies si hi avh, he denies side effects of medications will follow up in 1 year.     Today:  pt received a letter informing him that this provider was leaving the office and he made an appointment before July 12th.  He is here today stating he is doing really well.  He is in a wheelchair due to a heart ablation that he has not recovered well.  He is still working and has had some difficulty with mobility.  Overall he states he has been doing really well, he is bright calm and cooperative.  He feels like his medications are working and does not require any medication adjustments at

## 2024-09-20 ENCOUNTER — TELEPHONE (OUTPATIENT)
Dept: PSYCHIATRY | Age: 59
End: 2024-09-20

## 2024-09-20 RX ORDER — DIVALPROEX SODIUM 500 MG/1
1500 TABLET, FILM COATED, EXTENDED RELEASE ORAL NIGHTLY
Qty: 90 TABLET | Refills: 2 | Status: SHIPPED | OUTPATIENT
Start: 2024-09-20

## 2024-09-20 RX ORDER — TAMSULOSIN HYDROCHLORIDE 0.4 MG/1
1 CAPSULE ORAL
Qty: 90 CAPSULE | Refills: 1 | OUTPATIENT
Start: 2024-09-20

## 2024-11-13 ENCOUNTER — OFFICE VISIT (OUTPATIENT)
Dept: INTERNAL MEDICINE | Facility: CLINIC | Age: 59
End: 2024-11-13
Payer: MEDICAID

## 2024-11-13 VITALS
SYSTOLIC BLOOD PRESSURE: 126 MMHG | HEIGHT: 69 IN | TEMPERATURE: 97 F | OXYGEN SATURATION: 99 % | BODY MASS INDEX: 39.69 KG/M2 | WEIGHT: 268 LBS | HEART RATE: 75 BPM | DIASTOLIC BLOOD PRESSURE: 70 MMHG

## 2024-11-13 DIAGNOSIS — E03.9 ACQUIRED HYPOTHYROIDISM: ICD-10-CM

## 2024-11-13 DIAGNOSIS — D62 ACUTE BLOOD LOSS ANEMIA: ICD-10-CM

## 2024-11-13 DIAGNOSIS — R09.82 PND (POST-NASAL DRIP): ICD-10-CM

## 2024-11-13 DIAGNOSIS — H91.13 PRESBYCUSIS OF BOTH EARS: ICD-10-CM

## 2024-11-13 DIAGNOSIS — Z98.890 HISTORY OF CARDIAC RADIOFREQUENCY ABLATION: ICD-10-CM

## 2024-11-13 DIAGNOSIS — I50.32 CHRONIC DIASTOLIC (CONGESTIVE) HEART FAILURE: ICD-10-CM

## 2024-11-13 DIAGNOSIS — Z00.00 ANNUAL PHYSICAL EXAM: Primary | ICD-10-CM

## 2024-11-13 DIAGNOSIS — E66.9 OBESITY (BMI 30-39.9): ICD-10-CM

## 2024-11-13 DIAGNOSIS — I10 ESSENTIAL HYPERTENSION: ICD-10-CM

## 2024-11-13 DIAGNOSIS — N52.8 OTHER MALE ERECTILE DYSFUNCTION: ICD-10-CM

## 2024-11-13 DIAGNOSIS — H61.22 LEFT EAR IMPACTED CERUMEN: ICD-10-CM

## 2024-11-13 DIAGNOSIS — G62.9 PERIPHERAL POLYNEUROPATHY: ICD-10-CM

## 2024-11-13 PROCEDURE — 1126F AMNT PAIN NOTED NONE PRSNT: CPT | Performed by: INTERNAL MEDICINE

## 2024-11-13 PROCEDURE — 1160F RVW MEDS BY RX/DR IN RCRD: CPT | Performed by: INTERNAL MEDICINE

## 2024-11-13 PROCEDURE — 99396 PREV VISIT EST AGE 40-64: CPT | Performed by: INTERNAL MEDICINE

## 2024-11-13 PROCEDURE — 3074F SYST BP LT 130 MM HG: CPT | Performed by: INTERNAL MEDICINE

## 2024-11-13 PROCEDURE — 69210 REMOVE IMPACTED EAR WAX UNI: CPT | Performed by: INTERNAL MEDICINE

## 2024-11-13 PROCEDURE — 3078F DIAST BP <80 MM HG: CPT | Performed by: INTERNAL MEDICINE

## 2024-11-13 PROCEDURE — 1159F MED LIST DOCD IN RCRD: CPT | Performed by: INTERNAL MEDICINE

## 2024-11-13 RX ORDER — PREGABALIN 150 MG/1
150 CAPSULE ORAL 2 TIMES DAILY
Qty: 60 CAPSULE | Refills: 5 | Status: SHIPPED | OUTPATIENT
Start: 2024-11-13

## 2024-11-13 RX ORDER — CETIRIZINE HYDROCHLORIDE 10 MG/1
10 TABLET ORAL DAILY
Qty: 30 TABLET | Refills: 1 | Status: SHIPPED | OUTPATIENT
Start: 2024-11-13

## 2024-11-13 RX ORDER — SILDENAFIL 100 MG/1
100 TABLET, FILM COATED ORAL DAILY PRN
Qty: 10 TABLET | Refills: 1 | Status: SHIPPED | OUTPATIENT
Start: 2024-11-13

## 2024-11-13 RX ORDER — FLUTICASONE PROPIONATE 50 MCG
2 SPRAY, SUSPENSION (ML) NASAL DAILY
Qty: 16 G | Refills: 1 | Status: SHIPPED | OUTPATIENT
Start: 2024-11-13

## 2024-11-13 NOTE — PROGRESS NOTES
"    Chief Complaint  Annual Exam, Erectile Dysfunction, and Sinusitis (Raspy voice x 3 days.  Denies fever and chills.  )    Subjective        Shankar Velasco presents to [facility]  Erectile Dysfunction    Sinusitis      See below.     Objective   Vital Signs:  /70 (BP Location: Left arm, Patient Position: Sitting, Cuff Size: Adult)   Pulse 75   Temp 97 °F (36.1 °C) (Temporal)   Ht 175.3 cm (69.02\")   Wt 122 kg (268 lb)   SpO2 99%   BMI 39.55 kg/m²   Estimated body mass index is 39.55 kg/m² as calculated from the following:    Height as of this encounter: 175.3 cm (69.02\").    Weight as of this encounter: 122 kg (268 lb).         Physical Exam  Constitutional:       Appearance: He is obese. He is not ill-appearing.      Comments: Seen and discussed with his wife.  Noticeable weight loss.   HENT:      Head: Normocephalic and atraumatic.      Left Ear: External ear normal.      Mouth/Throat:      Mouth: Mucous membranes are moist.      Pharynx: Oropharynx is clear.   Eyes:      Conjunctiva/sclera: Conjunctivae normal.      Pupils: Pupils are equal, round, and reactive to light.   Cardiovascular:      Rate and Rhythm: Normal rate and regular rhythm.      Heart sounds: Normal heart sounds.   Pulmonary:      Effort: Pulmonary effort is normal. No respiratory distress.      Breath sounds: Normal breath sounds.   Musculoskeletal:         General: Swelling (trace) present.      Cervical back: Neck supple.   Skin:     General: Skin is warm and dry.      Findings: No rash.   Neurological:      General: No focal deficit present.      Mental Status: He is alert and oriented to person, place, and time.   Psychiatric:         Mood and Affect: Mood normal.         Behavior: Behavior normal.         Thought Content: Thought content normal.         Judgment: Judgment normal.        Result Review :  Labs from 7/8:  1.  Hemoglobin A1c 4.5.  2.  TSH 2.140.  3.  Vitamin D 861.  4.  Total " cholesterol 182, HDL 27, , triglycerides 256.  5.  PSA normal.  Ear Cerumen Removal    Date/Time: 11/13/2024 12:34 PM    Performed by: BRYAN Dave DO  Authorized by: BRYAN Dave DO    Anesthesia:  Local Anesthetic: none  Location details: left ear  Patient tolerance: patient tolerated the procedure well with no immediate complications  Procedure type: instrumentation, curette   Sedation:  Patient sedated: no              Assessment and Plan   Diagnoses and all orders for this visit:    1. Annual physical exam (Primary)    2. Obesity (BMI 30-39.9)    3. PND (post-nasal drip)  -     fluticasone (FLONASE) 50 MCG/ACT nasal spray; Administer 2 sprays into the nostril(s) as directed by provider Daily.  Dispense: 16 g; Refill: 1  -     cetirizine (zyrTEC) 10 MG tablet; Take 1 tablet by mouth Daily.  Dispense: 30 tablet; Refill: 1    4. Left ear impacted cerumen  -     Ear Cerumen Removal    5. Presbycusis of both ears  -     Ambulatory Referral to Audiology    6. Other male erectile dysfunction  -     sildenafil (Viagra) 100 MG tablet; Take 1 tablet by mouth Daily As Needed for Erectile Dysfunction.  Dispense: 10 tablet; Refill: 1    7. Essential hypertension  -     Comprehensive metabolic panel    8. Chronic diastolic (congestive) heart failure    9. History of cardiac radiofrequency ablation    10. Acute blood loss anemia  -     CBC & Differential    11. Peripheral polyneuropathy  -     pregabalin (LYRICA) 150 MG capsule; Take 1 capsule by mouth 2 (Two) Times a Day.  Dispense: 60 capsule; Refill: 5    12. Acquired hypothyroidism       Presents today for annual physical exam as well as the below.    It was noticeable when I walked in the room that he has lost considerable weight since I last saw him in July.  He was 291 pounds in July and is now 268 pounds.  He has been counting calories and trying to stay in a deficit.  I encouraged him to keep up the good work.  He was more motivated after  "his health difficulties over the summer and he states that his daughter is getting  next May and he would like to \"look a lot better when I walk her down now.\"    He complained today of hoarseness of voice and postnasal drip.  He states that his sinuses have been bothering him since the change in the weather recurred.  He does not typically have difficulty with allergies.  Trial Flonase and Zyrtec.    He and his wife both brought up issues with hearing.  He does have a cerumen impaction on the left that we cleansed in the office today with improvement, but they are desiring a referral to audiology to further investigate as to whether or not he might need hearing devices.    He cites difficulty with erectile dysfunction.  He would like to trial sildenafil.  I asked him to take a half a tablet to start and increase if needed.  He understands that insurance may or may not pay for this.    Blood pressure is well-controlled today at 126/70.  Continue lisinopril-HCTZ, amlodipine.      Looks euvolemic on exam.  Continues to take Lasix once daily and potassium replacement.  He sees Dr. Dasilva again in January.  He has felt a few flutters with regards to his heart over the last few months, but nothing that has rise to the level of his previous issues.  He did undergo cardiac ablation with Dr. Paredes in June.  This was a PVC ablation.  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  He and his wife are interested in rechecking his blood counts today.    We are going to transition Lyrica to 150 mg twice daily from 75 mg 3 times daily.  He frequently forgets his midday dose while at work.     Thyroid replacement was stable in July.  Continue present dose of levothyroxine.    PSA was normal in July.    He is overdue for colonoscopy with Dr. Scar Craig at [facility].  His last colonoscopy was in November 2016 and at that point in time Dr. Craig recommended repeat in 5 years.  His wife " is going to contact his office today to see about getting in for an updated study.    Counseled on appropriate vision and dental screening.  [facility].  He does wear glasses.  He complains of watery eyes and at times has left eye discomfort.  Recommended he see Dr. Anabelle Loepz with optometry and then be referred on to ophthalmology if felt needed.  He is on Medicaid and sees at the [facility] outreach clinic at the [facility].    Plan to have him back in 3 months for reevaluation.  He and his wife know that they can reach out sooner if there are problems.      Follow Up   Return in about 3 months (around 2/13/2025) for Recheck.  Patient was given instructions and counseling regarding his condition or for health maintenance advice. Please see specific information pulled into the AVS if appropriate.      SRAVAN Dave DO       Electronically signed by BRYAN Dave DO, 11/13/24, 10:01 AM CST.

## 2024-11-14 ENCOUNTER — TELEPHONE (OUTPATIENT)
Dept: PSYCHIATRY | Age: 59
End: 2024-11-14

## 2024-11-14 LAB
ALBUMIN SERPL-MCNC: 4.1 G/DL (ref 3.8–4.9)
ALP SERPL-CCNC: 46 IU/L (ref 44–121)
ALT SERPL-CCNC: 10 IU/L (ref 0–44)
AST SERPL-CCNC: 12 IU/L (ref 0–40)
BASOPHILS # BLD AUTO: 0 X10E3/UL (ref 0–0.2)
BASOPHILS NFR BLD AUTO: 1 %
BILIRUB SERPL-MCNC: 0.4 MG/DL (ref 0–1.2)
BUN SERPL-MCNC: 14 MG/DL (ref 8–27)
BUN/CREAT SERPL: 20 (ref 10–24)
CALCIUM SERPL-MCNC: 8.9 MG/DL (ref 8.6–10.2)
CHLORIDE SERPL-SCNC: 105 MMOL/L (ref 96–106)
CO2 SERPL-SCNC: 22 MMOL/L (ref 20–29)
CREAT SERPL-MCNC: 0.69 MG/DL (ref 0.76–1.27)
EGFRCR SERPLBLD CKD-EPI 2021: 106 ML/MIN/1.73
EOSINOPHIL # BLD AUTO: 0.1 X10E3/UL (ref 0–0.4)
EOSINOPHIL NFR BLD AUTO: 1 %
ERYTHROCYTE [DISTWIDTH] IN BLOOD BY AUTOMATED COUNT: 13.1 % (ref 11.6–15.4)
GLOBULIN SER CALC-MCNC: 2.9 G/DL (ref 1.5–4.5)
GLUCOSE SERPL-MCNC: 92 MG/DL (ref 70–99)
HCT VFR BLD AUTO: 39 % (ref 37.5–51)
HGB BLD-MCNC: 12.6 G/DL (ref 13–17.7)
IMM GRANULOCYTES # BLD AUTO: 0 X10E3/UL (ref 0–0.1)
IMM GRANULOCYTES NFR BLD AUTO: 1 %
LYMPHOCYTES # BLD AUTO: 2.1 X10E3/UL (ref 0.7–3.1)
LYMPHOCYTES NFR BLD AUTO: 31 %
MCH RBC QN AUTO: 30.8 PG (ref 26.6–33)
MCHC RBC AUTO-ENTMCNC: 32.3 G/DL (ref 31.5–35.7)
MCV RBC AUTO: 95 FL (ref 79–97)
MONOCYTES # BLD AUTO: 0.5 X10E3/UL (ref 0.1–0.9)
MONOCYTES NFR BLD AUTO: 8 %
NEUTROPHILS # BLD AUTO: 3.9 X10E3/UL (ref 1.4–7)
NEUTROPHILS NFR BLD AUTO: 58 %
PLATELET # BLD AUTO: 226 X10E3/UL (ref 150–450)
POTASSIUM SERPL-SCNC: 4.4 MMOL/L (ref 3.5–5.2)
PROT SERPL-MCNC: 7 G/DL (ref 6–8.5)
RBC # BLD AUTO: 4.09 X10E6/UL (ref 4.14–5.8)
SODIUM SERPL-SCNC: 143 MMOL/L (ref 134–144)
WBC # BLD AUTO: 6.6 X10E3/UL (ref 3.4–10.8)

## 2024-11-14 NOTE — TELEPHONE ENCOUNTER
Called pt to cancel/reschedule his appt for 06/18/25 with Jessica Crowell because the provider will no longer be at this practice.    No answer and LVM.  Sent a [facility] chart message.  Rescheduled for the same day 06/18/25 @ 10:30 with Dr. Reis.    Electronically signed by Yasmin Murillo MA on 11/14/2024 at 9:59 AM

## 2024-11-20 RX ORDER — DIVALPROEX SODIUM 500 MG/1
1500 TABLET, FILM COATED, EXTENDED RELEASE ORAL NIGHTLY
Qty: 90 TABLET | Refills: 3 | Status: SHIPPED | OUTPATIENT
Start: 2024-11-20

## 2024-11-20 NOTE — TELEPHONE ENCOUNTER
Pharmacy sent a request to refill pt's medication       Last office visit : 6/19/2024 SEDRICK DOMINIQUE  Next office visit : 6/18/2025 CHAVA Reis MD    Requested Prescriptions     Pending Prescriptions Disp Refills    divalproex (DEPAKOTE ER) 500 MG extended release tablet [Pharmacy Med Name: DIVALPROEX SODIUM ER 500MG TABLET EXTENDED RELEASE 24 HOUR] 90 tablet 2     Sig: TAKE 3 TABLETS BY MOUTH NIGHTLY            Aakash Lester        6/19/24                                                Progress Note     Gennaro Brian 1965                                 Chief Complaint   Patient presents with    Medication Check    Follow-up            Subjective:    Patient is a 59 y.o. male diagnosed with bipolar 1 and presents today for follow-up.  Last seen in clinic on 3/6/24  and prior records were reviewed.     Last visit: pts wife called in between visits and stated that pt has been more depressed lately and requested a follow up appointment at an earlier time.  He states today he is doing very well.  He had some concerns with his elevated blood pressure and some physical complaints.  He was encouraged to follow up with PCP and cardiologist for his concerns.  He had some issues with discouragement especially regarding inability to do [place] trips.  Supportive therapy provided at this time.  He is bright calm and cooperative today, he denies si hi avh, he denies side effects of medications will follow up in 1 year.     Today:  pt received a letter informing him that this provider was leaving the office and he made an appointment before July 12th.  He is here today stating he is doing really well.  He is in a wheelchair due to a heart ablation that he has not recovered well.  He is still working and has had some difficulty with mobility.  Overall he states he has been doing really well, he is bright calm and cooperative.  He feels like his medications are working and does not require any medication adjustments at

## 2024-12-10 ENCOUNTER — OFFICE VISIT (OUTPATIENT)
Age: 59
End: 2024-12-10
Payer: MEDICAID

## 2024-12-10 VITALS
DIASTOLIC BLOOD PRESSURE: 68 MMHG | HEART RATE: 63 BPM | BODY MASS INDEX: 39.61 KG/M2 | RESPIRATION RATE: 20 BRPM | SYSTOLIC BLOOD PRESSURE: 130 MMHG | WEIGHT: 268.2 LBS | OXYGEN SATURATION: 97 % | TEMPERATURE: 97.6 F

## 2024-12-10 DIAGNOSIS — L60.0 INGROWN TOENAIL OF LEFT FOOT WITH INFECTION: Primary | ICD-10-CM

## 2024-12-10 PROCEDURE — 99213 OFFICE O/P EST LOW 20 MIN: CPT

## 2024-12-10 PROCEDURE — 3075F SYST BP GE 130 - 139MM HG: CPT

## 2024-12-10 PROCEDURE — 3078F DIAST BP <80 MM HG: CPT

## 2024-12-10 RX ORDER — SULFAMETHOXAZOLE AND TRIMETHOPRIM 800; 160 MG/1; MG/1
1 TABLET ORAL 2 TIMES DAILY
Qty: 20 TABLET | Refills: 0 | Status: SHIPPED | OUTPATIENT
Start: 2024-12-10 | End: 2024-12-20

## 2024-12-10 RX ORDER — MUPIROCIN 20 MG/G
OINTMENT TOPICAL
Qty: 15 G | Refills: 0 | Status: SHIPPED | OUTPATIENT
Start: 2024-12-10

## 2024-12-10 RX ORDER — CEFTRIAXONE 1 G/1
1000 INJECTION, POWDER, FOR SOLUTION INTRAMUSCULAR; INTRAVENOUS ONCE
Status: COMPLETED | OUTPATIENT
Start: 2024-12-10 | End: 2024-12-10

## 2024-12-10 RX ADMIN — CEFTRIAXONE 1000 MG: 1 INJECTION, POWDER, FOR SOLUTION INTRAMUSCULAR; INTRAVENOUS at 16:15

## 2024-12-10 ASSESSMENT — ENCOUNTER SYMPTOMS
NAUSEA: 0
DIARRHEA: 0
EYE ITCHING: 0
CONSTIPATION: 0
VOMITING: 0
SINUS PAIN: 0
CHEST TIGHTNESS: 0
ABDOMINAL DISTENTION: 0
APNEA: 0
COLOR CHANGE: 0
TROUBLE SWALLOWING: 0
SINUS PRESSURE: 0
SHORTNESS OF BREATH: 0
EYE DISCHARGE: 0
RHINORRHEA: 0
ABDOMINAL PAIN: 0
SORE THROAT: 0
COUGH: 0
WHEEZING: 0
EYE PAIN: 0

## 2024-12-10 NOTE — PROGRESS NOTES
Medication was administered by Sara Patel MA at 4:17 PM.    Medication: ceftriaxone(rocephin)  Amount:  1000mg (2.86ml)  Route: intramuscular  Site: Dorsogluteal right  Patient was observed for 15 minutes     Patient tolerated well.  
minutes following injection. Tolerated well.   Bactrim prescribed, take full course of antibiotics.  Mupirocin prescribed, apply three times daily after washing with antibacterial soap.  The patient is to follow up with PCP or return to clinic if symptoms worsen/fail to improve.  Report to ER if symptoms become severe, such as fever, body aches, chills, shortness of breath, or increase in swelling/redness/warmth of extremity.         Electronically signed by TRIP Frost CNP on 12/11/2024 at 8:59 AM      EMR Dragon/translation disclaimer: Much of this encounter note is an electronic transcription/translation of spoken language to printed text. The electronic translation of spoken language may be erroneous, or at times, nonsensical words or phrases may be inadvertently transcribed.  Although I have reviewed the note for such errors, some may still exist.

## 2024-12-10 NOTE — PATIENT INSTRUCTIONS
Rocephin injection given today. Patient was monitored in exam room for 15 minutes following injection. Tolerated well.   Bactrim prescribed, take full course of antibiotics.  Mupirocin prescribed, apply three times daily after washing with antibacterial soap.  Follow up with PCP or return to clinic for recheck in 1 week.  Report to ER if symptoms become severe, such as fever, body aches, chills, shortness of breath, or increase in swelling/redness/warmth of extremity.

## 2024-12-11 ENCOUNTER — OFFICE VISIT (OUTPATIENT)
Dept: GASTROENTEROLOGY | Age: 59
End: 2024-12-11
Payer: MEDICAID

## 2024-12-11 VITALS
HEART RATE: 69 BPM | DIASTOLIC BLOOD PRESSURE: 80 MMHG | BODY MASS INDEX: 40.14 KG/M2 | SYSTOLIC BLOOD PRESSURE: 128 MMHG | OXYGEN SATURATION: 99 % | WEIGHT: 271 LBS | HEIGHT: 69 IN

## 2024-12-11 DIAGNOSIS — Z12.11 ENCOUNTER FOR SCREENING COLONOSCOPY: Primary | ICD-10-CM

## 2024-12-11 PROCEDURE — S0285 CNSLT BEFORE SCREEN COLONOSC: HCPCS | Performed by: NURSE PRACTITIONER

## 2024-12-11 ASSESSMENT — ENCOUNTER SYMPTOMS
COUGH: 0
VOMITING: 0
NAUSEA: 0
ANAL BLEEDING: 0
RECTAL PAIN: 0
SHORTNESS OF BREATH: 0
CONSTIPATION: 0
CHOKING: 0
TROUBLE SWALLOWING: 0
ABDOMINAL PAIN: 0
DIARRHEA: 0
ABDOMINAL DISTENTION: 0
BLOOD IN STOOL: 0

## 2024-12-11 NOTE — PROGRESS NOTES
intact.      Conjunctiva/sclera: Conjunctivae normal.   Cardiovascular:      Rate and Rhythm: Normal rate.   Pulmonary:      Effort: Pulmonary effort is normal. No respiratory distress.   Abdominal:      General: There is no distension.   Musculoskeletal:         General: Normal range of motion.      Cervical back: Normal range of motion.   Skin:     General: Skin is warm and dry.   Neurological:      General: No focal deficit present.      Mental Status: He is alert and oriented to person, place, and time.   Psychiatric:         Mood and Affect: Mood normal.         Behavior: Behavior normal.

## 2024-12-17 DIAGNOSIS — R09.82 PND (POST-NASAL DRIP): ICD-10-CM

## 2024-12-18 RX ORDER — CETIRIZINE HYDROCHLORIDE 10 MG/1
10 TABLET ORAL DAILY
Qty: 30 TABLET | Refills: 1 | OUTPATIENT
Start: 2024-12-18

## 2024-12-18 RX ORDER — AMLODIPINE BESYLATE 10 MG/1
10 TABLET ORAL DAILY
Qty: 30 TABLET | Refills: 2 | Status: SHIPPED | OUTPATIENT
Start: 2024-12-18

## 2024-12-30 RX ORDER — TAMSULOSIN HYDROCHLORIDE 0.4 MG/1
1 CAPSULE ORAL
Qty: 90 CAPSULE | Refills: 1 | Status: SHIPPED | OUTPATIENT
Start: 2024-12-30

## 2024-12-30 RX ORDER — ATORVASTATIN CALCIUM 10 MG/1
10 TABLET, FILM COATED ORAL
Qty: 90 TABLET | Refills: 1 | Status: SHIPPED | OUTPATIENT
Start: 2024-12-30

## 2025-01-17 DIAGNOSIS — R09.82 PND (POST-NASAL DRIP): ICD-10-CM

## 2025-01-17 RX ORDER — CETIRIZINE HYDROCHLORIDE 10 MG/1
10 TABLET ORAL DAILY
Qty: 30 TABLET | Refills: 1 | Status: SHIPPED | OUTPATIENT
Start: 2025-01-17

## 2025-02-12 ENCOUNTER — OFFICE VISIT (OUTPATIENT)
Dept: INTERNAL MEDICINE | Facility: CLINIC | Age: 60
End: 2025-02-12
Payer: MEDICAID

## 2025-02-12 ENCOUNTER — TELEPHONE (OUTPATIENT)
Dept: GASTROENTEROLOGY | Age: 60
End: 2025-02-12

## 2025-02-12 VITALS
BODY MASS INDEX: 40.58 KG/M2 | WEIGHT: 274 LBS | SYSTOLIC BLOOD PRESSURE: 124 MMHG | OXYGEN SATURATION: 98 % | HEIGHT: 69 IN | DIASTOLIC BLOOD PRESSURE: 68 MMHG | TEMPERATURE: 97.5 F | HEART RATE: 96 BPM

## 2025-02-12 DIAGNOSIS — L60.0 INGROWN LEFT GREATER TOENAIL: ICD-10-CM

## 2025-02-12 DIAGNOSIS — I10 ESSENTIAL HYPERTENSION: ICD-10-CM

## 2025-02-12 DIAGNOSIS — Z98.890 HISTORY OF CARDIAC RADIOFREQUENCY ABLATION: ICD-10-CM

## 2025-02-12 DIAGNOSIS — M15.0 PRIMARY OSTEOARTHRITIS INVOLVING MULTIPLE JOINTS: ICD-10-CM

## 2025-02-12 DIAGNOSIS — L03.031 CHRONIC PARONYCHIA OF TOE OF RIGHT FOOT: ICD-10-CM

## 2025-02-12 DIAGNOSIS — L60.0 INGROWN RIGHT GREATER TOENAIL: Primary | ICD-10-CM

## 2025-02-12 DIAGNOSIS — I50.32 CHRONIC DIASTOLIC (CONGESTIVE) HEART FAILURE: ICD-10-CM

## 2025-02-12 DIAGNOSIS — B35.1 ONYCHOMYCOSIS: ICD-10-CM

## 2025-02-12 DIAGNOSIS — E66.01 OBESITY, CLASS III, BMI 40-49.9 (MORBID OBESITY): ICD-10-CM

## 2025-02-12 PROCEDURE — 3078F DIAST BP <80 MM HG: CPT | Performed by: INTERNAL MEDICINE

## 2025-02-12 PROCEDURE — 1160F RVW MEDS BY RX/DR IN RCRD: CPT | Performed by: INTERNAL MEDICINE

## 2025-02-12 PROCEDURE — 99214 OFFICE O/P EST MOD 30 MIN: CPT | Performed by: INTERNAL MEDICINE

## 2025-02-12 PROCEDURE — 3074F SYST BP LT 130 MM HG: CPT | Performed by: INTERNAL MEDICINE

## 2025-02-12 PROCEDURE — 1126F AMNT PAIN NOTED NONE PRSNT: CPT | Performed by: INTERNAL MEDICINE

## 2025-02-12 PROCEDURE — 1159F MED LIST DOCD IN RCRD: CPT | Performed by: INTERNAL MEDICINE

## 2025-02-12 RX ORDER — DOXYCYCLINE 100 MG/1
100 CAPSULE ORAL 2 TIMES DAILY
Qty: 14 CAPSULE | Refills: 0 | Status: SHIPPED | OUTPATIENT
Start: 2025-02-12

## 2025-02-12 NOTE — TELEPHONE ENCOUNTER
Gennaro Torres called  needing pre op instructions. Pt has cln scheduled on 2/26. Per wife pt's appt reminder was sent to her my chart accountm she is not a pt here.  Pt was not aware of appt or prep instructions. Pt gave verbal consent on phone to speak with wife. Please call [phone] to advise.

## 2025-02-12 NOTE — PROGRESS NOTES
"    Chief Complaint  Follow-up (3 month follow up ), Nail Problem (Ingrown toenail on both feet.  Requesting referral to podiatry. ), Arthritis (Requesting a handicap parking tag. ), and Weight Gain (Would like to discuss starting weight loss medication. )    Subjective        Shankar Velasco presents to [facility]  Arthritis    See below.     Objective   Vital Signs:  /68 (BP Location: Right arm, Patient Position: Sitting, Cuff Size: Adult)   Pulse 96   Temp 97.5 °F (36.4 °C) (Temporal)   Ht 175.3 cm (69.02\")   Wt 124 kg (274 lb)   SpO2 98%   BMI 40.44 kg/m²   Estimated body mass index is 40.44 kg/m² as calculated from the following:    Height as of this encounter: 175.3 cm (69.02\").    Weight as of this encounter: 124 kg (274 lb).         Physical Exam  Constitutional:       Appearance: He is obese. He is not ill-appearing.      Comments: Seen and discussed with his wife.   HENT:      Head: Normocephalic and atraumatic.   Eyes:      Conjunctiva/sclera: Conjunctivae normal.      Pupils: Pupils are equal, round, and reactive to light.   Cardiovascular:      Rate and Rhythm: Normal rate and regular rhythm.      Heart sounds: Normal heart sounds.   Pulmonary:      Effort: Pulmonary effort is normal. No respiratory distress.      Breath sounds: Normal breath sounds.   Musculoskeletal:         General: No swelling.   Skin:     General: Skin is warm and dry.      Findings: No rash.      Comments: He does have ingrown toenails of the bilateral great toes.  He also has significant fungal involvement of the nails.  He has a paronychia associated with the lateral and inferior margin of the nailbed on the right.   Neurological:      General: No focal deficit present.      Mental Status: He is alert and oriented to person, place, and time.   Psychiatric:         Mood and Affect: Mood normal.         Behavior: Behavior normal.         Thought Content: Thought content normal.         " Judgment: Judgment normal.        Result Review :  CMP in November was unremarkable.  Creatinine 0.69.  CBC in November was unremarkable.  Hemoglobin 12.6.         Assessment and Plan   Diagnoses and all orders for this visit:    1. Ingrown right greater toenail (Primary)  -     Ambulatory Referral to Podiatry    2. Ingrown left greater toenail  -     Ambulatory Referral to Podiatry    3. Chronic paronychia of toe of right foot  -     Ambulatory Referral to Podiatry  -     doxycycline (VIBRAMYCIN) 100 MG capsule; Take 1 capsule by mouth 2 (Two) Times a Day.  Dispense: 14 capsule; Refill: 0    4. Onychomycosis    5. Obesity, Class III, BMI 40-49.9 (morbid obesity)  -     Ambulatory Referral to Bariatric Surgery    6. Primary osteoarthritis involving multiple joints    7. Essential hypertension    8. Chronic diastolic (congestive) heart failure    9. History of cardiac radiofrequency ablation       Presents today for follow-up.    His main concern at this point in time is difficulty with his bilateral toenails.  He was seen at urgent care in December for ingrown nails and there were concerns of an infection at that time.  He was given Bactroban to use topically and prescribed a course of Bactrim.  He appears to have a paronychia on the right today.  We will treat with doxycycline.  Referring him to podiatry.  His onychomycosis may need to be addressed at some point as well.    He had tried to lose weight over the last year.  He had gotten down to 268 pounds in November, but is back up to 274.  His heaviest in 2024 was 313 in January.  He feels like he is stuck and can no longer lose any weight.  He inquired about a GLP-1 medication to help.  I explained to him that with Medicaid insurance and no diabetes that they would not approve this.  He is interested in seeing the bariatric office so a referral was placed..    He complains of severe arthritis and difficulty with getting around.  He would like a handicap placard  for his vehicle.  A form was filled out.    Blood pressure is 124/68 today.  Continue lisinopril-HCTZ as well as amlodipine. Stop potassium replacement as he is no longer taking a loop diuretic. Compensated. NSR.     Plan to have him back in 3 months for reevaluation.  We will repeat fasting labs at that point in time.  He knows that he can reach out sooner if problems.      Follow Up   Return in about 3 months (around 5/12/2025) for Recheck.  Patient was given instructions and counseling regarding his condition or for health maintenance advice. Please see specific information pulled into the AVS if appropriate.      SRAVAN Dave DO       Electronically signed by BRYAN Dave DO, 02/12/25, 10:32 AM CST.

## 2025-02-12 NOTE — TELEPHONE ENCOUNTER
Called and spoke with the patient wife. She is going to stop by the office to  the prep and instructions.

## 2025-02-21 ENCOUNTER — TELEPHONE (OUTPATIENT)
Age: 60
End: 2025-02-21
Payer: MEDICAID

## 2025-02-21 NOTE — TELEPHONE ENCOUNTER
Scheduled appt with Thao on 04/02/25 at the patient request.  Had given patient several dates but did not work with schedule due to missing work and concern of losing job.

## 2025-02-24 ENCOUNTER — TELEPHONE (OUTPATIENT)
Dept: GASTROENTEROLOGY | Age: 60
End: 2025-02-24

## 2025-02-24 NOTE — TELEPHONE ENCOUNTER
Patient: Gennaro Torres    YOB: 1965      Clearance was received on February 24, 2025.    for Endoscopy / Colonoscopy scheduled for: [facility]  Cardiac Clearance came back     IS Lovenox required:  no    PATIENT NOTIFIED ON:  2.24.25    Called and lm      Clearance scanned into chart

## 2025-02-25 ENCOUNTER — TELEPHONE (OUTPATIENT)
Dept: GASTROENTEROLOGY | Age: 60
End: 2025-02-25

## 2025-02-25 NOTE — TELEPHONE ENCOUNTER
Patient wife called she needs rescheduled patient EGD tomorrow 2-26-25. Please called [phone]      Thank you

## 2025-03-19 RX ORDER — AMLODIPINE BESYLATE 10 MG/1
10 TABLET ORAL DAILY
Qty: 30 TABLET | Refills: 2 | Status: SHIPPED | OUTPATIENT
Start: 2025-03-19

## 2025-03-28 RX ORDER — OMEPRAZOLE 20 MG/1
20 CAPSULE, DELAYED RELEASE ORAL DAILY
Qty: 30 CAPSULE | Refills: 0 | Status: SHIPPED | OUTPATIENT
Start: 2025-03-28

## 2025-03-28 NOTE — TELEPHONE ENCOUNTER
Pharmacy sent a request to refill pt's medication       Last office visit : 6/19/2024 SEDRICK DOMINIQUE  Next office visit : 6/18/2025 CHAVA Reis MD    Requested Prescriptions     Pending Prescriptions Disp Refills    divalproex (DEPAKOTE ER) 500 MG extended release tablet [Pharmacy Med Name: DIVALPROEX SODIUM ER 500MG TABLET EXTENDED RELEASE 24 HOUR] 90 tablet 3     Sig: TAKE 3 TABLETS BY MOUTH NIGHTLY            Aakash Lester      6/19/24                                                Progress Note     Gennaro Brian 1965                                 Chief Complaint   Patient presents with    Medication Check    Follow-up            Subjective:    Patient is a 59 y.o. male diagnosed with bipolar 1 and presents today for follow-up.  Last seen in clinic on 3/6/24  and prior records were reviewed.     Last visit: pts wife called in between visits and stated that pt has been more depressed lately and requested a follow up appointment at an earlier time.  He states today he is doing very well.  He had some concerns with his elevated blood pressure and some physical complaints.  He was encouraged to follow up with PCP and cardiologist for his concerns.  He had some issues with discouragement especially regarding inability to do [place].  Supportive therapy provided at this time.  He is bright calm and cooperative today, he denies si hi avh, he denies side effects of medications will follow up in 1 year.     Today:  pt received a letter informing him that this provider was leaving the office and he made an appointment before July 12th.  He is here today stating he is doing really well.  He is in a wheelchair due to a heart ablation that he has not recovered well.  He is still working and has had some difficulty with mobility.  Overall he states he has been doing really well, he is bright calm and cooperative.  He feels like his medications are working and does not require any medication adjustments at this

## 2025-03-31 RX ORDER — DIVALPROEX SODIUM 500 MG/1
1500 TABLET, FILM COATED, EXTENDED RELEASE ORAL NIGHTLY
Qty: 90 TABLET | Refills: 3 | Status: SHIPPED | OUTPATIENT
Start: 2025-03-31

## 2025-04-01 ENCOUNTER — TELEPHONE (OUTPATIENT)
Dept: INTERNAL MEDICINE | Facility: CLINIC | Age: 60
End: 2025-04-01

## 2025-04-01 NOTE — TELEPHONE ENCOUNTER
Caller: Shankar Velasco    Relationship: Self    Best call back number: [phone]     What orders are you requesting (i.e. lab or imaging): HEADGEAR INCLUDING HOSING.  THEY ARE GOING TO SEND US SOMETHING REQUESTING EVERYTHING HE NEEDS.    HE GOT A NEW PUP, AND PUP DECIDED IT WAS GOOD FOR A CHEW.  THANK YOU.           Additional notes:  [employer] MEDICARE IS

## 2025-04-24 RX ORDER — OMEPRAZOLE 20 MG/1
20 CAPSULE, DELAYED RELEASE ORAL DAILY
Qty: 30 CAPSULE | Refills: 2 | Status: SHIPPED | OUTPATIENT
Start: 2025-04-24

## 2025-05-13 RX ORDER — LISINOPRIL AND HYDROCHLOROTHIAZIDE 20; 25 MG/1; MG/1
1 TABLET ORAL DAILY
Qty: 90 TABLET | Refills: 3 | Status: SHIPPED | OUTPATIENT
Start: 2025-05-13

## 2025-05-13 RX ORDER — LEVOTHYROXINE SODIUM 50 UG/1
50 TABLET ORAL DAILY
Qty: 30 TABLET | Refills: 0 | Status: SHIPPED | OUTPATIENT
Start: 2025-05-13

## 2025-05-15 DIAGNOSIS — G62.9 PERIPHERAL POLYNEUROPATHY: ICD-10-CM

## 2025-05-15 RX ORDER — PREGABALIN 150 MG/1
150 CAPSULE ORAL 2 TIMES DAILY
Qty: 60 CAPSULE | Refills: 4 | Status: SHIPPED | OUTPATIENT
Start: 2025-05-15

## 2025-05-29 ENCOUNTER — OFFICE VISIT (OUTPATIENT)
Dept: INTERNAL MEDICINE | Facility: CLINIC | Age: 60
End: 2025-05-29
Payer: MEDICAID

## 2025-05-29 VITALS
WEIGHT: 293 LBS | SYSTOLIC BLOOD PRESSURE: 138 MMHG | HEIGHT: 69 IN | TEMPERATURE: 97.1 F | HEART RATE: 63 BPM | BODY MASS INDEX: 43.4 KG/M2 | DIASTOLIC BLOOD PRESSURE: 78 MMHG | OXYGEN SATURATION: 97 %

## 2025-05-29 DIAGNOSIS — I10 ESSENTIAL HYPERTENSION: ICD-10-CM

## 2025-05-29 DIAGNOSIS — E03.9 ACQUIRED HYPOTHYROIDISM: ICD-10-CM

## 2025-05-29 DIAGNOSIS — E66.813 CLASS 3 SEVERE OBESITY DUE TO EXCESS CALORIES WITH SERIOUS COMORBIDITY AND BODY MASS INDEX (BMI) OF 40.0 TO 44.9 IN ADULT: Primary | ICD-10-CM

## 2025-05-29 DIAGNOSIS — Z98.890 HISTORY OF CARDIAC RADIOFREQUENCY ABLATION: ICD-10-CM

## 2025-05-29 DIAGNOSIS — I50.32 CHRONIC DIASTOLIC (CONGESTIVE) HEART FAILURE: ICD-10-CM

## 2025-05-29 DIAGNOSIS — E66.01 CLASS 3 SEVERE OBESITY DUE TO EXCESS CALORIES WITH SERIOUS COMORBIDITY AND BODY MASS INDEX (BMI) OF 40.0 TO 44.9 IN ADULT: Primary | ICD-10-CM

## 2025-05-29 DIAGNOSIS — R79.89 LOW TESTOSTERONE IN MALE: ICD-10-CM

## 2025-05-29 NOTE — PROGRESS NOTES
"    Chief Complaint  Follow-up (3 month follow up.  Requesting to have his hormones checked.  Patient canceled appointment with podiatry and bariatrics. . )    Subjective        Shankar Velasco presents to [facility]  History of Present Illness  See below.     Objective   Vital Signs:  /78   Pulse 63   Temp 97.1 °F (36.2 °C) (Temporal)   Ht 175.3 cm (69.02\")   Wt 133 kg (293 lb)   SpO2 97%   BMI 43.24 kg/m²   Estimated body mass index is 43.24 kg/m² as calculated from the following:    Height as of this encounter: 175.3 cm (69.02\").    Weight as of this encounter: 133 kg (293 lb).         Physical Exam  Constitutional:       Appearance: He is obese. He is not ill-appearing.      Comments: His wife is not present with him today, which is unusual.  She usually accompanies him to appointments.  He states that she is currently in a disability hearing for herself.   HENT:      Head: Normocephalic and atraumatic.   Eyes:      Conjunctiva/sclera: Conjunctivae normal.      Pupils: Pupils are equal, round, and reactive to light.   Cardiovascular:      Rate and Rhythm: Normal rate and regular rhythm.      Heart sounds: Normal heart sounds.   Pulmonary:      Effort: Pulmonary effort is normal. No respiratory distress.      Breath sounds: Normal breath sounds.   Musculoskeletal:         General: Swelling (trace) present.   Skin:     General: Skin is warm and dry.      Findings: No rash.   Neurological:      General: No focal deficit present.      Mental Status: He is alert and oriented to person, place, and time.   Psychiatric:         Mood and Affect: Mood normal.         Behavior: Behavior normal.         Thought Content: Thought content normal.         Judgment: Judgment normal.        Result Review :  Last labs were in November 2024 and July 2024.             Assessment and Plan   Diagnoses and all orders for this visit:    1. Class 3 severe obesity due to excess calories with " serious comorbidity and body mass index (BMI) of 40.0 to 44.9 in adult (Primary)  -     Lipid Panel    2. Chronic diastolic (congestive) heart failure    3. History of cardiac radiofrequency ablation    4. Essential hypertension  -     CBC & Differential  -     Comprehensive metabolic panel    5. Acquired hypothyroidism  -     TSH Rfx On Abnormal To Free T4    6. Low testosterone in male  -     Testosterone       Presents today for follow-up.    Since we last saw him, he unfortunately lost his job.      Weight is 293 pounds today after being 274 pounds in February.  He never followed through on the bariatrics referral placed in February.  He stated that work was getting in the way.  He plans to contact their office again and get in now that he has time.    He wanted a podiatry referral in February.  He never followed through on this.  He stated that work was getting the way.  He plans to contact their office again and get in now that he has time.    He appears compensated from a cardiac standpoint.  He is not on a daily diuretic any longer.  He is on lisinopril.    Blood pressure is 138/78.  He should watch more carefully.  Continue lisinopril-HCTZ.    He complains of low energy.  He is worried about his thyroid.  This does need to be reassessed.  He also points out that he took testosterone replacement for a bit in 2019.  His total testosterone at that point in time was 155.  It has never been reassessed.  We can check on this and discuss possible replacement if warranted.    Plan to see him back in 3 months.  His annual physical in November.  I will update him on the above labs and any further instructions as soon as they are available.      Follow Up   Return in about 3 months (around 8/29/2025) for Recheck.  Patient was given instructions and counseling regarding his condition or for health maintenance advice. Please see specific information pulled into the AVS if appropriate.      SRAVAN Dave, DO        Electronically signed by BRYAN Dave DO, 05/29/25, 10:06 AM CDT.

## 2025-05-30 LAB
ALBUMIN SERPL-MCNC: 4.4 G/DL (ref 3.9–4.9)
ALP SERPL-CCNC: 48 IU/L (ref 44–121)
ALT SERPL-CCNC: 16 IU/L (ref 0–44)
AST SERPL-CCNC: 16 IU/L (ref 0–40)
BASOPHILS # BLD AUTO: 0.1 X10E3/UL (ref 0–0.2)
BASOPHILS NFR BLD AUTO: 1 %
BILIRUB SERPL-MCNC: 0.3 MG/DL (ref 0–1.2)
BUN SERPL-MCNC: 20 MG/DL (ref 8–27)
BUN/CREAT SERPL: 26 (ref 10–24)
CALCIUM SERPL-MCNC: 9.6 MG/DL (ref 8.6–10.2)
CHLORIDE SERPL-SCNC: 104 MMOL/L (ref 96–106)
CHOLEST SERPL-MCNC: 185 MG/DL (ref 100–199)
CO2 SERPL-SCNC: 22 MMOL/L (ref 20–29)
CREAT SERPL-MCNC: 0.76 MG/DL (ref 0.76–1.27)
EGFRCR SERPLBLD CKD-EPI 2021: 102 ML/MIN/1.73
EOSINOPHIL # BLD AUTO: 0.1 X10E3/UL (ref 0–0.4)
EOSINOPHIL NFR BLD AUTO: 1 %
ERYTHROCYTE [DISTWIDTH] IN BLOOD BY AUTOMATED COUNT: 12.2 % (ref 11.6–15.4)
GLOBULIN SER CALC-MCNC: 2.9 G/DL (ref 1.5–4.5)
GLUCOSE SERPL-MCNC: 103 MG/DL (ref 70–99)
HCT VFR BLD AUTO: 41.4 % (ref 37.5–51)
HDLC SERPL-MCNC: 30 MG/DL
HGB BLD-MCNC: 14 G/DL (ref 13–17.7)
IMM GRANULOCYTES # BLD AUTO: 0.1 X10E3/UL (ref 0–0.1)
IMM GRANULOCYTES NFR BLD AUTO: 1 %
LDLC SERPL CALC-MCNC: 116 MG/DL (ref 0–99)
LYMPHOCYTES # BLD AUTO: 2.6 X10E3/UL (ref 0.7–3.1)
LYMPHOCYTES NFR BLD AUTO: 42 %
MCH RBC QN AUTO: 32.1 PG (ref 26.6–33)
MCHC RBC AUTO-ENTMCNC: 33.8 G/DL (ref 31.5–35.7)
MCV RBC AUTO: 95 FL (ref 79–97)
MONOCYTES # BLD AUTO: 0.7 X10E3/UL (ref 0.1–0.9)
MONOCYTES NFR BLD AUTO: 10 %
NEUTROPHILS # BLD AUTO: 2.8 X10E3/UL (ref 1.4–7)
NEUTROPHILS NFR BLD AUTO: 45 %
PLATELET # BLD AUTO: 222 X10E3/UL (ref 150–450)
POTASSIUM SERPL-SCNC: 4.1 MMOL/L (ref 3.5–5.2)
PROT SERPL-MCNC: 7.3 G/DL (ref 6–8.5)
RBC # BLD AUTO: 4.36 X10E6/UL (ref 4.14–5.8)
SODIUM SERPL-SCNC: 145 MMOL/L (ref 134–144)
TESTOST SERPL-MCNC: 248 NG/DL (ref 264–916)
TRIGL SERPL-MCNC: 220 MG/DL (ref 0–149)
TSH SERPL DL<=0.005 MIU/L-ACNC: 2.98 UIU/ML (ref 0.45–4.5)
VLDLC SERPL CALC-MCNC: 39 MG/DL (ref 5–40)
WBC # BLD AUTO: 6.3 X10E3/UL (ref 3.4–10.8)

## 2025-06-13 RX ORDER — LEVOTHYROXINE SODIUM 50 UG/1
50 TABLET ORAL DAILY
Qty: 30 TABLET | Refills: 0 | Status: SHIPPED | OUTPATIENT
Start: 2025-06-13

## 2025-06-17 ENCOUNTER — TELEPHONE (OUTPATIENT)
Dept: PSYCHIATRY | Age: 60
End: 2025-06-17

## 2025-06-17 NOTE — TELEPHONE ENCOUNTER
Called and confirmed appt with pt for 6/18 @ 10:30 am     Electronically signed by Aakash Lester on 6/17/2025 at 11:34 AM

## 2025-06-18 ENCOUNTER — OFFICE VISIT (OUTPATIENT)
Dept: PSYCHIATRY | Age: 60
End: 2025-06-18
Payer: MEDICAID

## 2025-06-18 VITALS
HEIGHT: 68 IN | TEMPERATURE: 97.8 F | DIASTOLIC BLOOD PRESSURE: 80 MMHG | WEIGHT: 301.2 LBS | SYSTOLIC BLOOD PRESSURE: 130 MMHG | BODY MASS INDEX: 45.65 KG/M2 | OXYGEN SATURATION: 95 % | HEART RATE: 79 BPM

## 2025-06-18 DIAGNOSIS — Z79.899 HIGH RISK MEDICATION USE: ICD-10-CM

## 2025-06-18 DIAGNOSIS — F31.70 BIPOLAR DISORDER IN REMISSION: Primary | ICD-10-CM

## 2025-06-18 LAB — VALPROATE SERPL-MCNC: 89.1 UG/ML (ref 50–100)

## 2025-06-18 PROCEDURE — 3079F DIAST BP 80-89 MM HG: CPT | Performed by: PSYCHIATRY & NEUROLOGY

## 2025-06-18 PROCEDURE — 3075F SYST BP GE 130 - 139MM HG: CPT | Performed by: PSYCHIATRY & NEUROLOGY

## 2025-06-18 PROCEDURE — 99215 OFFICE O/P EST HI 40 MIN: CPT | Performed by: PSYCHIATRY & NEUROLOGY

## 2025-06-18 ASSESSMENT — PATIENT HEALTH QUESTIONNAIRE - PHQ9
SUM OF ALL RESPONSES TO PHQ QUESTIONS 1-9: 0
6. FEELING BAD ABOUT YOURSELF - OR THAT YOU ARE A FAILURE OR HAVE LET YOURSELF OR YOUR FAMILY DOWN: NOT AT ALL
8. MOVING OR SPEAKING SO SLOWLY THAT OTHER PEOPLE COULD HAVE NOTICED. OR THE OPPOSITE, BEING SO FIGETY OR RESTLESS THAT YOU HAVE BEEN MOVING AROUND A LOT MORE THAN USUAL: NOT AT ALL
9. THOUGHTS THAT YOU WOULD BE BETTER OFF DEAD, OR OF HURTING YOURSELF: NOT AT ALL
5. POOR APPETITE OR OVEREATING: NOT AT ALL
3. TROUBLE FALLING OR STAYING ASLEEP: NOT AT ALL
2. FEELING DOWN, DEPRESSED OR HOPELESS: NOT AT ALL
SUM OF ALL RESPONSES TO PHQ QUESTIONS 1-9: 0
4. FEELING TIRED OR HAVING LITTLE ENERGY: NOT AT ALL
1. LITTLE INTEREST OR PLEASURE IN DOING THINGS: NOT AT ALL
7. TROUBLE CONCENTRATING ON THINGS, SUCH AS READING THE NEWSPAPER OR WATCHING TELEVISION: NOT AT ALL
10. IF YOU CHECKED OFF ANY PROBLEMS, HOW DIFFICULT HAVE THESE PROBLEMS MADE IT FOR YOU TO DO YOUR WORK, TAKE CARE OF THINGS AT HOME, OR GET ALONG WITH OTHER PEOPLE: NOT DIFFICULT AT ALL
SUM OF ALL RESPONSES TO PHQ QUESTIONS 1-9: 0
SUM OF ALL RESPONSES TO PHQ QUESTIONS 1-9: 0

## 2025-06-18 NOTE — PROGRESS NOTES
6/18/2025 12:56 PM   Progress Note          Gennaro Torres 1965      Chief Complaint   Patient presents with    Anxiety         Subjective:    61 yo white male with h/o Bipolar disorder, presents for follow up. On Depakote for a long time. No side effects. Last hospitalization in 2021.    Pt is calm and cooperative. Comes with wife.  Talkative. Mood stable on Depakote. Sleeping well. No episodes of amy/hypomania. Wife voices no safety concerns. Pt is looking for a job.       Objective:      /80   Pulse 79   Temp 97.8 °F (36.6 °C)   Ht 1.727 m (5' 8\")   Wt (!) 136.6 kg (301 lb 3.2 oz)   SpO2 95%   BMI 45.80 kg/m²       Review of Systems - 14 point review:  Negative except for    Constitutional: (fevers, chills, night sweats, wt loss/gain, change in appetite, fatigue, somnolence)    HEENT: (ear pain or discharge, hearing loss, ear ringing, sinus pressure, nosebleed, nasal discharge, sore throat, oral sores, tooth pain, bleeding gums, hoarse voice, neck pain)      Cardiovascular: (HTN, chest pain, elevated cholesterol/lipids, palpitations, leg swelling, leg pain with walking)    Respiratory: (cough, wheezing, snoring, SOB with activity (dyspnea), SOB while lying flat (orthopnea), awakening with severe SOB (paroxysmal nocturnal dyspnea))    Gastrointestinal: (NVD, constipation, abdominal pain, bright red stools, black tarry stools, stool incontinence)     Genitourinary:  (pelvic pain, burning or frequency of urination, urinary urgency, blood in urine incomplete bladder emptying, urinary incontinence, STD; MEN: testicular pain or swelling, erectile dysfunction; WOMEN: LMP, heavy menstrual bleeding (menorrhagia), irregular periods, postmenopausal bleeding, menstrual pain (dymenorrhea, vaginal discharge)    Musculoskeletal: (bone pain/fracture, joint pain or swelling, musle pain)    Integumentary: (rashes, acne, non-healing sores, itching, breast lumps, breast pain, nipple discharge, hair

## 2025-07-14 RX ORDER — TAMSULOSIN HYDROCHLORIDE 0.4 MG/1
1 CAPSULE ORAL
Qty: 90 CAPSULE | Refills: 1 | Status: SHIPPED | OUTPATIENT
Start: 2025-07-14

## 2025-07-14 RX ORDER — LEVOTHYROXINE SODIUM 50 UG/1
50 TABLET ORAL DAILY
Qty: 30 TABLET | Refills: 2 | Status: SHIPPED | OUTPATIENT
Start: 2025-07-14

## 2025-07-15 NOTE — PROGRESS NOTES
[facility] - PODIATRY    Today's Date: 07/18/25     Patient Name: Shankar Velasco  MRN: [id number]  CSN: [id number]  PCP: BRYAN Dave DO  Referring Provider: BRYAN Dave,*    SUBJECTIVE     Chief Complaint   Patient presents with    Establish Care     BRYAN Dave DO 05/29/25  NEW PT - Ingrown right greater toenail, Ingrown left greater toenail, Chronic paronychia of toe of right foot - PROG NOTE 2.12.25   Pt here to establish care. Pt states that both of his great toes have ingrown toenails. Pt states that his other toenails need trimmed if possible.        HPI: Shankar Velasco, a 60 y.o.male, comes to clinic as a(n) new patient complaining of ingrown toenail and complaining of toenail/callus issues. Patient has h/o acid reflux, arthritis, Bell's palsy, hyperlipidemia, hypertension, low back pain, sleep apnea.  Patient presents with elongated, thickened nails.  He reports that his bilateral hallux nails were ingrown and elongated.  He reports recently his right hallux nail fell off and there was another thickened nail underneath.  Reports small amount of pain to the right hallux, but most significant pain to the left hallux where the nail is thickened, discolored and partially avulsed from the nail plate.  Patient is non-diabetic.  Patient reports that he does have significant burning to his bilateral feet.  He reports that he does have low back issues and previously worked with harsh chemicals. Patient reports pain to the left hallux when the area is pressed. Relates previous treatment(s) including OTC antifungal. Denies any constitutional symptoms. No other pedal complaints at this time.    Past Medical History:   Diagnosis Date    Acid reflux     Arthritis     Bell palsy     Chronic pain disorder     Disease of thyroid gland     Diverticulitis of colon     Hyperlipidemia     Hypertension     Low back pain     Neck pain     Sleep apnea     Stomach ulcer      Past  Surgical History:   Procedure Laterality Date    ADENOIDECTOMY      CARDIAC ELECTROPHYSIOLOGY PROCEDURE N/A 6/5/2024    Procedure: Ablation PVC;  Surgeon: Devante Paredes MD;  Location:  [facility];  Service: Cardiovascular;  Laterality: N/A;    COLON RESECTION      COLONOSCOPY  11/08/2016    large amount of diverticular disease in sigmoid colon, internal hemorrhoids    ENDOSCOPY  08/27/2018    meld inflammation and focal lamina propria fibrosis (-) h-Pylori    TONSILLECTOMY       Family History   Problem Relation Age of Onset    Heart disease Mother     Diabetes Mother     Heart disease Father     Lung disease Father     Colon cancer Neg Hx     Colon polyps Neg Hx      Social History     Socioeconomic History    Marital status:    Tobacco Use    Smoking status: Never     Passive exposure: Never    Smokeless tobacco: Never   Vaping Use    Vaping status: Never Used   Substance and Sexual Activity    Alcohol use: No    Drug use: No    Sexual activity: Defer     Allergies   Allergen Reactions    Metformin Diarrhea     Current Outpatient Medications   Medication Sig Dispense Refill    amLODIPine (NORVASC) 10 MG tablet TAKE 1 TABLET BY MOUTH ONCE DAILY 30 tablet 2    atorvastatin (LIPITOR) 10 MG tablet TAKE 1 TABLET BY MOUTH AT BEDTIME 90 tablet 1    cetirizine (zyrTEC) 10 MG tablet TAKE 1 TABLET BY MOUTH ONCE DAILY 30 tablet 1    Diclofenac Sodium (VOLTAREN) 1 % gel gel Apply 4 g topically to the appropriate area as directed Every 4 (Four) Hours As Needed (pain).      divalproex (DEPAKOTE ER) 500 MG 24 hr tablet Take 3 tablets by mouth Every Night. 3 tablets at night      fluticasone (FLONASE) 50 MCG/ACT nasal spray Administer 2 sprays into the nostril(s) as directed by provider Daily. 16 g 1    HYDROcodone-acetaminophen (NORCO) 7.5-325 MG per tablet Take 1 tablet by mouth Every 6 (Six) Hours As Needed for Moderate Pain. 12 tablet 0    levothyroxine (SYNTHROID, LEVOTHROID) 50 MCG tablet TAKE 1  TABLET BY MOUTH ONCE DAILY 30 tablet 2    lisinopril-hydrochlorothiazide (PRINZIDE,ZESTORETIC) 20-25 MG per tablet TAKE 1 TABLET BY MOUTH ONCE DAILY 90 tablet 3    omeprazole (priLOSEC) 20 MG capsule TAKE 1 CAPSULE BY MOUTH ONCE DAILY 30 capsule 2    pregabalin (LYRICA) 150 MG capsule TAKE 1 CAPSULE BY MOUTH TWICE DAILY 60 capsule 4    sildenafil (Viagra) 100 MG tablet Take 1 tablet by mouth Daily As Needed for Erectile Dysfunction. 10 tablet 1    tamsulosin (FLOMAX) 0.4 MG capsule 24 hr capsule TAKE 1 CAPSULE BY MOUTH DAILY AT BEDTIME. 90 capsule 1    furosemide (LASIX) 20 MG tablet Take 1 tablet by mouth 2 (Two) Times a Day. (Patient not taking: Reported on 7/24/2024)      silver sulfadiazine (SILVADENE, SSD) 1 % cream Apply 1 Application topically to the appropriate area as directed Daily As Needed for Wound Care. APPLY TO AFFECTED AREA ONCE DAILY (Patient not taking: Reported on 2/12/2025)      terbinafine (lamiSIL) 250 MG tablet Take 1 tablet by mouth Daily for 30 days. 30 tablet 0     No current facility-administered medications for this visit.     Review of Systems   Constitutional:  Negative for activity change.   HENT:  Negative for congestion.    Respiratory:  Negative for apnea and shortness of breath.    Cardiovascular:  Positive for leg swelling.   Gastrointestinal:  Negative for abdominal pain.   Musculoskeletal:  Positive for arthralgias (Left toe pain) and back pain.   Neurological:  Positive for numbness.   Psychiatric/Behavioral:  Negative for agitation, behavioral problems and confusion.        OBJECTIVE     Vitals:    07/18/25 0833   BP: 134/90   Pulse: 91   SpO2: 95%       PHYSICAL EXAM  GEN:   Accompanied by None.     Foot/Ankle Exam    GENERAL  Appearance:  appears stated age  Orientation:  AAOx3  Affect:  appropriate  Gait:  unimpaired  Assistance:  independent  Right shoe gear: casual shoe  Left shoe gear: casual shoe    VASCULAR     Right Foot Vascularity   Dorsalis pedis:  2+  Posterior  tibial:  2+  Skin temperature:  warm  Edema grading:  None  CFT:  3  Pedal hair growth:  Present  Varicosities:  none     Left Foot Vascularity   Dorsalis pedis:  2+  Posterior tibial:  2+  Skin temperature:  warm  Edema grading:  None  CFT:  3  Pedal hair growth:  Present  Varicosities:  none     NEUROLOGIC     Right Foot Neurologic   Normal sensation    Light touch sensation: normal  Vibratory sensation: normal  Hot/Cold sensation: normal  Protective Sensation using [place] Monofilament:   Sites intact: 10  Sites tested: 10     Left Foot Neurologic   Normal sensation    Light touch sensation: normal  Vibratory sensation: normal  Hot/Cold sensation:  normal  Protective Sensation using [place] Monofilament:   Sites intact: 10  Sites tested: 10    MUSCULOSKELETAL     Right Foot Musculoskeletal   Ecchymosis:  none  Tenderness:  toenail problem    Arch:  Normal     Left Foot Musculoskeletal   Ecchymosis:  none  Tenderness:  toenail problem  Arch:  Normal    MUSCLE STRENGTH     Right Foot Muscle Strength   Foot dorsiflexion:  5  Foot plantar flexion:  5  Foot inversion:  5  Foot eversion:  5     Left Foot Muscle Strength   Foot dorsiflexion:  5  Foot plantar flexion:  5  Foot inversion:  5  Foot eversion:  5    RANGE OF MOTION     Right Foot Range of Motion   Foot and ankle ROM within normal limits       Left Foot Range of Motion   Foot and ankle ROM within normal limits      DERMATOLOGIC      Right Foot Dermatologic   Skin  Right foot skin is intact.   Nails  1.  Positive for elongated, onychomycosis and abnormal thickness.  2.  Positive for elongated and abnormal thickness.  3.  Positive for elongated and abnormal thickness.  4.  Positive for elongated and abnormal thickness.  5.  Positive for elongated and abnormal thickness.     Left Foot Dermatologic   Skin  Left foot skin is intact.   Nails  1.  Positive for elongated, onychomycosis and abnormal thickness.  2.  Positive for elongated and abnormal  thickness.  3.  Positive for elongated and abnormal thickness.  4.  Positive for elongated and abnormally thick.  5.  Positive for elongated and abnormally thick.      RADIOLOGY/NUCLEAR:  No results found.    LABORATORY/CULTURE RESULTS:      PATHOLOGY RESULTS:       ASSESSMENT/PLAN     Diagnoses and all orders for this visit:    1. Onychomycosis (Primary)  -     Hepatic Function Panel; Future  -     terbinafine (lamiSIL) 250 MG tablet; Take 1 tablet by mouth Daily for 30 days.  Dispense: 30 tablet; Refill: 0    2. Paresthesia of both lower extremities    3. Ingrown left greater toenail    4. Toe pain, left      Comprehensive lower extremity examination and evaluation was performed.  Discussed findings and treatment plan including risks, benefits, and treatment options with patient in detail. Patient agreed with treatment plan.  PCP note reviewed.  CBC reviewed.  CMP reviewed/ALT and AST are within normal limits.  Chest with patient that numbness to the bilateral feet is likely caused by low back issues, but can also be caused by exposure to chemicals previously.  Discussed with patient that thickness of toenails may be caused by multiple factors including a fungal infection, trauma to the toenails, psoriasis, or aging.  Discussed that if thickness is due to aging, traumatic event or psoriasis the options for the nails are to keep them trimmed back or have the nails permanently removed.   Due to discoloration and onycholysis of the nail, it is likely that the patient's nail thickness is caused by onychomycosis.  Discussed with patient possible treatments for fungal nails, including keeping nails trimmed back, topical antifungal, and oral antifungals.  Educated patient that topical and oral treatments take time before results are noted.  Discussed that oral antifungals require evaluation of liver function after being on the medication for 1 month.   Patient is agreeable to beginning oral terbinafine.  After he has  completed 1 month of the medication, he will get a liver function test.  If the liver function test within normal limits, the patient will begin 2 more months of oral antifungal.  The patient that if he begins to have significant pain, any of the toenails can be removed permanently.  Patient verbalized understanding  After verbal consent obtained, nail(s) x10 debrided of length and thickness with nail nipper without incidence  Patient may maintain nails and calluses at home utilizing emery board or pumice stone between visits as needed   Patient will return in approximately 3 months for reevaluation of the nails.  Discussed that if he begins to have any more issues with ingrown nails, to contact the office.  An After Visit Summary was printed and given to the patient at discharge, including (if requested) any available informative/educational handouts regarding diagnosis, treatment, or medications. All questions were answered to patient/family satisfaction. Should symptoms fail to improve or worsen they agree to call or return to clinic or to go to the Emergency Department. Discussed the importance of following up with any needed screening tests/labs/specialist appointments and any requested follow-up recommended by me today. Importance of maintaining follow-up discussed and patient accepts that missed appointments can delay diagnosis and potentially lead to worsening of conditions.  Return in about 3 months (around 10/18/2025) for Schedule Foot Care Clinic, With Thao MCBRIDE., or sooner if acute issues arise.      This document has been electronically signed by REED Palomares on July 18, 2025 11:49 CDT

## 2025-07-18 ENCOUNTER — OFFICE VISIT (OUTPATIENT)
Age: 60
End: 2025-07-18
Payer: MEDICAID

## 2025-07-18 ENCOUNTER — PATIENT ROUNDING (BHMG ONLY) (OUTPATIENT)
Age: 60
End: 2025-07-18
Payer: MEDICAID

## 2025-07-18 VITALS
WEIGHT: 293 LBS | HEART RATE: 91 BPM | OXYGEN SATURATION: 95 % | DIASTOLIC BLOOD PRESSURE: 90 MMHG | BODY MASS INDEX: 43.4 KG/M2 | HEIGHT: 69 IN | SYSTOLIC BLOOD PRESSURE: 134 MMHG

## 2025-07-18 DIAGNOSIS — R20.2 PARESTHESIA OF BOTH LOWER EXTREMITIES: ICD-10-CM

## 2025-07-18 DIAGNOSIS — B35.1 ONYCHOMYCOSIS: Primary | ICD-10-CM

## 2025-07-18 DIAGNOSIS — M79.675 TOE PAIN, LEFT: ICD-10-CM

## 2025-07-18 DIAGNOSIS — L60.0 INGROWN LEFT GREATER TOENAIL: ICD-10-CM

## 2025-07-18 RX ORDER — TERBINAFINE HYDROCHLORIDE 250 MG/1
250 TABLET ORAL DAILY
Qty: 30 TABLET | Refills: 0 | Status: SHIPPED | OUTPATIENT
Start: 2025-07-18 | End: 2025-08-17

## 2025-07-18 NOTE — PROGRESS NOTES
July 18, 2025    Hello, may I speak with Shankar Velasco?    My name is Citlali      I am  with [facility]  [address]  [facility] [address] [phone].    Before we get started may I verify your date of birth? 1965    I am calling to officially welcome you to our practice and ask about your recent visit. Is this a good time to talk? yes    Tell me about your visit with us. What things went well?  Patient said the visit went well and everyone was very nice.        We're always looking for ways to make our patients' experiences even better. Do you have recommendations on ways we may improve?  no    Overall were you satisfied with your first visit to our practice? yes       I appreciate you taking the time to speak with me today. Is there anything else I can do for you? no      Thank you, and have a great day.

## 2025-07-22 RX ORDER — ATORVASTATIN CALCIUM 10 MG/1
10 TABLET, FILM COATED ORAL
Qty: 90 TABLET | Refills: 1 | Status: SHIPPED | OUTPATIENT
Start: 2025-07-22

## 2025-08-04 RX ORDER — OMEPRAZOLE 20 MG/1
20 CAPSULE, DELAYED RELEASE ORAL DAILY
Qty: 30 CAPSULE | Refills: 2 | Status: SHIPPED | OUTPATIENT
Start: 2025-08-04

## 2025-08-12 ENCOUNTER — TELEPHONE (OUTPATIENT)
Age: 60
End: 2025-08-12
Payer: MEDICAID

## 2025-08-12 DIAGNOSIS — B35.1 ONYCHOMYCOSIS: Primary | ICD-10-CM

## 2025-08-12 RX ORDER — CICLOPIROX 80 MG/ML
SOLUTION TOPICAL NIGHTLY
Qty: 6.6 ML | Refills: 11 | Status: SHIPPED | OUTPATIENT
Start: 2025-08-12 | End: 2026-07-14

## 2025-08-18 ENCOUNTER — TELEPHONE (OUTPATIENT)
Dept: PSYCHIATRY | Age: 60
End: 2025-08-18

## 2025-08-19 ENCOUNTER — TELEPHONE (OUTPATIENT)
Dept: PSYCHIATRY | Age: 60
End: 2025-08-19

## 2025-08-20 ENCOUNTER — OFFICE VISIT (OUTPATIENT)
Dept: PSYCHIATRY | Age: 60
End: 2025-08-20
Payer: MEDICAID

## 2025-08-20 VITALS
BODY MASS INDEX: 42.89 KG/M2 | DIASTOLIC BLOOD PRESSURE: 71 MMHG | OXYGEN SATURATION: 96 % | HEIGHT: 69 IN | WEIGHT: 289.6 LBS | TEMPERATURE: 98 F | SYSTOLIC BLOOD PRESSURE: 118 MMHG | HEART RATE: 79 BPM

## 2025-08-20 DIAGNOSIS — F31.70 BIPOLAR DISORDER IN REMISSION: Primary | ICD-10-CM

## 2025-08-20 PROCEDURE — 99214 OFFICE O/P EST MOD 30 MIN: CPT | Performed by: PSYCHIATRY & NEUROLOGY

## 2025-08-20 PROCEDURE — 3074F SYST BP LT 130 MM HG: CPT | Performed by: PSYCHIATRY & NEUROLOGY

## 2025-08-20 PROCEDURE — 3078F DIAST BP <80 MM HG: CPT | Performed by: PSYCHIATRY & NEUROLOGY

## 2025-08-20 ASSESSMENT — PATIENT HEALTH QUESTIONNAIRE - PHQ9
SUM OF ALL RESPONSES TO PHQ QUESTIONS 1-9: 0
8. MOVING OR SPEAKING SO SLOWLY THAT OTHER PEOPLE COULD HAVE NOTICED. OR THE OPPOSITE, BEING SO FIGETY OR RESTLESS THAT YOU HAVE BEEN MOVING AROUND A LOT MORE THAN USUAL: NOT AT ALL
4. FEELING TIRED OR HAVING LITTLE ENERGY: NOT AT ALL
3. TROUBLE FALLING OR STAYING ASLEEP: NOT AT ALL
7. TROUBLE CONCENTRATING ON THINGS, SUCH AS READING THE NEWSPAPER OR WATCHING TELEVISION: NOT AT ALL
SUM OF ALL RESPONSES TO PHQ QUESTIONS 1-9: 0
1. LITTLE INTEREST OR PLEASURE IN DOING THINGS: NOT AT ALL
9. THOUGHTS THAT YOU WOULD BE BETTER OFF DEAD, OR OF HURTING YOURSELF: NOT AT ALL
2. FEELING DOWN, DEPRESSED OR HOPELESS: NOT AT ALL
SUM OF ALL RESPONSES TO PHQ QUESTIONS 1-9: 0
10. IF YOU CHECKED OFF ANY PROBLEMS, HOW DIFFICULT HAVE THESE PROBLEMS MADE IT FOR YOU TO DO YOUR WORK, TAKE CARE OF THINGS AT HOME, OR GET ALONG WITH OTHER PEOPLE: NOT DIFFICULT AT ALL
6. FEELING BAD ABOUT YOURSELF - OR THAT YOU ARE A FAILURE OR HAVE LET YOURSELF OR YOUR FAMILY DOWN: NOT AT ALL
5. POOR APPETITE OR OVEREATING: NOT AT ALL
SUM OF ALL RESPONSES TO PHQ QUESTIONS 1-9: 0

## 2025-09-05 RX ORDER — DIVALPROEX SODIUM 500 MG/1
1500 TABLET, FILM COATED, EXTENDED RELEASE ORAL NIGHTLY
Qty: 90 TABLET | Refills: 3 | Status: SHIPPED | OUTPATIENT
Start: 2025-09-05

## (undated) DEVICE — Device: Brand: SMARTABLATE

## (undated) DEVICE — SI AVANTI+ 7F STD W/GW  NO OBT: Brand: AVANTI

## (undated) DEVICE — SUREFIT, DUAL DISPERSIVE ELECTRODE, CONTACT QUALITY MONITOR: Brand: SUREFIT

## (undated) DEVICE — SOL IRR NACL 0.9PCT BT 1000ML

## (undated) DEVICE — ANGIO-SEAL VIP VASCULAR CLOSURE DEVICE: Brand: ANGIO-SEAL

## (undated) DEVICE — INTRO STEER AGILIS NXT MED/CURL 8.5F

## (undated) DEVICE — SI AVANTI+ 10F STD W/GW: Brand: AVANTI

## (undated) DEVICE — BI-DIRECTIONAL NAVIGATION CATHETER, NAV, D-F: Brand: QDOT MICRO

## (undated) DEVICE — PK CATH CARD 30 CA/4

## (undated) DEVICE — PERCLOSE™ PROSTYLE™ SUTURE-MEDIATED CLOSURE AND REPAIR SYSTEM: Brand: PERCLOSE™ PROSTYLE™

## (undated) DEVICE — KT NDL GUIDE STRL 18GA

## (undated) DEVICE — SYS COL WAST NAMIC IV SGL/LN FML/FIT W/VNT/SPK/HD 72IN

## (undated) DEVICE — SOLIDIFIER LIQUI LOC PLUS 2000CC

## (undated) DEVICE — PAD, DEFIB, ADULT, RADIOTRANS, PHYSIO: Brand: MEDLINE

## (undated) DEVICE — SLV CBL IVL 5X96IN

## (undated) DEVICE — Device: Brand: REFERENCE PATCH CARTO 3

## (undated) DEVICE — TBG PRESS/MONITR FIX M/F LL A/ 48IN STRL

## (undated) DEVICE — Device: Brand: SOUNDSTAR

## (undated) DEVICE — Device: Brand: WEBSTER CS

## (undated) DEVICE — STPCK 3/WY HP M/RA W/OFF/HNDL 1050PSI STRL

## (undated) DEVICE — SI AVANTI+ 8F STD W/GW  NO OBT: Brand: AVANTI